# Patient Record
Sex: MALE | Race: WHITE | NOT HISPANIC OR LATINO | Employment: UNEMPLOYED | URBAN - METROPOLITAN AREA
[De-identification: names, ages, dates, MRNs, and addresses within clinical notes are randomized per-mention and may not be internally consistent; named-entity substitution may affect disease eponyms.]

---

## 2017-10-26 ENCOUNTER — APPOINTMENT (OUTPATIENT)
Dept: NON INVASIVE DIAGNOSTICS | Facility: HOSPITAL | Age: 57
DRG: 247 | End: 2017-10-26

## 2017-10-26 ENCOUNTER — APPOINTMENT (EMERGENCY)
Dept: RADIOLOGY | Facility: HOSPITAL | Age: 57
DRG: 247 | End: 2017-10-26

## 2017-10-26 ENCOUNTER — HOSPITAL ENCOUNTER (INPATIENT)
Facility: HOSPITAL | Age: 57
LOS: 1 days | Discharge: HOME | DRG: 247 | End: 2017-10-28
Attending: EMERGENCY MEDICINE | Admitting: INTERNAL MEDICINE

## 2017-10-26 ENCOUNTER — GENERIC CONVERSION - ENCOUNTER (OUTPATIENT)
Dept: OTHER | Facility: OTHER | Age: 57
End: 2017-10-26

## 2017-10-26 ENCOUNTER — APPOINTMENT (OUTPATIENT)
Dept: INTERVENTIONAL RADIOLOGY/VASCULAR | Facility: HOSPITAL | Age: 57
DRG: 247 | End: 2017-10-26
Attending: INTERNAL MEDICINE

## 2017-10-26 DIAGNOSIS — I25.10 CORONARY ARTERY DISEASE INVOLVING NATIVE CORONARY ARTERY: ICD-10-CM

## 2017-10-26 DIAGNOSIS — E78.00 HYPERCHOLESTEROLEMIA: ICD-10-CM

## 2017-10-26 DIAGNOSIS — I20.0 UNSTABLE ANGINA (HCC): Primary | ICD-10-CM

## 2017-10-26 DIAGNOSIS — E78.5 HYPERLIPIDEMIA: ICD-10-CM

## 2017-10-26 PROBLEM — R07.9 CHEST PAIN: Status: ACTIVE | Noted: 2017-10-26

## 2017-10-26 PROBLEM — Z72.0 TOBACCO ABUSE: Status: ACTIVE | Noted: 2017-10-26

## 2017-10-26 LAB
ALBUMIN SERPL BCP-MCNC: 3.9 G/DL (ref 3.5–5)
ALP SERPL-CCNC: 94 U/L (ref 46–116)
ALT SERPL W P-5'-P-CCNC: 47 U/L (ref 12–78)
ANION GAP SERPL CALCULATED.3IONS-SCNC: 7 MMOL/L (ref 4–13)
ANION GAP SERPL CALCULATED.3IONS-SCNC: 8 MMOL/L (ref 4–13)
APTT PPP: 34 SECONDS (ref 23–35)
APTT PPP: 47 SECONDS (ref 23–35)
AST SERPL W P-5'-P-CCNC: 29 U/L (ref 5–45)
ATRIAL RATE: 50 BPM
ATRIAL RATE: 54 BPM
BASOPHILS # BLD AUTO: 0.07 THOUSANDS/ΜL (ref 0–0.1)
BASOPHILS NFR BLD AUTO: 1 % (ref 0–1)
BILIRUB SERPL-MCNC: 0.4 MG/DL (ref 0.2–1)
BUN SERPL-MCNC: 14 MG/DL (ref 5–25)
BUN SERPL-MCNC: 17 MG/DL (ref 5–25)
CALCIUM SERPL-MCNC: 9 MG/DL (ref 8.3–10.1)
CALCIUM SERPL-MCNC: 9.1 MG/DL (ref 8.3–10.1)
CHLORIDE SERPL-SCNC: 103 MMOL/L (ref 100–108)
CHLORIDE SERPL-SCNC: 104 MMOL/L (ref 100–108)
CHOLEST SERPL-MCNC: 250 MG/DL (ref 50–200)
CO2 SERPL-SCNC: 28 MMOL/L (ref 21–32)
CO2 SERPL-SCNC: 29 MMOL/L (ref 21–32)
CREAT SERPL-MCNC: 0.94 MG/DL (ref 0.6–1.3)
CREAT SERPL-MCNC: 1 MG/DL (ref 0.6–1.3)
EOSINOPHIL # BLD AUTO: 0.16 THOUSAND/ΜL (ref 0–0.61)
EOSINOPHIL NFR BLD AUTO: 2 % (ref 0–6)
ERYTHROCYTE [DISTWIDTH] IN BLOOD BY AUTOMATED COUNT: 12.8 % (ref 11.6–15.1)
ERYTHROCYTE [DISTWIDTH] IN BLOOD BY AUTOMATED COUNT: 12.8 % (ref 11.6–15.1)
ERYTHROCYTE [DISTWIDTH] IN BLOOD BY AUTOMATED COUNT: 12.9 % (ref 11.6–15.1)
GFR SERPL CREATININE-BSD FRML MDRD: 84 ML/MIN/1.73SQ M
GFR SERPL CREATININE-BSD FRML MDRD: 91 ML/MIN/1.73SQ M
GLUCOSE P FAST SERPL-MCNC: 86 MG/DL (ref 65–99)
GLUCOSE SERPL-MCNC: 86 MG/DL (ref 65–140)
GLUCOSE SERPL-MCNC: 95 MG/DL (ref 65–140)
HCT VFR BLD AUTO: 44.5 % (ref 36.5–49.3)
HCT VFR BLD AUTO: 44.6 % (ref 36.5–49.3)
HCT VFR BLD AUTO: 45.1 % (ref 36.5–49.3)
HDLC SERPL-MCNC: 44 MG/DL (ref 40–60)
HGB BLD-MCNC: 14.9 G/DL (ref 12–17)
HGB BLD-MCNC: 14.9 G/DL (ref 12–17)
HGB BLD-MCNC: 15.2 G/DL (ref 12–17)
INR PPP: 0.94 (ref 0.86–1.16)
INR PPP: 1.09 (ref 0.86–1.16)
LDLC SERPL CALC-MCNC: 196 MG/DL (ref 0–100)
LYMPHOCYTES # BLD AUTO: 2.3 THOUSANDS/ΜL (ref 0.6–4.47)
LYMPHOCYTES NFR BLD AUTO: 29 % (ref 14–44)
MAGNESIUM SERPL-MCNC: 1.8 MG/DL (ref 1.6–2.6)
MCH RBC QN AUTO: 30.3 PG (ref 26.8–34.3)
MCH RBC QN AUTO: 30.4 PG (ref 26.8–34.3)
MCH RBC QN AUTO: 30.5 PG (ref 26.8–34.3)
MCHC RBC AUTO-ENTMCNC: 33.4 G/DL (ref 31.4–37.4)
MCHC RBC AUTO-ENTMCNC: 33.5 G/DL (ref 31.4–37.4)
MCHC RBC AUTO-ENTMCNC: 33.7 G/DL (ref 31.4–37.4)
MCV RBC AUTO: 91 FL (ref 82–98)
MONOCYTES # BLD AUTO: 0.71 THOUSAND/ΜL (ref 0.17–1.22)
MONOCYTES NFR BLD AUTO: 9 % (ref 4–12)
NEUTROPHILS # BLD AUTO: 4.7 THOUSANDS/ΜL (ref 1.85–7.62)
NEUTS SEG NFR BLD AUTO: 59 % (ref 43–75)
NRBC BLD AUTO-RTO: 0 /100 WBCS
P AXIS: 48 DEGREES
P AXIS: 61 DEGREES
PLATELET # BLD AUTO: 268 THOUSANDS/UL (ref 149–390)
PLATELET # BLD AUTO: 275 THOUSANDS/UL (ref 149–390)
PLATELET # BLD AUTO: 281 THOUSANDS/UL (ref 149–390)
PMV BLD AUTO: 10.1 FL (ref 8.9–12.7)
PMV BLD AUTO: 9.8 FL (ref 8.9–12.7)
PMV BLD AUTO: 9.9 FL (ref 8.9–12.7)
POTASSIUM SERPL-SCNC: 3.7 MMOL/L (ref 3.5–5.3)
POTASSIUM SERPL-SCNC: 4.3 MMOL/L (ref 3.5–5.3)
PR INTERVAL: 164 MS
PR INTERVAL: 172 MS
PROT SERPL-MCNC: 8.1 G/DL (ref 6.4–8.2)
PROTHROMBIN TIME: 12.8 SECONDS (ref 12.1–14.4)
PROTHROMBIN TIME: 14.3 SECONDS (ref 12.1–14.4)
QRS AXIS: -16 DEGREES
QRS AXIS: 28 DEGREES
QRSD INTERVAL: 104 MS
QRSD INTERVAL: 112 MS
QT INTERVAL: 428 MS
QT INTERVAL: 448 MS
QTC INTERVAL: 405 MS
QTC INTERVAL: 408 MS
RBC # BLD AUTO: 4.9 MILLION/UL (ref 3.88–5.62)
RBC # BLD AUTO: 4.92 MILLION/UL (ref 3.88–5.62)
RBC # BLD AUTO: 4.98 MILLION/UL (ref 3.88–5.62)
SODIUM SERPL-SCNC: 139 MMOL/L (ref 136–145)
SODIUM SERPL-SCNC: 140 MMOL/L (ref 136–145)
T WAVE AXIS: -8 DEGREES
T WAVE AXIS: 7 DEGREES
TRIGL SERPL-MCNC: 48 MG/DL
TROPONIN I SERPL-MCNC: <0.02 NG/ML
VENTRICULAR RATE: 50 BPM
VENTRICULAR RATE: 54 BPM
WBC # BLD AUTO: 8 THOUSAND/UL (ref 4.31–10.16)
WBC # BLD AUTO: 8.39 THOUSAND/UL (ref 4.31–10.16)
WBC # BLD AUTO: 8.5 THOUSAND/UL (ref 4.31–10.16)

## 2017-10-26 PROCEDURE — 80048 BASIC METABOLIC PNL TOTAL CA: CPT | Performed by: PHYSICIAN ASSISTANT

## 2017-10-26 PROCEDURE — 84484 ASSAY OF TROPONIN QUANT: CPT | Performed by: INTERNAL MEDICINE

## 2017-10-26 PROCEDURE — 36415 COLL VENOUS BLD VENIPUNCTURE: CPT | Performed by: EMERGENCY MEDICINE

## 2017-10-26 PROCEDURE — 93005 ELECTROCARDIOGRAM TRACING: CPT | Performed by: PHYSICIAN ASSISTANT

## 2017-10-26 PROCEDURE — 85027 COMPLETE CBC AUTOMATED: CPT | Performed by: PHYSICIAN ASSISTANT

## 2017-10-26 PROCEDURE — 80053 COMPREHEN METABOLIC PANEL: CPT | Performed by: EMERGENCY MEDICINE

## 2017-10-26 PROCEDURE — 85730 THROMBOPLASTIN TIME PARTIAL: CPT | Performed by: INTERNAL MEDICINE

## 2017-10-26 PROCEDURE — 85610 PROTHROMBIN TIME: CPT | Performed by: PHYSICIAN ASSISTANT

## 2017-10-26 PROCEDURE — 99285 EMERGENCY DEPT VISIT HI MDM: CPT

## 2017-10-26 PROCEDURE — 84484 ASSAY OF TROPONIN QUANT: CPT | Performed by: EMERGENCY MEDICINE

## 2017-10-26 PROCEDURE — 80061 LIPID PANEL: CPT | Performed by: PHYSICIAN ASSISTANT

## 2017-10-26 PROCEDURE — 71020 HB CHEST X-RAY 2VW FRONTAL&LATL: CPT

## 2017-10-26 PROCEDURE — 99152 MOD SED SAME PHYS/QHP 5/>YRS: CPT | Performed by: PHYSICIAN ASSISTANT

## 2017-10-26 PROCEDURE — 93005 ELECTROCARDIOGRAM TRACING: CPT | Performed by: EMERGENCY MEDICINE

## 2017-10-26 PROCEDURE — 93306 TTE W/DOPPLER COMPLETE: CPT

## 2017-10-26 PROCEDURE — 85730 THROMBOPLASTIN TIME PARTIAL: CPT | Performed by: PHYSICIAN ASSISTANT

## 2017-10-26 PROCEDURE — 83735 ASSAY OF MAGNESIUM: CPT | Performed by: PHYSICIAN ASSISTANT

## 2017-10-26 PROCEDURE — 85025 COMPLETE CBC W/AUTO DIFF WBC: CPT | Performed by: EMERGENCY MEDICINE

## 2017-10-26 RX ORDER — ASPIRIN 325 MG
325 TABLET, DELAYED RELEASE (ENTERIC COATED) ORAL DAILY
Status: DISCONTINUED | OUTPATIENT
Start: 2017-10-26 | End: 2017-10-27

## 2017-10-26 RX ORDER — ATORVASTATIN CALCIUM 20 MG/1
20 TABLET, FILM COATED ORAL
Status: DISCONTINUED | OUTPATIENT
Start: 2017-10-26 | End: 2017-10-27

## 2017-10-26 RX ORDER — NICOTINE 21 MG/24HR
1 PATCH, TRANSDERMAL 24 HOURS TRANSDERMAL DAILY
Status: DISCONTINUED | OUTPATIENT
Start: 2017-10-26 | End: 2017-10-28 | Stop reason: HOSPADM

## 2017-10-26 RX ORDER — FENTANYL CITRATE 50 UG/ML
INJECTION, SOLUTION INTRAMUSCULAR; INTRAVENOUS CODE/TRAUMA/SEDATION MEDICATION
Status: COMPLETED | OUTPATIENT
Start: 2017-10-26 | End: 2017-10-26

## 2017-10-26 RX ORDER — MIDAZOLAM HYDROCHLORIDE 1 MG/ML
INJECTION INTRAMUSCULAR; INTRAVENOUS CODE/TRAUMA/SEDATION MEDICATION
Status: COMPLETED | OUTPATIENT
Start: 2017-10-26 | End: 2017-10-26

## 2017-10-26 RX ORDER — HEPARIN SODIUM 1000 [USP'U]/ML
4000 INJECTION, SOLUTION INTRAVENOUS; SUBCUTANEOUS AS NEEDED
Status: DISCONTINUED | OUTPATIENT
Start: 2017-10-26 | End: 2017-10-27

## 2017-10-26 RX ORDER — HEPARIN SODIUM 1000 [USP'U]/ML
4000 INJECTION, SOLUTION INTRAVENOUS; SUBCUTANEOUS ONCE
Status: COMPLETED | OUTPATIENT
Start: 2017-10-26 | End: 2017-10-26

## 2017-10-26 RX ORDER — HEPARIN SODIUM 10000 [USP'U]/100ML
3-20 INJECTION, SOLUTION INTRAVENOUS
Status: DISCONTINUED | OUTPATIENT
Start: 2017-10-26 | End: 2017-10-27

## 2017-10-26 RX ORDER — SODIUM CHLORIDE 9 MG/ML
100 INJECTION, SOLUTION INTRAVENOUS CONTINUOUS
Status: DISCONTINUED | OUTPATIENT
Start: 2017-10-26 | End: 2017-10-27

## 2017-10-26 RX ORDER — HEPARIN SODIUM 1000 [USP'U]/ML
2000 INJECTION, SOLUTION INTRAVENOUS; SUBCUTANEOUS AS NEEDED
Status: DISCONTINUED | OUTPATIENT
Start: 2017-10-26 | End: 2017-10-27

## 2017-10-26 RX ADMIN — ATORVASTATIN CALCIUM 20 MG: 20 TABLET, FILM COATED ORAL at 17:38

## 2017-10-26 RX ADMIN — HEPARIN SODIUM 2000 UNITS: 1000 INJECTION, SOLUTION INTRAVENOUS; SUBCUTANEOUS at 21:17

## 2017-10-26 RX ADMIN — FENTANYL CITRATE 50 MCG: 50 INJECTION, SOLUTION INTRAMUSCULAR; INTRAVENOUS at 12:01

## 2017-10-26 RX ADMIN — ASPIRIN 325 MG: 325 TABLET, DELAYED RELEASE ORAL at 11:22

## 2017-10-26 RX ADMIN — MIDAZOLAM HYDROCHLORIDE 1 MG: 1 INJECTION, SOLUTION INTRAMUSCULAR; INTRAVENOUS at 12:01

## 2017-10-26 RX ADMIN — SODIUM CHLORIDE 100 ML/HR: 0.9 INJECTION, SOLUTION INTRAVENOUS at 14:57

## 2017-10-26 RX ADMIN — HEPARIN SODIUM 4000 UNITS: 1000 INJECTION, SOLUTION INTRAVENOUS; SUBCUTANEOUS at 13:42

## 2017-10-26 RX ADMIN — NICOTINE 1 PATCH: 21 PATCH, EXTENDED RELEASE TRANSDERMAL at 15:55

## 2017-10-26 RX ADMIN — HEPARIN SODIUM AND DEXTROSE 12 UNITS/KG/HR: 10000; 5 INJECTION INTRAVENOUS at 13:45

## 2017-10-26 NOTE — ED PROVIDER NOTES
History  Chief Complaint   Patient presents with    Chest Pain     Pt states that he started with chest pain yesterday that is across the top of his chest  Pt denies any cardiac hx      This is a 52-year-old male presenting here to the department with a chief complaint of random chest pain  He states that chest pain when he gets it radiates across the entire chest also to the left neck  The chest pain can be exertional in can also be at rest   He has at least 1 episode today at random times usually it is relieved by just resting  This morning he had really sharp pain where he was doubled over  He reports that in the past he had cardiac catheterization was told he had 60% occlusion on   He continues to smoke  He has history is concerning for unstable angina  None       Past Medical History:   Diagnosis Date    Irregular heart beat        History reviewed  No pertinent surgical history  History reviewed  No pertinent family history  I have reviewed and agree with the history as documented  Social History   Substance Use Topics    Smoking status: Current Every Day Smoker     Types: Cigarettes    Smokeless tobacco: Never Used    Alcohol use No        Review of Systems   Constitutional: Negative for diaphoresis, fatigue and fever  HENT: Negative for congestion, ear pain, nosebleeds and sore throat  Eyes: Negative for photophobia, pain, discharge and visual disturbance  Respiratory: Negative for cough, choking, chest tightness, shortness of breath and wheezing  Cardiovascular: Positive for chest pain  Negative for palpitations  Gastrointestinal: Negative for abdominal distention, abdominal pain, diarrhea and vomiting  Genitourinary: Negative for dysuria, flank pain and frequency  Musculoskeletal: Negative for back pain, gait problem and joint swelling  Skin: Negative for color change and rash  Neurological: Negative for dizziness, syncope and headaches  Psychiatric/Behavioral: Negative for behavioral problems and confusion  The patient is not nervous/anxious  All other systems reviewed and are negative  Physical Exam  ED Triage Vitals [10/26/17 0831]   Temperature Pulse Respirations Blood Pressure SpO2   98 2 °F (36 8 °C) 58 16 137/68 98 %      Temp Source Heart Rate Source Patient Position - Orthostatic VS BP Location FiO2 (%)   Oral Monitor Sitting Right arm --      Pain Score       Worst Possible Pain           Orthostatic Vital Signs  Vitals:    10/26/17 1225 10/26/17 1240 10/26/17 1310 10/26/17 1500   BP: 138/79 122/68 126/83 117/58   Pulse: (!) 51 (!) 51 (!) 47 (!) 50   Patient Position - Orthostatic VS:    Lying       Physical Exam   Constitutional: He is oriented to person, place, and time  He appears well-developed and well-nourished  HENT:   Head: Normocephalic and atraumatic  Eyes: Pupils are equal, round, and reactive to light  Neck: Normal range of motion  Neck supple  Cardiovascular: Normal rate, regular rhythm, normal heart sounds and normal pulses  PMI is not displaced  Pulmonary/Chest: Effort normal and breath sounds normal  No respiratory distress  Abdominal: Soft  He exhibits no distension  There is no guarding  Musculoskeletal: Normal range of motion  Lymphadenopathy:     He has no cervical adenopathy  Neurological: He is alert and oriented to person, place, and time  Skin: Skin is warm and dry  No rash noted  He is not diaphoretic  No pallor  Psychiatric: He has a normal mood and affect  Vitals reviewed        ED Medications  Medications   aspirin (ECOTRIN) EC tablet 325 mg (325 mg Oral Given 10/26/17 1122)   atorvastatin (LIPITOR) tablet 20 mg (not administered)   heparin (porcine) 25,000 units in 250 mL infusion (premix) (12 Units/kg/hr × 80 kg (Order-Specific) Intravenous New Bag 10/26/17 1345)   heparin (porcine) injection 4,000 Units (not administered)   heparin (porcine) injection 2,000 Units (not administered)   sodium chloride 0 9 % infusion (100 mL/hr Intravenous New Bag 10/26/17 7497)   nicotine (NICODERM CQ) 21 mg/24 hr TD 24 hr patch 1 patch (1 patch Transdermal Medication Applied 10/26/17 4815)   heparin (porcine) injection 4,000 Units (4,000 Units Intravenous Given 10/26/17 1342)   midazolam (VERSED) injection (1 mg Intravenous Given 10/26/17 1201)   fentanyl citrate (PF) 100 MCG/2ML (50 mcg Intravenous Given 10/26/17 1201)       Diagnostic Studies  Results Reviewed     Procedure Component Value Units Date/Time    APTT six (6) hours after Heparin bolus/drip initiation or dosing change [[id number]]  (Normal) Collected:  10/26/17 1051    Lab Status:  Final result Specimen:  Blood from Arm, Right Updated:  10/26/17 1113     PTT 34 seconds     Narrative:          Therapeutic Heparin Range = 60-90 seconds    Protime-INR [[id number]]  (Normal) Collected:  10/26/17 1051    Lab Status:  Final result Specimen:  Blood from Arm, Right Updated:  10/26/17 1113     Protime 12 8 seconds      INR 0 94    CBC [[id number]]  (Normal) Collected:  10/26/17 1051    Lab Status:  Final result Specimen:  Blood from Arm, Right Updated:  10/26/17 1103     WBC 8 39 Thousand/uL      RBC 4 90 Million/uL      Hemoglobin 14 9 g/dL      Hematocrit 44 5 %      MCV 91 fL      MCH 30 4 pg      MCHC 33 5 g/dL      RDW 12 8 %      Platelets 353 Thousands/uL      MPV 9 9 fL     TSH, 3rd generation [[id number]]     Lab Status:  No result Specimen:  Blood     Troponin I [[id number]]  (Normal) Collected:  10/26/17 0842    Lab Status:  Final result Specimen:  Blood from Arm, Right Updated:  10/26/17 0937     Troponin I <0 02 ng/mL     Narrative:         [employer] Chemistry analyzer 99% cutoff is > 0 04 ng/mL in network labs    o cTnI 99% cutoff is useful only when applied to patients in the clinical setting of myocardial ischemia  o cTnI 99% cutoff should be interpreted in the context of clinical history, ECG findings and possibly cardiac imaging to establish correct diagnosis  o cTnI 99% cutoff may be suggestive but clearly not indicative of a coronary event without the clinical setting of myocardial ischemia  Comprehensive metabolic panel [[id number]] Collected:  10/26/17 0842    Lab Status:  Final result Specimen:  Blood from Arm, Right Updated:  10/26/17 0934     Sodium 139 mmol/L      Potassium 4 3 mmol/L      Chloride 103 mmol/L      CO2 28 mmol/L      Anion Gap 8 mmol/L      BUN 17 mg/dL      Creatinine 0 94 mg/dL      Glucose 95 mg/dL      Calcium 9 1 mg/dL      AST 29 U/L      ALT 47 U/L      Alkaline Phosphatase 94 U/L      Total Protein 8 1 g/dL      Albumin 3 9 g/dL      Total Bilirubin 0 40 mg/dL      eGFR 91 ml/min/1 73sq m     Narrative:         National Kidney Disease Education Program recommendations are as follows:  GFR calculation is accurate only with a steady state creatinine  Chronic Kidney disease less than 60 ml/min/1 73 sq  meters  Kidney failure less than 15 ml/min/1 73 sq  meters  CBC and differential [[id number]]  (Normal) Collected:  10/26/17 0842    Lab Status:  Final result Specimen:  Blood from Arm, Right Updated:  10/26/17 0856     WBC 8 00 Thousand/uL      RBC 4 98 Million/uL      Hemoglobin 15 2 g/dL      Hematocrit 45 1 %      MCV 91 fL      MCH 30 5 pg      MCHC 33 7 g/dL      RDW 12 9 %      MPV 10 1 fL      Platelets 448 Thousands/uL      nRBC 0 /100 WBCs      Neutrophils Relative 59 %      Lymphocytes Relative 29 %      Monocytes Relative 9 %      Eosinophils Relative 2 %      Basophils Relative 1 %      Neutrophils Absolute 4 70 Thousands/µL      Lymphocytes Absolute 2 30 Thousands/µL      Monocytes Absolute 0 71 Thousand/µL      Eosinophils Absolute 0 16 Thousand/µL      Basophils Absolute 0 07 Thousands/µL                  X-ray chest 2 views   Final Result by Kym Vegas MD (10/26 0920)      Mild central airway peribronchial thickening suggesting bronchitis           Workstation performed: [id number] Procedures  Procedures       Phone Contacts  ED Phone Contact    ED Course  ED Course          HEART Risk Score    Flowsheet Row Most Recent Value   History  2 Filed at: 10/26/2017 1623   ECG  1 Filed at: 10/26/2017 1623   Age  1 Filed at: 10/26/2017 1623   Risk Factors  1 Filed at: 10/26/2017 1623   Troponin  0 Filed at: 10/26/2017 1623   Heart Score Risk Calculator   History  2 Filed at: 10/26/2017 1623   ECG  1 Filed at: 10/26/2017 1623   Age  1 Filed at: 10/26/2017 1623   Risk Factors  1 Filed at: 10/26/2017 1623   Troponin  0 Filed at: 10/26/2017 1623   HEART Score  5 Filed at: 10/26/2017 1623   HEART Score  5 Filed at: 10/26/2017 1623                            [facility]  Number of Diagnoses or Management Options  Unstable angina [facility]): new and requires workup  Diagnosis management comments: Although there is no ST elevation on EKG the patient story is very concerning for unstable angina the case was discussed with Cardiology who evaluated the patient here in the department and determined that he may need catheterization given his risk factors and history  Amount and/or Complexity of Data Reviewed  Clinical lab tests: reviewed and ordered  Tests in the radiology section of CPT®: reviewed and ordered  Tests in the medicine section of CPT®: reviewed and ordered  Review and summarize past medical records: yes  Independent visualization of images, tracings, or specimens: yes    Patient Progress  Patient progress: stable    CritCare Time    Disposition  Final diagnoses:   Unstable angina ([address] )     Time reflects when diagnosis was documented in both MDM as applicable and the Disposition within this note     Time User Action Codes Description Comment    10/26/2017  9:58 AM Barbara Barraza Add [I20 0] Unstable angina [facility])       ED Disposition     None      Follow-up Information    None       There are no discharge medications for this patient  No discharge procedures on file      ED Provider  Electronically Signed by           Ilana Goldman, RALPH  10/26/17 Narda Hughes, RALPH  10/26/17 6760

## 2017-10-26 NOTE — PLAN OF CARE
DISCHARGE PLANNING     Discharge to home or other facility with appropriate resources Progressing        DISCHARGE PLANNING - CARE MANAGEMENT     Discharge to post-acute care or home with appropriate resources Progressing        INFECTION - ADULT     Absence or prevention of progression during hospitalization Progressing        Knowledge Deficit     Patient/family/caregiver demonstrates understanding of disease process, treatment plan, medications, and discharge instructions Progressing        Nutrition/Hydration-ADULT     Nutrient/Hydration intake appropriate for improving, restoring or maintaining nutritional needs Progressing        PAIN - ADULT     Verbalizes/displays adequate comfort level or baseline comfort level Progressing        SAFETY ADULT     Patient will remain free of falls Progressing     Maintain or return to baseline ADL function Progressing     Maintain or return mobility status to optimal level Progressing

## 2017-10-26 NOTE — PROGRESS NOTES
Patient transported to [facility] 1 emergently for emergency procedure coming into lab  Report given to [facility], care assumed  VSS

## 2017-10-26 NOTE — CONSULTS
Consultation - Cardiology Team One  Dede Gonsales 55 y o  male MRN: [id number]  Unit/Bed#: ED 14 Encounter: [id number]    Inpatient consult to Cardiology  Consult performed by: Tatum Devlin ordered by: Lida Wolfe          Physician Requesting Consult: No att  providers found  Reason for Consult / Principal Problem: Unstable angina    HPI: Cardiologist Dr Elijah Tejeda is a 55y o  year old male who has a history of CAD (cath in 2009 showed left main 40% and RCA 40% stenosis) and tobacco abuse  Pt came to ED with complaints of  Chest pain for the last couple of weeks  He states that the chest pain is a pressure like pain in the left side of his chest  He states it occurs with rest and exertion  He states this am it was 10/10 on pain scale  He states it lasted a few minutes then resolved, but he got scare and came to ED for evaluation  He denies shortness of breath  He has family history of CAD, his father had Mi age 47  He is a smoker  REVIEW OF SYSTEMS:  Constitutional:  Denies fever or chills   Eyes:  Denies change in visual acuity   HENT:  Denies nasal congestion or sore throat   Respiratory:  Denies cough or shortness of breath   Cardiovascular: +chest pain Denies edema   GI:  Denies abdominal pain, nausea, vomiting, bloody stools or diarrhea   :  Denies dysuria, frequency, difficulty in micturition and nocturia  Musculoskeletal:  Denies back pain or joint pain   Neurologic:  Denies headache, focal weakness or sensory changes   Endocrine:  Denies polyuria or polydipsia   Lymphatic:  Denies swollen glands   Psychiatric:  Denies depression or anxiety     Historical Information   History reviewed  No pertinent past medical history  History reviewed  No pertinent surgical history    History   Alcohol Use No     History   Drug Use No     History   Smoking Status    Current Every Day Smoker    Types: Cigarettes   Smokeless Tobacco    Never Used       Family History: History reviewed  No pertinent family history  MEDS & ALLERGIES:  all current active meds have been reviewed and current meds: Current Facility-Administered Medications   Medication Dose Route Frequency    aspirin (ECOTRIN) EC tablet 325 mg  325 mg Oral Daily    atorvastatin (LIPITOR) tablet 20 mg  20 mg Oral Daily With Dinner    heparin (porcine) 25,000 units in 250 mL infusion (premix)  3-20 Units/kg/hr (Order-Specific) Intravenous Titrated    heparin (porcine) injection 2,000 Units  2,000 Units Intravenous PRN    heparin (porcine) injection 4,000 Units  4,000 Units Intravenous Once    heparin (porcine) injection 4,000 Units  4,000 Units Intravenous PRN       heparin (porcine) 3-20 Units/kg/hr (Order-Specific)     No Known Allergies    OBJECTIVE:  Vitals:   Vitals:    10/26/17 1045   BP: 145/86   Pulse: (!) 51   Resp: 16   Temp:    SpO2: 100%     Body mass index is 29 05 kg/m²  Systolic (63ZSQ), JAYNE:910 , Min:137 , OGX:921     Diastolic (45PMD), VGS:01, Min:68, Max:86    No intake or output data in the 24 hours ending 10/26/17 1057  Weight (last 2 days)     Date/Time   Weight    10/26/17 0831  81 6 (180)            Invasive Devices     Peripheral Intravenous Line            Peripheral IV 10/26/17 Right Antecubital less than 1 day                PHYSICAL EXAMS:  General:  Patient is not in acute distress, laying in the bed comfortably, awake, alert responding to commands  Head: Normocephalic, Atraumatic  HEENT: White sclera, pink conjunctiva,  PERRLA,pharynx benign  Neck:  Supple, no neck vein distention, carotids+2/+2 no bruits, thyromegaly, adenopathy  Respiratory: clear to P/A  Cardiovascular:  PMI normal, S1-S2 normal, No  Murmurs, thrills, gallops, rubs   Regular rhythm  GI:  Abdomen soft nontender   No hepatosplenomegaly, adenopathy, ascites,or rebound tenderness  Extremities: No edema, normal pulses, no calf tenderness, no joint deformities, no venous disease   Integument:  No skin rashes or ulceration  Lymphatic:  No cervical or inguinal lymphadenopathy  Neurologic:  Patient is awake alert, responding to command, well-oriented to time and place and person moving all extremities    LABORATORY RESULTS:    Results from last 7 days  Lab Units 10/26/17  0842   TROPONIN I ng/mL <0 02     CBC with diff:   Results from last 7 days  Lab Units 10/26/17  0842   WBC Thousand/uL 8 00   HEMOGLOBIN g/dL 15 2   HEMATOCRIT % 45 1   MCV fL 91   PLATELETS Thousands/uL 281   MCH pg 30 5   MCHC g/dL 33 7   RDW % 12 9   MPV fL 10 1   NRBC AUTO /100 WBCs 0       CMP:  Results from last 7 days  Lab Units 10/26/17  0842   SODIUM mmol/L 139   POTASSIUM mmol/L 4 3   CHLORIDE mmol/L 103   CO2 mmol/L 28   ANION GAP mmol/L 8   BUN mg/dL 17   CREATININE mg/dL 0 94   GLUCOSE RANDOM mg/dL 95   CALCIUM mg/dL 9 1   AST U/L 29   ALT U/L 47   ALK PHOS U/L 94   TOTAL PROTEIN g/dL 8 1   ALBUMIN g/dL 3 9   BILIRUBIN TOTAL mg/dL 0 40   EGFR ml/min/1 73sq m 91       BMP:  Results from last 7 days  Lab Units 10/26/17  0842   SODIUM mmol/L 139   POTASSIUM mmol/L 4 3   CHLORIDE mmol/L 103   CO2 mmol/L 28   BUN mg/dL 17   CREATININE mg/dL 0 94   GLUCOSE RANDOM mg/dL 95   CALCIUM mg/dL 9 1                                    Imaging:   I have personally reviewed pertinent reports  EKG reviewed personally:  Sinus richie, st depressions inferiorly    Telemetry reviewed personally:   Sinus richie HR 50's    Assessment/Plan:  1  Chest pain; 1st trop negative; in patient with known CAD (cath in 2009 left main 40% and rca 40% stenosis) add heparin gtt  Add asa and lipitor  No bb given bradycardia  Check echo to assess lvef and rwm  Discussed the need for cardiac cath to assess for progression of CAD  Risk and benefits reviewed, all questions answered  Pt was quoted risk of 1% for bleeding, infection,stroke and MI  Consent will be obtained  Npo after midnight  Pt to have cardiac cath tomorrow,  10/27/2017   Further recommendations will be based upon cardiac cath findings  Code Status: No Order    Counseling / Coordination of Care  Total floor / unit time spent today 35 minutes  Greater than 50% of total time was spent with the patient and / or family counseling and / or coordination of care  A description of the counseling / coordination of care: Review of history, current assessment, development of a plan      Connor Palomo PA-C  10/26/2017,10:57 AM

## 2017-10-26 NOTE — H&P
History and Physical - [facility]    Patient Information: Michelle Gillette 55 y o  male MRN: [id number]  Unit/Bed#: ED 14 Encounter: [id number]  Admitting Physician: Babar Fountain MD  PCP: No primary care provider on file  Date of Admission:  10/26/17    Assessment/Plan:    Hospital Problem List:     Principal Problem:    Chest pain  Active Problems:    Tobacco abuse    Coronary artery disease involving native coronary artery    Present on Admission:   Chest pain   Tobacco abuse   Coronary artery disease involving native coronary artery      Plan for the Primary Problem(s):  · Chest pain/unstable angina  Patient with history of having coronary artery disease-60% of 1 of his artery was clogged as per patient about 5 years ago  Seen by Cardiology  Is being started on IV heparin in addition to aspirin and statins  Patient takes baby aspirin at home  Not a candidate of a beta-blocker as he is bradycardic with heart rate going into high 40s and low 50s  · Bradycardia  Patient was pretty of lack before  Has been doing less work out  Would check his TSH  · Tobacco use  Encouraged to quit    Plan for Additional Problems:   · As above     VTE Prophylaxis: Heparin Drip  / sequential compression device   Code Status:  Full code  POLST: There is no POLST form on file for this patient (pre-hospital)    Anticipated Length of Stay:  Patient will be admitted on an Observation basis with an anticipated length of stay of  less than 2 midnights  Justification for Hospital Stay:  Chest pain        Chief Complaint:   Chest pain    History of Present Illness:    Michelle Gillette is a 55 y o  male who presents with off and on chest pain going on for the last couple of weeks  This morning his chest pain was 10/10 in intensity  At the moment chest pain is resolved and it is 0/10 in intensity  Chest pain was like pressure in upper part of his chest   He also has some discomfort of his neck    No shortness of breath  Chest pain would come at any time while at times he would be working and at times he would be just at rest   Chest pain would also be relieved on its own  No real exacerbating or relieving factors  No headache  No dizziness  Denies any fever or chills  Denies any abdominal pain  Denies any nausea or vomiting    Patient had a cardiac catheterization done about 5 years ago and as per patient 1 of his artery was 60% blocked  Review of Systems    All systems are reviewed  Positive as per history of presenting illness  Patient answered no to all other questions  Past Medical and Surgical History:  Coronary artery disease as above            Meds/Allergies:  No known allergies to medications, food or dye    Prior to Admission medications    Baby aspirin daily     I have reviewed home medications with patient personally  Allergies: No Known Allergies    Social History:     Marital Status: /Civil Union   Occupation:  He works in construction business  Patient Pre-hospital Living Situation:  With family  Patient Pre-hospital Level of Mobility:  Independent  Patient Pre-hospital Diet Restrictions:  None  Substance Use History:   History   Alcohol Use No     History   Smoking Status    Current Every Day Smoker    Types: Cigarettes   Smokeless Tobacco    Never Used     History   Drug Use No       Family History:    His father  at age [de-identified] and he had an MI  Physical Exam      Vitals:   Blood Pressure: 145/86 (10/26/17 1045)  Pulse: (!) 51 (10/26/17 104)  Temperature: 98 2 °F (36 8 °C) (10/26/17 0831)  Temp Source: Oral (10/26/17 0831)  Respirations: 16 (10/26/17 1045)  Height: 5' 6" (167 6 cm) (10/26/17 0831)  Weight - Scale: 81 6 kg (180 lb) (10/26/17 0831)  SpO2: 100 % (10/26/17 1045)    Vital signs are reviewed as above  Constitutional:  Patient laying in stretcher in the ER  Eyes: EOM grossly intact  Conjunctivae slightly pale   Patient has anicteric sclera  HENT: Oropharynx are moist  Did not notice any significant lesions on the tongue  Head normocephalic  Neck: Neck is supple  I was not able to visualize any JVD at 90°  There is no significant lymphadenopathy  I also did not notice any significant thyromegaly  Cardiac: I did not hear any rubs or gallop  Patient appears to be in sinus rhythm although bradycardic  Respiratory: Patient not in significant respiratory distress  Air entry in general is good  GI: Abdomen is soft  It is grossly nontender  Bowel sounds are adequate  I was not able to appreciate any hepatosplenomegaly  Neurologic:  Patient is awake and alert  Neurological examination is grossly intact  No obvious focal neurological deficit noticed  Skin: Skin is warm and dry  Psychiatric: Mood and affect are pleasant  Musculoskeletal  Patient moving all extremities   Has chronic arthritic joints   Extremities: Patient has no significant cyanosis, clubbing, or lower extremity edema           Additional Data:     Lab Results: I have personally reviewed pertinent reports  Results from last 7 days  Lab Units 10/26/17  1051 10/26/17  0842   WBC Thousand/uL 8 39 8 00   HEMOGLOBIN g/dL 14 9 15 2   HEMATOCRIT % 44 5 45 1   PLATELETS Thousands/uL 275 281   NEUTROS PCT %  --  59   LYMPHS PCT %  --  29   MONOS PCT %  --  9   EOS PCT %  --  2       Results from last 7 days  Lab Units 10/26/17  0842   SODIUM mmol/L 139   POTASSIUM mmol/L 4 3   CHLORIDE mmol/L 103   CO2 mmol/L 28   BUN mg/dL 17   CREATININE mg/dL 0 94   CALCIUM mg/dL 9 1   TOTAL PROTEIN g/dL 8 1   BILIRUBIN TOTAL mg/dL 0 40   ALK PHOS U/L 94   ALT U/L 47   AST U/L 29   GLUCOSE RANDOM mg/dL 95       Results from last 7 days  Lab Units 10/26/17  1051   INR  0 94       Imaging: I have personally reviewed pertinent reports  X-ray Chest 2 Views    Result Date: 10/26/2017  Narrative: CHEST DUAL ENERGY INDICATION:  Chest pain for a week  Smoker   COMPARISON:  None VIEWS:  PA and lateral projections; 2 images FINDINGS:     Cardiomediastinal silhouette appears unremarkable  Mild central away peribronchial thickening   No pneumothorax or pleural effusion  Visualized osseous structures appear within normal limits for the patient's age  Impression: Mild central airway peribronchial thickening suggesting bronchitis  Workstation performed: [id number]       EKG, Pathology, and Other Studies Reviewed on Admission:   · EKG:  Sinus Faythe [place]  I do not see any acute ischemic changes  Heart rate in 50s    Allscripts Records Reviewed: No     ** Please Note: Dragon 360 Dictation voice to text software may have been used in the creation of this document   **

## 2017-10-26 NOTE — PROGRESS NOTES
Patient transported to [facility] on stretcher on monitor, report given to [facility], care assumed  Expalined PTT needed to be drawn in 6 hours and that cardiac cath is scheduled for 0800 tomorrow morning  Patient agreeable to plan, Dr Munoz Query aware

## 2017-10-27 ENCOUNTER — APPOINTMENT (OUTPATIENT)
Dept: INTERVENTIONAL RADIOLOGY/VASCULAR | Facility: HOSPITAL | Age: 57
DRG: 247 | End: 2017-10-27
Attending: INTERNAL MEDICINE

## 2017-10-27 PROBLEM — I20.0 UNSTABLE ANGINA (HCC): Status: ACTIVE | Noted: 2017-10-26

## 2017-10-27 LAB
APTT PPP: 43 SECONDS (ref 23–35)
APTT PPP: 52 SECONDS (ref 23–35)

## 2017-10-27 PROCEDURE — B211YZZ FLUOROSCOPY OF MULTIPLE CORONARY ARTERIES USING OTHER CONTRAST: ICD-10-PCS | Performed by: INTERNAL MEDICINE

## 2017-10-27 PROCEDURE — C1887 CATHETER, GUIDING: HCPCS | Performed by: PHYSICIAN ASSISTANT

## 2017-10-27 PROCEDURE — C1874 STENT, COATED/COV W/DEL SYS: HCPCS

## 2017-10-27 PROCEDURE — 93458 L HRT ARTERY/VENTRICLE ANGIO: CPT | Performed by: PHYSICIAN ASSISTANT

## 2017-10-27 PROCEDURE — 85730 THROMBOPLASTIN TIME PARTIAL: CPT | Performed by: INTERNAL MEDICINE

## 2017-10-27 PROCEDURE — 4A023N7 MEASUREMENT OF CARDIAC SAMPLING AND PRESSURE, LEFT HEART, PERCUTANEOUS APPROACH: ICD-10-PCS | Performed by: INTERNAL MEDICINE

## 2017-10-27 PROCEDURE — 99152 MOD SED SAME PHYS/QHP 5/>YRS: CPT | Performed by: PHYSICIAN ASSISTANT

## 2017-10-27 PROCEDURE — C1769 GUIDE WIRE: HCPCS | Performed by: PHYSICIAN ASSISTANT

## 2017-10-27 PROCEDURE — 99153 MOD SED SAME PHYS/QHP EA: CPT | Performed by: PHYSICIAN ASSISTANT

## 2017-10-27 PROCEDURE — 027135Z DILATION OF CORONARY ARTERY, TWO ARTERIES WITH TWO DRUG-ELUTING INTRALUMINAL DEVICES, PERCUTANEOUS APPROACH: ICD-10-PCS | Performed by: INTERNAL MEDICINE

## 2017-10-27 PROCEDURE — C9601 PERC DRUG-EL COR STENT BRAN: HCPCS | Performed by: PHYSICIAN ASSISTANT

## 2017-10-27 PROCEDURE — C1725 CATH, TRANSLUMIN NON-LASER: HCPCS | Performed by: PHYSICIAN ASSISTANT

## 2017-10-27 PROCEDURE — C1894 INTRO/SHEATH, NON-LASER: HCPCS | Performed by: PHYSICIAN ASSISTANT

## 2017-10-27 PROCEDURE — C9600 PERC DRUG-EL COR STENT SING: HCPCS | Performed by: PHYSICIAN ASSISTANT

## 2017-10-27 RX ORDER — MIDAZOLAM HYDROCHLORIDE 1 MG/ML
INJECTION INTRAMUSCULAR; INTRAVENOUS CODE/TRAUMA/SEDATION MEDICATION
Status: COMPLETED | OUTPATIENT
Start: 2017-10-27 | End: 2017-10-27

## 2017-10-27 RX ORDER — LIDOCAINE HYDROCHLORIDE 10 MG/ML
INJECTION, SOLUTION INFILTRATION; PERINEURAL CODE/TRAUMA/SEDATION MEDICATION
Status: COMPLETED | OUTPATIENT
Start: 2017-10-27 | End: 2017-10-27

## 2017-10-27 RX ORDER — ACETAMINOPHEN 325 MG/1
650 TABLET ORAL EVERY 6 HOURS PRN
Status: DISCONTINUED | OUTPATIENT
Start: 2017-10-27 | End: 2017-10-28 | Stop reason: HOSPADM

## 2017-10-27 RX ORDER — ATORVASTATIN CALCIUM 40 MG/1
40 TABLET, FILM COATED ORAL
Status: DISCONTINUED | OUTPATIENT
Start: 2017-10-27 | End: 2017-10-28 | Stop reason: HOSPADM

## 2017-10-27 RX ORDER — NITROGLYCERIN 0.4 MG/1
0.4 TABLET SUBLINGUAL
Status: DISCONTINUED | OUTPATIENT
Start: 2017-10-27 | End: 2017-10-28 | Stop reason: HOSPADM

## 2017-10-27 RX ORDER — SODIUM CHLORIDE 9 MG/ML
75 INJECTION, SOLUTION INTRAVENOUS CONTINUOUS
Status: DISPENSED | OUTPATIENT
Start: 2017-10-27 | End: 2017-10-27

## 2017-10-27 RX ORDER — FENTANYL CITRATE 50 UG/ML
INJECTION, SOLUTION INTRAMUSCULAR; INTRAVENOUS CODE/TRAUMA/SEDATION MEDICATION
Status: COMPLETED | OUTPATIENT
Start: 2017-10-27 | End: 2017-10-27

## 2017-10-27 RX ORDER — ASPIRIN 81 MG/1
81 TABLET ORAL DAILY
Status: DISCONTINUED | OUTPATIENT
Start: 2017-10-28 | End: 2017-10-28 | Stop reason: HOSPADM

## 2017-10-27 RX ORDER — ONDANSETRON 2 MG/ML
4 INJECTION INTRAMUSCULAR; INTRAVENOUS EVERY 6 HOURS PRN
Status: DISCONTINUED | OUTPATIENT
Start: 2017-10-27 | End: 2017-10-28 | Stop reason: HOSPADM

## 2017-10-27 RX ORDER — NITROGLYCERIN 20 MG/100ML
INJECTION INTRAVENOUS CODE/TRAUMA/SEDATION MEDICATION
Status: COMPLETED | OUTPATIENT
Start: 2017-10-27 | End: 2017-10-27

## 2017-10-27 RX ORDER — MAGNESIUM HYDROXIDE/ALUMINUM HYDROXICE/SIMETHICONE 120; 1200; 1200 MG/30ML; MG/30ML; MG/30ML
30 SUSPENSION ORAL EVERY 6 HOURS PRN
Status: DISCONTINUED | OUTPATIENT
Start: 2017-10-27 | End: 2017-10-28 | Stop reason: HOSPADM

## 2017-10-27 RX ORDER — VERAPAMIL HCL 2.5 MG/ML
AMPUL (ML) INTRAVENOUS CODE/TRAUMA/SEDATION MEDICATION
Status: COMPLETED | OUTPATIENT
Start: 2017-10-27 | End: 2017-10-27

## 2017-10-27 RX ADMIN — VERAPAMIL HYDROCHLORIDE 2.5 MG: 2.5 INJECTION, SOLUTION INTRAVENOUS at 08:59

## 2017-10-27 RX ADMIN — MIDAZOLAM HYDROCHLORIDE 2 MG: 1 INJECTION, SOLUTION INTRAMUSCULAR; INTRAVENOUS at 08:26

## 2017-10-27 RX ADMIN — FENTANYL CITRATE 50 MCG: 50 INJECTION, SOLUTION INTRAMUSCULAR; INTRAVENOUS at 08:55

## 2017-10-27 RX ADMIN — FENTANYL CITRATE 50 MCG: 50 INJECTION, SOLUTION INTRAMUSCULAR; INTRAVENOUS at 08:26

## 2017-10-27 RX ADMIN — BIVALIRUDIN 1.75 MG/KG/HR: 250 INJECTION, POWDER, LYOPHILIZED, FOR SOLUTION INTRAVENOUS at 08:48

## 2017-10-27 RX ADMIN — SODIUM CHLORIDE 75 ML/HR: 0.9 INJECTION, SOLUTION INTRAVENOUS at 11:30

## 2017-10-27 RX ADMIN — TICAGRELOR 180 MG: 90 TABLET ORAL at 09:10

## 2017-10-27 RX ADMIN — ACETAMINOPHEN 650 MG: 325 TABLET, FILM COATED ORAL at 11:26

## 2017-10-27 RX ADMIN — MIDAZOLAM HYDROCHLORIDE 1 MG: 1 INJECTION, SOLUTION INTRAMUSCULAR; INTRAVENOUS at 08:57

## 2017-10-27 RX ADMIN — ATORVASTATIN CALCIUM 40 MG: 40 TABLET, FILM COATED ORAL at 16:09

## 2017-10-27 RX ADMIN — NITROGLYCERIN 200 MCG: 20 INJECTION INTRAVENOUS at 08:30

## 2017-10-27 RX ADMIN — TICAGRELOR 90 MG: 90 TABLET ORAL at 23:00

## 2017-10-27 RX ADMIN — HEPARIN SODIUM 2000 UNITS: 1000 INJECTION, SOLUTION INTRAVENOUS; SUBCUTANEOUS at 04:30

## 2017-10-27 RX ADMIN — NITROGLYCERIN 200 MCG: 20 INJECTION INTRAVENOUS at 08:59

## 2017-10-27 RX ADMIN — ASPIRIN 325 MG: 325 TABLET, DELAYED RELEASE ORAL at 07:33

## 2017-10-27 RX ADMIN — LIDOCAINE HYDROCHLORIDE 1 ML: 10 INJECTION, SOLUTION INFILTRATION; PERINEURAL at 08:29

## 2017-10-27 RX ADMIN — VERAPAMIL HYDROCHLORIDE 2.5 MG: 2.5 INJECTION, SOLUTION INTRAVENOUS at 08:30

## 2017-10-27 RX ADMIN — IOHEXOL 115 ML: 350 INJECTION, SOLUTION INTRAVENOUS at 09:13
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
## 2017-10-27 NOTE — PROGRESS NOTES
[facility] Progress Note  Patient: Hector Mendoza 55 y o  male   MRN: [id number]  PCP: No primary care provider on file  Unit/Bed#: [facility] Encounter: [id number]  Date Of Visit: 10/27/17    Assessment/Plan:    Principal Problem:    Unstable angina (HCC)  Active Problems:    Tobacco abuse    Coronary artery disease involving native coronary artery    Present on Admission:   Unstable angina ([address] )   Tobacco abuse   Coronary artery disease involving native coronary artery      · Unstable angina /coronary artery disease  Patient status post stent placement x2  Not a candidate for beta-blocker because of resting bradycardia  · Dyslipidemia  On statin  Would increase the dose  Further titration on outpatient basis  · Tobacco use  Encouraged to quit      VTE Pharmacologic Prophylaxis:   Pharmacologic: Heparin Drip  Mechanical VTE Prophylaxis in Place: Yes    Patient Centered Rounds: I have performed bedside rounds with nursing staff today  Discussions with Specialists or Other Care Team Provider:  Yes    Education and Discussions with Family / Patient:  Yes    Time Spent for Care: 35+ minutes  More than 50% of total time spent on counseling and coordination of care as described above  Current Length of Stay: 0 day(s)    Current Patient Status: Observation   Certification Statement: The patient will continue to require additional inpatient hospital stay due to Post cardiac catheterization/stent placement/unstable angina    Discharge Plan:  Home when stable    Code Status: Level 1 - Full Code      Subjective:   Patient status post cardiac catheterization and placement of 2 stents  Denies any chest pain, however, he is complaining of headache  Denies any nausea or vomiting    Denies any fever or chills    Objective:     Vitals:   Temp (24hrs), Av °F (36 7 °C), Min:97 7 °F (36 5 °C), Max:98 1 °F (36 7 °C)    HR:  [47-57] 48  Resp:  [14-22] 18  BP: (114-160)/(58-87) 137/71  SpO2: [96 %-100 %] 100 %  Body mass index is 29 28 kg/m²  Input and Output Summary (last 24 hours): Intake/Output Summary (Last 24 hours) at 10/27/17 1111  Last data filed at 10/27/17 0617   Gross per 24 hour   Intake             1340 ml   Output              450 ml   Net              890 ml           Physical Exam:     Vital signs are reviewed as above  Constitutional:  Patient in bed  Patient laying comfortably although somewhat sleepy in complaining of some headache  Eyes: EOM grossly intact  HENT: Oropharynx are slightly   Neck: Neck is supple  Cardiac: I did not hear any rubs or gallop  Patient appears to be in sinus rhythm although bradycardic  Respiratory: Patient not in significant respiratory distress  Air entry in general is fair anterolaterally  GI: Abdomen is soft  It is grossly nontender  Bowel sounds are adequate  I was not able to appreciate any hepatosplenomegaly  Neurologic:  Patient is awake and alert  Neurological examination is grossly intact  No obvious focal neurological deficit noticed  He is somewhat sleepy post Cath  Skin: Skin is warm and dry  Psychiatric: Mood and affect are pleasant  Musculoskeletal  Patient moving all extremities   Extremities: Patient has no significant cyanosis, clubbing, or lower extremity edema       Additional Data:     Labs:      Results from last 7 days  Lab Units 10/26/17  1454  10/26/17  0842   WBC Thousand/uL 8 50  < > 8 00   HEMOGLOBIN g/dL 14 9  < > 15 2   HEMATOCRIT % 44 6  < > 45 1   PLATELETS Thousands/uL 268  < > 281   NEUTROS PCT %  --   --  59   LYMPHS PCT %  --   --  29   MONOS PCT %  --   --  9   EOS PCT %  --   --  2   < > = values in this interval not displayed      Results from last 7 days  Lab Units 10/26/17  1454 10/26/17  0842   SODIUM mmol/L 140 139   POTASSIUM mmol/L 3 7 4 3   CHLORIDE mmol/L 104 103   CO2 mmol/L 29 28   BUN mg/dL 14 17   CREATININE mg/dL 1 00 0 94   CALCIUM mg/dL 9 0 9 1   TOTAL PROTEIN g/dL  --  8 1 BILIRUBIN TOTAL mg/dL  --  0 40   ALK PHOS U/L  --  94   ALT U/L  --  47   AST U/L  --  29   GLUCOSE RANDOM mg/dL 86 95       Results from last 7 days  Lab Units 10/26/17  1454   INR  1 09       * I Have Reviewed All Lab Data Listed Above  * Additional Pertinent Lab Tests Reviewed: All Labs Within Last 24 Hours Reviewed      Recent Cultures (last 7 days):           Last 24 Hours Medication List:     aspirin 325 mg Oral Daily   atorvastatin 20 mg Oral Daily With Dinner   nicotine 1 patch Transdermal Daily        Today, Patient Was Seen By: Sheridan Hall MD    ** Please Note: Dragon 360 Dictation voice to text software may have been used in the creation of this document   **
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
## 2017-10-27 NOTE — BRIEF OP NOTE (RAD/CATH)
Left heart catheterization, circumflex and RCA PCI Procedure Note    PATIENT NAME: Nicol Rapp  : 1960  MRN: [id number]     Pre-op Diagnosis:   1  Unstable angina (HCC)      Post-op Diagnosis:   1   Unstable angina ([address] )        Surgeon:   Oneida Serrato MD    Estimated Blood Loss: Minimal  Findings:   90% proximal circumflex lesion status post drug-eluting stent basement  80% mid RCA lesion status post drug-eluting stent placement    Complications:  None    Anesthesia: Conscious sedation and Local     Plan: aspirin and brilanta of at least one year    Oneida Serrato MD     Date: 10/27/2017  Time: 3:05 PM

## 2017-10-27 NOTE — CASE MANAGEMENT
[address] in the [facility] by Andrew Clemons for 2017  Network Utilization Review Department  Phone: [phone]; Fax [phone]  ATTENTION: The Network Utilization Review Department is now centralized for our 7 Facilities  Make a note that we have a new phone and fax numbers for our Department  Please call with any questions or concerns to [phone] and carefully follow the prompts so that you are directed to the right person  All voicemails are confidential  Fax any determinations, approvals, denials, and requests for initial or continue stay review clinical to [phone]  Due to HIGH CALL volume, it would be easier if you could please send faxed requests to expedite your requests and in part, help us provide discharge notifications faster  Initial Clinical Review    Admission: Date/Time/Statement: 10/26  1106    Orders Placed This Encounter   Procedures    Place in Observation     Standing Status:   Standing     Number of Occurrences:   1     Order Specific Question:   Admitting Physician     Answer:   Juan Jose Frost [[id number]]     Order Specific Question:   Level of Care     Answer:   Med Surg [16]     ED: Date/Time/Mode of Arrival:   ED Arrival Information     Expected Arrival Acuity Means of Arrival Escorted By Service Admission Type    - 10/26/2017 08:26 Urgent Walk-In Self General Medicine Urgent    Arrival Complaint    Chest pain          Chief Complaint:   Chief Complaint   Patient presents with    Chest Pain     Pt states that he started with chest pain yesterday that is across the top of his chest  Pt denies any cardiac hx        History of Illness:      Alanis Contreras is a 55 y o  male who presents with off and on chest pain going on for the last couple of weeks  This morning his chest pain was 10/10 in intensity  At the moment chest pain is resolved and it is 0/10 in intensity    Chest pain was like pressure in upper part of his chest  He also has some discomfort of his neck  No shortness of breath  Chest pain would come at any time while at times he would be working and at times he would be just at rest   Chest pain would also be relieved on its own  No real exacerbating or relieving factors  No headache  No dizziness  Denies any fever or chills  Denies any abdominal pain  Denies any nausea or vomiting  ED Vital Signs:   ED Triage Vitals [10/26/17 0831]   Temperature Pulse Respirations Blood Pressure SpO2   98 2 °F (36 8 °C) 58 16 137/68 98 %      Temp Source Heart Rate Source Patient Position - Orthostatic VS BP Location FiO2 (%)   Oral Monitor Sitting Right arm --      Pain Score       Worst Possible Pain        Wt Readings from Last 1 Encounters:   10/26/17 82 3 kg (181 lb 7 oz)       Vital Signs (abnormal):   10/26/17 1310 --  47 22 126/83 100 % -- CL   10/26/17 1240 --  51 14 122/68 100 % -- CL   10/26/17 1225 --  51 17 138/79 100 % -- CL   10/26/17 1210 --  51 18 136/75 100 % -- CL   10/26/17 1128 --  52 16 160/83 100 % -- SJO   10/26/17 1045 --  51 16 145/86 100 %           Abnormal Labs/Diagnostic Test Results: CXR -     Mild central airway peribronchial thickening suggesting bronchitis  EKG- SB   54    ED Treatment:   Medication Administration from 10/26/2017 0826 to 10/26/2017 1403       Date/Time Order Dose Route Action Action by Comments     10/26/2017 1122 aspirin (ECOTRIN) EC tablet 325 mg 325 mg Oral Given Nereyda Frias RN      10/26/2017 1342 heparin (porcine) injection 4,000 Units 4,000 Units Intravenous Given Keisha Moreau RN      10/26/2017 1345 heparin (porcine) 25,000 units in 250 mL infusion (premix) 12 Units/kg/hr Intravenous Hu Camargo RN      10/26/2017 1201 midazolam (VERSED) injection 1 mg Intravenous Given Keisha Moreau RN      10/26/2017 1201 fentanyl citrate (PF) 100 MCG/2ML 50 mcg Intravenous Given Keisha Moreau RN           Past Medical/Surgical History:    Active Ambulatory Problems     Diagnosis Date Noted    No Active Ambulatory Problems     Resolved Ambulatory Problems     Diagnosis Date Noted    No Resolved Ambulatory Problems     Past Medical History:   Diagnosis Date    Irregular heart beat        Admitting Diagnosis: Unstable angina (HCC) [I20 0]  Chest pain [R07 9]    Age/Sex: 55 y o  male    Assessment/Plan: Hospital Problem List:   Principal Problem:    Chest pain  Active Problems:    Tobacco abuse    Coronary artery disease involving native coronary artery   Present on Admission:   Chest pain   Tobacco abuse   Coronary artery disease involving native coronary artery   Plan for the Primary Problem(s):  · Chest pain/unstable angina  Patient with history of having coronary artery disease-60% of 1 of his artery was clogged as per patient about 5 years ago  Seen by Cardiology  Is being started on IV heparin in addition to aspirin and statins  Patient takes baby aspirin at home  Not a candidate of a beta-blocker as he is bradycardic with heart rate going into high 40s and low 50s  · Bradycardia  Patient was pretty of lack before  Has been doing less work out  Would check his TSH  ? Tobacco use  Encouraged to quit   Plan for Additional Problems:   · As above    VTE Prophylaxis: Heparin Drip  / sequential compression device   Code Status:  Full code  POLST: There is no POLST form on file for this patient (pre-hospital)   Anticipated Length of Stay:  Patient will be admitted on an Observation basis with an anticipated length of stay of  less than 2 midnights     Justification for Hospital Stay:  Chest pain       Admission Orders:  Scheduled Meds:   aspirin 325 mg Oral Daily   atorvastatin 20 mg Oral Daily With Dinner   nicotine 1 patch Transdermal Daily     Continuous Infusions:   heparin (porcine) 3-20 Units/kg/hr (Order-Specific) Last Rate: Stopped (10/27/17 7061)   sodium chloride 100 mL/hr Last Rate: 100 mL/hr (10/26/17 1457)     PRN Meds: heparin (porcine)   heparin (porcine)    NPO   Up and OOB as haley   Tele   10/27 ptt  10/26 tsh   Cardiac cath  10/27  SCD  Serial trop       Cardiology consult  10/26  Assessment/Plan:  1  Chest pain; 1st trop negative; in patient with known CAD (cath in 2009 left main 40% and rca 40% stenosis) add heparin gtt  Add asa and lipitor  No bb given bradycardia  Check echo to assess lvef and rwm  Discussed the need for cardiac cath to assess for progression of CAD  Risk and benefits reviewed, all questions answered  Pt was quoted risk of 1% for bleeding, infection,stroke and MI  Consent will be obtained  Npo after midnight  Pt to have cardiac cath tomorrow,  10/27/2017  Further recommendations will be based upon cardiac cath findings       Internal med progress note  10/27  Assessment/Plan:   Principal Problem:    Unstable angina [facility])  Active Problems:    Tobacco abuse    Coronary artery disease involving native coronary artery   Present on Admission:   Unstable angina ([facility] [address] )   Tobacco abuse   Coronary artery disease involving native coronary artery   · Unstable angina /coronary artery disease  Patient status post stent placement x2  Not a candidate for beta-blocker because of resting bradycardia  · Dyslipidemia  On statin  Would increase the dose  Further titration on outpatient basis  · Tobacco use  Encouraged to quit   VTE Pharmacologic Prophylaxis:   Pharmacologic: Heparin Drip  Mechanical VTE Prophylaxis in Place: Yes   Patient Centered Rounds: I have performed bedside rounds with nursing staff today    Discussions with Specialists or Other Care Team Provider:  Yes   Education and Discussions with Family / Patient:  Collin Brooks  Time Spent for Care: 35+ minutes    More than 50% of total time spent on counseling and coordination of care as described above    Current Length of Stay: 0 day(s)   Current Patient Status: Observation   Certification Statement: The patient will continue to require additional inpatient hospital stay due to Post cardiac catheterization/stent placement/unstable angina   Discharge Plan:  Home when stable

## 2017-10-27 NOTE — PROGRESS NOTES
Patient transported to [facility] [address] on stretcher on monitor, report given to [facility], care assumed  Assessed right radial puncture site with RN, site is clean, dry and intact  No signs or symptoms of bleeding or hematoma  Cap refill is brisk, fingers are warm to touch and pink

## 2017-10-28 VITALS
TEMPERATURE: 98.2 F | BODY MASS INDEX: 29.16 KG/M2 | HEART RATE: 58 BPM | SYSTOLIC BLOOD PRESSURE: 115 MMHG | WEIGHT: 181.44 LBS | OXYGEN SATURATION: 98 % | HEIGHT: 66 IN | RESPIRATION RATE: 19 BRPM | DIASTOLIC BLOOD PRESSURE: 74 MMHG

## 2017-10-28 PROBLEM — E78.00 HYPERCHOLESTEROLEMIA: Status: ACTIVE | Noted: 2017-10-28

## 2017-10-28 LAB
ANION GAP SERPL CALCULATED.3IONS-SCNC: 9 MMOL/L (ref 4–13)
BUN SERPL-MCNC: 13 MG/DL (ref 5–25)
CALCIUM SERPL-MCNC: 8.5 MG/DL (ref 8.3–10.1)
CHLORIDE SERPL-SCNC: 108 MMOL/L (ref 100–108)
CO2 SERPL-SCNC: 24 MMOL/L (ref 21–32)
CREAT SERPL-MCNC: 0.91 MG/DL (ref 0.6–1.3)
ERYTHROCYTE [DISTWIDTH] IN BLOOD BY AUTOMATED COUNT: 13 % (ref 11.6–15.1)
GFR SERPL CREATININE-BSD FRML MDRD: 95 ML/MIN/1.73SQ M
GLUCOSE SERPL-MCNC: 108 MG/DL (ref 65–140)
HCT VFR BLD AUTO: 42.1 % (ref 36.5–49.3)
HGB BLD-MCNC: 14 G/DL (ref 12–17)
MCH RBC QN AUTO: 30.4 PG (ref 26.8–34.3)
MCHC RBC AUTO-ENTMCNC: 33.3 G/DL (ref 31.4–37.4)
MCV RBC AUTO: 91 FL (ref 82–98)
PLATELET # BLD AUTO: 250 THOUSANDS/UL (ref 149–390)
PMV BLD AUTO: 10.3 FL (ref 8.9–12.7)
POTASSIUM SERPL-SCNC: 4.1 MMOL/L (ref 3.5–5.3)
RBC # BLD AUTO: 4.61 MILLION/UL (ref 3.88–5.62)
SODIUM SERPL-SCNC: 141 MMOL/L (ref 136–145)
WBC # BLD AUTO: 8.52 THOUSAND/UL (ref 4.31–10.16)

## 2017-10-28 PROCEDURE — 85027 COMPLETE CBC AUTOMATED: CPT | Performed by: PHYSICIAN ASSISTANT

## 2017-10-28 PROCEDURE — 80048 BASIC METABOLIC PNL TOTAL CA: CPT | Performed by: PHYSICIAN ASSISTANT

## 2017-10-28 RX ORDER — ASPIRIN 81 MG/1
81 TABLET ORAL DAILY
Refills: 0
Start: 2017-10-29 | End: 2019-12-26 | Stop reason: HOSPADM

## 2017-10-28 RX ORDER — CLOPIDOGREL BISULFATE 75 MG/1
75 TABLET ORAL DAILY
Qty: 30 TABLET | Refills: 11 | Status: SHIPPED | OUTPATIENT
Start: 2017-10-28 | End: 2019-06-21 | Stop reason: SDUPTHER

## 2017-10-28 RX ORDER — ATORVASTATIN CALCIUM 40 MG/1
40 TABLET, FILM COATED ORAL
Qty: 30 TABLET | Refills: 2 | Status: SHIPPED | OUTPATIENT
Start: 2017-10-28 | End: 2019-06-21 | Stop reason: SDUPTHER

## 2017-10-28 RX ORDER — CLOPIDOGREL BISULFATE 75 MG/1
75 TABLET ORAL DAILY
Status: DISCONTINUED | OUTPATIENT
Start: 2017-10-28 | End: 2017-10-28 | Stop reason: HOSPADM

## 2017-10-28 RX ADMIN — TICAGRELOR 90 MG: 90 TABLET ORAL at 08:35

## 2017-10-28 RX ADMIN — ASPIRIN 81 MG: 81 TABLET, COATED ORAL at 08:35

## 2017-10-28 RX ADMIN — CLOPIDOGREL BISULFATE 75 MG: 75 TABLET ORAL at 12:17

## 2017-10-28 NOTE — DISCHARGE SUMMARY
Discharge Summary - Gaina Cantrell [facility] Internal Medicine    Patient Information: Steve Gu 55 y o  male MRN: [id number]  Unit/Bed#: -01 Encounter: [id number]    Discharging Physician / Practitioner: Yann Willis MD  PCP: No primary care provider on file  Admission Date: 10/26/2017  Discharge Date: 10/28/17    Reason for Admission:  Chest pain    Discharge Diagnoses:     Principal Problem:    Unstable angina [facility])  Active Problems:    Tobacco abuse    Coronary artery disease involving native coronary artery    Hypercholesterolemia  Resolved Problems:    * No resolved hospital problems  *    Present on Admission:   Unstable angina ([address] )   Tobacco abuse   Coronary artery disease involving native coronary artery   Hypercholesterolemia    Consultations During Hospital Stay:  · Cardiology    Procedures Performed:     · Cardiac catheterization    Significant Findings:     Findings:   · 90% proximal circumflex lesion status post drug-eluting stent basement  · 80% mid RCA lesion status post drug-eluting stent placement    Incidental Findings:   · As above     Test Results Pending at Discharge (will require follow up): · None     Outpatient Tests Requested:  · None    Complications:  None    Hospital Course:     Steve Gu is a 55 y o  male patient who originally presented to the hospital on 10/26/2017 due to chest pain  He was ruled out for an acute ischemic event with serial cardiac enzymes and EKG  He has history of coronary artery disease which did not require any stent placement in the past   He was seen by Cardiology  Had a cardiac catheterization and results as above  He had 2 stents put in  He is not a candidate for beta-blocker because of bradycardia  He has been started on aspirin in addition to Plavix and statin  Patient claims that he had some trouble with his liver functions when he was on statin    I have ordered LFTs to be drawn in a month and patient to follow up with his primary care physician [facility] cardiologist about continuing statin  He was made aware of the importance of taking his medications including antiplatelets  He was also strongly encouraged smoking  Encouraged him on healthy diet and exercise    Condition at Discharge: good     Discharge Day Visit / Exam:     Subjective:  No further chest pain or any other symptoms  Vitals: Blood Pressure: 115/74 (10/28/17 1000)  Pulse: 58 (10/28/17 1000)  Temperature: 98 2 °F (36 8 °C) (10/28/17 1000)  Temp Source: Oral (10/28/17 1000)  Respirations: 19 (10/28/17 1000)  Height: 5' 6" (167 6 cm) (10/26/17 1500)  Weight - Scale: 82 3 kg (181 lb 7 oz) (10/26/17 1500)  SpO2: 98 % (10/28/17 1000)  Exam:        Vital signs are reviewed as above  Constitutional:  Patient in bed  Patient is sitting  comfortably  Eyes: EOM grossly intact  Conjunctivae are normal    HENT: Oropharynx are moist  Did not notice any significant lesions on the tongue  Head normocephalic  Neck: Neck is supple  I was not able to visualize any JVD at 90°  There is no significant lymphadenopathy  I also did not notice any significant thyromegaly  Cardiac: I did not hear any rubs or gallop  Patient in sinus rhythm although [place]   Respiratory: Patient not in significant respiratory distress  Air entry in general is fair  GI: Abdomen is soft  It is grossly nontender  Bowel sounds are adequate  I was not able to appreciate any hepatosplenomegaly  Neurologic:  Patient is awake and alert  Neurological examination is grossly intact  No obvious focal neurological deficit noticed  Skin: Skin is warm and dry  Psychiatric:  Patient was quite upset this morning when I saw him for really no recent  Also he refused to take Brilinta  Explained to him and then he was calmer and cooperative  Musculoskeletal  Patient moving all extremities       Extremities: Patient has no significant cyanosis, clubbing, or lower extremity edema               Discharge instructions/Information to patient and family:   See after visit summary for information provided to patient and family  Provisions for Follow-Up Care:  See after visit summary for information related to follow-up care and any pertinent home health orders  Disposition:     Home    For Discharges to [address] SNF:   · Not Applicable to this Patient - Not Applicable to this Patient    Planned Readmission:  None     Discharge Statement:  I spent more than 35 minutes discharging the patient  This time was spent on the day of discharge  I had direct contact with the patient on the day of discharge  Greater than 50% of the total time was spent examining patient, answering all patient questions, arranging and discussing plan of care with patient as well as directly providing post-discharge instructions  Additional time then spent on discharge activities  Discharge Medications:  See after visit summary for reconciled discharge medications provided to patient and family  ** Please Note: Dragon 360 Dictation voice to text software may have been used in the creation of this document   **

## 2017-10-28 NOTE — PROGRESS NOTES
General Cardiology   Progress Note   Tasneem Right 55 y o  male MRN: [id number]  Unit/Bed#: -01 Encounter: [id number]      SUBJECTIVE:   No significant events overnight  I am feeling good  I am ready to go home  Denies any chest pain  REVIEW OF SYSTEMS:  Constitutional:  Denies fever or chills   Eyes:  Denies change in visual acuity   HENT:  Denies nasal congestion or sore throat   Respiratory:  Denies cough or shortness of breath   Cardiovascular:  Denies chest pain or edema   GI:  Denies abdominal pain, nausea, vomiting, bloody stools or diarrhea   :  Denies dysuria, frequency, difficulty in micturition and nocturia  Musculoskeletal:  Denies back pain or joint pain   Neurologic:  Denies headache, focal weakness or sensory changes   Endocrine:  Denies polyuria or polydipsia   Lymphatic:  Denies swollen glands   Psychiatric:  Denies depression or anxiety     OBJECTIVE:   Vitals:  Vitals:    10/28/17 1000   BP: 115/74   Pulse: 58   Resp: 19   Temp: 98 2 °F (36 8 °C)   SpO2: 98%     Body mass index is 29 28 kg/m²  Systolic (77CFE), FIB:204 , Min:115 , YGX:840     Diastolic (92RMK), YRS:87, Min:64, Max:77      Intake/Output Summary (Last 24 hours) at 10/28/17 1203  Last data filed at 10/28/17 0430   Gross per 24 hour   Intake             1560 ml   Output             1450 ml   Net              110 ml     Weight (last 2 days)     Date/Time   Weight    10/26/17 1500  82 3 (181 44)    10/26/17 0831  81 6 (180)              PHYSICAL EXAMS:  General:  Patient is not in acute distress, laying in the bed comfortably, awake, alert responding to commands  Head: Normocephalic, Atraumatic  HEENT:  Both pupils normal-size atraumatic, normocephalic, nonicteric  Neck:  JVP not raised  Trachea central  Respiratory:  Bronchovascular breathing all over the chest without any accompaniment  Cardiovascular:  S1-S2 normal  Regular rate and rhythm  GI:  Abdomen soft nontender   Liver and spleen normal size  Lymphatic: No cervical or inguinal lymphadenopathy  Neurologic:  Patient is awake alert, responding to command, well-oriented to time and place and person moving     LABORATORY RESULTS:    Results from last 7 days  Lab Units 10/26/17  2010 10/26/17  1748 10/26/17  1454   TROPONIN I ng/mL <0 02 <0 02 <0 02       CBC with diff:   Results from last 7 days  Lab Units 10/28/17  0521 10/26/17  1454 10/26/17  1051 10/26/17  0842   WBC Thousand/uL 8 52 8 50 8 39 8 00   HEMOGLOBIN g/dL 14 0 14 9 14 9 15 2   HEMATOCRIT % 42 1 44 6 44 5 45 1   MCV fL 91 91 91 91   PLATELETS Thousands/uL 250 268 275 281   MCH pg 30 4 30 3 30 4 30 5   MCHC g/dL 33 3 33 4 33 5 33 7   RDW % 13 0 12 8 12 8 12 9   MPV fL 10 3 9 8 9 9 10 1   NRBC AUTO /100 WBCs  --   --   --  0       CMP:  Results from last 7 days  Lab Units 10/28/17  0521 10/26/17  1454 10/26/17  0842   SODIUM mmol/L 141 140 139   POTASSIUM mmol/L 4 1 3 7 4 3   CHLORIDE mmol/L 108 104 103   CO2 mmol/L 24 29 28   ANION GAP mmol/L 9 7 8   BUN mg/dL 13 14 17   CREATININE mg/dL 0 91 1 00 0 94   GLUCOSE RANDOM mg/dL 108 86 95   CALCIUM mg/dL 8 5 9 0 9 1   AST U/L  --   --  29   ALT U/L  --   --  47   ALK PHOS U/L  --   --  94   TOTAL PROTEIN g/dL  --   --  8 1   ALBUMIN g/dL  --   --  3 9   BILIRUBIN TOTAL mg/dL  --   --  0 40   EGFR ml/min/1 73sq m 95 84 91       BMP:  Results from last 7 days  Lab Units 10/28/17  0521 10/26/17  1454 10/26/17  0842   SODIUM mmol/L 141 140 139   POTASSIUM mmol/L 4 1 3 7 4 3   CHLORIDE mmol/L 108 104 103   CO2 mmol/L 24 29 28   BUN mg/dL 13 14 17   CREATININE mg/dL 0 91 1 00 0 94   GLUCOSE RANDOM mg/dL 108 86 95   CALCIUM mg/dL 8 5 9 0 9 1              Results from last 7 days  Lab Units 10/26/17  1454   MAGNESIUM mg/dL 1 8               Results from last 7 days  Lab Units 10/26/17  1454 10/26/17  1051   INR  1 09 0 94       Lipid Profile:   Lab Results   Component Value Date    CHOL 250 (H) 10/26/2017     Lab Results   Component Value Date    HDL 44 10/26/2017 Lab Results   Component Value Date    LDLCALC 196 (H) 10/26/2017     Lab Results   Component Value Date    TRIG 48 10/26/2017       Cardiac testing:  Results for orders placed during the hospital encounter of 10/26/17   Echo complete with contrast if indicated    Narrative [address] [place] [address]  [phone]    Transthoracic Echocardiogram  2D, M-mode, Doppler, and Color Doppler    Study date:  26-Oct-2017    Patient: Angel Mccollum  MR number: [id number]  Account number: [de-identified]  : 1960  Age: 55 years  Gender: Male  Status: Outpatient  Location: Bedside  Height: 66 in  Weight: 180 lb  BP: 160/ 83 mmHg    Indications: Chets pain, pressure and tightness, Assess left ventricular function  Diagnoses: R07 9 - Chest pain, unspecified    Sonographer:   [facility] , RDCS  Interpreting Physician:  Carmelo Vera MD  Referring Physician:  Weston Ponce PA-C  Group:  [facility]    SUMMARY    LEFT VENTRICLE:  Ejection fraction was estimated to be 60 %  There were no regional wall motion abnormalities  MITRAL VALVE:  There was trace regurgitation  TRICUSPID VALVE:  There was trace regurgitation  HISTORY: Symptoms: chest pain  PRIOR HISTORY: CAD, Risk factors: a history of current cigarette use (within the last month)  PROCEDURE: The procedure was performed at the bedside  This was a routine study  The transthoracic approach was used  The study included complete 2D imaging, M-mode, complete spectral Doppler, and color Doppler  The heart rate was 48 bpm,  at the start of the study  Images were obtained from the parasternal, apical, subcostal, and suprasternal notch acoustic windows  Image quality was adequate  LEFT VENTRICLE: Size was normal  Ejection fraction was estimated to be 60 %  There were no regional wall motion abnormalities   DOPPLER: Left ventricular diastolic function parameters were normal     RIGHT VENTRICLE: The size was normal  Systolic function was normal  Wall thickness was normal     LEFT ATRIUM: Size was normal     RIGHT ATRIUM: Size was normal     MITRAL VALVE: Valve structure was normal  There was normal leaflet separation  DOPPLER: The transmitral velocity was within the normal range  There was no evidence for stenosis  There was trace regurgitation  AORTIC VALVE: The valve was trileaflet  Leaflets exhibited normal thickness and normal cuspal separation  DOPPLER: Transaortic velocity was within the normal range  There was no evidence for stenosis  There was no regurgitation  TRICUSPID VALVE: The valve structure was normal  There was normal leaflet separation  DOPPLER: The transtricuspid velocity was within the normal range  There was no evidence for stenosis  There was trace regurgitation  PULMONIC VALVE: Leaflets exhibited normal thickness, no calcification, and normal cuspal separation  DOPPLER: The transpulmonic velocity was within the normal range  There was no regurgitation  PERICARDIUM: There was no pericardial effusion  The pericardium was normal in appearance  AORTA: The root exhibited normal size      SYSTEM MEASUREMENT TABLES    2D  %FS: 41 1 %  Ao Diam: 3 2 cm  EDV(Teich): 105 4 ml  EF(Teich): 71 9 %  ESV(Teich): 29 7 ml  IVSd: 1 1 cm  LA Area: 19 4 cm2  LA Diam: 3 1 cm  LVEDV MOD A4C: 113 9 ml  LVEF MOD A4C: 53 %  LVESV MOD A4C: 53 5 ml  LVIDd: 4 8 cm  LVIDs: 2 8 cm  LVLd A4C: 9 2 cm  LVLs A4C: 7 1 cm  LVPWd: 1 cm  RA Area: 17 5 cm2  RVIDd: 3 5 cm  SV MOD A4C: 60 4 ml  SV(Teich): 75 8 ml    CW  TR Vmax: 2 6 m/s  TR maxP 3 mmHg    MM  TAPSE: 2 1 cm    PW  E': 0 1 m/s  E/E': 8 8  MV A Luis Alberto: 0 7 m/s  MV Dec Thurston: 4 m/s2  MV DecT: 231 8 ms  MV E Luis Alberto: 0 9 m/s  MV E/A Ratio: 1 3  MV PHT: 67 2 ms  MVA By PHT: 3 3 cm2    [employer] Accredited Echocardiography Laboratory    Prepared and electronically signed by    Adelita Arango MD  Signed 26-Oct-2017 16:09:55       No results found for this or any previous visit  No results found for this or any previous visit  No procedure found  No results found for this or any previous visit  Meds/Allergies   all current active meds have been reviewed and current meds:   Current Facility-Administered Medications   Medication Dose Route Frequency    acetaminophen (TYLENOL) tablet 650 mg  650 mg Oral Q6H PRN    aluminum-magnesium hydroxide-simethicone (MYLANTA) 200-200-20 mg/5 mL oral suspension 30 mL  30 mL Oral Q6H PRN    aspirin (ECOTRIN LOW STRENGTH) EC tablet 81 mg  81 mg Oral Daily    atorvastatin (LIPITOR) tablet 40 mg  40 mg Oral Daily With Dinner    clopidogrel (PLAVIX) tablet 75 mg  75 mg Oral Daily    nicotine (NICODERM CQ) 21 mg/24 hr TD 24 hr patch 1 patch  1 patch Transdermal Daily    nitroglycerin (NITROSTAT) SL tablet 0 4 mg  0 4 mg Sublingual Q5 Min PRN    ondansetron (ZOFRAN) injection 4 mg  4 mg Intravenous Q6H PRN     No prescriptions prior to admission  ASSESSMENT & PLAN   1   CAD status post drug-eluting stent to 90% proximal circumflex lesion as well as 80% mid RCA lesion  Continue all medications aspirin 81 mg daily, Lipitor 40 mg daily, Plavix 75 mg daily  No beta blockers as patient has a tendency for bradycardia  Discussed the importance of staying on Plavix for the next 1 year without interruption  Advise report any bleeding  All questions answered  Patient is stable from a cardiac standpoint to be discharged  Follow up in the office in 1-2 weeks  Rolando Jasmine PA-C  10/28/2017,12:03 PM    Portions of the record may have been created with voice recognition software   Occasional wrong word or "sound a like" substitutions may have occurred due to the inherent limitations of voice recognition software   Read the chart carefully and recognize, using context, where substitutions have occurred

## 2017-10-28 NOTE — PLAN OF CARE
Problem: DISCHARGE PLANNING - CARE MANAGEMENT  Goal: Discharge to post-acute care or home with appropriate resources  INTERVENTIONS:  - Conduct assessment to determine patient/family and health care team treatment goals, and need for post-acute services based on payer coverage, community resources, and patient preferences, and barriers to discharge  - Address psychosocial, clinical, and financial barriers to discharge as identified in assessment in conjunction with the patient/family and health care team  - Arrange appropriate level of post-acute services according to patient's   needs and preference and payer coverage in collaboration with the physician and health care team  - Communicate with and update the patient/family, physician, and health care team regarding progress on the discharge plan  - Arrange appropriate transportation to post-acute venues    Outcome: Completed Date Met: 10/28/17  Nurse requested CM meet with pt at bedside  Pt reported he may have difficulties paying for prescriptions pending cost  CM spoke with [facility] who provided Plavix and Lipitor prescriptions  CM spoke with [facility] who reported prescriptions would be $3 95 each  CM met with pt at bedside and he was agreeable  CM faxed prescriptions to [facility] for   CM notified WILEY and nurse  Pt has no other needs at this time

## 2017-10-28 NOTE — SOCIAL WORK
Nurse requested CM meet with pt at bedside  Pt reported he may have difficulties paying for prescriptions pending cost  CM spoke with [facility] who provided Plavix and Lipitor prescriptions  CM spoke with [facility] who reported prescriptions would be $3 95 each  CM met with pt at bedside and he was agreeable  CM faxed prescriptions to [facility] for   CM notified WILEY and nurse  Pt has no other needs at this time

## 2017-10-30 NOTE — CASE MANAGEMENT
UPDATE:   Was OBS 10/26/17 @1106  Changed to INPATIENT 10/27/17 @2237  10/27/17 2238  Inpatient Admission Once     Transfer Service: General Medicine       Question Answer Comment   Admitting Physician Greer Todd    Level of Care Med Surg    Estimated length of stay More than 2 Midnights    Certification I certify that inpatient services are medically necessary for this patient for a duration of greater than two midnights  See H&P and MD Progress Notes for additional information about the patient's course of treatment          10/27/17 2237

## 2017-11-08 ENCOUNTER — ALLSCRIPTS OFFICE VISIT (OUTPATIENT)
Dept: OTHER | Facility: OTHER | Age: 57
End: 2017-11-08

## 2017-11-10 NOTE — PROGRESS NOTES
Assessment  Assessed    1  History of hyperlipidemia (V12 29) (Z86 39)   2  History Of Prior Surgery   3  Family history of myocardial infarction (V17 3) (Z82 49) : Father   4  Former smoker (V15 82) (C10 669)   5  CAD (coronary artery disease) (414 00) (I25 10)   6  History of heart artery stent (V45 82) (Z95 5)    Plan  CAD (coronary artery disease)    · From  Plavix 75 MG Oral Tablet TAKE 1 TABLET DAILY To Clopidogrel [id number] MG Oral Tablet (Plavix) TAKE 1 TABLET DAILY   Rx By: Garima Dumont; Dispense: 30 Days ; #:30 Tablet; Refill: 4;CAD (coronary artery disease); DANNY = N; [facility]   · Aspirin 81 MG Oral Tablet Delayed Release; TAKE 1 TABLET DAILY ASDIRECTED   Rx By: Garima Dumont; Dispense: 30 Days ; #:30 Tablet Delayed Release; Refill: 11;CAD (coronary artery disease); DANNY = N; Sent To: [facility]  History of heart artery stent    · Follow-up visit in 6 months Evaluation and Treatment  Follow-up  Status: Hold For -Scheduling  Requested for: [id number]   Ordered; For: History of heart artery stent; Ordered By: Garima Dumont Performed:  Due: [id number]  Hyperlipidemia    · Atorvastatin Calcium 40 MG Oral Tablet; Take 1 tablet daily   Rx By: Garima Dumont; Dispense: 90 Days ; #:90 Tablet; Refill: 3;For: Hyperlipidemia; DANNY = N; Sent To: [facility]   · (1) COMPREHENSIVE METABOLIC PANEL; Status:Active; Requested KXZ:[id number];    Perform:[facility]; IGK:[id number]; Ordered;Ordered By:Dalton Enriquez;   · (1) LIPID PANEL FASTING W DIRECT LDL REFLEX; Status:Active; Requestedfor:[id number];    Perform:[facility]; NAL:[id number]; Ordered;Ordered By:Dalton Enriquez; Discussion/Summary  Cardiology Discussion Summary Free Text Note Form [facility]:   #1   Coronary disease status post RCA and circumflex PCI with drug-eluting stentpain is resolvedand Plavix for at least a yearatorvastatinbeta blocker due to bradycardia and no ACE inhibitor due to hypotensionHyperlipidemialipid profile in 6 weeks for improvement in LDL, last LDL was 196  Chief Complaint  Chief Complaint Free Text Note Form: Hospital follow-up for unstable angina      History of Present Illness  Cardiology HPI Free Text Note Form [facility]: 55-year-old male with past medical history of uncontrolled hyperlipidemia presented to the hospital last week with chest pain concerning for unstable angina  He had cardiac catheterization which showed significant lesions in proximal circumflex and RCA status post drug-eluting stent placement  Patient's chest pain is resolved after intervention  He is tolerating his medications well  Patient was not started on beta blocker due to significant bradycardia  His LV systolic function was normal And his blood pressure was low, hence was not initiated on ACE inhibitor  heart catheterization:circumflex: There was a tubular 90 % stenosis  There was KAMLESH grade 3 flow through the vessel (brisk flow)  This lesion is a likely culprit for the patient's anginal symptoms  An intervention was performed  circumflex: There was a 50 % stenosis  RCA: There was a tubular 50 % stenosis  RCA: There was a discrete 80 % stenosis  There was KAMLESH grade 3 flow through the vessel (brisk flow)  This lesion is a likely culprit for the patient's anginal symptoms  An intervention was performed  RCA: There was a 50 % stenosis  showed normal systolic function      Review of Systems  Cardiology Male ROS:    Cardiac: No complaints of chest pain, no palpitations, no fainiting  Skin: No complaints of nonhealing sores or skin rash  Genitourinary: No complaints of recurrent urinary tract infections, frequent urination at night, difficult urination, blood in urine, kidney stones, loss of bladder control, no kidney or prostate problems, no erectile dysfunction  Psychological: No complaints of feeling depressed, anxiety, panic attacks, or difficulty concentrating    General: No complaints of trouble sleeping, lack of energy, fatigue, appetite changes, weight changes, fever, frequent infections, or night sweats  Respiratory: No complaints of shortness of breath, cough with sputum, or wheezing  HEENT: No complaints of serious problems, hearing problems, nose problems, throat problems, or snoring  Gastrointestinal: No complaints of liver problems, nausea, vomiting, heartburn, constipation, bloody stools, diarrhea, problems swallowing, adbominal pain, or rectal bleeding  Hematologic: No complaints of bleeding disorders, anemia, blood clots, or excessive brusing  Neurological: No complaints of numbness, tingling, dizziness, weakness, seizures, headaches, syncope or fainting, AM fatigue, daytime sleepiness, no witnessed apnea episodes  Musculoskeletal: No complaints of arthritis, back pain, or painfull swelling  ROS Reviewed:   ROS reviewed  Active Problems  Problems    1  CAD (coronary artery disease) (414 00) (I25 10)   2  Hyperlipidemia (272 4) (E78 5)    Past Medical History  Problems    1  History of hyperlipidemia (V12 29) (Z86 39)  Active Problems And Past Medical History Reviewed: The active problems and past medical history were reviewed and updated today  Surgical History  Problems    1  History Of Prior Surgery  Surgical History Reviewed: The surgical history was reviewed and updated today  Family History  Father    1  Family history of myocardial infarction (V17 3) (Z82 49)  Family History Reviewed: The family history was reviewed and updated today  Social History  Problems    · Former smoker (W26 75) (B48 233)   ·    · Occasional alcohol use  Social History Reviewed: The social history was reviewed and updated today  The social history was reviewed and is unchanged  Current Meds   1  Aspirin 81 MG Oral Tablet Delayed Release; TAKE 1 TABLET DAILY AS DIRECTED; Therapy: [id number] to Recorded   2  Atorvastatin Calcium 40 MG Oral Tablet; Take 1 tablet daily;  Therapy: [id number] to (Nettie Contreras) Recorded   3  Plavix 75 MG Oral Tablet; TAKE 1 TABLET DAILY; Therapy: [id number] to (Nettie Diane) Recorded  Medication List Reviewed: The medication list was reviewed and updated today  Allergies  Medication    1  No Known Drug Allergies    Vitals  Vital Signs    Recorded: 25RTQ0963 03:25PM   Heart Rate 68   Systolic 016   Diastolic 60   Height 5 ft 7 in   Weight 176 lb 6 0 oz   BMI Calculated 27 62   BSA Calculated 1 92   O2 Saturation 97       Physical Exam   Constitutional  General appearance: No acute distress, well appearing and well nourished  Eyes  Conjunctiva and Sclera examination: Conjunctiva pink, sclera anicteric  Ears, Nose, Mouth, and Throat - Oropharynx: Clear, nares are clear, mucous membranes are moist   Neck  Neck and thyroid: Normal, supple, trachea midline, no thyromegaly  Pulmonary  Respiratory effort: No increased work of breathing or signs of respiratory distress  Auscultation of lungs: Clear to auscultation, no rales, no rhonchi, no wheezing, good air movement  Cardiovascular  Auscultation of heart: Normal rate and rhythm, normal S1 and S2, no murmurs  Carotid pulses: Normal, 2+ bilaterally  Peripheral vascular exam: Normal pulses throughout, no tenderness, erythema or swelling  Pedal pulses: Normal, 2+ bilaterally  Examination of extremities for edema and/or varicosities: Normal    Abdomen  Abdomen: Non-tender and no distention  Liver and spleen: No hepatomegaly or splenomegaly  Musculoskeletal Gait and station: Normal gait  -- Digits and nails: Normal without clubbing or cyanosis  -- Inspection/palpation of joints, bones, and muscles: Normal, ROM normal    Skin - Skin and subcutaneous tissue: Normal without rashes or lesions  Skin is warm and well perfused, normal turgor  Neurologic - Cranial nerves: II - XII intact  -- Speech: Normal    Psychiatric - Orientation to person, place, and time: Normal -- Mood and affect: Normal  Results/Data  Diagnostic Studies Reviewed Cardio: I personally reviewed the recording/images in the office today  My interpretation follows  Echocardiogram/PHILIP: Normal LV systolic function  Catheterization: Proximal circumflex: There was a tubular 90 % stenosis  There was KAMLESH grade 3 flow through the vessel (brisk flow)  This lesion is a likely culprit for the patient's anginal symptoms  An intervention was performed  circumflex: There was a 50 % stenosis  RCA: There was a tubular 50 % stenosis  RCA: There was a discrete 80 % stenosis  There was KAMLESH grade 3 flow through the vessel (brisk flow)  This lesion is a likely culprit for the patient's anginal symptoms  An intervention was performed  RCA: There was a 50 % stenosis        Signatures   Electronically signed by : GERMAN Sky ; Nov 8 2017  3:51PM EST                       (Author)

## 2018-01-13 VITALS
WEIGHT: 176.38 LBS | DIASTOLIC BLOOD PRESSURE: 60 MMHG | HEIGHT: 67 IN | OXYGEN SATURATION: 97 % | HEART RATE: 68 BPM | SYSTOLIC BLOOD PRESSURE: 108 MMHG | BODY MASS INDEX: 27.68 KG/M2

## 2018-06-11 ENCOUNTER — OFFICE VISIT (OUTPATIENT)
Dept: CARDIOLOGY CLINIC | Facility: CLINIC | Age: 58
End: 2018-06-11

## 2018-06-11 VITALS
DIASTOLIC BLOOD PRESSURE: 82 MMHG | OXYGEN SATURATION: 98 % | HEIGHT: 66 IN | HEART RATE: 61 BPM | WEIGHT: 186.8 LBS | BODY MASS INDEX: 30.02 KG/M2 | SYSTOLIC BLOOD PRESSURE: 114 MMHG

## 2018-06-11 DIAGNOSIS — Z72.0 TOBACCO ABUSE: ICD-10-CM

## 2018-06-11 DIAGNOSIS — I25.118 CORONARY ARTERY DISEASE OF NATIVE ARTERY OF NATIVE HEART WITH STABLE ANGINA PECTORIS (HCC): ICD-10-CM

## 2018-06-11 DIAGNOSIS — E78.00 HYPERCHOLESTEROLEMIA: ICD-10-CM

## 2018-06-11 DIAGNOSIS — I20.0 UNSTABLE ANGINA (HCC): Primary | ICD-10-CM

## 2018-06-11 PROCEDURE — 99214 OFFICE O/P EST MOD 30 MIN: CPT | Performed by: INTERNAL MEDICINE

## 2018-06-11 NOTE — PROGRESS NOTES
Cardiology Consultation     Evan Oneill  [id number]  1960  [address] [address]    C/C: FOLLOW-UP OF CORONARY ARTERY DISEASE    HPI:  41-year-old male with past medical history of coronary disease status post circumflex and RCA PCI with drug-eluting stent  In October 2017 is here for follow-up  Patient complains of occasional chest pressure with exertion  Patient had 2 episodes since last visit on exertion  Chest pressure was very brief and resolved spontaneously with no treatment  Unfortunately continues to smoke about a pack a day  He denies other cardiac symptoms including shortness of breath, palpitations, dizziness or syncope  Patient Active Problem List   Diagnosis    Unstable angina (HCC)    Tobacco abuse    Coronary artery disease involving native coronary artery    Hypercholesterolemia     Past Medical History:   Diagnosis Date    CAD (coronary artery disease)     Family history of MI (myocardial infarction)     Former smoker     Hyperlipidemia     Irregular heart beat      Social History     Social History    Marital status: /Civil Union     Spouse name: N/A    Number of children: N/A    Years of education: N/A     Occupational History    Not on file  Social History Main Topics    Smoking status: Current Every Day Smoker     Types: Cigarettes    Smokeless tobacco: Never Used    Alcohol use No    Drug use: No    Sexual activity: Not on file     Other Topics Concern    Not on file     Social History Narrative    No narrative on file      History reviewed  No pertinent family history  Past Surgical History:   Procedure Laterality Date    CORONARY ANGIOPLASTY WITH STENT PLACEMENT  10/27/2017    TAMARA to prox   Cfx       Current Outpatient Prescriptions:     aspirin (ECOTRIN LOW STRENGTH) 81 mg EC tablet, Take 1 tablet by mouth daily, Disp: , Rfl: 0    atorvastatin (LIPITOR) 40 mg tablet, Take 1 tablet by mouth daily with dinner, Disp: 30 tablet, Rfl: 2    clopidogrel (PLAVIX) 75 mg tablet, Take 1 tablet by mouth daily, Disp: 30 tablet, Rfl: 11  No Known Allergies  Vitals:    06/11/18 1441   BP: 114/82   Pulse: 61   SpO2: 98%   Weight: 84 7 kg (186 lb 12 8 oz)   Height: 5' 6" (1 676 m)       Labs:  No visits with results within 2 Month(s) from this visit     Latest known visit with results is:   Admission on 10/26/2017, Discharged on 10/28/2017   Component Date Value    Sodium 10/26/2017 139     Potassium 10/26/2017 4 3     Chloride 10/26/2017 103     CO2 10/26/2017 28     Anion Gap 10/26/2017 8     BUN 10/26/2017 17     Creatinine 10/26/2017 0 94     Glucose 10/26/2017 95     Calcium 10/26/2017 9 1     AST 10/26/2017 29     ALT 10/26/2017 47     Alkaline Phosphatase 10/26/2017 94     Total Protein 10/26/2017 8 1     Albumin 10/26/2017 3 9     Total Bilirubin 10/26/2017 0 40     eGFR 10/26/2017 91     WBC 10/26/2017 8 00     RBC 10/26/2017 4 98     Hemoglobin 10/26/2017 15 2     Hematocrit 10/26/2017 45 1     MCV 10/26/2017 91     MCH 10/26/2017 30 5     MCHC 10/26/2017 33 7     RDW 10/26/2017 12 9     MPV 10/26/2017 10 1     Platelets 06/28/5119 281     nRBC 10/26/2017 0     Neutrophils Relative 10/26/2017 59     Lymphocytes Relative 10/26/2017 29     Monocytes Relative 10/26/2017 9     Eosinophils Relative 10/26/2017 2     Basophils Relative 10/26/2017 1     Neutrophils Absolute 10/26/2017 4 70     Lymphocytes Absolute 10/26/2017 2 30     Monocytes Absolute 10/26/2017 0 71     Eosinophils Absolute 10/26/2017 0 16     Basophils Absolute 10/26/2017 0 07     Ventricular Rate 10/26/2017 54     Atrial Rate 10/26/2017 54     [place] Interval 10/26/2017 172     QRSD Interval 10/26/2017 104     QT Interval 10/26/2017 428     QTC Interval 10/26/2017 405     P Axis 10/26/2017 61     QRS Axis 10/26/2017 28  T Wave Axis 10/26/2017 7     Troponin I 10/26/2017 <0 02     PTT 10/26/2017 34     WBC 10/26/2017 8 39     RBC 10/26/2017 4 90     Hemoglobin 10/26/2017 14 9     Hematocrit 10/26/2017 44 5     MCV 10/26/2017 91     MCH 10/26/2017 30 4     MCHC 10/26/2017 33 5     RDW 10/26/2017 12 8     Platelets 70/36/8483 275     MPV 10/26/2017 9 9     Protime 10/26/2017 12 8     INR 10/26/2017 0 94     Sodium 10/26/2017 140     Potassium 10/26/2017 3 7     Chloride 10/26/2017 104     CO2 10/26/2017 29     Anion Gap 10/26/2017 7     BUN 10/26/2017 14     Creatinine 10/26/2017 1 00     Glucose 10/26/2017 86     Glucose, Fasting 10/26/2017 86     Calcium 10/26/2017 9 0     eGFR 10/26/2017 84     Magnesium 10/26/2017 1 8     Cholesterol 10/26/2017 250*    Triglycerides 10/26/2017 48     HDL, Direct 10/26/2017 44     LDL Calculated 10/26/2017 196*    WBC 10/26/2017 8 50     RBC 10/26/2017 4 92     Hemoglobin 10/26/2017 14 9     Hematocrit 10/26/2017 44 6     MCV 10/26/2017 91     MCH 10/26/2017 30 3     MCHC 10/26/2017 33 4     RDW 10/26/2017 12 8     Platelets 04/53/0428 268     MPV 10/26/2017 9 8     Protime 10/26/2017 14 3     INR 10/26/2017 1 09     Ventricular Rate 10/26/2017 50     Atrial Rate 10/26/2017 50     [place] Interval 10/26/2017 164     QRSD Interval 10/26/2017 112     QT Interval 10/26/2017 448     QTC Interval 10/26/2017 408     P Axis 10/26/2017 48     QRS Axis 10/26/2017 -16     T Wave Axis 10/26/2017 -8     Troponin I 10/26/2017 <0 02     Troponin I 10/26/2017 <0 02     PTT 10/26/2017 47*    Troponin I 10/26/2017 <0 02     PTT 10/27/2017 52*    PTT 10/27/2017 43*    WBC 10/28/2017 8 52     RBC 10/28/2017 4 61     Hemoglobin 10/28/2017 14 0     Hematocrit 10/28/2017 42 1     MCV 10/28/2017 91     MCH 10/28/2017 30 4     MCHC 10/28/2017 33 3     RDW 10/28/2017 13 0     Platelets 37/35/0650 250     MPV 10/28/2017 10 3     Sodium 10/28/2017 141     Potassium 10/28/2017 4 1     Chloride 10/28/2017 108     CO2 10/28/2017 24     Anion Gap 10/28/2017 9     BUN 10/28/2017 13     Creatinine 10/28/2017 0 91     Glucose 10/28/2017 108     Calcium 10/28/2017 8 5     eGFR 10/28/2017 95      Imaging: No results found  Review of Systems:  Review of Systems   Constitutional: Negative for diaphoresis and fatigue  HENT: Negative for congestion and facial swelling  Eyes: Negative for photophobia and visual disturbance  Respiratory: Negative for chest tightness and shortness of breath  Cardiovascular: Positive for chest pain  Negative for palpitations and leg swelling  Gastrointestinal: Negative for abdominal pain and blood in stool  Endocrine: Negative for cold intolerance and heat intolerance  Musculoskeletal: Negative for arthralgias and myalgias  Skin: Negative for pallor and rash  Neurological: Negative for dizziness and syncope  Physical Exam:  Physical Exam   Constitutional: He is oriented to person, place, and time  He appears well-developed and well-nourished  HENT:   Head: Normocephalic and atraumatic  Eyes: Conjunctivae and EOM are normal  Pupils are equal, round, and reactive to light  Neck: Normal range of motion  Neck supple  No JVD present  No thyromegaly present  Cardiovascular: Normal rate, regular rhythm, normal heart sounds and intact distal pulses  Exam reveals no gallop and no friction rub  No murmur heard  Pulmonary/Chest: Effort normal and breath sounds normal  No respiratory distress  He has no wheezes  He has no rales  Abdominal: Soft  Bowel sounds are normal  He exhibits no distension  There is no tenderness  Musculoskeletal: Normal range of motion  He exhibits no edema  Neurological: He is alert and oriented to person, place, and time  He has normal reflexes  Skin: Skin is warm and dry  Psychiatric: He has a normal mood and affect  Discussion/Summary:    1   Coronary disease status post circumflex and RCA PCI   continue aspirin, statins and Plavix   cannot add beta-blocker as patient's blood pressure is on lower side   patient was counseled about smoking cessation   I will get nuclear stress test to evaluate for ischemia for recurrent chest tightness        Follow-up in 4 months

## 2018-09-17 ENCOUNTER — TELEPHONE (OUTPATIENT)
Dept: CARDIOLOGY CLINIC | Facility: CLINIC | Age: 58
End: 2018-09-17

## 2018-09-17 NOTE — TELEPHONE ENCOUNTER
S/w pts wife Pati and she informed me for about a month Odalys Quinn has been having chest pain, numbness in both hands and tingling in left arm with occasional headaches  He absolutely refuses to go to the ED-she says she has ran out of options and has no idea what or how to convince him to go to the ED  She is requesting a CB from Dr Chinedu Ledesma to see what options there are to help her   Please advise

## 2018-09-17 NOTE — TELEPHONE ENCOUNTER
Patients wife called & sated she is very concerned about her husbands health  Patients wife stated back in May patient was ordered to stop smoking cigarettes, pt has been smoking 2 packs a day  Patient has been complaing of chest discomfort & tingling but does not want to come for an appointment  Patients wife would like a call back to discuss patients health & concerns

## 2018-09-18 ENCOUNTER — OFFICE VISIT (OUTPATIENT)
Dept: CARDIOLOGY CLINIC | Facility: CLINIC | Age: 58
End: 2018-09-18
Payer: COMMERCIAL

## 2018-09-18 VITALS
OXYGEN SATURATION: 97 % | HEART RATE: 62 BPM | HEIGHT: 66 IN | SYSTOLIC BLOOD PRESSURE: 126 MMHG | DIASTOLIC BLOOD PRESSURE: 74 MMHG | BODY MASS INDEX: 29.51 KG/M2 | WEIGHT: 183.6 LBS

## 2018-09-18 DIAGNOSIS — I25.118 CORONARY ARTERY DISEASE OF NATIVE ARTERY OF NATIVE HEART WITH STABLE ANGINA PECTORIS (HCC): ICD-10-CM

## 2018-09-18 DIAGNOSIS — Z72.0 TOBACCO ABUSE: ICD-10-CM

## 2018-09-18 DIAGNOSIS — E78.00 PURE HYPERCHOLESTEROLEMIA: ICD-10-CM

## 2018-09-18 DIAGNOSIS — R07.9 CHEST PAIN, UNSPECIFIED TYPE: Primary | ICD-10-CM

## 2018-09-18 PROCEDURE — 99214 OFFICE O/P EST MOD 30 MIN: CPT | Performed by: INTERNAL MEDICINE

## 2018-09-18 PROCEDURE — 93000 ELECTROCARDIOGRAM COMPLETE: CPT | Performed by: INTERNAL MEDICINE

## 2018-09-18 RX ORDER — ISOSORBIDE MONONITRATE 30 MG/1
30 TABLET, EXTENDED RELEASE ORAL DAILY
Qty: 30 TABLET | Refills: 5 | Status: SHIPPED | OUTPATIENT
Start: 2018-09-18 | End: 2019-05-13 | Stop reason: SDUPTHER

## 2018-09-18 RX ORDER — ISOSORBIDE MONONITRATE 30 MG/1
30 TABLET, EXTENDED RELEASE ORAL DAILY
Qty: 30 TABLET | Refills: 5 | Status: SHIPPED | OUTPATIENT
Start: 2018-09-18 | End: 2018-09-18 | Stop reason: SDUPTHER

## 2018-09-18 NOTE — PROGRESS NOTES
[place] [address] [id number]  Cardiology Follow Up    Austin Xiong  1960  [id number]    1  Chest pain, unspecified type  POCT ECG    NM myocardial perfusion spect (rx stress and/or rest)    Echo complete with contrast if indicated    isosorbide mononitrate (IMDUR) 30 mg 24 hr tablet    DISCONTINUED: isosorbide mononitrate (IMDUR) 30 mg 24 hr tablet   2  Coronary artery disease of native artery of native heart with stable angina pectoris ([address] )     3  Pure hypercholesterolemia     4  Tobacco abuse         Chief Complaint   Patient presents with    Follow-up    Chest Pain    Numbness     in both hands       Interval History:  Patient presents to the office for follow-up visit with history of CAD, drug-eluting stent to proximal circumflex and mid RCA October 2017  Patient states he has been having chest pressure for the last couple of weeks  He states that occurs at rest and exertion  He denies any shortness of breath, dizziness, sweatiness  He states he has more sluggish than usual   He also notes numbness and tingling in his left hand  Patient states he is taking all medications as prescribed  Patient states he continues to smoke approximately half a pack a day which is down from 2 to 3 packs previously  Patient Active Problem List   Diagnosis    Unstable angina (HCC)    Tobacco abuse    Coronary artery disease involving native coronary artery    Hypercholesterolemia     Past Medical History:   Diagnosis Date    CAD (coronary artery disease)     Family history of MI (myocardial infarction)     Former smoker     Hyperlipidemia     Irregular heart beat      Social History     Social History    Marital status: /Civil Union     Spouse name: N/A    Number of children: N/A    Years of education: N/A     Occupational History    Not on file       Social History Main Topics    Smoking status: Current Every Day Smoker     Types: Cigarettes    Smokeless tobacco: Never Used    Alcohol use No    Drug use: No    Sexual activity: Not on file     Other Topics Concern    Not on file     Social History Narrative    No narrative on file      History reviewed  No pertinent family history  Past Surgical History:   Procedure Laterality Date    CORONARY ANGIOPLASTY WITH STENT PLACEMENT  10/27/2017    TAMARA to prox  Cfx       Current Outpatient Prescriptions:     aspirin (ECOTRIN LOW STRENGTH) 81 mg EC tablet, Take 1 tablet by mouth daily, Disp: , Rfl: 0    atorvastatin (LIPITOR) 40 mg tablet, Take 1 tablet by mouth daily with dinner, Disp: 30 tablet, Rfl: 2    clopidogrel (PLAVIX) 75 mg tablet, Take 1 tablet by mouth daily, Disp: 30 tablet, Rfl: 11    isosorbide mononitrate (IMDUR) 30 mg 24 hr tablet, Take 1 tablet (30 mg total) by mouth daily, Disp: 30 tablet, Rfl: 5  No Known Allergies      Ekg: sb, filipped t waves inferior leads  Imaging: No results found  Review of Systems:  Review of Systems   Constitutional: Negative  HENT: Negative  Respiratory: Negative  Cardiovascular: Positive for chest pain  Genitourinary: Negative  Neurological: Positive for numbness  Hematological: Negative  Psychiatric/Behavioral: Negative  All other systems reviewed and are negative  /74   Pulse 62   Ht 5' 6" (1 676 m)   Wt 83 3 kg (183 lb 9 6 oz)   SpO2 97%   BMI 29 63 kg/m²     Physical Exam:  Physical Exam   Constitutional: He is oriented to person, place, and time  He appears well-developed and well-nourished  HENT:   Head: Normocephalic and atraumatic  Neck: Normal range of motion  Neck supple  Cardiovascular: Normal rate, regular rhythm and normal heart sounds  Pulmonary/Chest: Effort normal and breath sounds normal    Musculoskeletal: Normal range of motion  Neurological: He is alert and oriented to person, place, and time  Psychiatric: He has a normal mood and affect   His behavior is normal  Judgment and thought content normal    Nursing note and vitals reviewed  Discussion/Summary:   1-CAD with history of stents now with chest pain  Will check nuclear stress test to assess for ischemia  Patient cannot walk on the treadmill given he is currently having symptoms with stents less than 1 year ago  Will add Imdur 30 mg daily  Check echocardiogram to assess LV function and regional wall motion for abnormalities    2-hyperlipidemia, continue with statin    3-tobacco abuse smoking cessation is highly advised  Continue all medications  Previous studies reviewed with patient, medications reviewed and possible side effects discussed  Continue risk factor modification  Optimize weight, regular exercise and follow up with appropriate specialists and primary care physician as discussed  All questions answered  Patient advised to report any problems prompting to medical attention   Return for follow up visit in 1 months or earlier if needed

## 2018-09-24 ENCOUNTER — TELEPHONE (OUTPATIENT)
Dept: CARDIOLOGY CLINIC | Facility: CLINIC | Age: 58
End: 2018-09-24

## 2018-09-24 NOTE — TELEPHONE ENCOUNTER
Patients stress test needs peer to peer review through [facility]   [phone]  Option 3   Tracking number [id number]

## 2018-09-27 ENCOUNTER — HOSPITAL ENCOUNTER (OUTPATIENT)
Dept: NON INVASIVE DIAGNOSTICS | Facility: CLINIC | Age: 58
Discharge: HOME | End: 2018-09-27
Payer: COMMERCIAL

## 2018-09-27 ENCOUNTER — TELEPHONE (OUTPATIENT)
Dept: CARDIOLOGY CLINIC | Facility: CLINIC | Age: 58
End: 2018-09-27

## 2018-09-27 DIAGNOSIS — R07.9 CHEST PAIN, UNSPECIFIED TYPE: ICD-10-CM

## 2018-09-27 PROCEDURE — 93018 CV STRESS TEST I&R ONLY: CPT | Performed by: INTERNAL MEDICINE

## 2018-09-27 PROCEDURE — 78452 HT MUSCLE IMAGE SPECT MULT: CPT | Performed by: INTERNAL MEDICINE

## 2018-09-27 PROCEDURE — 78452 HT MUSCLE IMAGE SPECT MULT: CPT

## 2018-09-27 PROCEDURE — 93016 CV STRESS TEST SUPVJ ONLY: CPT | Performed by: INTERNAL MEDICINE

## 2018-09-27 PROCEDURE — 93306 TTE W/DOPPLER COMPLETE: CPT | Performed by: INTERNAL MEDICINE

## 2018-09-27 PROCEDURE — 93017 CV STRESS TEST TRACING ONLY: CPT

## 2018-09-27 PROCEDURE — A9502 TC99M TETROFOSMIN: HCPCS

## 2018-09-27 PROCEDURE — 93306 TTE W/DOPPLER COMPLETE: CPT

## 2018-09-27 RX ADMIN — REGADENOSON 0.4 MG: 0.08 INJECTION, SOLUTION INTRAVENOUS at 08:57

## 2018-09-27 NOTE — TELEPHONE ENCOUNTER
----- Message from Celina Sellers PA-C sent at 9/27/2018  2:03 PM EDT -----  pls call stress test and echo was good, how is he feeling?

## 2018-09-27 NOTE — TELEPHONE ENCOUNTER
Spoke with pts wife but he's not home, called and lm on cell with results and for pt to cb with an update on how he's feeling

## 2018-09-28 LAB
MAX DIASTOLIC BP: 74 MMHG
MAX HEART RATE: 75 BPM
MAX PREDICTED HEART RATE: 164 BPM
MAX. SYSTOLIC BP: 126 MMHG
PROTOCOL NAME: NORMAL
REASON FOR TERMINATION: NORMAL
TARGET HR FORMULA: NORMAL
TEST INDICATION: NORMAL
TIME IN EXERCISE PHASE: NORMAL

## 2018-09-28 NOTE — TELEPHONE ENCOUNTER
Spoke with pt he states he feels fine with no symptoms, he'd like to  a copy of both tests in the [place] office   Can you print and have ready, i'm at [place]

## 2018-10-18 ENCOUNTER — OFFICE VISIT (OUTPATIENT)
Dept: CARDIOLOGY CLINIC | Facility: CLINIC | Age: 58
End: 2018-10-18
Payer: COMMERCIAL

## 2018-10-18 VITALS
SYSTOLIC BLOOD PRESSURE: 130 MMHG | HEIGHT: 66 IN | BODY MASS INDEX: 29.73 KG/M2 | OXYGEN SATURATION: 99 % | WEIGHT: 185 LBS | DIASTOLIC BLOOD PRESSURE: 80 MMHG | HEART RATE: 70 BPM

## 2018-10-18 DIAGNOSIS — Z95.5 PRESENCE OF DRUG COATED STENT IN LEFT CIRCUMFLEX CORONARY ARTERY: ICD-10-CM

## 2018-10-18 DIAGNOSIS — Z95.5 S/P RIGHT CORONARY ARTERY (RCA) STENT PLACEMENT: ICD-10-CM

## 2018-10-18 DIAGNOSIS — I25.118 CORONARY ARTERY DISEASE OF NATIVE ARTERY OF NATIVE HEART WITH STABLE ANGINA PECTORIS (HCC): Primary | ICD-10-CM

## 2018-10-18 DIAGNOSIS — Z72.0 TOBACCO ABUSE: ICD-10-CM

## 2018-10-18 DIAGNOSIS — E78.00 PURE HYPERCHOLESTEROLEMIA: ICD-10-CM

## 2018-10-18 PROCEDURE — 99214 OFFICE O/P EST MOD 30 MIN: CPT | Performed by: INTERNAL MEDICINE

## 2018-10-18 RX ORDER — METOPROLOL SUCCINATE 25 MG/1
25 TABLET, EXTENDED RELEASE ORAL DAILY
Qty: 30 TABLET | Refills: 3 | Status: SHIPPED | OUTPATIENT
Start: 2018-10-18 | End: 2018-10-18 | Stop reason: SDUPTHER

## 2018-10-18 RX ORDER — METOPROLOL SUCCINATE 25 MG/1
25 TABLET, EXTENDED RELEASE ORAL DAILY
Qty: 30 TABLET | Refills: 11 | Status: SHIPPED | OUTPATIENT
Start: 2018-10-18 | End: 2019-06-21 | Stop reason: SDUPTHER

## 2018-10-18 NOTE — PROGRESS NOTES
Cardiology Consultation     Deborah Hairston  [id number]  1960  [address] [place] [address]    Chief complaints: CHEST PRESSURE     HISTORY OF PRESENT ILLNESS:  Patient presents to the office for follow-up visit with history of CAD, drug-eluting stent to proximal circumflex and mid RCA October 2017  PATIENT CONTINUES TO HAVE CHEST PRESSURE BUT IT IS NOT EXERTIONAL  Patient did go back to work and did some strenuous work with no chest pain  He denies having other cardiac symptoms including shortness of breath, palpitations, dizziness or syncope  Patient states he continues to smoke approximately half a pack a day which is down from 2 to 3 packs previously  Patient Active Problem List   Diagnosis    Unstable angina (HCC)    Tobacco abuse    Coronary artery disease involving native coronary artery    Pure hypercholesterolemia     Past Medical History:   Diagnosis Date    CAD (coronary artery disease)     Family history of MI (myocardial infarction)     Former smoker     Hyperlipidemia     Irregular heart beat      Social History     Social History    Marital status: /Civil Union     Spouse name: N/A    Number of children: N/A    Years of education: N/A     Occupational History    Not on file  Social History Main Topics    Smoking status: Current Every Day Smoker     Types: Cigarettes    Smokeless tobacco: Never Used    Alcohol use No    Drug use: No    Sexual activity: Not on file     Other Topics Concern    Not on file     Social History Narrative    No narrative on file      History reviewed  No pertinent family history  Past Surgical History:   Procedure Laterality Date    CORONARY ANGIOPLASTY WITH STENT PLACEMENT  10/27/2017    TAMARA to prox   Cfx       Current Outpatient Prescriptions:     aspirin (ECOTRIN LOW STRENGTH) 81 mg EC tablet, Take 1 tablet by mouth daily, Disp: , Rfl: 0    atorvastatin (LIPITOR) 40 mg tablet, Take 1 tablet by mouth daily with dinner, Disp: 30 tablet, Rfl: 2    clopidogrel (PLAVIX) 75 mg tablet, Take 1 tablet by mouth daily, Disp: 30 tablet, Rfl: 11    isosorbide mononitrate (IMDUR) 30 mg 24 hr tablet, Take 1 tablet (30 mg total) by mouth daily, Disp: 30 tablet, Rfl: 5  No Known Allergies  Vitals:    10/18/18 1527   BP: 130/80   BP Location: Left arm   Patient Position: Sitting   Cuff Size: Adult   Pulse: 70   SpO2: 99%   Weight: 83 9 kg (185 lb)   Height: 5' 6" (1 676 m)       Labs:  Hospital Outpatient Visit on 09/27/2018   Component Date Value    Protocol Name 09/27/2018 LEXISCAN-SIT     Time In Exercise Phase 09/27/2018 00:03:00     MAX  SYSTOLIC BP 71/02/8125 280     Max Diastolic Bp 53/72/3301 74     Max Heart Rate 09/27/2018 75     Max Predicted Heart Rate 09/27/2018 164     Reason for Termination 09/27/2018 Protocol Complete     Test Indication 09/27/2018 CHEST PAIN     Target Hr Formular 09/27/2018 (220 - Age)*85%      Imaging: No results found  Review of Systems:  Review of Systems   Constitutional: Negative for diaphoresis and fatigue  HENT: Negative for congestion and facial swelling  Eyes: Negative for photophobia and visual disturbance  Respiratory: Positive for chest tightness  Negative for shortness of breath  Cardiovascular: Negative for chest pain, palpitations and leg swelling  Gastrointestinal: Negative for abdominal pain and blood in stool  Endocrine: Negative for cold intolerance and heat intolerance  Musculoskeletal: Negative for arthralgias and myalgias  Skin: Negative for pallor and rash  Neurological: Negative for dizziness and syncope  Physical Exam:  Physical Exam   Constitutional: He is oriented to person, place, and time  He appears well-developed and well-nourished  HENT:   Head: Normocephalic and atraumatic     Eyes: Pupils are equal, round, and reactive to light  Conjunctivae and EOM are normal    Neck: Normal range of motion  Neck supple  No JVD present  No thyromegaly present  Cardiovascular: Normal rate, regular rhythm, normal heart sounds and intact distal pulses  Exam reveals no gallop and no friction rub  No murmur heard  Pulmonary/Chest: Effort normal and breath sounds normal  No respiratory distress  He has no wheezes  He has no rales  Abdominal: Soft  Bowel sounds are normal  He exhibits no distension  There is no tenderness  Musculoskeletal: Normal range of motion  He exhibits no edema  Neurological: He is alert and oriented to person, place, and time  He has normal reflexes  Skin: Skin is warm and dry  Psychiatric: He has a normal mood and affect  Discussion/Summary:   1  Coronary disease status post PCI/chest pain   chest pain could be due to microvascular disease, stress test was negative  Chest pressure improved with Imdur   I will add Toprol-XL  2  Hyperlipidemia,  Check lipid profile   LDL was high last time     3  Tobacco use, smoking cessation counseling was given     Previous studies reviewed with patient, medications reviewed and possible side effects discussed  Continue risk factor modification  Optimize weight, regular exercise and follow up with appropriate specialists and primary care physician as discussed  All questions answered  Patient advised to report any problems prompting to medical attention   Return for follow up visit in 1 months or earlier if needed

## 2019-02-25 ENCOUNTER — OFFICE VISIT (OUTPATIENT)
Dept: CARDIOLOGY CLINIC | Facility: CLINIC | Age: 59
End: 2019-02-25
Payer: COMMERCIAL

## 2019-02-25 VITALS
WEIGHT: 189 LBS | BODY MASS INDEX: 30.37 KG/M2 | OXYGEN SATURATION: 99 % | HEIGHT: 66 IN | SYSTOLIC BLOOD PRESSURE: 132 MMHG | HEART RATE: 61 BPM | DIASTOLIC BLOOD PRESSURE: 82 MMHG

## 2019-02-25 DIAGNOSIS — Z95.5 PRESENCE OF DRUG COATED STENT IN LEFT CIRCUMFLEX CORONARY ARTERY: ICD-10-CM

## 2019-02-25 DIAGNOSIS — Z72.0 TOBACCO ABUSE: ICD-10-CM

## 2019-02-25 DIAGNOSIS — Z95.5 S/P RIGHT CORONARY ARTERY (RCA) STENT PLACEMENT: ICD-10-CM

## 2019-02-25 DIAGNOSIS — I25.10 CORONARY ARTERY DISEASE INVOLVING NATIVE CORONARY ARTERY OF NATIVE HEART WITHOUT ANGINA PECTORIS: Primary | ICD-10-CM

## 2019-02-25 PROCEDURE — 99214 OFFICE O/P EST MOD 30 MIN: CPT | Performed by: INTERNAL MEDICINE

## 2019-02-25 NOTE — PROGRESS NOTES
Cardiology Consultation     Lazaro Mak  [id number]  1960  [facility] [address] [facility] [address]    Chief complaints: Follow-up of chest pain and CAD      HISTORY OF PRESENT ILLNESS:  Patient presents to the office for follow-up visit with history of CAD, drug-eluting stent to proximal circumflex and mid RCA October 2017  patient was seen last visit for chest tightness  He was started on Imdur and was counseled about smoking cessation  Patient has stopped smoking since last visit  Patient says the chest pressure is improved since last visit     Patient is working on his diet and tries to exercise routinely  Patient denies other cardiac symptoms including shortness of breath, palpitations, dizziness or syncope      Patient Active Problem List   Diagnosis    Unstable angina (HCC)    Tobacco abuse    Coronary artery disease involving native coronary artery    Pure hypercholesterolemia    Presence of drug coated stent in left circumflex coronary artery    S/P right coronary artery (RCA) stent placement     Past Medical History:   Diagnosis Date    CAD (coronary artery disease)     Family history of MI (myocardial infarction)     Former smoker     Hyperlipidemia     Irregular heart beat      Social History     Socioeconomic History    Marital status: /Civil Union     Spouse name: Not on file    Number of children: Not on file    Years of education: Not on file    Highest education level: Not on file   Occupational History    Not on file   Social Needs    Financial resource strain: Not on file    Food insecurity:     Worry: Not on file     Inability: Not on file    Transportation needs:     Medical: Not on file     Non-medical: Not on file   Tobacco Use    Smoking status: Current Every Day Smoker     Types: Cigarettes    Smokeless tobacco: Never Used   Substance and Sexual Activity    Alcohol use: No    Drug use: No    Sexual activity: Not on file   Lifestyle    Physical activity:     Days per week: Not on file     Minutes per session: Not on file    Stress: Not on file   Relationships    Social connections:     Talks on phone: Not on file     Gets together: Not on file     Attends Religion service: Not on file     Active member of club or organization: Not on file     Attends meetings of clubs or organizations: Not on file     Relationship status: Not on file    Intimate partner violence:     Fear of current or ex partner: Not on file     Emotionally abused: Not on file     Physically abused: Not on file     Forced sexual activity: Not on file   Other Topics Concern    Not on file   Social History Narrative    Not on file      History reviewed  No pertinent family history  Past Surgical History:   Procedure Laterality Date    CORONARY ANGIOPLASTY WITH STENT PLACEMENT  10/27/2017    TAMARA to prox  Cfx       Current Outpatient Medications:     aspirin (ECOTRIN LOW STRENGTH) 81 mg EC tablet, Take 1 tablet by mouth daily, Disp: , Rfl: 0    atorvastatin (LIPITOR) 40 mg tablet, Take 1 tablet by mouth daily with dinner, Disp: 30 tablet, Rfl: 2    clopidogrel (PLAVIX) 75 mg tablet, Take 1 tablet by mouth daily, Disp: 30 tablet, Rfl: 11    isosorbide mononitrate (IMDUR) 30 mg 24 hr tablet, Take 1 tablet (30 mg total) by mouth daily, Disp: 30 tablet, Rfl: 5    metoprolol succinate (TOPROL-XL) 25 mg 24 hr tablet, Take 1 tablet (25 mg total) by mouth daily, Disp: 30 tablet, Rfl: 11  No Known Allergies  Vitals:    02/25/19 0841   BP: 132/82   BP Location: Left arm   Patient Position: Sitting   Cuff Size: Adult   Pulse: 61   SpO2: 99%   Weight: 85 7 kg (189 lb)   Height: 5' 6" (1 676 m)       Labs:  No visits with results within 2 Month(s) from this visit     Latest known visit with results is:   Hospital Outpatient Visit on 09/27/2018   Component Date Value    Protocol Name 09/27/2018 LEXISCAN-SIT     Time In Exercise Phase 09/27/2018 00:03:00     MAX  SYSTOLIC BP 99/35/0424 574     Max Diastolic Bp 18/45/8960 74     Max Heart Rate 09/27/2018 75     Max Predicted Heart Rate 09/27/2018 164     Reason for Termination 09/27/2018 Protocol Complete     Test Indication 09/27/2018 CHEST PAIN     Target Hr Formular 09/27/2018 (220 - Age)*85%      Imaging: No results found  Review of Systems:  Review of Systems   Constitutional: Negative for diaphoresis and fatigue  HENT: Negative for congestion and facial swelling  Eyes: Negative for photophobia and visual disturbance  Respiratory: Negative for chest tightness and shortness of breath  Cardiovascular: Negative for chest pain, palpitations and leg swelling  Gastrointestinal: Negative for abdominal pain and blood in stool  Endocrine: Negative for cold intolerance and heat intolerance  Musculoskeletal: Negative for arthralgias and myalgias  Skin: Negative for pallor and rash  Neurological: Negative for dizziness and syncope  Physical Exam:  Physical Exam   Constitutional: He is oriented to person, place, and time  He appears well-developed and well-nourished  HENT:   Head: Normocephalic and atraumatic  Eyes: Pupils are equal, round, and reactive to light  Conjunctivae and EOM are normal    Neck: Normal range of motion  Neck supple  No JVD present  No thyromegaly present  Cardiovascular: Normal rate, regular rhythm, normal heart sounds and intact distal pulses  Exam reveals no gallop and no friction rub  No murmur heard  Pulmonary/Chest: Effort normal and breath sounds normal  No respiratory distress  He has no wheezes  He has no rales  Abdominal: Soft  Bowel sounds are normal  He exhibits no distension  There is no tenderness  Musculoskeletal: Normal range of motion  He exhibits no edema  Neurological: He is alert and oriented to person, place, and time  He has normal reflexes     Skin: Skin is warm and dry  Psychiatric: He has a normal mood and affect  Discussion/Summary:   1  Coronary disease status post PCI/chest pain  Chest pain has resolved  Patient is on maximal medical management for CAD including his aspirin, Plavix, statins, beta-blocker and Imdur  Patient has stop smoking which has improved his symptoms        2  Hyperlipidemia,  Check lipid profile   LDL was high last time   Patient was asked to check his lipid profile as he did not check it since last visit       3  Tobacco use, smoking cessation counseling was given     Previous studies reviewed with patient, medications reviewed and possible side effects discussed  Continue risk factor modification  Optimize weight, regular exercise and follow up with appropriate specialists and primary care physician as discussed   All questions answered  Patient advised to report any problems prompting to medical attention       Follow-up in 4 months

## 2019-03-07 ENCOUNTER — TELEPHONE (OUTPATIENT)
Dept: CARDIOLOGY CLINIC | Facility: CLINIC | Age: 59
End: 2019-03-07

## 2019-05-13 DIAGNOSIS — R07.9 CHEST PAIN, UNSPECIFIED TYPE: ICD-10-CM

## 2019-05-15 RX ORDER — ISOSORBIDE MONONITRATE 30 MG/1
30 TABLET, EXTENDED RELEASE ORAL DAILY
Qty: 30 TABLET | Refills: 4 | Status: SHIPPED | OUTPATIENT
Start: 2019-05-15 | End: 2019-06-21 | Stop reason: SDUPTHER

## 2019-06-21 ENCOUNTER — HOSPITAL ENCOUNTER (EMERGENCY)
Facility: HOSPITAL | Age: 59
Discharge: HOME | End: 2019-06-21
Attending: EMERGENCY MEDICINE | Admitting: EMERGENCY MEDICINE

## 2019-06-21 VITALS
HEIGHT: 67 IN | HEART RATE: 58 BPM | WEIGHT: 180 LBS | TEMPERATURE: 98.1 F | SYSTOLIC BLOOD PRESSURE: 149 MMHG | DIASTOLIC BLOOD PRESSURE: 64 MMHG | BODY MASS INDEX: 28.25 KG/M2 | RESPIRATION RATE: 18 BRPM | OXYGEN SATURATION: 99 %

## 2019-06-21 DIAGNOSIS — K04.7 DENTAL ABSCESS: Primary | ICD-10-CM

## 2019-06-21 DIAGNOSIS — I25.118 CORONARY ARTERY DISEASE OF NATIVE ARTERY OF NATIVE HEART WITH STABLE ANGINA PECTORIS (HCC): ICD-10-CM

## 2019-06-21 DIAGNOSIS — R07.9 CHEST PAIN, UNSPECIFIED TYPE: ICD-10-CM

## 2019-06-21 PROCEDURE — 99283 EMERGENCY DEPT VISIT LOW MDM: CPT | Performed by: EMERGENCY MEDICINE

## 2019-06-21 PROCEDURE — 99283 EMERGENCY DEPT VISIT LOW MDM: CPT

## 2019-06-21 PROCEDURE — 96372 THER/PROPH/DIAG INJ SC/IM: CPT

## 2019-06-21 RX ORDER — ATORVASTATIN CALCIUM 40 MG/1
40 TABLET, FILM COATED ORAL
Qty: 90 TABLET | Refills: 3 | Status: SHIPPED | OUTPATIENT
Start: 2019-06-21 | End: 2019-12-26 | Stop reason: HOSPADM

## 2019-06-21 RX ORDER — LIDOCAINE HYDROCHLORIDE 10 MG/ML
5 INJECTION, SOLUTION EPIDURAL; INFILTRATION; INTRACAUDAL; PERINEURAL ONCE
Status: COMPLETED | OUTPATIENT
Start: 2019-06-21 | End: 2019-06-21

## 2019-06-21 RX ORDER — CLOPIDOGREL BISULFATE 75 MG/1
75 TABLET ORAL DAILY
Qty: 90 TABLET | Refills: 3 | Status: SHIPPED | OUTPATIENT
Start: 2019-06-21 | End: 2019-06-21 | Stop reason: SDUPTHER

## 2019-06-21 RX ORDER — KETOROLAC TROMETHAMINE 30 MG/ML
15 INJECTION, SOLUTION INTRAMUSCULAR; INTRAVENOUS ONCE
Status: COMPLETED | OUTPATIENT
Start: 2019-06-21 | End: 2019-06-21

## 2019-06-21 RX ORDER — METOPROLOL SUCCINATE 25 MG/1
25 TABLET, EXTENDED RELEASE ORAL DAILY
Qty: 90 TABLET | Refills: 3 | Status: SHIPPED | OUTPATIENT
Start: 2019-06-21 | End: 2019-12-26 | Stop reason: HOSPADM

## 2019-06-21 RX ORDER — ISOSORBIDE MONONITRATE 30 MG/1
30 TABLET, EXTENDED RELEASE ORAL DAILY
Qty: 90 TABLET | Refills: 3 | Status: SHIPPED | OUTPATIENT
Start: 2019-06-21 | End: 2019-12-26 | Stop reason: HOSPADM

## 2019-06-21 RX ORDER — PENICILLIN V POTASSIUM 500 MG/1
500 TABLET ORAL 4 TIMES DAILY
Qty: 40 TABLET | Refills: 0 | Status: SHIPPED | OUTPATIENT
Start: 2019-06-21 | End: 2019-06-28

## 2019-06-21 RX ORDER — CLOPIDOGREL BISULFATE 75 MG/1
75 TABLET ORAL DAILY
Qty: 90 TABLET | Refills: 3 | Status: SHIPPED | OUTPATIENT
Start: 2019-06-21 | End: 2019-12-26 | Stop reason: HOSPADM

## 2019-06-21 RX ORDER — ATORVASTATIN CALCIUM 40 MG/1
40 TABLET, FILM COATED ORAL
Qty: 90 TABLET | Refills: 3 | Status: SHIPPED | OUTPATIENT
Start: 2019-06-21 | End: 2019-06-21 | Stop reason: SDUPTHER

## 2019-06-21 RX ORDER — NAPROXEN 500 MG/1
500 TABLET ORAL EVERY 12 HOURS
Qty: 30 TABLET | Refills: 0 | Status: SHIPPED | OUTPATIENT
Start: 2019-06-21 | End: 2019-12-26 | Stop reason: HOSPADM

## 2019-06-21 RX ORDER — NAPROXEN 500 MG/1
500 TABLET ORAL 2 TIMES DAILY PRN
Qty: 14 TABLET | Refills: 0 | Status: SHIPPED | OUTPATIENT
Start: 2019-06-21 | End: 2019-06-21

## 2019-06-21 RX ADMIN — KETOROLAC TROMETHAMINE 15 MG: 30 INJECTION, SOLUTION INTRAMUSCULAR at 11:37

## 2019-06-21 RX ADMIN — LIDOCAINE HYDROCHLORIDE 5 ML: 10 INJECTION, SOLUTION EPIDURAL; INFILTRATION; INTRACAUDAL; PERINEURAL at 11:37

## 2019-12-03 ENCOUNTER — APPOINTMENT (EMERGENCY)
Dept: CT IMAGING | Facility: HOSPITAL | Age: 59
End: 2019-12-03

## 2019-12-03 ENCOUNTER — HOSPITAL ENCOUNTER (EMERGENCY)
Facility: HOSPITAL | Age: 59
Discharge: HOME | End: 2019-12-03
Admitting: EMERGENCY MEDICINE

## 2019-12-03 ENCOUNTER — APPOINTMENT (EMERGENCY)
Dept: RADIOLOGY | Facility: HOSPITAL | Age: 59
End: 2019-12-03

## 2019-12-03 VITALS
RESPIRATION RATE: 18 BRPM | TEMPERATURE: 97.8 F | SYSTOLIC BLOOD PRESSURE: 125 MMHG | DIASTOLIC BLOOD PRESSURE: 64 MMHG | OXYGEN SATURATION: 98 % | HEART RATE: 62 BPM

## 2019-12-03 DIAGNOSIS — S16.1XXA CERVICAL STRAIN, ACUTE: ICD-10-CM

## 2019-12-03 DIAGNOSIS — S63.501A SPRAIN OF RIGHT WRIST: Primary | ICD-10-CM

## 2019-12-03 PROCEDURE — 73130 X-RAY EXAM OF HAND: CPT

## 2019-12-03 PROCEDURE — 99284 EMERGENCY DEPT VISIT MOD MDM: CPT

## 2019-12-03 PROCEDURE — 99285 EMERGENCY DEPT VISIT HI MDM: CPT | Performed by: PHYSICIAN ASSISTANT

## 2019-12-03 PROCEDURE — 72125 CT NECK SPINE W/O DYE: CPT

## 2019-12-03 PROCEDURE — 73110 X-RAY EXAM OF WRIST: CPT

## 2019-12-03 RX ORDER — CYCLOBENZAPRINE HCL 10 MG
10 TABLET ORAL 3 TIMES DAILY PRN
Qty: 15 TABLET | Refills: 0 | Status: SHIPPED | OUTPATIENT
Start: 2019-12-03 | End: 2019-12-26 | Stop reason: HOSPADM

## 2019-12-03 NOTE — ED PROVIDER NOTES
History  Chief Complaint   Patient presents with   Cristy Splinter Fall     pt fell on ice on sunday and landed on concrete  pt states to have caught self with right hand  pt c/o right hand and wrist pain  pt c/o upper back pain and tailbone pain  pt denies hitting head, denies LOC  pt currently on plavix and baby aspirin daily     21-year-old male here for evaluation of injuries sustained from a fall on Sunday  He was walking on the ice when he slipped falling toward his right and bracing his fall with his right hand  He has had pain in the hand and wrist since then  Also having pain along his tailbone where he landed  Complains of pain at the base of his neck that radiates to both shoulders  She states he was not going to come in however his pain seems to be getting worsening feels like muscle spasm retention and his upper back  Denies any chest pain or shortness of breath  There is no head injury, no loss of consciousness  Did not strike his head  He states the pain from his neck is radiating up to his head otherwise no headache  Denies any visual disturbances, nausea or vomiting  No saddle anesthesia or urinary incontinence no bowel incontinence  No extremity weakness  He does take Plavix, no anticoagulants  History provided by:  Patient   used: No    Fall   Mechanism of injury: fall    Injury location: neck, tailbone, right wrist and right hand    Incident location:  Outdoors  Time since incident:  2 days  Arrived directly from scene: no    Fall:     Fall occurred:  Tripped    Height of fall:  Standing    Impact surface:  Gravel and ice    Point of impact:  Outstretched arms and neck  Protective equipment: none    Prior to arrival data:     Bystander interventions:  None  Associated symptoms: back pain and neck pain    Associated symptoms: no abdominal pain, no blindness, no chest pain, no difficulty breathing, no headaches, no hearing loss, no loss of consciousness, no nausea, no seizures and no vomiting    Risk factors comment:  On plavix      Prior to Admission Medications   Prescriptions Last Dose Informant Patient Reported? Taking?   aspirin (ECOTRIN LOW STRENGTH) 81 mg EC tablet   No No   Sig: Take 1 tablet by mouth daily   atorvastatin (LIPITOR) 40 mg tablet   No No   Sig: Take 1 tablet (40 mg total) by mouth daily with dinner   clopidogrel (PLAVIX) 75 mg tablet   No No   Sig: Take 1 tablet (75 mg total) by mouth daily   isosorbide mononitrate (IMDUR) 30 mg 24 hr tablet   No No   Sig: Take 1 tablet (30 mg total) by mouth daily   metoprolol succinate (TOPROL-XL) 25 mg 24 hr tablet   No No   Sig: Take 1 tablet (25 mg total) by mouth daily   naproxen (NAPROSYN) 500 mg tablet   No No   Sig: Take 1 tablet (500 mg total) by mouth every 12 (twelve) hours      Facility-Administered Medications: None       Past Medical History:   Diagnosis Date    CAD (coronary artery disease)     Family history of MI (myocardial infarction)     Former smoker     Hyperlipidemia     Irregular heart beat        Past Surgical History:   Procedure Laterality Date    CORONARY ANGIOPLASTY WITH STENT PLACEMENT  10/27/2017    TAMARA to prox  Cfx       History reviewed  No pertinent family history  I have reviewed and agree with the history as documented  Social History     Tobacco Use    Smoking status: Current Every Day Smoker     Types: Cigarettes    Smokeless tobacco: Never Used   Substance Use Topics    Alcohol use: No    Drug use: No        Review of Systems   Constitutional: Negative for activity change, appetite change, chills, diaphoresis, fatigue, fever and unexpected weight change  HENT: Negative for congestion, hearing loss, rhinorrhea, sinus pressure, sore throat and trouble swallowing  Eyes: Negative for blindness, photophobia and visual disturbance  Respiratory: Negative for apnea, cough, choking, chest tightness, shortness of breath, wheezing and stridor      Cardiovascular: Negative for chest pain, palpitations and leg swelling  Gastrointestinal: Negative for abdominal distention, abdominal pain, blood in stool, constipation, diarrhea, nausea and vomiting  Genitourinary: Negative for decreased urine volume, difficulty urinating, dysuria, enuresis, flank pain, frequency, hematuria and urgency  Musculoskeletal: Positive for back pain and neck pain  Negative for arthralgias, myalgias and neck stiffness  Skin: Negative for color change, pallor, rash and wound  Allergic/Immunologic: Negative  Neurological: Negative for dizziness, tremors, seizures, loss of consciousness, syncope, weakness, light-headedness, numbness and headaches  Hematological: Negative  Psychiatric/Behavioral: Negative  All other systems reviewed and are negative  Physical Exam  Physical Exam   Constitutional: He is oriented to person, place, and time  He appears well-developed and well-nourished  Non-toxic appearance  He does not have a sickly appearance  He does not appear ill  No distress  HENT:   Head: Normocephalic and atraumatic  Eyes: Pupils are equal, round, and reactive to light  EOM and lids are normal    Neck: Normal range of motion  Neck supple  Cardiovascular: Normal rate, regular rhythm, S1 normal, S2 normal, normal heart sounds, intact distal pulses and normal pulses  Exam reveals no gallop, no distant heart sounds, no friction rub and no decreased pulses  No murmur heard  Pulses:       Radial pulses are 2+ on the right side, and 2+ on the left side  Pulmonary/Chest: Effort normal and breath sounds normal  No accessory muscle usage  No apnea, no tachypnea and no bradypnea  No respiratory distress  He has no decreased breath sounds  He has no wheezes  He has no rhonchi  He has no rales  Abdominal: Soft  Normal appearance  He exhibits no distension  There is no tenderness  There is no rigidity, no rebound and no guarding  Musculoskeletal: Normal range of motion   He exhibits no edema or deformity  Right elbow: Normal        Right wrist: He exhibits tenderness and bony tenderness  He exhibits normal range of motion, no swelling, no effusion, no crepitus and no deformity  Cervical back: He exhibits tenderness and bony tenderness  Thoracic back: Normal         Lumbar back: He exhibits tenderness and bony tenderness  He exhibits normal range of motion, no swelling and no edema  Back:         Right forearm: Normal         Right hand: He exhibits tenderness and bony tenderness  He exhibits normal range of motion, normal capillary refill, no deformity and no swelling  Normal sensation noted  Normal strength noted  Hands:  Neurological: He is alert and oriented to person, place, and time  No cranial nerve deficit  GCS eye subscore is 4  GCS verbal subscore is 5  GCS motor subscore is 6  GCS 15  AAOx3  Ambulating in department without difficulty  CN II-XII grossly intact  No focal neuro deficits  Skin: Skin is warm, dry and intact  No rash noted  He is not diaphoretic  No erythema  No pallor  Psychiatric: His speech is normal    Nursing note and vitals reviewed        Vital Signs  ED Triage Vitals   Temperature Pulse Respirations Blood Pressure SpO2   12/03/19 1331 12/03/19 1331 12/03/19 1331 12/03/19 1331 12/03/19 1331   97 8 °F (36 6 °C) 55 17 125/57 99 %      Temp Source Heart Rate Source Patient Position - Orthostatic VS BP Location FiO2 (%)   12/03/19 1331 12/03/19 1331 12/03/19 1331 12/03/19 1331 --   Oral Monitor Sitting Left arm       Pain Score       12/03/19 1407       8           Vitals:    12/03/19 1331 12/03/19 1602   BP: 125/57 125/64   Pulse: 55 62   Patient Position - Orthostatic VS: Sitting          Visual Acuity  Visual Acuity      Most Recent Value   L Pupil Size (mm)  3   R Pupil Size (mm)  3          ED Medications  Medications - No data to display    Diagnostic Studies  Results Reviewed     None                 CT spine cervical without contrast   Final Result by Mathew Sierra MD (12/03 8600)      No cervical spine fracture or traumatic malalignment  Workstation performed: [id number]         XR hand 3+ vw right   ED Interpretation by Claudean Sine, PA-C (12/03 1432)   No acute osseous abnormalities      Final Result by Cornelius Rothman DO (12/03 1557)      No acute osseous abnormality  Small ossified density projects anterior to the carpal bones on the lateral view, possibly representing sequela of an old injury  This does not have the appearance of an acute fracture  Correlate for focal pain  Workstation performed: [id number]         XR wrist 3+ views RIGHT   ED Interpretation by Claudean Sine, PA-C (12/03 1433)   No acute osseous abnormalities  Final Result by Cornelius Rothman DO (12/03 1557)      No acute osseous abnormality  Small ossified density projects anterior to the carpal bones on the lateral view, possibly representing sequela of an old injury  This does not have the appearance of an acute fracture  Correlate for focal pain  Workstation performed: [id number]                    Procedures  Procedures         ED Course                               MDM  Number of Diagnoses or Management Options  Cervical strain, acute: new and requires workup  Sprain of right wrist: new and requires workup  Diagnosis management comments: Differential diagnosis including but not limited to: sprain, strain, fracture, dislocation, contusion  Plan:  CT neck, x-ray right wrist and right hand  Discussed imaging of the tail bone and explained that if there is a fractured tailbone the management would be conservative  He states he does not feel acute fractures table anyhow does not want x-ray         Amount and/or Complexity of Data Reviewed  Tests in the radiology section of CPT®: reviewed and ordered  Independent visualization of images, tracings, or specimens: yes    Risk of Complications, Morbidity, and/or Mortality  Presenting problems: moderate  Management options: low  General comments: 60-year-old male patient status post fall on outstretched hand  X-ray and CT scans obtained  Both without acute abnormalities  Likely sprain strain contusion injuries  Recommended rest ice compression elevation  Can trial muscle relaxer given his upper back/neck pain seems more like muscle spasm  Discussed follow up with family physician, return parameters were provided  Patient understands and agrees with the plan, ambulated of department  Patient Progress  Patient progress: stable        Disposition  Final diagnoses:   Sprain of right wrist   Cervical strain, acute     Time reflects when diagnosis was documented in both MDM as applicable and the Disposition within this note     Time User Action Codes Description Comment    12/3/2019  3:47 PM Ninetta Doom Add [S63 501A] Sprain of right wrist     12/3/2019  3:47 PM Ninetta Doom Add [S16  1XXA] Cervical strain, acute       ED Disposition     ED Disposition Condition Date/Time Comment    Discharge Stable Tue Dec 3, 2019  3:46 PM Mariettalarie Cost discharge to home/self care              Follow-up Information     Follow up With Specialties Details Why [address],  Internal Medicine Call  for local family doctor and routine follow up [address]  [phone]            Discharge Medication List as of 12/3/2019  3:47 PM      START taking these medications    Details   cyclobenzaprine (FLEXERIL) 10 mg tablet Take 1 tablet (10 mg total) by mouth 3 (three) times a day as needed for muscle spasms, Starting Tue 12/3/2019, Print         CONTINUE these medications which have NOT CHANGED    Details   aspirin (ECOTRIN LOW STRENGTH) 81 mg EC tablet Take 1 tablet by mouth daily, Starting Sun 10/29/2017, No Print      atorvastatin (LIPITOR) 40 mg tablet Take 1 tablet (40 mg total) by mouth daily with dinner, Starting Fri 6/21/2019, Normal      clopidogrel (PLAVIX) 75 mg tablet Take 1 tablet (75 mg total) by mouth daily, Starting Fri 6/21/2019, Normal      isosorbide mononitrate (IMDUR) 30 mg 24 hr tablet Take 1 tablet (30 mg total) by mouth daily, Starting Fri 6/21/2019, Normal      metoprolol succinate (TOPROL-XL) 25 mg 24 hr tablet Take 1 tablet (25 mg total) by mouth daily, Starting Fri 6/21/2019, Normal      naproxen (NAPROSYN) 500 mg tablet Take 1 tablet (500 mg total) by mouth every 12 (twelve) hours, Starting Fri 6/21/2019, Print           No discharge procedures on file      ED Provider  Electronically Signed by           Ibeth Motta PA-C  12/03/19 9680

## 2019-12-12 ENCOUNTER — HOSPITAL ENCOUNTER (OUTPATIENT)
Facility: HOSPITAL | Age: 59
Setting detail: OBSERVATION
Discharge: ANOTHER HOSPITAL | End: 2019-12-13
Attending: EMERGENCY MEDICINE | Admitting: FAMILY MEDICINE

## 2019-12-12 ENCOUNTER — APPOINTMENT (EMERGENCY)
Dept: RADIOLOGY | Facility: HOSPITAL | Age: 59
End: 2019-12-12

## 2019-12-12 DIAGNOSIS — I20.0 UNSTABLE ANGINA (HCC): Primary | ICD-10-CM

## 2019-12-12 PROBLEM — Z95.5 S/P PRIMARY ANGIOPLASTY WITH CORONARY STENT: Status: ACTIVE | Noted: 2019-12-12

## 2019-12-12 PROBLEM — R07.9 CHEST PAIN: Status: ACTIVE | Noted: 2019-12-12

## 2019-12-12 LAB
ALBUMIN SERPL BCP-MCNC: 3.7 G/DL (ref 3.5–5)
ALP SERPL-CCNC: 93 U/L (ref 46–116)
ALT SERPL W P-5'-P-CCNC: 46 U/L (ref 12–78)
ANION GAP SERPL CALCULATED.3IONS-SCNC: 10 MMOL/L (ref 4–13)
AST SERPL W P-5'-P-CCNC: 26 U/L (ref 5–45)
ATRIAL RATE: 60 BPM
ATRIAL RATE: 76 BPM
BASOPHILS # BLD AUTO: 0.07 THOUSANDS/ΜL (ref 0–0.1)
BASOPHILS NFR BLD AUTO: 1 % (ref 0–1)
BILIRUB SERPL-MCNC: 0.5 MG/DL (ref 0.2–1)
BUN SERPL-MCNC: 18 MG/DL (ref 5–25)
CALCIUM SERPL-MCNC: 8.7 MG/DL (ref 8.3–10.1)
CHLORIDE SERPL-SCNC: 106 MMOL/L (ref 100–108)
CHOLEST SERPL-MCNC: 149 MG/DL (ref 50–200)
CO2 SERPL-SCNC: 26 MMOL/L (ref 21–32)
CREAT SERPL-MCNC: 1.23 MG/DL (ref 0.6–1.3)
EOSINOPHIL # BLD AUTO: 0.24 THOUSAND/ΜL (ref 0–0.61)
EOSINOPHIL NFR BLD AUTO: 2 % (ref 0–6)
ERYTHROCYTE [DISTWIDTH] IN BLOOD BY AUTOMATED COUNT: 12.9 % (ref 11.6–15.1)
GFR SERPL CREATININE-BSD FRML MDRD: 64 ML/MIN/1.73SQ M
GLUCOSE SERPL-MCNC: 96 MG/DL (ref 65–140)
HCT VFR BLD AUTO: 46.8 % (ref 36.5–49.3)
HDLC SERPL-MCNC: 39 MG/DL
HGB BLD-MCNC: 15.5 G/DL (ref 12–17)
IMM GRANULOCYTES # BLD AUTO: 0.06 THOUSAND/UL (ref 0–0.2)
IMM GRANULOCYTES NFR BLD AUTO: 1 % (ref 0–2)
LDLC SERPL CALC-MCNC: 101 MG/DL (ref 0–100)
LYMPHOCYTES # BLD AUTO: 2.3 THOUSANDS/ΜL (ref 0.6–4.47)
LYMPHOCYTES NFR BLD AUTO: 22 % (ref 14–44)
MCH RBC QN AUTO: 30.9 PG (ref 26.8–34.3)
MCHC RBC AUTO-ENTMCNC: 33.1 G/DL (ref 31.4–37.4)
MCV RBC AUTO: 93 FL (ref 82–98)
MONOCYTES # BLD AUTO: 1.13 THOUSAND/ΜL (ref 0.17–1.22)
MONOCYTES NFR BLD AUTO: 11 % (ref 4–12)
NEUTROPHILS # BLD AUTO: 6.68 THOUSANDS/ΜL (ref 1.85–7.62)
NEUTS SEG NFR BLD AUTO: 63 % (ref 43–75)
NONHDLC SERPL-MCNC: 110 MG/DL
NRBC BLD AUTO-RTO: 0 /100 WBCS
NT-PROBNP SERPL-MCNC: 82 PG/ML
P AXIS: 52 DEGREES
P AXIS: 60 DEGREES
PLATELET # BLD AUTO: 266 THOUSANDS/UL (ref 149–390)
PMV BLD AUTO: 10.2 FL (ref 8.9–12.7)
POTASSIUM SERPL-SCNC: 3.8 MMOL/L (ref 3.5–5.3)
PR INTERVAL: 150 MS
PR INTERVAL: 168 MS
PROT SERPL-MCNC: 7.7 G/DL (ref 6.4–8.2)
QRS AXIS: -18 DEGREES
QRS AXIS: -32 DEGREES
QRSD INTERVAL: 102 MS
QRSD INTERVAL: 106 MS
QT INTERVAL: 404 MS
QT INTERVAL: 426 MS
QTC INTERVAL: 404 MS
QTC INTERVAL: 479 MS
RBC # BLD AUTO: 5.01 MILLION/UL (ref 3.88–5.62)
SODIUM SERPL-SCNC: 142 MMOL/L (ref 136–145)
T WAVE AXIS: 33 DEGREES
T WAVE AXIS: 4 DEGREES
TRIGL SERPL-MCNC: 44 MG/DL
TROPONIN I SERPL-MCNC: <0.02 NG/ML
VENTRICULAR RATE: 60 BPM
VENTRICULAR RATE: 76 BPM
WBC # BLD AUTO: 10.48 THOUSAND/UL (ref 4.31–10.16)

## 2019-12-12 PROCEDURE — 99284 EMERGENCY DEPT VISIT MOD MDM: CPT | Performed by: EMERGENCY MEDICINE

## 2019-12-12 PROCEDURE — 85025 COMPLETE CBC W/AUTO DIFF WBC: CPT | Performed by: EMERGENCY MEDICINE

## 2019-12-12 PROCEDURE — 93005 ELECTROCARDIOGRAM TRACING: CPT

## 2019-12-12 PROCEDURE — 99220 PR INITIAL OBSERVATION CARE/DAY 70 MINUTES: CPT | Performed by: FAMILY MEDICINE

## 2019-12-12 PROCEDURE — 36415 COLL VENOUS BLD VENIPUNCTURE: CPT | Performed by: EMERGENCY MEDICINE

## 2019-12-12 PROCEDURE — 80053 COMPREHEN METABOLIC PANEL: CPT | Performed by: EMERGENCY MEDICINE

## 2019-12-12 PROCEDURE — 99245 OFF/OP CONSLTJ NEW/EST HI 55: CPT | Performed by: INTERNAL MEDICINE

## 2019-12-12 PROCEDURE — 84484 ASSAY OF TROPONIN QUANT: CPT | Performed by: EMERGENCY MEDICINE

## 2019-12-12 PROCEDURE — 83880 ASSAY OF NATRIURETIC PEPTIDE: CPT | Performed by: EMERGENCY MEDICINE

## 2019-12-12 PROCEDURE — 99285 EMERGENCY DEPT VISIT HI MDM: CPT

## 2019-12-12 PROCEDURE — 71046 X-RAY EXAM CHEST 2 VIEWS: CPT

## 2019-12-12 PROCEDURE — 36415 COLL VENOUS BLD VENIPUNCTURE: CPT | Performed by: PHYSICIAN ASSISTANT

## 2019-12-12 PROCEDURE — 84484 ASSAY OF TROPONIN QUANT: CPT | Performed by: PHYSICIAN ASSISTANT

## 2019-12-12 PROCEDURE — 93010 ELECTROCARDIOGRAM REPORT: CPT | Performed by: INTERNAL MEDICINE

## 2019-12-12 PROCEDURE — 80061 LIPID PANEL: CPT | Performed by: PHYSICIAN ASSISTANT

## 2019-12-12 RX ORDER — ACETAMINOPHEN 325 MG/1
650 TABLET ORAL EVERY 4 HOURS PRN
Status: DISCONTINUED | OUTPATIENT
Start: 2019-12-12 | End: 2019-12-13 | Stop reason: HOSPADM

## 2019-12-12 RX ORDER — CLOPIDOGREL BISULFATE 75 MG/1
75 TABLET ORAL DAILY
Status: DISCONTINUED | OUTPATIENT
Start: 2019-12-12 | End: 2019-12-13 | Stop reason: HOSPADM

## 2019-12-12 RX ORDER — ATORVASTATIN CALCIUM 40 MG/1
40 TABLET, FILM COATED ORAL
Status: DISCONTINUED | OUTPATIENT
Start: 2019-12-12 | End: 2019-12-13 | Stop reason: HOSPADM

## 2019-12-12 RX ORDER — NICOTINE 21 MG/24HR
1 PATCH, TRANSDERMAL 24 HOURS TRANSDERMAL DAILY
Status: DISCONTINUED | OUTPATIENT
Start: 2019-12-12 | End: 2019-12-13 | Stop reason: HOSPADM

## 2019-12-12 RX ORDER — METOPROLOL SUCCINATE 25 MG/1
12.5 TABLET, EXTENDED RELEASE ORAL DAILY
Status: DISCONTINUED | OUTPATIENT
Start: 2019-12-13 | End: 2019-12-13 | Stop reason: HOSPADM

## 2019-12-12 RX ORDER — ASPIRIN 81 MG/1
81 TABLET ORAL DAILY
Status: DISCONTINUED | OUTPATIENT
Start: 2019-12-12 | End: 2019-12-13 | Stop reason: HOSPADM

## 2019-12-12 RX ORDER — METOPROLOL SUCCINATE 25 MG/1
25 TABLET, EXTENDED RELEASE ORAL DAILY
Status: DISCONTINUED | OUTPATIENT
Start: 2019-12-12 | End: 2019-12-12

## 2019-12-12 RX ORDER — ONDANSETRON 2 MG/ML
4 INJECTION INTRAMUSCULAR; INTRAVENOUS EVERY 6 HOURS PRN
Status: DISCONTINUED | OUTPATIENT
Start: 2019-12-12 | End: 2019-12-13 | Stop reason: HOSPADM

## 2019-12-12 RX ORDER — ISOSORBIDE MONONITRATE 30 MG/1
30 TABLET, EXTENDED RELEASE ORAL DAILY
Status: DISCONTINUED | OUTPATIENT
Start: 2019-12-12 | End: 2019-12-13 | Stop reason: HOSPADM

## 2019-12-12 RX ORDER — CALCIUM CARBONATE 200(500)MG
1000 TABLET,CHEWABLE ORAL DAILY PRN
Status: DISCONTINUED | OUTPATIENT
Start: 2019-12-12 | End: 2019-12-13 | Stop reason: HOSPADM

## 2019-12-12 RX ORDER — SODIUM CHLORIDE 9 MG/ML
50 INJECTION, SOLUTION INTRAVENOUS CONTINUOUS
Status: DISCONTINUED | OUTPATIENT
Start: 2019-12-12 | End: 2019-12-12

## 2019-12-12 RX ADMIN — SODIUM CHLORIDE 50 ML/HR: 0.9 INJECTION, SOLUTION INTRAVENOUS at 05:38

## 2019-12-12 RX ADMIN — ASPIRIN 81 MG: 81 TABLET, COATED ORAL at 08:20

## 2019-12-12 RX ADMIN — METOPROLOL SUCCINATE 25 MG: 25 TABLET, EXTENDED RELEASE ORAL at 08:19

## 2019-12-12 RX ADMIN — CLOPIDOGREL BISULFATE 75 MG: 75 TABLET ORAL at 08:19

## 2019-12-12 RX ADMIN — ISOSORBIDE MONONITRATE 30 MG: 30 TABLET, EXTENDED RELEASE ORAL at 08:19

## 2019-12-12 RX ADMIN — ATORVASTATIN CALCIUM 40 MG: 40 TABLET, FILM COATED ORAL at 18:09

## 2019-12-12 NOTE — ED PROVIDER NOTES
History  Chief Complaint   Patient presents with    Chest Pain     was woken up by CP  hx of cardiac stents  Patient is a 63year old male  He has a history of coronary artery disease  He has stents  His last catheterization was in 2017  He is a former smoker  There is a family history of coronary artery disease  He has hyperlipidemia  He denies hypertension or diabetes  Patient awoke from sleep about an hour ago with chest pain  It is a left-sided pressure that involves the left arm  No associated shortness of breath, nausea or diaphoresis  Patient took aspirin  Pain lasted about 10 minutes and resolved  There is no cough or hemoptysis  No unilateral leg swelling  No peripheral edema  Pain was nonexertional   Pain resolved spontaneously  Currently patient is pain-free  Patient has no associated back pain pleuritic pain  No fever or chills  Prior to Admission Medications   Prescriptions Last Dose Informant Patient Reported?  Taking?   aspirin (ECOTRIN LOW STRENGTH) 81 mg EC tablet   No No   Sig: Take 1 tablet by mouth daily   atorvastatin (LIPITOR) 40 mg tablet   No No   Sig: Take 1 tablet (40 mg total) by mouth daily with dinner   clopidogrel (PLAVIX) 75 mg tablet   No No   Sig: Take 1 tablet (75 mg total) by mouth daily   cyclobenzaprine (FLEXERIL) 10 mg tablet   No No   Sig: Take 1 tablet (10 mg total) by mouth 3 (three) times a day as needed for muscle spasms   isosorbide mononitrate (IMDUR) 30 mg 24 hr tablet   No No   Sig: Take 1 tablet (30 mg total) by mouth daily   metoprolol succinate (TOPROL-XL) 25 mg 24 hr tablet   No No   Sig: Take 1 tablet (25 mg total) by mouth daily   naproxen (NAPROSYN) 500 mg tablet   No No   Sig: Take 1 tablet (500 mg total) by mouth every 12 (twelve) hours      Facility-Administered Medications: None       Past Medical History:   Diagnosis Date    CAD (coronary artery disease)     Family history of MI (myocardial infarction)     Former smoker  Hyperlipidemia     Irregular heart beat        Past Surgical History:   Procedure Laterality Date    CORONARY ANGIOPLASTY WITH STENT PLACEMENT  10/27/2017    TAMARA to prox  Cfx       History reviewed  No pertinent family history  I have reviewed and agree with the history as documented  Social History     Tobacco Use    Smoking status: Current Every Day Smoker     Types: Cigarettes    Smokeless tobacco: Never Used   Substance Use Topics    Alcohol use: No    Drug use: No        Review of Systems   Constitutional: Negative for chills and fever  HENT: Negative for rhinorrhea and sore throat  Eyes: Negative for pain, redness and visual disturbance  Respiratory: Negative for cough and shortness of breath  Cardiovascular: Positive for chest pain  Negative for leg swelling  Gastrointestinal: Negative for abdominal pain, diarrhea and vomiting  Endocrine: Negative for polydipsia and polyuria  Genitourinary: Negative for dysuria, frequency and hematuria  Musculoskeletal: Negative for back pain and neck pain  Skin: Negative for rash and wound  Allergic/Immunologic: Negative for immunocompromised state  Neurological: Negative for weakness, numbness and headaches  Psychiatric/Behavioral: Negative for hallucinations and suicidal ideas  All other systems reviewed and are negative  Physical Exam  Physical Exam   Constitutional: He is oriented to person, place, and time  He appears well-developed and well-nourished  Non-toxic appearance  He does not appear ill  No distress  HENT:   Head: Normocephalic and atraumatic  Eyes: Right eye exhibits no discharge  Left eye exhibits no discharge  No scleral icterus  Neck: Normal range of motion  Neck supple  Cardiovascular: Normal rate, regular rhythm, normal heart sounds and intact distal pulses  Exam reveals no gallop and no friction rub  No murmur heard    Pulmonary/Chest: Effort normal and breath sounds normal  No respiratory distress  He has no wheezes  He has no rales  No chest wall tenderness  Abdominal: Soft  Bowel sounds are normal  He exhibits no distension  There is no tenderness  There is no rebound and no guarding  Musculoskeletal: Normal range of motion  He exhibits no edema, tenderness or deformity  Right lower leg: He exhibits no tenderness and no edema  Left lower leg: He exhibits no tenderness and no edema  Neurological: He is alert and oriented to person, place, and time  He has normal strength  No sensory deficit  GCS eye subscore is 4  GCS verbal subscore is 5  GCS motor subscore is 6  Skin: Skin is warm and dry  No rash noted  He is not diaphoretic  Psychiatric: He has a normal mood and affect  His behavior is normal    Vitals reviewed        Vital Signs  ED Triage Vitals   Temperature Pulse Respirations Blood Pressure SpO2   12/12/19 0357 12/12/19 0357 12/12/19 0357 12/12/19 0357 12/12/19 0357   97 6 °F (36 4 °C) 59 17 (!) 172/90 99 %      Temp src Heart Rate Source Patient Position - Orthostatic VS BP Location FiO2 (%)   -- 12/12/19 0357 12/12/19 0357 12/12/19 0357 --    Monitor Lying Right arm       Pain Score       12/12/19 0355       No Pain           Vitals:    12/12/19 0357 12/12/19 0430   BP: (!) 172/90 148/84   Pulse: 59 (!) 54   Patient Position - Orthostatic VS: Lying Lying         Visual Acuity      ED Medications  Medications - No data to display    Diagnostic Studies  Results Reviewed     Procedure Component Value Units Date/Time    NT-BNP PRO [[id number]]     Lab Status:  No result Specimen:  Blood     Troponin I [[id number]]  (Normal) Collected:  12/12/19 0415    Lab Status:  Final result Specimen:  Blood from Arm, Right Updated:  12/12/19 0442     Troponin I <0 02 ng/mL     Comprehensive metabolic panel [[id number]] Collected:  12/12/19 0415    Lab Status:  Final result Specimen:  Blood from Arm, Right Updated:  12/12/19 0439     Sodium 142 mmol/L      Potassium 3 8 mmol/L Chloride 106 mmol/L      CO2 26 mmol/L      ANION GAP 10 mmol/L      BUN 18 mg/dL      Creatinine 1 23 mg/dL      Glucose 96 mg/dL      Calcium 8 7 mg/dL      AST 26 U/L      ALT 46 U/L      Alkaline Phosphatase 93 U/L      Total Protein 7 7 g/dL      Albumin 3 7 g/dL      Total Bilirubin 0 50 mg/dL      eGFR 64 ml/min/1 73sq m     Narrative:       [place] guidelines for Chronic Kidney Disease (CKD):     Stage 1 with normal or high GFR (GFR > 90 mL/min/1 73 square meters)    Stage 2 Mild CKD (GFR = 60-89 mL/min/1 73 square meters)    Stage 3A Moderate CKD (GFR = 45-59 mL/min/1 73 square meters)    Stage 3B Moderate CKD (GFR = 30-44 mL/min/1 73 square meters)    Stage 4 Severe CKD (GFR = 15-29 mL/min/1 73 square meters)    Stage 5 End Stage CKD (GFR <15 mL/min/1 73 square meters)  Note: GFR calculation is accurate only with a steady state creatinine    CBC and differential [[id number]]  (Abnormal) Collected:  12/12/19 0415    Lab Status:  Final result Specimen:  Blood from Arm, Right Updated:  12/12/19 0424     WBC 10 48 Thousand/uL      RBC 5 01 Million/uL      Hemoglobin 15 5 g/dL      Hematocrit 46 8 %      MCV 93 fL      MCH 30 9 pg      MCHC 33 1 g/dL      RDW 12 9 %      MPV 10 2 fL      Platelets 992 Thousands/uL      nRBC 0 /100 WBCs      Neutrophils Relative 63 %      Immat GRANS % 1 %      Lymphocytes Relative 22 %      Monocytes Relative 11 %      Eosinophils Relative 2 %      Basophils Relative 1 %      Neutrophils Absolute 6 68 Thousands/µL      Immature Grans Absolute 0 06 Thousand/uL      Lymphocytes Absolute 2 30 Thousands/µL      Monocytes Absolute 1 13 Thousand/µL      Eosinophils Absolute 0 24 Thousand/µL      Basophils Absolute 0 07 Thousands/µL                  XR chest 2 views   ED Interpretation by Serafin Brink MD (12/12 4982)   No pneumothorax  No pneumonia  Increased vascular congestion  No widened mediastinum                   Procedures  ECG 12 Lead Documentation Only  Date/Time: 12/12/2019 4:18 AM  Performed by: Varsha Christiansen MD  Authorized by: Varsha Christiansen MD     ECG reviewed by me, the ED Provider: yes    Patient location:  ED  Comments:      Normal sinus rhythm  Left axis deviation  No acute ischemic ST or T-wave abnormality  No elevations  No ectopy  ED Course         HEART Risk Score      Most Recent Value   History  1 Filed at: 12/12/2019 0455   ECG  0 Filed at: 12/12/2019 0455   Age  1 Filed at: 12/12/2019 0455   Risk Factors  2 Filed at: 12/12/2019 0455   Troponin  0 Filed at: 12/12/2019 0455   Heart Score Risk Calculator   History  1 Filed at: 12/12/2019 0455   ECG  0 Filed at: 12/12/2019 0455   Age  1 Filed at: 12/12/2019 0455   Risk Factors  2 Filed at: 12/12/2019 0455   Troponin  0 Filed at: 12/12/2019 0455   HEART Score  4 Filed at: 12/12/2019 0455   HEART Score  4 Filed at: 12/12/2019 0455                            MDM  Number of Diagnoses or Management Options  Diagnosis management comments: Atypical for dissection or pulmonary embolism  No pneumonia or pneumothorax on chest x-ray  Heart score is 4  Patient has a history of coronary artery disease and stents  He will require admission to rule out acute coronary syndrome  Pain-free of present    Received aspirin prior to arrival          Amount and/or Complexity of Data Reviewed  Clinical lab tests: ordered and reviewed  Tests in the radiology section of CPT®: ordered and reviewed  Discuss the patient with other providers: yes  Independent visualization of images, tracings, or specimens: yes          Disposition  Final diagnoses:   Unstable angina ([address] )     Time reflects when diagnosis was documented in both MDM as applicable and the Disposition within this note     Time User Action Codes Description Comment    12/12/2019  4:57 AM Lavonne Rosario [I20 0] Unstable angina [facility])       ED Disposition     ED Disposition Condition Date/Time Comment    Admit Stable Thu Dec 12, 2019  4:57 AM         Follow-up Information    None         Patient's Medications   Discharge Prescriptions    No medications on file     No discharge procedures on file      ED Provider  Electronically Signed by           Serafin Brink MD  12/12/19 7166

## 2019-12-12 NOTE — QUICK NOTE
The patient is now going to require further hospitalization and will be going for a cardiac catheterization tomorrow  Discharge will depend on the cardiac catheterization  Patient with symptoms of unstable angina  Will give 1 time dose of therapeutic Lovenox per Cardiology recommendation

## 2019-12-12 NOTE — ASSESSMENT & PLAN NOTE
· States awoke at 0200 with Lt sided anterior chest pressure/pain that radiated down LUE with (+) nausea lasting 30min  Denies SOB or diaphoresis  · (+) hx of CAD and with stent x 2 10/27/2017 (RCA and circumflex)  · NM stress test 9/27/2018 (-)  · Initial troponin in ED (-)   Will trend x 3  · KAMLESH = 3  · Admit obs - ACS r/o

## 2019-12-12 NOTE — H&P
H&P- Renata Scruggs 1960, 63 y o  male MRN: [id number]    Unit/Bed#: ED 14 Encounter: [id number]    Primary Care Provider: No primary care provider on file  Date and time admitted to hospital: 12/12/2019  3:52 AM        * Chest pain  Assessment & Plan  · States awoke at 0200 with Lt sided anterior chest pressure/pain that radiated down LUE with (+) nausea lasting 30min  Denies SOB or diaphoresis  · (+) hx of CAD and with stent x 2 10/27/2017 (RCA and circumflex)  · NM stress test 9/27/2018 (-)  · Initial troponin in ED (-)  Will trend x 3  · KAMLESH = 3  · Admit obs - ACS r/o    S/P primary angioplasty with coronary stent  Assessment & Plan  · TAMARA to Circumflex and RCA 10/2017  · Continue home dose ASA, Lipitor, Plavix, Imdur and Toprol XL    Tobacco abuse  Assessment & Plan  · Currently smoking 0 5ppd  · Smoking cessation education  · Nicotine patch    Pure hypercholesterolemia  Assessment & Plan  · Continue home dose Lipitor  · FLP in am      VTE Prophylaxis: Low risk  Ambulatory  / reason for no mechanical VTE prophylaxis Ambulatory   Code Status: Level 1 Full Code  POLST: POLST form is not discussed and not completed at this time  Discussion with family: NA    Anticipated Length of Stay:  Patient will be admitted on an Observation basis with an anticipated length of stay of  Less than 2 midnights  Justification for Hospital Stay: See AP above    Total Time for Visit, including Counseling / Coordination of Care: 30 minutes  Greater than 50% of this total time spent on direct patient counseling and coordination of care  Chief Complaint:   Chest pain    History of Present Illness:    Renata Scruggs is a 63 y o  male who presents with Lt anterior chest wall pressure/pain that radiated down left arm  States awoke with the pain at 0200 and it lasted approx 30min  (+) associated nausea  Denies SOB or diaphoresis  (+) hx of CAD with TAMARA x 2 placed 10/2017  NM stress test completed 9/2018 (-)  Denies recent rever/chills  Denies URI symptoms  Review of Systems:    Review of Systems   Constitutional: Negative for activity change, appetite change, chills and fever  HENT: Negative for congestion, ear pain, sinus pressure and sore throat  Eyes: Negative for visual disturbance  Respiratory: Negative for cough, shortness of breath and wheezing  Cardiovascular: Positive for chest pain  Negative for palpitations and leg swelling  Gastrointestinal: Positive for nausea  Negative for abdominal pain, constipation, diarrhea and vomiting  Genitourinary: Negative for difficulty urinating, dysuria and frequency  Musculoskeletal: Negative for neck pain and neck stiffness  Neurological: Negative for dizziness, syncope, light-headedness and headaches  All other systems reviewed and are negative  Past Medical and Surgical History:     Past Medical History:   Diagnosis Date    CAD (coronary artery disease)     Family history of MI (myocardial infarction)     Former smoker     Hyperlipidemia     Irregular heart beat        Past Surgical History:   Procedure Laterality Date    CORONARY ANGIOPLASTY WITH STENT PLACEMENT  10/27/2017    TAMARA to prox  Cfx       Meds/Allergies:    Prior to Admission medications    Medication Sig Start Date End Date Taking?  Authorizing Provider   aspirin (ECOTRIN LOW STRENGTH) 81 mg EC tablet Take 1 tablet by mouth daily 10/29/17   Shellie Preston MD   atorvastatin (LIPITOR) 40 mg tablet Take 1 tablet (40 mg total) by mouth daily with dinner 6/21/19   Lauren Henderson MD   clopidogrel (PLAVIX) 75 mg tablet Take 1 tablet (75 mg total) by mouth daily 6/21/19   Lauren Henderson MD   cyclobenzaprine (FLEXERIL) 10 mg tablet Take 1 tablet (10 mg total) by mouth 3 (three) times a day as needed for muscle spasms 12/3/19   Charlie Anna PA-C   isosorbide mononitrate (IMDUR) 30 mg 24 hr tablet Take 1 tablet (30 mg total) by mouth daily 6/21/19   Lauren Henderson MD   metoprolol succinate (TOPROL-XL) 25 mg 24 hr tablet Take 1 tablet (25 mg total) by mouth daily 6/21/19   Lila Godfrey MD   naproxen (NAPROSYN) 500 mg tablet Take 1 tablet (500 mg total) by mouth every 12 (twelve) hours 6/21/19   Osiel Deluna PA-C     I have reviewed home medications with patient personally  Allergies: No Known Allergies    Social History:     Marital Status: /Civil Union   Patient Pre-hospital Living Situation: Lives with wife  Patient Pre-hospital Level of Mobility: Ambulatory w/o assistive device  Patient Pre-hospital Diet Restrictions: Low fat  Low Na  Substance Use History:   Social History     Substance and Sexual Activity   Alcohol Use No     Social History     Tobacco Use   Smoking Status Current Every Day Smoker    Packs/day: 0 50    Types: Cigarettes   Smokeless Tobacco Never Used     Social History     Substance and Sexual Activity   Drug Use No       Family History:    Family History   Family history unknown: Yes       Physical Exam:     Vitals:   Blood Pressure: 144/77 (12/12/19 0500)  Pulse: 58 (12/12/19 0500)  Temperature: 97 6 °F (36 4 °C) (12/12/19 0357)  Respirations: 21 (12/12/19 0500)  Height: 5' 7" (170 2 cm) (12/12/19 0355)  Weight - Scale: 85 kg (187 lb 6 3 oz) (12/12/19 0357)  SpO2: 100 % (12/12/19 0500)    Physical Exam   Constitutional: He is oriented to person, place, and time  He appears well-developed and well-nourished  No distress  HENT:   Head: Normocephalic and atraumatic  Eyes: Pupils are equal, round, and reactive to light  EOM are normal    Neck: Normal range of motion  Neck supple  Cardiovascular: Regular rhythm, normal heart sounds and intact distal pulses  Bradycardia present  Exam reveals no gallop and no friction rub  No murmur heard  Pulmonary/Chest: Effort normal and breath sounds normal  No respiratory distress  He has no wheezes  He has no rales  Abdominal: Soft  Bowel sounds are normal  There is no tenderness   There is no rebound and no guarding  Musculoskeletal: Normal range of motion  He exhibits no edema or tenderness  Neurological: He is alert and oriented to person, place, and time  Skin: Skin is warm and dry  Psychiatric: He has a normal mood and affect  His behavior is normal  Thought content normal          Additional Data:     Lab Results: I have personally reviewed pertinent reports  Results from last 7 days   Lab Units 12/12/19  0415   WBC Thousand/uL 10 48*   HEMOGLOBIN g/dL 15 5   HEMATOCRIT % 46 8   PLATELETS Thousands/uL 266   NEUTROS PCT % 63   LYMPHS PCT % 22   MONOS PCT % 11   EOS PCT % 2     Results from last 7 days   Lab Units 12/12/19  0415   SODIUM mmol/L 142   POTASSIUM mmol/L 3 8   CHLORIDE mmol/L 106   CO2 mmol/L 26   BUN mg/dL 18   CREATININE mg/dL 1 23   ANION GAP mmol/L 10   CALCIUM mg/dL 8 7   ALBUMIN g/dL 3 7   TOTAL BILIRUBIN mg/dL 0 50   ALK PHOS U/L 93   ALT U/L 46   AST U/L 26   GLUCOSE RANDOM mg/dL 96                       Imaging: I have personally reviewed pertinent films in PACS    XR chest 2 views   ED Interpretation by Babita English MD (12/12 8788)   No pneumothorax  No pneumonia  Increased vascular congestion  No widened mediastinum  EKG, Pathology, and Other Studies Reviewed on Admission:   · EKG: NS w/ no acute ischemic changes    Allscripts / Epic Records Reviewed: Yes     ** Please Note: This note has been constructed using a voice recognition system   **

## 2019-12-12 NOTE — ED NOTES
Pt reports taking 3 or 4 Aspirin at home right before arrival in ED     Yoli Singletary RN  12/12/19 [id number]

## 2019-12-12 NOTE — ASSESSMENT & PLAN NOTE
· TAMARA to Circumflex and RCA 10/2017  · Continue home dose ASA, Lipitor, Plavix, Imdur and Toprol XL

## 2019-12-12 NOTE — ED NOTES
CC- CP (cardiac cath tomorrow)    Admission related to injury? - N/A    Orientation status- AOx4    Abnormal labs/abnormal focused assessment/vitals- none    Medication/drips- NSS 50/hr    Last time narcotics given- N/A     IV lines/drains/etc- 18G RAC    Isolation status- N/A    Skin- WDL    BMAT screening tool-? Level 4     ED nurse's name and phone number- LITA Roberson  12/12/19 3439

## 2019-12-12 NOTE — CONSULTS
Consultation - Cardiology   [facility] 63 y o  male MRN: [id number]  Unit/Bed#: ED 21 Encounter: [id number]  12/12/19  3:22 PM    Assessment/ Plan:  1- Unstable angina, symptoms concerning for ACS, discussed options patient to include invasive versus noninvasive ischemic evaluation, after long discussion patient decided to opt for invasive evaluation to define coronary anatomy  Risks versus benefits of procedure were discussed to include 1% chance of stroke, mi, bleeding, infection and/or arrhythmias  Patient verbalized understanding is agreeable to proceed  NPO now  Further recommendation will based upon catheterization results  2- CAD s/p pCFX, mRCA PCI, 10/27/2017  Plan as above  Continue aspirin, statin, Imdur and Toprol XL  3- Hyperlipidemia, continue with atorvastatin 40 mg daily  4- Tobacco abuse, current 1-1/2 pack per day smoker  He has no desire to quit at this time  Counseling given  History of Present Illness   Physician Requesting Consult: Juan Reagan MD  Reason for Consult / Principal Problem: Chest pain  HPI: [facility] is a 63y o  year old male previously known by Dr Ban Doyle who presents with chest pain  Patient states that last night he awoke with centralized, burning chest pressure which lasted approximately 10-15 minutes  Chest pain radiated to his neck and has left arm  States it felt like when he received stenting 2 years ago but a little more severe this time  No known exertional symptoms  No further episodes  Reports he has been compliant with his at home medication regimen otherwise  Denies any associated palpitations, nausea, diaphoresis, shortness of breath, orthopnea, PND or lower extremity edema  He received cardiac catheterization on 10/27/2017 with TAMARA to proximal circumflex and mid RCA, catheter poor also showed 50% mid circumflex, 50% proximal RCA and 50% distal RCA stenosis    He received outpatient pharmacologic stress test on 09/27/2018 which showed normal study  Previous echo from 09/27/2018 shows normal EF 55-65% without regional wall motion abnormalities, mild MR, mild TR  Inpatient consult to Cardiology  Consult performed by: Vish Hardy PA-C  Consult ordered by: Ernie Ohara MD        EKG: NSR, HR 60    Review of Systems   Constitutional: Negative for appetite change, chills, diaphoresis, fatigue and fever  Respiratory: Positive for chest tightness  Negative for cough and shortness of breath  Cardiovascular: Positive for chest pain  Negative for palpitations and leg swelling  Gastrointestinal: Negative for diarrhea, nausea and vomiting  Endocrine: Negative for cold intolerance and heat intolerance  Genitourinary: Negative for difficulty urinating, dysuria and enuresis  Musculoskeletal: Negative for arthralgias, back pain and gait problem  Allergic/Immunologic: Negative for environmental allergies and food allergies  Neurological: Negative for dizziness, facial asymmetry and headaches  Hematological: Negative for adenopathy  Does not bruise/bleed easily  Psychiatric/Behavioral: Negative for agitation, behavioral problems and confusion  Historical Information   Past Medical History:   Diagnosis Date    CAD (coronary artery disease)     Family history of MI (myocardial infarction)     Former smoker     Hyperlipidemia     Irregular heart beat      Past Surgical History:   Procedure Laterality Date    CORONARY ANGIOPLASTY WITH STENT PLACEMENT  10/27/2017    TAMARA to prox   Cfx     Social History     Substance and Sexual Activity   Alcohol Use No     Social History     Substance and Sexual Activity   Drug Use No     Social History     Tobacco Use   Smoking Status Current Every Day Smoker    Packs/day: 0 50    Types: Cigarettes   Smokeless Tobacco Never Used       Family History:   Family History   Family history unknown: Yes       Meds/Allergies   current meds:   Current Facility-Administered Medications   Medication Dose Route Frequency    acetaminophen (TYLENOL) tablet 650 mg  650 mg Oral Q4H PRN    aspirin (ECOTRIN LOW STRENGTH) EC tablet 81 mg  81 mg Oral Daily    atorvastatin (LIPITOR) tablet 40 mg  40 mg Oral Daily With Dinner    calcium carbonate (TUMS) chewable tablet 1,000 mg  1,000 mg Oral Daily PRN    clopidogrel (PLAVIX) tablet 75 mg  75 mg Oral Daily    enoxaparin (LOVENOX) subcutaneous injection 90 mg  1 mg/kg Subcutaneous Once    isosorbide mononitrate (IMDUR) 24 hr tablet 30 mg  30 mg Oral Daily    magnesium hydroxide (MILK OF MAGNESIA) 400 mg/5 mL oral suspension 30 mL  30 mL Oral Daily PRN    [START ON 12/13/2019] metoprolol succinate (TOPROL-XL) 24 hr tablet 12 5 mg  12 5 mg Oral Daily    nicotine (NICODERM CQ) 14 mg/24hr TD 24 hr patch 1 patch  1 patch Transdermal Daily    ondansetron (ZOFRAN) injection 4 mg  4 mg Intravenous Q6H PRN    sodium chloride 0 9 % infusion  50 mL/hr Intravenous Continuous     No Known Allergies    Objective   Vitals: Blood pressure 119/57, pulse (!) 49, temperature 97 6 °F (36 4 °C), resp  rate 16, height 5' 7" (1 702 m), weight 85 kg (187 lb 6 3 oz), SpO2 98 %  , Body mass index is 29 35 kg/m² ,   Orthostatic Blood Pressures      Most Recent Value   Blood Pressure  119/57 filed at 12/12/2019 1411   Patient Position - Orthostatic VS  Sitting filed at 12/12/2019 6081          Systolic (60FFW), WLM:966 , Min:119 , ASHLEIGH:278     Diastolic (55ZLD), JJN:33, Min:57, Max:90      No intake or output data in the 24 hours ending 12/12/19 1522    Invasive Devices     Peripheral Intravenous Line            Peripheral IV 12/12/19 Right Antecubital less than 1 day                    Physical Exam:  GEN: Alert and oriented x 3, in no acute distress  Well appearing and well nourished  HEENT: Sclera anicteric, conjunctivae pink, mucous membranes moist  Oropharynx clear  NECK: Supple, no carotid bruits, no significant JVD  Trachea midline, no thyromegaly     HEART: Regular rhythm, normal S1 and S2, no murmurs, clicks, gallops or rubs  PMI nondisplaced, no thrills  LUNGS: Clear to auscultation bilaterally; no wheezes, rales, or rhonchi  No increased work of breathing or signs of respiratory distress  ABDOMEN: Soft, nontender, nondistended, normoactive bowel sounds  EXTREMITIES: Skin warm and well perfused, no clubbing, cyanosis, or edema  NEURO: No focal findings  Normal speech  Mood and affect normal    SKIN: Normal without suspicious lesions on exposed skin        Lab Results:     Troponins:   Results from last 7 days   Lab Units 12/12/19  1015 12/12/19  0656 12/12/19  0415   TROPONIN I ng/mL <0 02 <0 02 <0 02       CBC with diff:   Results from last 7 days   Lab Units 12/12/19  0415   WBC Thousand/uL 10 48*   HEMOGLOBIN g/dL 15 5   HEMATOCRIT % 46 8   MCV fL 93   PLATELETS Thousands/uL 266   MCH pg 30 9   MCHC g/dL 33 1   RDW % 12 9   MPV fL 10 2   NRBC AUTO /100 WBCs 0         CMP:   Results from last 7 days   Lab Units 12/12/19  0415   POTASSIUM mmol/L 3 8   CHLORIDE mmol/L 106   CO2 mmol/L 26   BUN mg/dL 18   CREATININE mg/dL 1 23   CALCIUM mg/dL 8 7   AST U/L 26   ALT U/L 46   ALK PHOS U/L 93   EGFR ml/min/1 73sq m 64

## 2019-12-12 NOTE — ED NOTES
Family at bedside     Gabbi Rodríguez, [facility]  12/12/19 3521

## 2019-12-13 ENCOUNTER — APPOINTMENT (OUTPATIENT)
Dept: INTERVENTIONAL RADIOLOGY/VASCULAR | Facility: HOSPITAL | Age: 59
End: 2019-12-13

## 2019-12-13 ENCOUNTER — HOSPITAL ENCOUNTER (INPATIENT)
Facility: HOSPITAL | Age: 59
LOS: 13 days | Discharge: HOME | DRG: 235 | End: 2019-12-26
Attending: INTERNAL MEDICINE | Admitting: INTERNAL MEDICINE

## 2019-12-13 VITALS
HEART RATE: 50 BPM | BODY MASS INDEX: 29.41 KG/M2 | TEMPERATURE: 97.9 F | SYSTOLIC BLOOD PRESSURE: 140 MMHG | HEIGHT: 67 IN | WEIGHT: 187.39 LBS | RESPIRATION RATE: 19 BRPM | DIASTOLIC BLOOD PRESSURE: 84 MMHG | OXYGEN SATURATION: 98 %

## 2019-12-13 DIAGNOSIS — I20.0 UNSTABLE ANGINA (HCC): ICD-10-CM

## 2019-12-13 DIAGNOSIS — R09.02 HYPOXIA: ICD-10-CM

## 2019-12-13 DIAGNOSIS — Z95.1 S/P CABG (CORONARY ARTERY BYPASS GRAFT): ICD-10-CM

## 2019-12-13 DIAGNOSIS — J96.01 ACUTE RESPIRATORY FAILURE WITH HYPOXIA (HCC): ICD-10-CM

## 2019-12-13 DIAGNOSIS — I25.10 CORONARY ARTERY DISEASE INVOLVING NATIVE CORONARY ARTERY OF NATIVE HEART WITHOUT ANGINA PECTORIS: ICD-10-CM

## 2019-12-13 DIAGNOSIS — Z95.1 S/P CABG X 3: Primary | ICD-10-CM

## 2019-12-13 PROBLEM — R00.1 BRADYCARDIA: Status: ACTIVE | Noted: 2019-12-13

## 2019-12-13 LAB
ATRIAL RATE: 67 BPM
P AXIS: 63 DEGREES
PR INTERVAL: 166 MS
QRS AXIS: -24 DEGREES
QRSD INTERVAL: 90 MS
QT INTERVAL: 402 MS
QTC INTERVAL: 424 MS
T WAVE AXIS: -7 DEGREES
VENTRICULAR RATE: 67 BPM

## 2019-12-13 PROCEDURE — 99153 MOD SED SAME PHYS/QHP EA: CPT | Performed by: PHYSICIAN ASSISTANT

## 2019-12-13 PROCEDURE — 99214 OFFICE O/P EST MOD 30 MIN: CPT | Performed by: INTERNAL MEDICINE

## 2019-12-13 PROCEDURE — C1894 INTRO/SHEATH, NON-LASER: HCPCS | Performed by: PHYSICIAN ASSISTANT

## 2019-12-13 PROCEDURE — 99152 MOD SED SAME PHYS/QHP 5/>YRS: CPT | Performed by: INTERNAL MEDICINE

## 2019-12-13 PROCEDURE — 99217 PR OBSERVATION CARE DISCHARGE MANAGEMENT: CPT | Performed by: FAMILY MEDICINE

## 2019-12-13 PROCEDURE — 93458 L HRT ARTERY/VENTRICLE ANGIO: CPT | Performed by: PHYSICIAN ASSISTANT

## 2019-12-13 PROCEDURE — C1769 GUIDE WIRE: HCPCS | Performed by: PHYSICIAN ASSISTANT

## 2019-12-13 PROCEDURE — 93005 ELECTROCARDIOGRAM TRACING: CPT

## 2019-12-13 PROCEDURE — 93010 ELECTROCARDIOGRAM REPORT: CPT | Performed by: INTERNAL MEDICINE

## 2019-12-13 PROCEDURE — 99152 MOD SED SAME PHYS/QHP 5/>YRS: CPT | Performed by: PHYSICIAN ASSISTANT

## 2019-12-13 PROCEDURE — 93458 L HRT ARTERY/VENTRICLE ANGIO: CPT | Performed by: INTERNAL MEDICINE

## 2019-12-13 RX ORDER — FENTANYL CITRATE 50 UG/ML
INJECTION, SOLUTION INTRAMUSCULAR; INTRAVENOUS CODE/TRAUMA/SEDATION MEDICATION
Status: COMPLETED | OUTPATIENT
Start: 2019-12-13 | End: 2019-12-13

## 2019-12-13 RX ORDER — HEPARIN SODIUM 1000 [USP'U]/ML
INJECTION, SOLUTION INTRAVENOUS; SUBCUTANEOUS CODE/TRAUMA/SEDATION MEDICATION
Status: COMPLETED | OUTPATIENT
Start: 2019-12-13 | End: 2019-12-13

## 2019-12-13 RX ORDER — MIDAZOLAM HYDROCHLORIDE 2 MG/2ML
INJECTION, SOLUTION INTRAMUSCULAR; INTRAVENOUS CODE/TRAUMA/SEDATION MEDICATION
Status: COMPLETED | OUTPATIENT
Start: 2019-12-13 | End: 2019-12-13

## 2019-12-13 RX ORDER — VERAPAMIL HCL 2.5 MG/ML
AMPUL (ML) INTRAVENOUS CODE/TRAUMA/SEDATION MEDICATION
Status: COMPLETED | OUTPATIENT
Start: 2019-12-13 | End: 2019-12-13

## 2019-12-13 RX ORDER — ACETAMINOPHEN 325 MG/1
650 TABLET ORAL EVERY 4 HOURS PRN
Status: DISCONTINUED | OUTPATIENT
Start: 2019-12-13 | End: 2019-12-19 | Stop reason: HOSPADM

## 2019-12-13 RX ORDER — ATORVASTATIN CALCIUM 40 MG/1
40 TABLET, FILM COATED ORAL
Status: CANCELLED | OUTPATIENT
Start: 2019-12-13

## 2019-12-13 RX ORDER — ONDANSETRON 2 MG/ML
4 INJECTION INTRAMUSCULAR; INTRAVENOUS EVERY 6 HOURS PRN
Status: CANCELLED | OUTPATIENT
Start: 2019-12-13

## 2019-12-13 RX ORDER — ASPIRIN 81 MG/1
81 TABLET ORAL DAILY
Status: CANCELLED | OUTPATIENT
Start: 2019-12-14

## 2019-12-13 RX ORDER — CLOPIDOGREL BISULFATE 75 MG/1
75 TABLET ORAL DAILY
Status: CANCELLED | OUTPATIENT
Start: 2019-12-14

## 2019-12-13 RX ORDER — ATORVASTATIN CALCIUM 40 MG/1
40 TABLET, FILM COATED ORAL
Status: DISCONTINUED | OUTPATIENT
Start: 2019-12-14 | End: 2019-12-16

## 2019-12-13 RX ORDER — CALCIUM CARBONATE 200(500)MG
1000 TABLET,CHEWABLE ORAL DAILY PRN
Status: CANCELLED | OUTPATIENT
Start: 2019-12-13

## 2019-12-13 RX ORDER — ACETAMINOPHEN 325 MG/1
650 TABLET ORAL EVERY 4 HOURS PRN
Status: CANCELLED | OUTPATIENT
Start: 2019-12-13

## 2019-12-13 RX ORDER — SODIUM CHLORIDE 9 MG/ML
125 INJECTION, SOLUTION INTRAVENOUS CONTINUOUS
Status: DISPENSED | OUTPATIENT
Start: 2019-12-13 | End: 2019-12-13

## 2019-12-13 RX ORDER — ISOSORBIDE MONONITRATE 30 MG/1
30 TABLET, EXTENDED RELEASE ORAL DAILY
Status: CANCELLED | OUTPATIENT
Start: 2019-12-14

## 2019-12-13 RX ORDER — CALCIUM CARBONATE 200(500)MG
1000 TABLET,CHEWABLE ORAL DAILY PRN
Status: DISCONTINUED | OUTPATIENT
Start: 2019-12-13 | End: 2019-12-19 | Stop reason: HOSPADM

## 2019-12-13 RX ORDER — LIDOCAINE WITH 8.4% SOD BICARB 0.9%(10ML)
SYRINGE (ML) INJECTION CODE/TRAUMA/SEDATION MEDICATION
Status: COMPLETED | OUTPATIENT
Start: 2019-12-13 | End: 2019-12-13

## 2019-12-13 RX ORDER — NICOTINE 21 MG/24HR
1 PATCH, TRANSDERMAL 24 HOURS TRANSDERMAL DAILY
Status: DISCONTINUED | OUTPATIENT
Start: 2019-12-14 | End: 2019-12-19 | Stop reason: HOSPADM

## 2019-12-13 RX ORDER — ONDANSETRON 2 MG/ML
4 INJECTION INTRAMUSCULAR; INTRAVENOUS EVERY 6 HOURS PRN
Status: DISCONTINUED | OUTPATIENT
Start: 2019-12-13 | End: 2019-12-19 | Stop reason: HOSPADM

## 2019-12-13 RX ORDER — NITROGLYCERIN 20 MG/100ML
INJECTION INTRAVENOUS CODE/TRAUMA/SEDATION MEDICATION
Status: COMPLETED | OUTPATIENT
Start: 2019-12-13 | End: 2019-12-13

## 2019-12-13 RX ORDER — ISOSORBIDE MONONITRATE 30 MG/1
30 TABLET, EXTENDED RELEASE ORAL DAILY
Status: DISCONTINUED | OUTPATIENT
Start: 2019-12-14 | End: 2019-12-16

## 2019-12-13 RX ORDER — METOPROLOL SUCCINATE 25 MG/1
12.5 TABLET, EXTENDED RELEASE ORAL DAILY
Status: DISCONTINUED | OUTPATIENT
Start: 2019-12-14 | End: 2019-12-13

## 2019-12-13 RX ORDER — CLOPIDOGREL BISULFATE 75 MG/1
75 TABLET ORAL DAILY
Status: DISCONTINUED | OUTPATIENT
Start: 2019-12-14 | End: 2019-12-14

## 2019-12-13 RX ORDER — NICOTINE 21 MG/24HR
1 PATCH, TRANSDERMAL 24 HOURS TRANSDERMAL DAILY
Status: CANCELLED | OUTPATIENT
Start: 2019-12-14

## 2019-12-13 RX ORDER — LIDOCAINE HYDROCHLORIDE 10 MG/ML
INJECTION, SOLUTION EPIDURAL; INFILTRATION; INTRACAUDAL; PERINEURAL CODE/TRAUMA/SEDATION MEDICATION
Status: COMPLETED | OUTPATIENT
Start: 2019-12-13 | End: 2019-12-13

## 2019-12-13 RX ORDER — ASPIRIN 81 MG/1
81 TABLET ORAL DAILY
Status: DISCONTINUED | OUTPATIENT
Start: 2019-12-14 | End: 2019-12-19 | Stop reason: HOSPADM

## 2019-12-13 RX ORDER — METOPROLOL SUCCINATE 25 MG/1
12.5 TABLET, EXTENDED RELEASE ORAL DAILY
Status: CANCELLED | OUTPATIENT
Start: 2019-12-14

## 2019-12-13 RX ADMIN — ISOSORBIDE MONONITRATE 30 MG: 30 TABLET, EXTENDED RELEASE ORAL at 09:13

## 2019-12-13 RX ADMIN — SODIUM CHLORIDE 125 ML/HR: 0.9 INJECTION, SOLUTION INTRAVENOUS at 15:14

## 2019-12-13 RX ADMIN — CLOPIDOGREL BISULFATE 75 MG: 75 TABLET ORAL at 09:10

## 2019-12-13 RX ADMIN — VERAPAMIL HYDROCHLORIDE 2.5 MG: 2.5 INJECTION, SOLUTION INTRAVENOUS at 12:15

## 2019-12-13 RX ADMIN — MIDAZOLAM HYDROCHLORIDE 1 MG: 1 INJECTION, SOLUTION INTRAMUSCULAR; INTRAVENOUS at 12:08

## 2019-12-13 RX ADMIN — MIDAZOLAM HYDROCHLORIDE 1 MG: 1 INJECTION, SOLUTION INTRAMUSCULAR; INTRAVENOUS at 12:13

## 2019-12-13 RX ADMIN — IODIXANOL 35 ML: 320 INJECTION, SOLUTION INTRAVASCULAR at 12:33

## 2019-12-13 RX ADMIN — NITROGLYCERIN 200 MCG: 20 INJECTION INTRAVENOUS at 12:15

## 2019-12-13 RX ADMIN — FENTANYL CITRATE 25 MCG: 50 INJECTION, SOLUTION INTRAMUSCULAR; INTRAVENOUS at 12:08

## 2019-12-13 RX ADMIN — ASPIRIN 81 MG: 81 TABLET, COATED ORAL at 09:10

## 2019-12-13 RX ADMIN — METOPROLOL SUCCINATE 12.5 MG: 25 TABLET, EXTENDED RELEASE ORAL at 09:10

## 2019-12-13 RX ADMIN — LIDOCAINE HYDROCHLORIDE 3 ML: 10 INJECTION, SOLUTION INFILTRATION; PERINEURAL at 12:11

## 2019-12-13 RX ADMIN — ATORVASTATIN CALCIUM 40 MG: 40 TABLET, FILM COATED ORAL at 16:16

## 2019-12-13 RX ADMIN — LIDOCAINE HYDROCHLORIDE 2 ML: 10 INJECTION, SOLUTION EPIDURAL; INFILTRATION; INTRACAUDAL; PERINEURAL at 12:11

## 2019-12-13 RX ADMIN — HEPARIN SODIUM 5000 UNITS: 1000 INJECTION INTRAVENOUS; SUBCUTANEOUS at 12:18

## 2019-12-13 NOTE — ASSESSMENT & PLAN NOTE
· TAMARA to Circumflex and RCA 10/2017  · Continue home dose ASA, Lipitor, Plavix, Imdur and Toprol XL  · Patient is status post cardiac catheterization  He has multi-vessel disease and will need a CABG  Patient will be going to [facility] per conversation with Cardiology

## 2019-12-13 NOTE — PROGRESS NOTES
Cardiology Progress Note    Assessment/Plan   Unstable angina (70% ostial LAD, 80% ostial RPDA, 70% at the bifurcation of the LCx/OM)  CAD, s/p previous PCI to mid-RCA, proximal LCx, both patent  Tobacco abuse  Family history of CAD  HLD      Expectant management post cardiac cath via right radial approach  Given the patient's age (60) and his strong family history, combined with CAD progression on therapy, I think it would be reasonable to ask for a surgical opinion  The question will be inpatient versus outpatient  Monitor renal function  Keep K > 4, Mg > 2, Ca > 8 and PO4 > 2 5  Further instructions on disposition will follow our conversation with CTS  Addendum:  Case reviewed with CTS surgery  Given his presentation with resting pain, the decision was made to transfer to [facility] for inpatient CABG evaluation  This was explained to the patient and his wife  WILEY was informed  Transfer as soon as a bed is available  LOS: 0 days     Subjective   Coronary angiography shows 3VD, patent previous stents  Objective     Vital signs in last 24 hours:  Temp:  [98 3 °F (36 8 °C)-98 6 °F (37 °C)] 98 5 °F (36 9 °C)  HR:  [49-57] 52  Resp:  [15-16] 16  BP: (119-145)/(57-68) 128/68      No intake/output data recorded  No intake/output data recorded  Weight (last 2 days)     Date/Time   Weight    12/12/19 0357   85 (187 39)    12/12/19 0355   86 2 (190)                Physical Exam:  General: AAOx3, NAD  HEENT: Normocephalic/Atraumatic, No thyromegaly, lymphadenopathy, JVD or carotid bruits  Cardiovasc: Regular rhythm with a normal rate  No murmurs, rubs or gallops  Radial, carotid, femoral, dorsalis pedis and posterior tibial pulses are 2+/4  Chest/Pulm: CTAB, no wheezes, rales or rhonchi  Abdomen: +BSx4, Soft, non-tender  No organomegaly, rebound, rigidity or guarding  Extremities: No edema              Scheduled Meds:  Current Facility-Administered Medications:  acetaminophen 650 mg Oral Q4H PRN Zamzam Antonio PA-C   aspirin 81 mg Oral Daily Sonya Cota   [facility]vastatin 40 mg [facility] [address] [facility]   calcium carbonate 1,000 mg Oral Daily PRN Zamzam Antonio PA-C   clopidogrel 75 mg Oral Daily Carmen Olivas PA-C   enoxaparin 1 mg/kg Subcutaneous Once Paulette Anderson MD   isosorbide mononitrate 30 mg Oral Daily Carmen Olivas PA-C   magnesium hydroxide 30 mL Oral Daily PRN Zamzam Antonio PA-C   metoprolol succinate 12 5 mg Oral Daily Paulette Anderson MD   nicotine 1 patch Transdermal Daily Zamzam Antonio PA-C   ondansetron 4 mg Intravenous Q6H PRN Zamzam Antonio PA-C     Continuous Infusions:   PRN Meds:   acetaminophen    calcium carbonate    magnesium hydroxide    ondansetron          Lab Review   Results for Lan Cummings (MRN [id number]) as of 12/13/2019 12:50   12/12/2019 04:15   Sodium 142   Potassium 3 8   Chloride 106   CO2 26   Anion Gap 10   BUN 18   Creatinine 1 23   Glucose, Random 96   Calcium 8 7   AST 26   ALT 46   Alkaline Phosphatase 93   Total Protein 7 7   Albumin 3 7   TOTAL BILIRUBIN 0 50   eGFR 64     Results for Lan Cummings (MRN [id number]) as of 12/13/2019 12:50   12/12/2019 04:15   WBC 10 48 (H)   Red Blood Cell Count 5 01   Hemoglobin 15 5   HCT 46 8   MCV 93   MCH 30 9   MCHC 33 1   RDW 12 9   Platelet Count 558     Results for Lan Cummings (MRN [id number]) as of 12/13/2019 12:50   12/12/2019 06:56   Cholesterol 149   Triglycerides 44   HDL 39 (L)   Non-HDL Cholesterol 110   LDL Direct 101 (H)

## 2019-12-13 NOTE — PLAN OF CARE
Problem: Potential for Falls  Goal: Patient will remain free of falls  Description  INTERVENTIONS:  - Assess patient frequently for physical needs  -  Identify cognitive and physical deficits and behaviors that affect risk of falls    -  [place] fall precautions as indicated by assessment   - Educate patient/family on patient safety including physical limitations  - Instruct patient to call for assistance with activity based on assessment  - Modify environment to reduce risk of injury  - Consider OT/PT consult to assist with strengthening/mobility  Outcome: Progressing     Problem: PAIN - ADULT  Goal: Verbalizes/displays adequate comfort level or baseline comfort level  Description  Interventions:  - Encourage patient to monitor pain and request assistance  - Assess pain using appropriate pain scale  - Administer analgesics based on type and severity of pain and evaluate response  - Implement non-pharmacological measures as appropriate and evaluate response  - Consider cultural and social influences on pain and pain management  - Notify physician/advanced practitioner if interventions unsuccessful or patient reports new pain  Outcome: Progressing     Problem: INFECTION - ADULT  Goal: Absence or prevention of progression during hospitalization  Description  INTERVENTIONS:  - Assess and monitor for signs and symptoms of infection  - Monitor lab/diagnostic results  - Monitor all insertion sites, i e  indwelling lines, tubes, and drains  - Monitor endotracheal if appropriate and nasal secretions for changes in amount and color  - [place] appropriate cooling/warming therapies per order  - Administer medications as ordered  - Instruct and encourage patient and family to use good hand hygiene technique  - Identify and instruct in appropriate isolation precautions for identified infection/condition  Outcome: Progressing  Goal: Absence of fever/infection during neutropenic period  Description  INTERVENTIONS:  - Monitor WBC    Outcome: Progressing     Problem: SAFETY ADULT  Goal: Maintain or return to baseline ADL function  Description  INTERVENTIONS:  -  Assess patient's ability to carry out ADLs; assess patient's baseline for ADL function and identify physical deficits which impact ability to perform ADLs (bathing, care of mouth/teeth, toileting, grooming, dressing, etc )  - Assess/evaluate cause of self-care deficits   - Assess range of motion  - Assess patient's mobility; develop plan if impaired  - Assess patient's need for assistive devices and provide as appropriate  - Encourage maximum independence but intervene and supervise when necessary  - Involve family in performance of ADLs  - Assess for home care needs following discharge   - Consider OT consult to assist with ADL evaluation and planning for discharge  - Provide patient education as appropriate  Outcome: Progressing  Goal: Maintain or return mobility status to optimal level  Description  INTERVENTIONS:  - Assess patient's baseline mobility status (ambulation, transfers, stairs, etc )    - Identify cognitive and physical deficits and behaviors that affect mobility  - Identify mobility aids required to assist with transfers and/or ambulation (gait belt, sit-to-stand, lift, walker, cane, etc )  - [place] fall precautions as indicated by assessment  - Record patient progress and toleration of activity level on Mobility SBAR; progress patient to next Phase/Stage  - Instruct patient to call for assistance with activity based on assessment  - Consider rehabilitation consult to assist with strengthening/weightbearing, etc   Outcome: Progressing     Problem: DISCHARGE PLANNING  Goal: Discharge to home or other facility with appropriate resources  Description  INTERVENTIONS:  - Identify barriers to discharge w/patient and caregiver  - Arrange for needed discharge resources and transportation as appropriate  - Identify discharge learning needs (meds, wound care, etc )  - Arrange for interpretive services to assist at discharge as needed  - Refer to Case Management Department for coordinating discharge planning if the patient needs post-hospital services based on physician/advanced practitioner order or complex needs related to functional status, cognitive ability, or social support system  Outcome: Progressing     Problem: Knowledge Deficit  Goal: Patient/family/caregiver demonstrates understanding of disease process, treatment plan, medications, and discharge instructions  Description  Complete learning assessment and assess knowledge base    Interventions:  - Provide teaching at level of understanding  - Provide teaching via preferred learning methods  Outcome: Progressing

## 2019-12-13 NOTE — DISCHARGE SUMMARY
Discharge- Karen Rae 1960, 63 y o  male MRN: [id number]    Unit/Bed#: -01 Encounter: [id number]    Primary Care Provider: No primary care provider on file  Date and time admitted to hospital: 12/12/2019  3:52 AM        S/P primary angioplasty with coronary stent  Assessment & Plan  · TAMARA to Circumflex and RCA 10/2017  · Continue home dose ASA, Lipitor, Plavix, Imdur and Toprol XL  · Patient is status post cardiac catheterization  He has multi-vessel disease and will need a CABG  Patient will be going to [facility] per conversation with Cardiology  Pure hypercholesterolemia  Assessment & Plan  · Continue home dose Lipitor  ·     Tobacco abuse  Assessment & Plan  · Currently smoking 0 5ppd  · Smoking cessation education  · Nicotine patch    * Chest pain  Assessment & Plan  · States awoke at 0200 with Lt sided anterior chest pressure/pain that radiated down LUE with (+) nausea lasting 30min  Denies SOB or diaphoresis  · (+) hx of CAD and with stent x 2 10/27/2017 (RCA and circumflex)  · NM stress test 9/27/2018 (-)  · Secondary to to the symptoms the patient underwent a cardiac catheterization and will need to go [place] for a open heart procedure  Patient with unstable angina  Discharging Physician / Practitioner: Madison Anthony MD  PCP: No primary care provider on file    Admission Date:   Admission Orders (From admission, onward)     Ordered        12/12/19 0459  Place in Observation (expected length of stay for this patient is less than two midnights)  Once                   Discharge Date: 12/13/19    Resolved Problems  Date Reviewed: 12/13/2019    None          Consultations During Hospital Stay:  · [facility] Cardiology    Procedures Performed:   Cardiac catheterization:Unstable angina (70% ostial LAD, 80% ostial RPDA, 70% at the bifurcation of the LCx/OM)  · CAD, s/p previous PCI to mid-RCA, proximal LCx, both patent    Significant Findings / Test Results: · See above    Incidental Findings:   · See above    Test Results Pending at Discharge (will require follow up): · None     Outpatient Tests Requested:  · Non    Complications:  None    Reason for Admission:  Chest pain    Hospital Course:     Gin Harrison is a 63 y o  male patient who originally presented to the hospital on 12/12/2019 due to chest pain  History of presenting Kiran Camarillo is a 63 y o  male who presents with Lt anterior chest wall pressure/pain that radiated down left arm  States awoke with the pain at 0200 and it lasted approx 30min  (+) associated nausea  Denies SOB or diaphoresis  (+) hx of CAD with TAMARA x 2 placed 10/2017  NM stress test completed 9/2018 (-)  Denies recent rever/chills  Denies URI symptoms    Hospital course:  Patient was admitted for the above reasons  He was seen in consultation by Cardiology  Given his symptoms and underlying history with 2 previous stents the patient was taken for cardiac catheterization today  The cardiac catheterization was for unstable angina  Unstable angina (70% ostial LAD, 80% ostial RPDA, 70% at the bifurcation of the LCx/OM)  I did speak to Dr Ángela Jurado who spoke to CT surgery over there  The patient needs to go there for an inpatient CABG  Continue current medications  I am waiting to hear back from [place] for transfer  Please see above list of diagnoses and related plan for additional information  Condition at Discharge: fair     Discharge Day Visit / Exam:     Subjective:  Patient seen examined  Status post cardiac catheterization    Vitals: Blood Pressure: 119/63 (12/13/19 1430)  Pulse: (!) 46 (12/13/19 1430)  Temperature: 98 5 °F (36 9 °C) (12/13/19 1100)  Temp Source: Oral (12/13/19 1100)  Respirations: 16 (12/13/19 1300)  Height: 5' 7" (170 2 cm) (12/12/19 0355)  Weight - Scale: 85 kg (187 lb 6 3 oz) (12/12/19 0357)  SpO2: 100 % (12/13/19 1300)  Exam:   Physical Exam  (   General Appearance:    Alert, cooperative, no distress, appears stated age                               Lungs:     Clear to auscultation bilaterally, respirations unlabored       Heart:    Regular rate and rhythm, S1 and S2 normal, no murmur, rub    or gallop   Abdomen:     Soft, non-tender, bowel sounds active all four quadrants,     no masses, no organomegaly           Extremities:   Extremities normal, atraumatic, no cyanosis or edema     Discussion with Family:  Wife    Discharge instructions/Information to patient and family:   See after visit summary for information provided to patient and family  Provisions for Follow-Up Care:  See after visit summary for information related to follow-up care and any pertinent home health orders  Disposition:     [address] [facility]  [address] Transfer to [facility] to [facility] SNF:   · Not Applicable to this Patient - Not Applicable to this Patient    Planned Readmission:  He will be readmitted to [place]     Discharge Statement:  I spent 28 minutes discharging the patient  This time was spent on the day of discharge  I had direct contact with the patient on the day of discharge  Greater than 50% of the total time was spent examining patient, answering all patient questions, arranging and discussing plan of care with patient as well as directly providing post-discharge instructions  Additional time then spent on discharge activities  Discharge Medications:  See after visit summary for reconciled discharge medications provided to patient and family        ** Please Note: This note has been constructed using a voice recognition system **

## 2019-12-13 NOTE — PLAN OF CARE
Problem: Potential for Falls  Goal: Patient will remain free of falls  Description  INTERVENTIONS:  - Assess patient frequently for physical needs  -  Identify cognitive and physical deficits and behaviors that affect risk of falls    -  [place] fall precautions as indicated by assessment   - Educate patient/family on patient safety including physical limitations  - Instruct patient to call for assistance with activity based on assessment  - Modify environment to reduce risk of injury  - Consider OT/PT consult to assist with strengthening/mobility  Outcome: Progressing     Problem: PAIN - ADULT  Goal: Verbalizes/displays adequate comfort level or baseline comfort level  Description  Interventions:  - Encourage patient to monitor pain and request assistance  - Assess pain using appropriate pain scale  - Administer analgesics based on type and severity of pain and evaluate response  - Implement non-pharmacological measures as appropriate and evaluate response  - Consider cultural and social influences on pain and pain management  - Notify physician/advanced practitioner if interventions unsuccessful or patient reports new pain  Outcome: Progressing     Problem: INFECTION - ADULT  Goal: Absence or prevention of progression during hospitalization  Description  INTERVENTIONS:  - Assess and monitor for signs and symptoms of infection  - Monitor lab/diagnostic results  - Monitor all insertion sites, i e  indwelling lines, tubes, and drains  - Monitor endotracheal if appropriate and nasal secretions for changes in amount and color  - [place] appropriate cooling/warming therapies per order  - Administer medications as ordered  - Instruct and encourage patient and family to use good hand hygiene technique  - Identify and instruct in appropriate isolation precautions for identified infection/condition  Outcome: Progressing  Goal: Absence of fever/infection during neutropenic period  Description  INTERVENTIONS:  - Monitor WBC    Outcome: Progressing     Problem: SAFETY ADULT  Goal: Maintain or return to baseline ADL function  Description  INTERVENTIONS:  -  Assess patient's ability to carry out ADLs; assess patient's baseline for ADL function and identify physical deficits which impact ability to perform ADLs (bathing, care of mouth/teeth, toileting, grooming, dressing, etc )  - Assess/evaluate cause of self-care deficits   - Assess range of motion  - Assess patient's mobility; develop plan if impaired  - Assess patient's need for assistive devices and provide as appropriate  - Encourage maximum independence but intervene and supervise when necessary  - Involve family in performance of ADLs  - Assess for home care needs following discharge   - Consider OT consult to assist with ADL evaluation and planning for discharge  - Provide patient education as appropriate  Outcome: Progressing  Goal: Maintain or return mobility status to optimal level  Description  INTERVENTIONS:  - Assess patient's baseline mobility status (ambulation, transfers, stairs, etc )    - Identify cognitive and physical deficits and behaviors that affect mobility  - Identify mobility aids required to assist with transfers and/or ambulation (gait belt, sit-to-stand, lift, walker, cane, etc )  - [place] fall precautions as indicated by assessment  - Record patient progress and toleration of activity level on Mobility SBAR; progress patient to next Phase/Stage  - Instruct patient to call for assistance with activity based on assessment  - Consider rehabilitation consult to assist with strengthening/weightbearing, etc   Outcome: Progressing     Problem: DISCHARGE PLANNING  Goal: Discharge to home or other facility with appropriate resources  Description  INTERVENTIONS:  - Identify barriers to discharge w/patient and caregiver  - Arrange for needed discharge resources and transportation as appropriate  - Identify discharge learning needs (meds, wound care, etc )  - Arrange for interpretive services to assist at discharge as needed  - Refer to Case Management Department for coordinating discharge planning if the patient needs post-hospital services based on physician/advanced practitioner order or complex needs related to functional status, cognitive ability, or social support system  Outcome: Progressing     Problem: Knowledge Deficit  Goal: Patient/family/caregiver demonstrates understanding of disease process, treatment plan, medications, and discharge instructions  Description  Complete learning assessment and assess knowledge base    Interventions:  - Provide teaching at level of understanding  - Provide teaching via preferred learning methods  Outcome: Progressing     Problem: CARDIOVASCULAR - ADULT  Goal: Maintains optimal cardiac output and hemodynamic stability  Description  INTERVENTIONS:  - Monitor I/O, vital signs and rhythm  - Monitor for S/S and trends of decreased cardiac output  - Administer and titrate ordered vasoactive medications to optimize hemodynamic stability  - Assess quality of pulses, skin color and temperature  - Assess for signs of decreased coronary artery perfusion  - Instruct patient to report change in severity of symptoms  Outcome: Progressing  Goal: Absence of cardiac dysrhythmias or at baseline rhythm  Description  INTERVENTIONS:  - Continuous cardiac monitoring, vital signs, obtain 12 lead EKG if ordered  - Administer antiarrhythmic and heart rate control medications as ordered  - Monitor electrolytes and administer replacement therapy as ordered  Outcome: Progressing

## 2019-12-13 NOTE — EMTALA/ACUTE CARE TRANSFER
[facility] [address] FLOOR MED SURG UNIT  [address]  [place] [address]  Dept: [phone]      ACUTE CARE TRANSFER CONSENT    NAME James Aguilar                                         1960                              MRN [id number]    I have been informed of my rights regarding examination, treatment, and transfer   by Dr Mariely Ackerman MD    Benefits:      Risks:        Consent for Transfer:  I acknowledge that my medical condition has been evaluated and explained to me by the treating physician or other qualified medical person and/or my attending physician, who has recommended that I be transferred to the service of    at    The above potential benefits of such transfer, the potential risks associated with such transfer, and the probable risks of not being transferred have been explained to me, and I fully understand them  The doctor has explained that, in my case, the benefits of transfer outweigh the risks  I agree to be transferred  I authorize the performance of emergency medical procedures and treatments upon me in both transit and upon arrival at the receiving facility  Additionally, I authorize the release of any and all medical records to the receiving facility and request they be transported with me, if possible  I understand that the safest mode of transportation during a medical emergency is an ambulance and that the Hospital advocates the use of this mode of transport  Risks of traveling to the receiving facility by car, including absence of medical control, life sustaining equipment, such as oxygen, and medical personnel has been explained to me and I fully understand them  (TEMO CORRECT BOX BELOW)  [  ]  I consent to the stated transfer and to be transported by ambulance/helicopter  [  ]  I consent to the stated transfer, but refuse transportation by ambulance and accept full responsibility for my transportation by car    I understand the risks of non-ambulance transfers and I exonerate the Hospital and its staff from any deterioration in my condition that results from this refusal     X___________________________________________    DATE  19  TIME________  Signature of patient or legally responsible individual signing on patient behalf           RELATIONSHIP TO PATIENT_________________________          Provider Certification    NAME Jaydon Fairbanks                                         1960                              MRN [id number]    A medical screening exam was performed on the above named patient  Based on the examination:    Condition Necessitating Transfer CT surgery evaluation for open-heart    Patient Condition:      Reason for Transfer:      Transfer Requirements: Facility     · Space available and qualified personnel available for treatment as acknowledged by    · Agreed to accept transfer and to provide appropriate medical treatment as acknowledged by          · Appropriate medical records of the examination and treatment of the patient are provided at the time of transfer   [address] [place] [address] [address] _______  · Transfer will be performed by qualified personnel from    and appropriate transfer equipment as required, including the use of necessary and appropriate life support measures      Provider Certification: I have examined the patient and explained the following risks and benefits of being transferred/refusing transfer to the patient/family:         Based on these reasonable risks and benefits to the patient and/or the unborn child(jerome), and based upon the information available at the time of the patients examination, I certify that the medical benefits reasonably to be expected from the provision of appropriate medical treatments at another medical facility outweigh the increasing risks, if any, to the individuals medical condition, and in the case of labor to the unborn child, from effecting the transfer      X____________________________________________ DATE 12/13/19        TIME_______      ORIGINAL - SEND TO MEDICAL RECORDS   COPY - SEND WITH PATIENT DURING TRANSFER

## 2019-12-13 NOTE — ASSESSMENT & PLAN NOTE
· States awoke at 0200 with Lt sided anterior chest pressure/pain that radiated down LUE with (+) nausea lasting 30min  Denies SOB or diaphoresis  · (+) hx of CAD and with stent x 2 10/27/2017 (RCA and circumflex)  · NM stress test 9/27/2018 (-)  · Secondary to to the symptoms the patient underwent a cardiac catheterization and will need to go [place] for a open heart procedure  Patient with unstable angina

## 2019-12-13 NOTE — UTILIZATION REVIEW
Initial Clinical Review    Admission: Date/Time/Statement: OBS  12/12 0459  Orders Placed This Encounter   Procedures    Place in Observation (expected length of stay for this patient is less than two midnights)     Standing Status:   Standing     Number of Occurrences:   1     Order Specific Question:   Admitting Physician     Answer:   Zach Sin     Order Specific Question:   Level of Care     Answer:   Med Surg [16]     ED Arrival Information     Expected Arrival Acuity Means of Arrival Escorted By Service Admission Type    - 12/12/2019 03:47 Emergent Walk-In Spouse General Medicine Emergency    Arrival Complaint    chest pains/nausea        Chief Complaint   Patient presents with    Chest Pain     was woken up by CP  hx of cardiac stents  Assessment/Plan: 62 yo male to ED from home w/ Lt anterior chest wall pressure/pain that radiated down left arm  + nausea   (+) hx of CAD with TAMARA x 2 placed 10/2017  Admitted OBS status w/ CP plan to do serial trop   Hx angioplasty cont asa, lipitor , plavix, imdur and toprol XL      ED Triage Vitals   Temperature Pulse Respirations Blood Pressure SpO2   12/12/19 0357 12/12/19 0357 12/12/19 0357 12/12/19 0357 12/12/19 0357   97 6 °F (36 4 °C) 59 17 (!) 172/90 99 %      Temp Source Heart Rate Source Patient Position - Orthostatic VS BP Location FiO2 (%)   12/13/19 0300 12/12/19 0357 12/12/19 0357 12/12/19 0357 --   Oral Monitor Lying Right arm       Pain Score       12/12/19 0355       No Pain        Wt Readings from Last 1 Encounters:   12/12/19 85 kg (187 lb 6 3 oz)     Additional Vital Signs:   12/13/19 0755  98 3 °F (36 8 °C)  57  16  145/65  98 %  None (Room air)  Lying   12/13/19 0300  98 6 °F (37 °C)  52Abnormal   16    97 %  None (Room air)  Lying   12/12/19 1554    54Abnormal   15  120/64  100 %  None (Room air)  Sitting   12/12/19 1411    49Abnormal   16  119/57  98 %  None (Room air)  Sitting   12/12/19 1105    47Abnormal   18  123/61  98 %   Lying   12/12/19 0752    59  18  144/76  99 %    Sitting   12/12/19 0600    54Abnormal   21  141/70  99 %  None (Room air)  Lying   12/12/19 0500    58  21  144/77  100 %  Nasal cannula  Lying   12/12/19 0430    54Abnormal   17  148/84  99 %  Nasal cannula  Lying       Pertinent Labs/Diagnostic Test Results:   12/12 CXR -   No pneumothorax  No pneumonia  Increased vascular congestion  No widened mediastinum        12/12 EKG- NSR w   No ischemic changes  12/12 cardiac cath - pending   Results from last 7 days   Lab Units 12/12/19  0415   WBC Thousand/uL 10 48*   HEMOGLOBIN g/dL 15 5   HEMATOCRIT % 46 8   PLATELETS Thousands/uL 266   NEUTROS ABS Thousands/µL 6 68     Results from last 7 days   Lab Units 12/12/19  0415   SODIUM mmol/L 142   POTASSIUM mmol/L 3 8   CHLORIDE mmol/L 106   CO2 mmol/L 26   ANION GAP mmol/L 10   BUN mg/dL 18   CREATININE mg/dL 1 23   EGFR ml/min/1 73sq m 64   CALCIUM mg/dL 8 7     Results from last 7 days   Lab Units 12/12/19  0415   AST U/L 26   ALT U/L 46   ALK PHOS U/L 93   TOTAL PROTEIN g/dL 7 7   ALBUMIN g/dL 3 7   TOTAL BILIRUBIN mg/dL 0 50     Results from last 7 days   Lab Units 12/12/19  0415   GLUCOSE RANDOM mg/dL 96     Results from last 7 days   Lab Units 12/12/19  1015 12/12/19  0656 12/12/19  0415   TROPONIN I ng/mL <0 02 <0 02 <0 02     Results from last 7 days   Lab Units 12/12/19  0415   NT-PRO BNP pg/mL 82     ED Treatment:   Medication Administration from 12/12/2019 0347 to 12/12/2019 1942       Date/Time Order Dose Route Action     12/12/2019 0820 aspirin (ECOTRIN LOW STRENGTH) EC tablet 81 mg 81 mg Oral Given     12/12/2019 1809 atorvastatin (LIPITOR) tablet 40 mg 40 mg Oral Given     12/12/2019 0819 clopidogrel (PLAVIX) tablet 75 mg 75 mg Oral Given     12/12/2019 0819 isosorbide mononitrate (IMDUR) 24 hr tablet 30 mg 30 mg Oral Given     12/12/2019 0819 metoprolol succinate (TOPROL-XL) 24 hr tablet 25 mg 25 mg Oral Given     12/12/2019 0577 sodium chloride 0 9 % infusion 50 mL/hr Intravenous New Bag        Past Medical History:   Diagnosis Date    CAD (coronary artery disease)     Family history of MI (myocardial infarction)     Former smoker     Hyperlipidemia     Irregular heart beat      Present on Admission:   Tobacco abuse   Pure hypercholesterolemia      Admitting Diagnosis: Unstable angina ([facility] [address] ) [I20 0]  Chest pain [R07 9]  Age/Sex: 63 y o  male  Admission Orders:  Scheduled Medications:    Medications:  aspirin 81 mg Oral Daily   atorvastatin 40 mg Oral Daily With Dinner   clopidogrel 75 mg Oral Daily   enoxaparin 1 mg/kg Subcutaneous Once   isosorbide mononitrate 30 mg Oral Daily   metoprolol succinate 12 5 mg Oral Daily   nicotine 1 patch Transdermal Daily     Continuous IV Infusions:     PRN Meds:    acetaminophen 650 mg Oral Q4H PRN   calcium carbonate 1,000 mg Oral Daily PRN   magnesium hydroxide 30 mL Oral Daily PRN   ondansetron 4 mg Intravenous Q6H PRN     NPO   Cardiac diet   Act as haley   Up and OOb as haley   Tele   EKG prn cp  EKG 2nd and 3rd trop  IP CONSULT TO CARDIOLOGY    Network Utilization Review Department  [email or web address]  ATTENTION: Please call with any questions or concerns to [phone] and carefully listen to the prompts so that you are directed to the right person  All voicemails are confidential   Jesusita Jasso all requests for admission clinical reviews, approved or denied determinations and any other requests to dedicated fax number below belonging to the campus where the patient is receiving treatment   List of dedicated fax numbers for the Facilities:  FACILITY NAME UR FAX NUMBER   ADMISSION DENIALS (Administrative/Medical Necessity) [phone]   PARENT CHILD HEALTH (Maternity/NICU/Pediatrics) [phone]   Humphrey Vasquez [phone]   Francisco Brown [phone]   [address] [facility] [phone]   [facility] [phone]   [facility] [address] [phone] [address] [phone]

## 2019-12-14 ENCOUNTER — APPOINTMENT (INPATIENT)
Dept: NON INVASIVE DIAGNOSTICS | Facility: HOSPITAL | Age: 59
DRG: 235 | End: 2019-12-14

## 2019-12-14 ENCOUNTER — APPOINTMENT (INPATIENT)
Dept: RADIOLOGY | Facility: HOSPITAL | Age: 59
DRG: 235 | End: 2019-12-14

## 2019-12-14 PROBLEM — R91.1 PULMONARY NODULE: Status: ACTIVE | Noted: 2019-12-14

## 2019-12-14 LAB
BILIRUB UR QL STRIP: NEGATIVE
CHOLEST SERPL-MCNC: 154 MG/DL (ref 50–200)
CLARITY UR: CLEAR
COLOR UR: YELLOW
EST. AVERAGE GLUCOSE BLD GHB EST-MCNC: 114 MG/DL
GLUCOSE UR STRIP-MCNC: NEGATIVE MG/DL
HBA1C MFR BLD: 5.6 % (ref 4.2–6.3)
HDLC SERPL-MCNC: 33 MG/DL
HGB UR QL STRIP.AUTO: NEGATIVE
KETONES UR STRIP-MCNC: NEGATIVE MG/DL
LDLC SERPL CALC-MCNC: 107 MG/DL (ref 0–100)
LEUKOCYTE ESTERASE UR QL STRIP: NEGATIVE
NITRITE UR QL STRIP: NEGATIVE
NONHDLC SERPL-MCNC: 121 MG/DL
PH UR STRIP.AUTO: 7 [PH]
PROT UR STRIP-MCNC: NEGATIVE MG/DL
SP GR UR STRIP.AUTO: 1.01 (ref 1–1.03)
TRIGL SERPL-MCNC: 68 MG/DL
UROBILINOGEN UR QL STRIP.AUTO: 1 E.U./DL

## 2019-12-14 PROCEDURE — 99255 IP/OBS CONSLTJ NEW/EST HI 80: CPT | Performed by: PHYSICIAN ASSISTANT

## 2019-12-14 PROCEDURE — 93971 EXTREMITY STUDY: CPT | Performed by: SURGERY

## 2019-12-14 PROCEDURE — 80061 LIPID PANEL: CPT | Performed by: PHYSICIAN ASSISTANT

## 2019-12-14 PROCEDURE — 93971 EXTREMITY STUDY: CPT

## 2019-12-14 PROCEDURE — 83036 HEMOGLOBIN GLYCOSYLATED A1C: CPT | Performed by: PHYSICIAN ASSISTANT

## 2019-12-14 PROCEDURE — 81003 URINALYSIS AUTO W/O SCOPE: CPT | Performed by: PHYSICIAN ASSISTANT

## 2019-12-14 PROCEDURE — 99223 1ST HOSP IP/OBS HIGH 75: CPT | Performed by: INTERNAL MEDICINE

## 2019-12-14 PROCEDURE — 87081 CULTURE SCREEN ONLY: CPT | Performed by: PHYSICIAN ASSISTANT

## 2019-12-14 PROCEDURE — 71250 CT THORAX DX C-: CPT

## 2019-12-14 PROCEDURE — 93880 EXTRACRANIAL BILAT STUDY: CPT

## 2019-12-14 PROCEDURE — NC001 PR NO CHARGE: Performed by: INTERNAL MEDICINE

## 2019-12-14 RX ORDER — CHLORHEXIDINE GLUCONATE 0.12 MG/ML
15 RINSE ORAL EVERY 12 HOURS SCHEDULED
Status: DISCONTINUED | OUTPATIENT
Start: 2019-12-14 | End: 2019-12-19 | Stop reason: HOSPADM

## 2019-12-14 RX ADMIN — ASPIRIN 81 MG: 81 TABLET ORAL at 09:13

## 2019-12-14 RX ADMIN — Medication 1 APPLICATION: at 21:16

## 2019-12-14 RX ADMIN — CHLORHEXIDINE GLUCONATE 0.12% ORAL RINSE 15 ML: 1.2 LIQUID ORAL at 09:49

## 2019-12-14 RX ADMIN — Medication 1 APPLICATION: at 09:47

## 2019-12-14 RX ADMIN — CHLORHEXIDINE GLUCONATE 0.12% ORAL RINSE 15 ML: 1.2 LIQUID ORAL at 21:16

## 2019-12-14 RX ADMIN — ISOSORBIDE MONONITRATE 30 MG: 30 TABLET, EXTENDED RELEASE ORAL at 09:13

## 2019-12-14 RX ADMIN — ATORVASTATIN CALCIUM 40 MG: 40 TABLET, FILM COATED ORAL at 16:51

## 2019-12-14 NOTE — SOCIAL WORK
Cm met with pt to review the role of Cm  Pt presented as alert during the conversation  Pt lives with wife Pati [phone] in 2 story split house, 4 steps leading to the house, 4 steps to bedroom and bathroom  Pt reported being independent with ADLS PTA  Pt denies any inpatient PT/OT, MH, substance abuse, or [facility]  Pt reported that he doesn't have PCP and preferred pharmacy is [facility]  Pt unemployed and drives independently  Pt has no DME  Pt has no POA or LW  Pt reported wife Pait will transport upon d/c   CM provided pt with info link card  CM reviewed d/c planning process including the following: identifying help at home, patient preference for d/c planning needs, Discharge Lounge, [facility] Meds to Bed program, availability of treatment team to discuss questions or concerns patient and/or family may have regarding understanding medications and recognizing signs and symptoms once discharged  CM also encouraged patient to follow up with all recommended appointments after discharge  Patient advised of importance for patient and family to participate in managing patients medical well being

## 2019-12-14 NOTE — NURSING NOTE
Patient is s/p cardiac cath via right radial, air removed from radial band per order/protocol  Band removed at 2040, site clean dry and intact, bandaid applied  Patient denies pain, denies  tingling,denies numbness  Skin warm good cap refill, palpable pulses,no swelling  Telephone report given to [address]  at [facility]  P 4  Pt for  by SLETS

## 2019-12-14 NOTE — ASSESSMENT & PLAN NOTE
- Presented to [facility] with chest pain  EKG negative and troponin negative at the time  - S/p PCI this admission which significant for 3 vessel disease 70% ostial LAD, 99% RCA, 90% circumflex    - Nuclear medicine stress test done 09/2018 was negative at that time  - Echocardiogram ejection fraction 65%, no regional wall motion abnormalities  - Evaluated by CT surgery, planned for CABG on Thursday 12/19    Plan:  - Continue ASA, hold Plavix for the anticipated CABG  - Imdur increased to 60 mg Q 24 hours    - Atorvastatin increased to 80 mg daily  - Beta-blockers on hold secondary to bradycardia  - CABG tomorrow Thursday 12/19

## 2019-12-14 NOTE — ASSESSMENT & PLAN NOTE
- Patient states that his baseline is HR 60s     - Asymptomatic  - Continue telemetry   - Beta-blocker on hold

## 2019-12-14 NOTE — H&P
INTERNAL MEDICINE RESIDENCY ADMISSION H&P     Name: Irene Sher   Age & Sex: 63 y o  male   MRN: [id number]  Unit/Bed#: [facility] 408-01   Encounter: [id number]  Primary Care Provider: No primary care provider on file  Code Status: Level 1 - Full Code  Admission Status: INPATIENT   Disposition: Patient requires Med/Surg with Telemetry    ASSESSMENT/PLAN     Principal Problem:    Unstable angina (HCC)  Active Problems:    Tobacco abuse    Pure hypercholesterolemia    S/P primary angioplasty with coronary stent    Bradycardia      * Unstable angina [facility])  Assessment & Plan  Presented to [facility] with chest pain  EKG negative and troponin negative at the time  S/p PCI this admission which indicated 70% ostial LAD, 80% ostial RPDA, 70% at the bifurcation of the left circumflex/OM  Nuclear medicine stress test done 09/2018 was negative at that time     -continue ASA Plavix, Imdur, metoprolol  -Evaluation by CTS for possible CABG   -Cardiology recommendations appreciated     Pure hypercholesterolemia  Assessment & Plan  Stable  , TG 44 most recently    -Continue atorvastatin 40     Tobacco abuse  Assessment & Plan  Smokes 0 5 ppd   -tobacco cessation education provided  -continue nicotine patch    S/P primary angioplasty with coronary stent  Assessment & Plan  CAD s/p TAMARA of pCFX, mRCA on 10/2017    -plan as above     Bradycardia  Assessment & Plan  Patient states that his baseline is HR 60s  Today, found to be with asymptomatic bradycardia to the 40s  EKG demonstrates sinus richie    -continue telemetry   -Held metoprolol for now       VTE Pharmacologic Prophylaxis: Enoxaparin (Lovenox)  VTE Mechanical Prophylaxis: sequential compression device    CHIEF COMPLAINT   No chief complaint on file  HISTORY OF PRESENT ILLNESS     64 yo M with CAD s/p TAMARA x 2 in 10/2017 presented to [facility] on 12/12/19 with left sided chest pain/pressure that radiated down left arm   Pain woke him up at 0200 and lasted about 30 min  There was associated nausea  Patient denies SOB, diaphoresis, abdominal pain, weakness, weight loss, fevers, chills  At the time of presentation, troponins have been negative and without EKG changes  Cardiology was consulted and he was taken for cardiac catheterization  This was significant for unstable angina (30% ostial LAD, 80% ostial RPDA, 7% at bifurcation of L CX/OM)  Patient transferred to [address] [place] [address] for evaluation for CABG  Today, patient is sitting comfortably in bed  He has no current complaints  Patient denies any shortness of breath, chest pain, abdominal pain, fevers, chills  Patient has many questions regarding potential CABG  He is confused as to why this is happening as he states that he was previously in good health and "20 lbs heavier and pure muscle  "He is a current smoker; patient is eager to quit however  No other complaints  Of note, patient was found to be in bradycardic to the 40s on telemetry once patient arrived to floors  He says his baseline in usually in the 62s  REVIEW OF SYSTEMS     Review of Systems   Constitutional: Negative for diaphoresis, fatigue and fever  HENT: Negative  Eyes: Negative  Respiratory: Negative for cough and chest tightness  Cardiovascular: Negative for chest pain, palpitations and leg swelling  Gastrointestinal: Negative for abdominal distention and abdominal pain  Endocrine: Negative  Genitourinary: Negative  Musculoskeletal: Negative  Skin: Negative  Allergic/Immunologic: Negative  Neurological: Negative  Hematological: Negative  Psychiatric/Behavioral: Negative        OBJECTIVE     Vitals:    19 2225   BP: 142/77   BP Location: Left arm   Pulse: (!) 43   Resp: 18   Temp: 97 6 °F (36 4 °C)   TempSrc: Oral   SpO2: 99%   Weight: 83 5 kg (184 lb 1 4 oz)   Height: 5' 7" (1 702 m)      Temperature:   Temp (24hrs), Av 1 °F (36 7 °C), Min:97 6 °F (36 4 °C), Max:98 6 °F (37 °C)    Temperature: 97 6 °F (36 4 °C)  Intake & Output:  I/O     None        Weights:   IBW: 66 1 kg    Body mass index is 28 83 kg/m²  Weight (last 2 days)     Date/Time   Weight    12/13/19 2225   83 5 (184 08) Standing     Weight: Standing  at 12/13/19 2225            Physical Exam   Constitutional: He is oriented to person, place, and time  He appears well-developed and well-nourished  HENT:   Head: Normocephalic and atraumatic  Mouth/Throat: Oropharynx is clear and moist    Eyes: Pupils are equal, round, and reactive to light  Conjunctivae and EOM are normal  No scleral icterus  Neck: Normal range of motion  Neck supple  No JVD present  Cardiovascular: Normal rate, regular rhythm, normal heart sounds and intact distal pulses  No murmur heard  Pulmonary/Chest: Effort normal and breath sounds normal  He has no wheezes  He has no rales  Abdominal: Soft  Bowel sounds are normal  He exhibits no mass  There is no tenderness  There is no guarding  Musculoskeletal: Normal range of motion  He exhibits no edema or tenderness  Neurological: He is alert and oriented to person, place, and time  No cranial nerve deficit or sensory deficit  He exhibits normal muscle tone  Skin: Skin is warm and dry  No rash noted  Nursing note and vitals reviewed  PAST MEDICAL HISTORY     Past Medical History:   Diagnosis Date    CAD (coronary artery disease)     Family history of MI (myocardial infarction)     Former smoker     Hyperlipidemia     Irregular heart beat      PAST SURGICAL HISTORY     Past Surgical History:   Procedure Laterality Date    CORONARY ANGIOPLASTY WITH STENT PLACEMENT  10/27/2017    TAMARA to prox   Cfx     SOCIAL & FAMILY HISTORY     Social History     Substance and Sexual Activity   Alcohol Use Never    Frequency: Never    Drinks per session: Patient refused    Binge frequency: Never     Substance and Sexual Activity   Alcohol Use Never    Frequency: Never    Drinks per session: Patient refused    Binge frequency: Never        Substance and Sexual Activity   Drug Use No     Social History     Tobacco Use   Smoking Status Current Every Day Smoker    Packs/day: 0 50    Types: Cigarettes   Smokeless Tobacco Never Used     Family History   Family history unknown: Yes     LABORATORY DATA     Labs: I have personally reviewed pertinent reports  Results from last 7 days   Lab Units 12/12/19  0415   WBC Thousand/uL 10 48*   HEMOGLOBIN g/dL 15 5   HEMATOCRIT % 46 8   PLATELETS Thousands/uL 266   NEUTROS PCT % 63   MONOS PCT % 11      Results from last 7 days   Lab Units 12/12/19  0415   POTASSIUM mmol/L 3 8   CHLORIDE mmol/L 106   CO2 mmol/L 26   BUN mg/dL 18   CREATININE mg/dL 1 23   CALCIUM mg/dL 8 7   ALK PHOS U/L 93   ALT U/L 46   AST U/L 26                      Results from last 7 days   Lab Units 12/12/19  1015 12/12/19  0656 12/12/19  0415   TROPONIN I ng/mL <0 02 <0 02 <0 02     Micro:  No results found for: Radha Maxim, WOUNDCULT, SPUTUMCULTUR  IMAGING & DIAGNOSTIC TESTS     Imaging: I have personally reviewed pertinent reports  Xr Chest 2 Views    Result Date: 12/12/2019  Impression: No acute cardiopulmonary disease  Workstation performed: [id number]     EKG, Pathology, and Other Studies: I have personally reviewed pertinent reports  ALLERGIES   No Known Allergies  MEDICATIONS PRIOR TO ARRIVAL     Prior to Admission medications    Medication Sig Start Date End Date Taking?  Authorizing Provider   aspirin (ECOTRIN LOW STRENGTH) 81 mg EC tablet Take 1 tablet by mouth daily 10/29/17   Amber Joseph MD   atorvastatin (LIPITOR) 40 mg tablet Take 1 tablet (40 mg total) by mouth daily with dinner 6/21/19   Jumana Ramos MD   clopidogrel (PLAVIX) 75 mg tablet Take 1 tablet (75 mg total) by mouth daily 6/21/19   Jumana Ramos MD   cyclobenzaprine (FLEXERIL) 10 mg tablet Take 1 tablet (10 mg total) by mouth 3 (three) times a day as needed for muscle spasms 12/3/19   Amador Palma MEGA Leyva   isosorbide mononitrate (IMDUR) 30 mg 24 hr tablet Take 1 tablet (30 mg total) by mouth daily 6/21/19   Christie Gant MD   metoprolol succinate (TOPROL-XL) 25 mg 24 hr tablet Take 1 tablet (25 mg total) by mouth daily 6/21/19   Christie Gant MD   naproxen (NAPROSYN) 500 mg tablet Take 1 tablet (500 mg total) by mouth every 12 (twelve) hours 6/21/19   Verena Watts PA-C     MEDICATIONS ADMINISTERED IN LAST 24 HOURS     CURRENT MEDICATIONS       Current Facility-Administered Medications:  acetaminophen 650 mg Oral Q4H PRN Yusra Mckeon MD   [START ON 12/14/2019] aspirin 81 mg Oral Daily Yusra Mckeon MD   [START ON 12/14/2019] atorvastatin 40 mg Oral Daily With Gust Rinne, MD   calcium carbonate 1,000 mg Oral Daily PRN Yusra Mckeon MD   Abdullahi Dent ON 12/14/2019] clopidogrel 75 mg Oral Daily Yusra Mckeon MD   Abdullahi Daviess ON 12/14/2019] isosorbide mononitrate 30 mg Oral Daily Yusra Mckeon MD   magnesium hydroxide 30 mL Oral Daily PRN MD David Blacka Dent ON 12/14/2019] nicotine 1 patch Transdermal Daily Yusra Mckeon MD   ondansetron 4 mg Intravenous Q6H PRN Yusra Mckeon MD            Admission Time  I spent 30 minutes admitting the patient  This involved direct patient contact where I performed a full history and physical, reviewing previous records, and reviewing laboratory and other diagnostic studies  Portions of the record may have been created with voice recognition software  Occasional wrong word or "sound a like" substitutions may have occurred due to the inherent limitations of voice recognition software    Read the chart carefully and recognize, using context, where substitutions have occurred     ==  Barber Newell MD  [address] [facility]  Internal Medicine Residency PGY-1

## 2019-12-14 NOTE — NURSING NOTE
2107: Patient transported off unit via stretcher accompanied  By 2 SLETS staff  All belongings taken by pt and spouse

## 2019-12-14 NOTE — UTILIZATION REVIEW
Initial Clinical Review    Admission: Date/Time/Statement: Inpatient Admission Orders (From admission, onward)     Ordered        12/13/19 2250  Inpatient Admission  Once                   Orders Placed This Encounter   Procedures    Inpatient Admission     Standing Status:   Standing     Number of Occurrences:   1     Order Specific Question:   Admitting Physician     Answer:   Uzair Casey [[id number]]     Order Specific Question:   Level of Care     Answer:   Med Surg [16]     Order Specific Question:   Estimated length of stay     Answer:   More than 2 Midnights     Order Specific Question:   Certification     Answer:   I certify that inpatient services are medically necessary for this patient for a duration of greater than two midnights  See H&P and MD Progress Notes for additional information about the patient's course of treatment  ED Arrival Information     Patient not seen in ED -- transfer from [address] [place] [address]                      Chief complaint:  Unstable angine    Assessment/Plan: 62 y/o male with PMHx of CAD s/p TAMARA x 2 in 10/2017 presented to [facility] on 12/12/19 with left sided chest pain/pressure that radiated down his left arm  Pain woke him up at 0200 and lasted about 30 min  There was associated nausea  At the time of presentation, troponins had been negative and without EKG changes  Cardiology was consulted and he was taken for cardiac catheterization  This was significant for unstable angina (30% ostial LAD, 80% ostial RPDA, 7% at bifurcation of L CX/OM)  Patient transferred to [facility] for evaluation for CABG  Admit inpatient to M/S/Tele unit with Unstable Angina -- tele monitoring, consult CTSx, continue po meds, cardiac diet, nicotine patch, hold metoprolol for now d/t bradycardia, hold plavix    12/14 -- CTSx consult -- A: unstable angina  Plan to proceed with ABG    Routine pre-op labs and studies have been ordered    Vital Signs:   Date/Time  Temp  Pulse  Resp  BP MAP (mmHg)  SpO2  O2 Device   12/14/19 1529  97 9 °F (36 6 °C)  45Abnormal   18  160/78     100 %  None (Room air)   12/14/19 1128  97 2 °F (36 2 °C)Abnormal   59  18  167/87  119  100 %  None (Room air)   12/14/19 0720  97 8 °F (36 6 °C)  54Abnormal   18  148/78  107  97 %  None (Room air)   12/14/19 0248  97 7 °F (36 5 °C)  50Abnormal   18  111/58     98 %  None (Room air)   12/14/19 0000              None (Room air)   12/13/19 2225  97 6 °F (36 4 °C)  43Abnormal   18  142/77     99 %  None (Room air)       Pertinent Labs/Diagnostic Test Results:   CT chest 12/14 --   Noncalcified 10 x 7 mm left upper lobe pulmonary nodule  Based on current Fleischner Society 2017 Guidelines on incidental pulmonary nodule, either PET/CT scan evaluation or tissue sampling may be considered appropriate in this patient with a smoking history    Subtle groundglass parenchymal densities which could represent atelectasis or perhaps mild interstitial edema related to pulmonary vascular congestive change   No pleural effusions    EKG 12/13 -- NSR    Results from last 7 days   Lab Units 12/12/19  0415   WBC Thousand/uL 10 48*   HEMOGLOBIN g/dL 15 5   HEMATOCRIT % 46 8   PLATELETS Thousands/uL 266   NEUTROS ABS Thousands/µL 6 68     Results from last 7 days   Lab Units 12/12/19  0415   SODIUM mmol/L 142   POTASSIUM mmol/L 3 8   CHLORIDE mmol/L 106   CO2 mmol/L 26   ANION GAP mmol/L 10   BUN mg/dL 18   CREATININE mg/dL 1 23   EGFR ml/min/1 73sq m 64   CALCIUM mg/dL 8 7     Results from last 7 days   Lab Units 12/12/19  0415   AST U/L 26   ALT U/L 46   ALK PHOS U/L 93   TOTAL PROTEIN g/dL 7 7   ALBUMIN g/dL 3 7   TOTAL BILIRUBIN mg/dL 0 50       Results from last 7 days   Lab Units 12/12/19  0415   GLUCOSE RANDOM mg/dL 96     Results from last 7 days   Lab Units 12/14/19  0953   HEMOGLOBIN A1C % 5 6   EAG mg/dl 114     Results from last 7 days   Lab Units 12/12/19  1015 12/12/19  0656 12/12/19  0415   TROPONIN I ng/mL <0 02 <0 02 <0 02     Results from last 7 days   Lab Units 12/12/19  0415   NT-PRO BNP pg/mL 82     Results from last 7 days   Lab Units 12/14/19  1609   CLARITY UA  Clear   COLOR UA  Yellow   SPEC GRAV UA  1 009   PH UA  7 0   GLUCOSE UA mg/dl Negative   KETONES UA mg/dl Negative   BLOOD UA  Negative   PROTEIN UA mg/dl Negative   NITRITE UA  Negative   BILIRUBIN UA  Negative   UROBILINOGEN UA E U /dl 1 0   LEUKOCYTES UA  Negative       Past Medical History:   Diagnosis Date    CAD (coronary artery disease)     Family history of MI (myocardial infarction)     Former smoker     Hyperlipidemia     Irregular heart beat      Present on Admission:   Tobacco abuse   Pure hypercholesterolemia   Unstable angina (HCC)      Admitting Diagnosis: Chest pain [R07 9]  Age/Sex: 63 y o  male  Admission Orders:  Scheduled Medications:  Medications:  aspirin 81 mg Oral Daily   atorvastatin 40 mg Oral Daily With Dinner   chlorhexidine 15 mL Mouth/Throat Q12H [address]   isosorbide mononitrate 30 mg Oral Daily   mupirocin 1 application Nasal C48T [address]   nicotine 1 patch Transdermal Daily        PRN Meds:  acetaminophen 650 mg Oral Q4H PRN   calcium carbonate 1,000 mg Oral Daily PRN   magnesium hydroxide 30 mL Oral Daily PRN   ondansetron 4 mg Intravenous Q6H PRN       IP CONSULT TO [address] [place] [address] Utilization Review Department  [email or web address]  ATTENTION: Please call with any questions or concerns to [phone] and carefully listen to the prompts so that you are directed to the right person  All voicemails are confidential   Jose Hallmark all requests for admission clinical reviews, approved or denied determinations and any other requests to dedicated fax number below belonging to the campus where the patient is receiving treatment   List of dedicated fax numbers for the Facilities:  [address] [place] [address] DENIALS (Administrative/Medical Necessity) [phone]   [address] [place] [address] (Maternity/NICU/Pediatrics) [phone]     Maya Ladinand [phone]   Brandi Harrington [phone]   Lui Grace [phone]   [address] [place] [address] [address] [facility] Guard [phone]   [place] [address] [facility] [address] [place] [address] [place] [phone]

## 2019-12-14 NOTE — PROGRESS NOTES
INTERNAL MEDICINE RESIDENCY SENIOR ADMISSION NOTE     Name: Apolonia Mendoza   Age & Sex: 63 y o  male   MRN: [id number]  Unit/Bed#: [facility] 408-01   Encounter: [id number]  Primary Care Provider: No primary care provider on file  Patient seen and examined  Reviewed H&P per Dr Shai Ferrell   Agree with the assessment and plan with any exception/addition as noted below:    Principal Problem:    Unstable angina (HCC)  Active Problems:    Tobacco abuse    Pure hypercholesterolemia    S/P primary angioplasty with coronary stent    Bradycardia    Triple Vessel disease  Plan:  CABG eval     Code Status: Level 1 - Full Code  Admission Status: INPATIENT   Disposition: Patient requires Med/Surg with Telemetry  Expected Length of Stay:

## 2019-12-14 NOTE — PLAN OF CARE
Problem: Potential for Falls  Goal: Patient will remain free of falls  Description  INTERVENTIONS:  - Assess patient frequently for physical needs  -  Identify cognitive and physical deficits and behaviors that affect risk of falls    -  [facility] fall precautions as indicated by assessment   - Educate patient/family on patient safety including physical limitations  - Instruct patient to call for assistance with activity based on assessment  - Modify environment to reduce risk of injury  - Consider OT/PT consult to assist with strengthening/mobility  Outcome: Progressing     Problem: PAIN - ADULT  Goal: Verbalizes/displays adequate comfort level or baseline comfort level  Description  Interventions:  - Encourage patient to monitor pain and request assistance  - Assess pain using appropriate pain scale  - Administer analgesics based on type and severity of pain and evaluate response  - Implement non-pharmacological measures as appropriate and evaluate response  - Consider cultural and social influences on pain and pain management  - Notify physician/advanced practitioner if interventions unsuccessful or patient reports new pain  Outcome: Progressing     Problem: INFECTION - ADULT  Goal: Absence or prevention of progression during hospitalization  Description  INTERVENTIONS:  - Assess and monitor for signs and symptoms of infection  - Monitor lab/diagnostic results  - Monitor all insertion sites, i e  indwelling lines, tubes, and drains  - Monitor endotracheal if appropriate and nasal secretions for changes in amount and color  - [facility] appropriate cooling/warming therapies per order  - Administer medications as ordered  - Instruct and encourage patient and family to use good hand hygiene technique  - Identify and instruct in appropriate isolation precautions for identified infection/condition  Outcome: Progressing  Goal: Absence of fever/infection during neutropenic period  Description  INTERVENTIONS:  - Monitor WBC    Outcome: Progressing     Problem: SAFETY ADULT  Goal: Maintain or return to baseline ADL function  Description  INTERVENTIONS:  -  Assess patient's ability to carry out ADLs; assess patient's baseline for ADL function and identify physical deficits which impact ability to perform ADLs (bathing, care of mouth/teeth, toileting, grooming, dressing, etc )  - Assess/evaluate cause of self-care deficits   - Assess range of motion  - Assess patient's mobility; develop plan if impaired  - Assess patient's need for assistive devices and provide as appropriate  - Encourage maximum independence but intervene and supervise when necessary  - Involve family in performance of ADLs  - Assess for home care needs following discharge   - Consider OT consult to assist with ADL evaluation and planning for discharge  - Provide patient education as appropriate  Outcome: Progressing  Goal: Maintain or return mobility status to optimal level  Description  INTERVENTIONS:  - Assess patient's baseline mobility status (ambulation, transfers, stairs, etc )    - Identify cognitive and physical deficits and behaviors that affect mobility  - Identify mobility aids required to assist with transfers and/or ambulation (gait belt, sit-to-stand, lift, walker, cane, etc )  - [place] fall precautions as indicated by assessment  - Record patient progress and toleration of activity level on Mobility SBAR; progress patient to next Phase/Stage  - Instruct patient to call for assistance with activity based on assessment  - Consider rehabilitation consult to assist with strengthening/weightbearing, etc   Outcome: Progressing     Problem: DISCHARGE PLANNING  Goal: Discharge to home or other facility with appropriate resources  Description  INTERVENTIONS:  - Identify barriers to discharge w/patient and caregiver  - Arrange for needed discharge resources and transportation as appropriate  - Identify discharge learning needs (meds, wound care, etc )  - Arrange for interpretive services to assist at discharge as needed  - Refer to Case Management Department for coordinating discharge planning if the patient needs post-hospital services based on physician/advanced practitioner order or complex needs related to functional status, cognitive ability, or social support system  Outcome: Progressing     Problem: Knowledge Deficit  Goal: Patient/family/caregiver demonstrates understanding of disease process, treatment plan, medications, and discharge instructions  Description  Complete learning assessment and assess knowledge base    Interventions:  - Provide teaching at level of understanding  - Provide teaching via preferred learning methods  Outcome: Progressing

## 2019-12-14 NOTE — H&P (VIEW-ONLY)
Consultation - Cardiothoracic Surgery   [facility] 63 y o  male MRN: [id number]  Unit/Bed#: [facility] 408-01 Encounter: [id number]    Physician Requesting Consult: Mendez Hussein DO    Reason for Consult / Principal Problem: CAD    Inpatient consult to Cardiothoracic Surgery  Consult performed by: Katty Barbour PA-C  Consult ordered by: Anita Lackey DO        History of Present Illness: [facility] is a 63y o  year old male with a cardiovascular past medical history significant for coronary artery disease  He previously underwent cardiac catheterization in 2017 with drug-eluting stent placement to the circumflex and RCA  Since that time he has been compliant with his outpatient Plavix  He has however been noncompliant with medical follow-up with his primary cardiologist     Most recently he has been in his usual state of health until he was awoken from sleep with significant chest pressure  He presented for evaluation treatment [facility] [place]  Cardiac enzymes were drawn and found to be negative  However in light of his past medical history, cardiac catheterization was completed  Severe 3 vessel coronary artery disease was identified  At this point was transferred to [place] [id number]  for cardiovascular surgery consultation  During interview today he denies recurrence of angina since admission to the hospital   He denies significant exertional dyspnea  Retrospectively does note progressive fatigability  He denies any recent fevers, sweats, or chills  He also denies any recent lightheadedness, dizziness, or syncope      Past Medical History:  Past Medical History:   Diagnosis Date    CAD (coronary artery disease)     Family history of MI (myocardial infarction)     Former smoker     Hyperlipidemia     Irregular heart beat        Past Surgical History:   Past Surgical History:   Procedure Laterality Date    CORONARY ANGIOPLASTY WITH STENT PLACEMENT  10/27/2017 TAMARA to prox  Cfx       Family History:  Family History   Family history unknown: Yes   MOther: CABG  Father:  2/2 MI at age 47    Social History:  Social History     Substance and Sexual Activity   Alcohol Use Never    Frequency: Never    Drinks per session: Patient refused    Binge frequency: Never     Social History     Substance and Sexual Activity   Drug Use No     Social History     Tobacco Use   Smoking Status Current Every Day Smoker    Packs/day: 0 50    Types: Cigarettes   Smokeless Tobacco Never Used     Marital Status: /Civil Union      Home Medications:   Prior to Admission medications    Medication Sig Start Date End Date Taking?  Authorizing Provider   aspirin (ECOTRIN LOW STRENGTH) 81 mg EC tablet Take 1 tablet by mouth daily 10/29/17   Rogelio Koch MD   atorvastatin (LIPITOR) 40 mg tablet Take 1 tablet (40 mg total) by mouth daily with dinner 19   Sergio Kelly MD   clopidogrel (PLAVIX) 75 mg tablet Take 1 tablet (75 mg total) by mouth daily 19   Sergio Kelly MD   cyclobenzaprine (FLEXERIL) 10 mg tablet Take 1 tablet (10 mg total) by mouth 3 (three) times a day as needed for muscle spasms 12/3/19   Aleja Parsons PA-C   isosorbide mononitrate (IMDUR) 30 mg 24 hr tablet Take 1 tablet (30 mg total) by mouth daily 19   Sergio Kelly MD   metoprolol succinate (TOPROL-XL) 25 mg 24 hr tablet Take 1 tablet (25 mg total) by mouth daily 19   Sergio Kelly MD   naproxen (NAPROSYN) 500 mg tablet Take 1 tablet (500 mg total) by mouth every 12 (twelve) hours 19   Yana Cruz PA-C       Inpatient Medications:  Scheduled Meds:   Current Facility-Administered Medications:  acetaminophen 650 mg Oral Q4H PRN Jim Vences MD   aspirin 81 mg Oral Daily Jim Vences MD   atorvastatin 40 mg Oral Daily With Rani Sauceda MD   calcium carbonate 1,000 mg Oral Daily PRN Jim Vences MD   chlorhexidine 15 mL Mouth/Throat Q12H [facility] Jocelyn Ricardo PA-C isosorbide mononitrate 30 mg Oral Daily Ernie Ohara MD   magnesium hydroxide 30 mL Oral Daily PRN Ernie Ohara MD   mupirocin 1 application Nasal M94P [facility] Mercedes Hogan PA-C   nicotine 1 patch Transdermal Daily Ernie Ohara MD   ondansetron 4 mg Intravenous Q6H PRN Ernie Ohara MD     Continuous Infusions:    PRN Meds:    acetaminophen 650 mg Q4H PRN   calcium carbonate 1,000 mg Daily PRN   magnesium hydroxide 30 mL Daily PRN   ondansetron 4 mg Q6H PRN       Allergies:  No Known Allergies    Review of Systems:  Review of Systems   Constitutional: Positive for fatigue  HENT: Negative  Eyes: Negative  Respiratory: Positive for chest tightness  Negative for shortness of breath  Cardiovascular: Positive for chest pain and leg swelling  Endocrine: Negative  Genitourinary: Negative  Musculoskeletal: Negative  Skin: Negative  Neurological: Negative  Psychiatric/Behavioral: Negative  Vital Signs:     Vitals:    12/14/19 0248 12/14/19 0538 12/14/19 0720 12/14/19 1128   BP: 111/58  148/78 167/87   BP Location: Left arm  Left arm Left arm   Pulse: (!) 50  (!) 54 59   Resp: 18  18 18   Temp: 97 7 °F (36 5 °C)  97 8 °F (36 6 °C) (!) 97 2 °F (36 2 °C)   TempSrc: Oral  Oral Oral   SpO2: 98%  97% 100%   Weight:  83 5 kg (184 lb)     Height:         Invasive Devices     Peripheral Intravenous Line            Peripheral IV 12/12/19 Right Antecubital 2 days                Physical Exam:  Physical Exam   Constitutional: He is oriented to person, place, and time  Vital signs are normal  He appears well-developed  HENT:   Head: Normocephalic and atraumatic  Eyes: Pupils are equal, round, and reactive to light  Conjunctivae are normal    Neck: Normal range of motion and full passive range of motion without pain  No JVD present  Carotid bruit is not present  No tracheal deviation present  No thyromegaly present     Cardiovascular: Normal rate, regular rhythm, S1 normal and S2 normal    Pulses: Carotid pulses are 2+ on the right side, and 2+ on the left side  Dorsalis pedis pulses are 2+ on the right side, and 2+ on the left side  Posterior tibial pulses are 2+ on the right side, and 2+ on the left side  Pulmonary/Chest: No accessory muscle usage  No respiratory distress  He has no wheezes  He has no rales  He exhibits no tenderness  Abdominal: Soft  Normal appearance and bowel sounds are normal  There is no tenderness  There is no CVA tenderness  Musculoskeletal: Normal range of motion  Neurological: He is alert and oriented to person, place, and time  He has normal strength  No cranial nerve deficit or sensory deficit  Skin: Skin is warm and dry  Psychiatric: He has a normal mood and affect  His speech is normal and behavior is normal        Lab Results:     Results from last 7 days   Lab Units 12/12/19  0415   WBC Thousand/uL 10 48*   HEMOGLOBIN g/dL 15 5   HEMATOCRIT % 46 8   PLATELETS Thousands/uL 266     Results from last 7 days   Lab Units 12/12/19  0415   POTASSIUM mmol/L 3 8   CHLORIDE mmol/L 106   CO2 mmol/L 26   BUN mg/dL 18   CREATININE mg/dL 1 23   CALCIUM mg/dL 8 7         Lab Results   Component Value Date    HGBA1C 5 6 12/14/2019     Lab Results   Component Value Date    TROPONINI <0 02 12/12/2019       Imaging Studies:     Cardiac Catheterization: 70% ostial LAD, 80% ostial RPDA, 70% at the bifurcation of the LCx/OM    Echocardiogram: Pending    I have personally reviewed pertinent reports  and I have personally reviewed pertinent films in PACS    Assessment:  Principal Problem:    Unstable angina ([address] )  Active Problems:    Tobacco abuse    Pure hypercholesterolemia    S/P primary angioplasty with coronary stent    Bradycardia    Severe coronary artery disease; Ongoing CABG workup    Plan:  Risks and benefits of coronary artery bypass grafting were discussed in detail today with the patient    They understand and wish to proceed with further workup and ultimately surgical intervention  We have ordered routine preoperative laboratory and vascular studies  Pending the results of these tests, they will be scheduled for surgery Thursday with LISA Miller     [facility] was comfortable with our recommendations, and their questions were answered to their satisfaction  We will continue to evaluate the patient daily with further recommendations as work up is completed  Thank you for allowing us to participate in the care of this patient       SIGNATURE: Katty Barbour PA-C  DATE: December 14, 2019  TIME: 11:55 AM

## 2019-12-14 NOTE — CONSULTS
Consultation - Cardiothoracic Surgery   [facility] 63 y o  male MRN: [id number]  Unit/Bed#: [facility] 408-01 Encounter: [id number]    Physician Requesting Consult: Aden Velez DO    Reason for Consult / Principal Problem: CAD    Inpatient consult to Cardiothoracic Surgery  Consult performed by: Yadiel Dias PA-C  Consult ordered by: Maldonado Mclaughlin DO        History of Present Illness: [facility] is a 63y o  year old male with a cardiovascular past medical history significant for coronary artery disease  He previously underwent cardiac catheterization in 2017 with drug-eluting stent placement to the circumflex and RCA  Since that time he has been compliant with his outpatient Plavix  He has however been noncompliant with medical follow-up with his primary cardiologist     Most recently he has been in his usual state of health until he was awoken from sleep with significant chest pressure  He presented for evaluation treatment [facility] [place]  Cardiac enzymes were drawn and found to be negative  However in light of his past medical history, cardiac catheterization was completed  Severe 3 vessel coronary artery disease was identified  At this point was transferred to [place] [address]  for cardiovascular surgery consultation  During interview today he denies recurrence of angina since admission to the hospital   He denies significant exertional dyspnea  Retrospectively does note progressive fatigability  He denies any recent fevers, sweats, or chills  He also denies any recent lightheadedness, dizziness, or syncope      Past Medical History:  Past Medical History:   Diagnosis Date    CAD (coronary artery disease)     Family history of MI (myocardial infarction)     Former smoker     Hyperlipidemia     Irregular heart beat        Past Surgical History:   Past Surgical History:   Procedure Laterality Date    CORONARY ANGIOPLASTY WITH STENT PLACEMENT  10/27/2017 TAMARA to prox  Cfx       Family History:  Family History   Family history unknown: Yes   MOther: CABG  Father:  2/2 MI at age 47    Social History:  Social History     Substance and Sexual Activity   Alcohol Use Never    Frequency: Never    Drinks per session: Patient refused    Binge frequency: Never     Social History     Substance and Sexual Activity   Drug Use No     Social History     Tobacco Use   Smoking Status Current Every Day Smoker    Packs/day: 0 50    Types: Cigarettes   Smokeless Tobacco Never Used     Marital Status: /Civil Union      Home Medications:   Prior to Admission medications    Medication Sig Start Date End Date Taking?  Authorizing Provider   aspirin (ECOTRIN LOW STRENGTH) 81 mg EC tablet Take 1 tablet by mouth daily 10/29/17   Tiarra Luevano MD   atorvastatin (LIPITOR) 40 mg tablet Take 1 tablet (40 mg total) by mouth daily with dinner 19   Rosa M Zelaya MD   clopidogrel (PLAVIX) 75 mg tablet Take 1 tablet (75 mg total) by mouth daily 19   Rosa M Zelaya MD   cyclobenzaprine (FLEXERIL) 10 mg tablet Take 1 tablet (10 mg total) by mouth 3 (three) times a day as needed for muscle spasms 12/3/19   Claudean Sine, PA-C   isosorbide mononitrate (IMDUR) 30 mg 24 hr tablet Take 1 tablet (30 mg total) by mouth daily 19   Rosa M Zelaya MD   metoprolol succinate (TOPROL-XL) 25 mg 24 hr tablet Take 1 tablet (25 mg total) by mouth daily 19   Rosa M Zelaya MD   naproxen (NAPROSYN) 500 mg tablet Take 1 tablet (500 mg total) by mouth every 12 (twelve) hours 19   Kathi Giron PA-C       Inpatient Medications:  Scheduled Meds:   Current Facility-Administered Medications:  acetaminophen 650 mg Oral Q4H PRN Archie Reardon MD   aspirin 81 mg Oral Daily Archie Reardon MD   atorvastatin 40 mg Oral Daily With Ana M Gonzales MD   calcium carbonate 1,000 mg Oral Daily PRN Archie Reardon MD   chlorhexidine 15 mL Mouth/Throat Q12H [address] Brianne Ayala PA-C isosorbide mononitrate 30 mg Oral Daily Heriberto Hurley MD   magnesium hydroxide 30 mL Oral Daily PRN Heriberto Hurley MD   mupirocin 1 application Nasal U01Y [address] Osiel MEGA Sifuentes   nicotine 1 patch Transdermal Daily Heriberto Hurley MD   ondansetron 4 mg Intravenous Q6H PRN Heriberto Hurley MD     Continuous Infusions:    PRN Meds:    acetaminophen 650 mg Q4H PRN   calcium carbonate 1,000 mg Daily PRN   magnesium hydroxide 30 mL Daily PRN   ondansetron 4 mg Q6H PRN       Allergies:  No Known Allergies    Review of Systems:  Review of Systems   Constitutional: Positive for fatigue  HENT: Negative  Eyes: Negative  Respiratory: Positive for chest tightness  Negative for shortness of breath  Cardiovascular: Positive for chest pain and leg swelling  Endocrine: Negative  Genitourinary: Negative  Musculoskeletal: Negative  Skin: Negative  Neurological: Negative  Psychiatric/Behavioral: Negative  Vital Signs:     Vitals:    12/14/19 0248 12/14/19 0538 12/14/19 0720 12/14/19 1128   BP: 111/58  148/78 167/87   BP Location: Left arm  Left arm Left arm   Pulse: (!) 50  (!) 54 59   Resp: 18  18 18   Temp: 97 7 °F (36 5 °C)  97 8 °F (36 6 °C) (!) 97 2 °F (36 2 °C)   TempSrc: Oral  Oral Oral   SpO2: 98%  97% 100%   Weight:  83 5 kg (184 lb)     Height:         Invasive Devices     Peripheral Intravenous Line            Peripheral IV 12/12/19 Right Antecubital 2 days                Physical Exam:  Physical Exam   Constitutional: He is oriented to person, place, and time  Vital signs are normal  He appears well-developed  HENT:   Head: Normocephalic and atraumatic  Eyes: Pupils are equal, round, and reactive to light  Conjunctivae are normal    Neck: Normal range of motion and full passive range of motion without pain  No JVD present  Carotid bruit is not present  No tracheal deviation present  No thyromegaly present     Cardiovascular: Normal rate, regular rhythm, S1 normal and S2 normal    Pulses: Carotid pulses are 2+ on the right side, and 2+ on the left side  Dorsalis pedis pulses are 2+ on the right side, and 2+ on the left side  Posterior tibial pulses are 2+ on the right side, and 2+ on the left side  Pulmonary/Chest: No accessory muscle usage  No respiratory distress  He has no wheezes  He has no rales  He exhibits no tenderness  Abdominal: Soft  Normal appearance and bowel sounds are normal  There is no tenderness  There is no CVA tenderness  Musculoskeletal: Normal range of motion  Neurological: He is alert and oriented to person, place, and time  He has normal strength  No cranial nerve deficit or sensory deficit  Skin: Skin is warm and dry  Psychiatric: He has a normal mood and affect  His speech is normal and behavior is normal        Lab Results:     Results from last 7 days   Lab Units 12/12/19  0415   WBC Thousand/uL 10 48*   HEMOGLOBIN g/dL 15 5   HEMATOCRIT % 46 8   PLATELETS Thousands/uL 266     Results from last 7 days   Lab Units 12/12/19  0415   POTASSIUM mmol/L 3 8   CHLORIDE mmol/L 106   CO2 mmol/L 26   BUN mg/dL 18   CREATININE mg/dL 1 23   CALCIUM mg/dL 8 7         Lab Results   Component Value Date    HGBA1C 5 6 12/14/2019     Lab Results   Component Value Date    TROPONINI <0 02 12/12/2019       Imaging Studies:     Cardiac Catheterization: 70% ostial LAD, 80% ostial RPDA, 70% at the bifurcation of the LCx/OM    Echocardiogram: Pending    I have personally reviewed pertinent reports  and I have personally reviewed pertinent films in PACS    Assessment:  Principal Problem:    Unstable angina ([address] )  Active Problems:    Tobacco abuse    Pure hypercholesterolemia    S/P primary angioplasty with coronary stent    Bradycardia    Severe coronary artery disease; Ongoing CABG workup    Plan:  Risks and benefits of coronary artery bypass grafting were discussed in detail today with the patient    They understand and wish to proceed with further workup and ultimately surgical intervention  We have ordered routine preoperative laboratory and vascular studies  Pending the results of these tests, they will be scheduled for surgery Thursday with LISA Paulino was comfortable with our recommendations, and their questions were answered to their satisfaction  We will continue to evaluate the patient daily with further recommendations as work up is completed  Thank you for allowing us to participate in the care of this patient       SIGNATURE: Mian Feng PA-C  DATE: December 14, 2019  TIME: 11:55 AM

## 2019-12-14 NOTE — PROGRESS NOTES
INTERNAL MEDICINE RESIDENCY PROGRESS NOTE     Name: Manny Mahan   Age & Sex: 63 y o  male   MRN: [id number]  Unit/Bed#: [facility] 408-01   Encounter: [id number]  Team: SOD Team A    PATIENT INFORMATION     Name: Manny Mahan   Age & Sex: 63 y o  male   MRN: [id number]  Hospital Stay Days: 1    ASSESSMENT/PLAN     Principal Problem:    Unstable angina ([address] )  Active Problems:    Tobacco abuse    Pure hypercholesterolemia    S/P primary angioplasty with coronary stent    Bradycardia      Bradycardia  Assessment & Plan  Patient states that his baseline is HR 60s  Today, found to be with asymptomatic bradycardia to the 40s  EKG demonstrates sinus richie    -continue telemetry   -Held metoprolol for now   -holding Plavix      S/P primary angioplasty with coronary stent  Assessment & Plan  CAD s/p TAMARA of pCFX, mRCA on 10/2017    -plan as above     Pure hypercholesterolemia  Assessment & Plan  Stable  , TG 44 most recently    -Continue atorvastatin 40     Tobacco abuse  Assessment & Plan  Smokes 0 5 ppd   -tobacco cessation education provided  -continue nicotine patch    * Unstable angina [facility])  Assessment & Plan  Presented to [facility] with chest pain  EKG negative and troponin negative at the time  S/p PCI this admission which indicated 70% ostial LAD, 80% ostial RPDA, 70% at the bifurcation of the left circumflex/OM  Nuclear medicine stress test done 09/2018 was negative at that time     -continue ASA Plavix, Imdur, metoprolol  -Evaluation by CTS for possible CABG   -Cardiology recommendations appreciated       Disposition:  Remain inpatient     SUBJECTIVE     Patient seen and examined  No acute events overnight  Patient resting comfortably in bed in no apparent distress  Patient says he has not any further episodes of chest pain since admitted to [facility] 2 days ago  Patient is to have multiple questions about CABG procedure and whether not he was going to have it done  Informed patient that CT surgery will be around today to evaluate him and they will be able to give him an answer  Patient continues to be bradycardic however he has been still asymptomatic since arrival to the hospital     OBJECTIVE     Vitals:    19 0248 19 0538 19 0720 19 1128   BP: 111/58  148/78 167/87   BP Location: Left arm  Left arm Left arm   Pulse: (!) 50  (!) 54 59   Resp: 18  18 18   Temp: 97 7 °F (36 5 °C)  97 8 °F (36 6 °C) (!) 97 2 °F (36 2 °C)   TempSrc: Oral  Oral Oral   SpO2: 98%  97% 100%   Weight:  83 5 kg (184 lb)     Height:          Temperature:   Temp (24hrs), Av 7 °F (36 5 °C), Min:97 2 °F (36 2 °C), Max:97 9 °F (36 6 °C)    Temperature: (!) 97 2 °F (36 2 °C)  Intake & Output:  I/O        07 -  0700  07 -  0700  07 - 12/15 0700    P  O   480     Total Intake(mL/kg)  480 (5 7)     Urine (mL/kg/hr)  575 700 (1 7)    Total Output  575 700    Net  -95 -700               Weights:   IBW: 66 1 kg    Body mass index is 28 82 kg/m²  Weight (last 2 days)     Date/Time   Weight    19 0538   83 5 (184)    19 2225   83 5 (184 08) Standing     Weight: Standing  at 19 2225            Physical Exam   Constitutional: He is oriented to person, place, and time  He appears well-developed and well-nourished  HENT:   Head: Normocephalic and atraumatic  Eyes: Pupils are equal, round, and reactive to light  Conjunctivae and EOM are normal    Neck: Normal range of motion  Neck supple  Cardiovascular: Regular rhythm, normal heart sounds and intact distal pulses  Bradycardic   Pulmonary/Chest: Effort normal and breath sounds normal    Abdominal: Soft  Bowel sounds are normal    Musculoskeletal: Normal range of motion  Neurological: He is alert and oriented to person, place, and time  Skin: Skin is dry  Psychiatric: He has a normal mood and affect   His behavior is normal  Judgment and thought content normal      LABORATORY DATA Labs: I have personally reviewed pertinent reports  Results from last 7 days   Lab Units 12/12/19  0415   WBC Thousand/uL 10 48*   HEMOGLOBIN g/dL 15 5   HEMATOCRIT % 46 8   PLATELETS Thousands/uL 266   NEUTROS PCT % 63   MONOS PCT % 11      Results from last 7 days   Lab Units 12/12/19  0415   POTASSIUM mmol/L 3 8   CHLORIDE mmol/L 106   CO2 mmol/L 26   BUN mg/dL 18   CREATININE mg/dL 1 23   CALCIUM mg/dL 8 7   ALK PHOS U/L 93   ALT U/L 46   AST U/L 26                      Results from last 7 days   Lab Units 12/12/19  1015 12/12/19  0656 12/12/19  0415   TROPONIN I ng/mL <0 02 <0 02 <0 02       IMAGING & DIAGNOSTIC TESTING     Radiology Results: I have personally reviewed pertinent reports  No results found  Other Diagnostic Testing: I have personally reviewed pertinent reports  ACTIVE MEDICATIONS     Current Facility-Administered Medications   Medication Dose Route Frequency    acetaminophen (TYLENOL) tablet 650 mg  650 mg Oral Q4H PRN    aspirin (ECOTRIN LOW STRENGTH) EC tablet 81 mg  81 mg Oral Daily    atorvastatin (LIPITOR) tablet 40 mg  40 mg Oral Daily With Dinner    calcium carbonate (TUMS) chewable tablet 1,000 mg  1,000 mg Oral Daily PRN    chlorhexidine (PERIDEX) 0 12 % oral rinse 15 mL  15 mL Mouth/Throat Q12H LATASHA    isosorbide mononitrate (IMDUR) 24 hr tablet 30 mg  30 mg Oral Daily    magnesium hydroxide (MILK OF MAGNESIA) 400 mg/5 mL oral suspension 30 mL  30 mL Oral Daily PRN    mupirocin (BACTROBAN) 2 % nasal ointment 1 application  1 application Nasal X73L [address]    nicotine (NICODERM CQ) 14 mg/24hr TD 24 hr patch 1 patch  1 patch Transdermal Daily    ondansetron (ZOFRAN) injection 4 mg  4 mg Intravenous Q6H PRN       VTE Pharmacologic Prophylaxis: Reason for no pharmacologic prophylaxis Possible procedure  VTE Mechanical Prophylaxis: reason for no mechanical VTE prophylaxis Patient refused    Portions of the record may have been created with voice recognition software  Occasional wrong word or "sound a like" substitutions may have occurred due to the inherent limitations of voice recognition software    Read the chart carefully and recognize, using context, where substitutions have occurred   ==  Maryjo Little, [facility]  Internal Medicine Residency PGY-2

## 2019-12-14 NOTE — PLAN OF CARE
Problem: Potential for Falls  Goal: Patient will remain free of falls  Description  INTERVENTIONS:  - Assess patient frequently for physical needs  -  Identify cognitive and physical deficits and behaviors that affect risk of falls    -  [place] fall precautions as indicated by assessment   - Educate patient/family on patient safety including physical limitations  - Instruct patient to call for assistance with activity based on assessment  - Modify environment to reduce risk of injury  - Consider OT/PT consult to assist with strengthening/mobility  Outcome: Progressing     Problem: PAIN - ADULT  Goal: Verbalizes/displays adequate comfort level or baseline comfort level  Description  Interventions:  - Encourage patient to monitor pain and request assistance  - Assess pain using appropriate pain scale  - Administer analgesics based on type and severity of pain and evaluate response  - Implement non-pharmacological measures as appropriate and evaluate response  - Consider cultural and social influences on pain and pain management  - Notify physician/advanced practitioner if interventions unsuccessful or patient reports new pain  Outcome: Progressing     Problem: INFECTION - ADULT  Goal: Absence or prevention of progression during hospitalization  Description  INTERVENTIONS:  - Assess and monitor for signs and symptoms of infection  - Monitor lab/diagnostic results  - Monitor all insertion sites, i e  indwelling lines, tubes, and drains  - Monitor endotracheal if appropriate and nasal secretions for changes in amount and color  - [place] appropriate cooling/warming therapies per order  - Administer medications as ordered  - Instruct and encourage patient and family to use good hand hygiene technique  - Identify and instruct in appropriate isolation precautions for identified infection/condition  Outcome: Progressing  Goal: Absence of fever/infection during neutropenic period  Description  INTERVENTIONS:  - Monitor WBC    Outcome: Progressing     Problem: SAFETY ADULT  Goal: Maintain or return to baseline ADL function  Description  INTERVENTIONS:  -  Assess patient's ability to carry out ADLs; assess patient's baseline for ADL function and identify physical deficits which impact ability to perform ADLs (bathing, care of mouth/teeth, toileting, grooming, dressing, etc )  - Assess/evaluate cause of self-care deficits   - Assess range of motion  - Assess patient's mobility; develop plan if impaired  - Assess patient's need for assistive devices and provide as appropriate  - Encourage maximum independence but intervene and supervise when necessary  - Involve family in performance of ADLs  - Assess for home care needs following discharge   - Consider OT consult to assist with ADL evaluation and planning for discharge  - Provide patient education as appropriate  Outcome: Progressing  Goal: Maintain or return mobility status to optimal level  Description  INTERVENTIONS:  - Assess patient's baseline mobility status (ambulation, transfers, stairs, etc )    - Identify cognitive and physical deficits and behaviors that affect mobility  - Identify mobility aids required to assist with transfers and/or ambulation (gait belt, sit-to-stand, lift, walker, cane, etc )  - [place] fall precautions as indicated by assessment  - Record patient progress and toleration of activity level on Mobility SBAR; progress patient to next Phase/Stage  - Instruct patient to call for assistance with activity based on assessment  - Consider rehabilitation consult to assist with strengthening/weightbearing, etc   Outcome: Progressing     Problem: DISCHARGE PLANNING  Goal: Discharge to home or other facility with appropriate resources  Description  INTERVENTIONS:  - Identify barriers to discharge w/patient and caregiver  - Arrange for needed discharge resources and transportation as appropriate  - Identify discharge learning needs (meds, wound care, etc )  - Arrange for interpretive services to assist at discharge as needed  - Refer to Case Management Department for coordinating discharge planning if the patient needs post-hospital services based on physician/advanced practitioner order or complex needs related to functional status, cognitive ability, or social support system  Outcome: Progressing     Problem: Knowledge Deficit  Goal: Patient/family/caregiver demonstrates understanding of disease process, treatment plan, medications, and discharge instructions  Description  Complete learning assessment and assess knowledge base    Interventions:  - Provide teaching at level of understanding  - Provide teaching via preferred learning methods  Outcome: Progressing     Problem: CARDIOVASCULAR - ADULT  Goal: Maintains optimal cardiac output and hemodynamic stability  Description  INTERVENTIONS:  - Monitor I/O, vital signs and rhythm  - Monitor for S/S and trends of decreased cardiac output  - Administer and titrate ordered vasoactive medications to optimize hemodynamic stability  - Assess quality of pulses, skin color and temperature  - Assess for signs of decreased coronary artery perfusion  - Instruct patient to report change in severity of symptoms  Outcome: Progressing  Goal: Absence of cardiac dysrhythmias or at baseline rhythm  Description  INTERVENTIONS:  - Continuous cardiac monitoring, vital signs, obtain 12 lead EKG if ordered  - Administer antiarrhythmic and heart rate control medications as ordered  - Monitor electrolytes and administer replacement therapy as ordered  Outcome: Progressing

## 2019-12-15 LAB
ABO GROUP BLD: NORMAL
ATRIAL RATE: 48 BPM
BLD GP AB SCN SERPL QL: NEGATIVE
P AXIS: 140 DEGREES
PLATELET # BLD AUTO: 260 THOUSANDS/UL (ref 149–390)
PMV BLD AUTO: 10.1 FL (ref 8.9–12.7)
PR INTERVAL: 176 MS
QRS AXIS: -25 DEGREES
QRSD INTERVAL: 104 MS
QT INTERVAL: 440 MS
QTC INTERVAL: 393 MS
RH BLD: POSITIVE
SPECIMEN EXPIRATION DATE: NORMAL
T WAVE AXIS: -22 DEGREES
TROPONIN I SERPL-MCNC: 0.03 NG/ML
TROPONIN I SERPL-MCNC: 0.05 NG/ML
TSH SERPL DL<=0.05 MIU/L-ACNC: 3.04 UIU/ML (ref 0.36–3.74)
VENTRICULAR RATE: 48 BPM

## 2019-12-15 PROCEDURE — NC001 PR NO CHARGE: Performed by: PHYSICIAN ASSISTANT

## 2019-12-15 PROCEDURE — 84443 ASSAY THYROID STIM HORMONE: CPT | Performed by: INTERNAL MEDICINE

## 2019-12-15 PROCEDURE — 86923 COMPATIBILITY TEST ELECTRIC: CPT

## 2019-12-15 PROCEDURE — 85049 AUTOMATED PLATELET COUNT: CPT | Performed by: INTERNAL MEDICINE

## 2019-12-15 PROCEDURE — 84484 ASSAY OF TROPONIN QUANT: CPT | Performed by: STUDENT IN AN ORGANIZED HEALTH CARE EDUCATION/TRAINING PROGRAM

## 2019-12-15 PROCEDURE — 99232 SBSQ HOSP IP/OBS MODERATE 35: CPT | Performed by: INTERNAL MEDICINE

## 2019-12-15 PROCEDURE — 86850 RBC ANTIBODY SCREEN: CPT | Performed by: THORACIC SURGERY (CARDIOTHORACIC VASCULAR SURGERY)

## 2019-12-15 PROCEDURE — 86901 BLOOD TYPING SEROLOGIC RH(D): CPT | Performed by: THORACIC SURGERY (CARDIOTHORACIC VASCULAR SURGERY)

## 2019-12-15 PROCEDURE — 84484 ASSAY OF TROPONIN QUANT: CPT | Performed by: INTERNAL MEDICINE

## 2019-12-15 PROCEDURE — 93005 ELECTROCARDIOGRAM TRACING: CPT

## 2019-12-15 PROCEDURE — 93880 EXTRACRANIAL BILAT STUDY: CPT | Performed by: SURGERY

## 2019-12-15 PROCEDURE — 86900 BLOOD TYPING SEROLOGIC ABO: CPT | Performed by: THORACIC SURGERY (CARDIOTHORACIC VASCULAR SURGERY)

## 2019-12-15 PROCEDURE — 93010 ELECTROCARDIOGRAM REPORT: CPT | Performed by: INTERNAL MEDICINE

## 2019-12-15 RX ORDER — LIDOCAINE 50 MG/G
1 PATCH TOPICAL DAILY
Status: DISCONTINUED | OUTPATIENT
Start: 2019-12-16 | End: 2019-12-19 | Stop reason: HOSPADM

## 2019-12-15 RX ADMIN — CHLORHEXIDINE GLUCONATE 0.12% ORAL RINSE 15 ML: 1.2 LIQUID ORAL at 08:12

## 2019-12-15 RX ADMIN — ASPIRIN 81 MG: 81 TABLET ORAL at 08:12

## 2019-12-15 RX ADMIN — ENOXAPARIN SODIUM 40 MG: 40 INJECTION SUBCUTANEOUS at 10:13

## 2019-12-15 RX ADMIN — ACETAMINOPHEN 650 MG: 325 TABLET ORAL at 20:41

## 2019-12-15 RX ADMIN — ATORVASTATIN CALCIUM 40 MG: 40 TABLET, FILM COATED ORAL at 17:43

## 2019-12-15 RX ADMIN — CHLORHEXIDINE GLUCONATE 0.12% ORAL RINSE 15 ML: 1.2 LIQUID ORAL at 20:41

## 2019-12-15 RX ADMIN — ISOSORBIDE MONONITRATE 30 MG: 30 TABLET, EXTENDED RELEASE ORAL at 10:00

## 2019-12-15 RX ADMIN — Medication 1 APPLICATION: at 20:42

## 2019-12-15 RX ADMIN — Medication 1 APPLICATION: at 08:12

## 2019-12-15 NOTE — INCIDENTAL FINDINGS
The following findings require follow up:  Radiographic finding      Finding: Noncalcified 10 x 7 mm left upper lobe pulmonary nodule   Follow up required: Either PET/CT scan evaluation or tissue sampling given the smoking history  Follow up should be done within: to be discussed with primary care physician  Please notify the following clinician to assist with the follow up: Your primary care physician

## 2019-12-15 NOTE — ASSESSMENT & PLAN NOTE
- Incidental noncalcified 10 x 7 mm left upper lobe pulmonary nodules detected on CT chest   - CT/PET or tissue sampling recommended in the setting of smoking history  - CT head: no intracranial abnormalities or masses    - CT abdomen/pelvis:  No evidence of Mets

## 2019-12-15 NOTE — PROGRESS NOTES
Progress Note - Cardiothoracic Surgery   [facility] 63 y o  male MRN: [id number]  Unit/Bed#: [facility] 408-01 Encounter: [id number]      24 Hour Events: No angina/dyspnea  No events overnight  Medications:   Scheduled Meds:  Current Facility-Administered Medications:  acetaminophen 650 mg Oral Q4H PRN Jeovanny Padgett MD   aspirin 81 mg Oral Daily Jeovanny Padgett MD   atorvastatin 40 mg Oral Daily With Mu Levine MD   calcium carbonate 1,000 mg Oral Daily PRN Jeovanny Padgett MD   chlorhexidine 15 mL Mouth/Throat Q12H [facility] Helen Barnes PA-C   enoxaparin 40 mg Subcutaneous Q24H [facility] J Luis Jc DO   isosorbide mononitrate 30 mg Oral Daily Jeovanny Padgett MD   magnesium hydroxide 30 mL Oral Daily PRN Jeovanny Padgett MD   mupirocin 1 application Nasal T63T [facility] Helen Barnes PA-C   nicotine 1 patch Transdermal Daily Jeovanny Padgett MD   ondansetron 4 mg Intravenous Q6H PRN Jeovanny Padgett MD     Continuous Infusions:     Results:   Results from last 7 days   Lab Units 12/15/19  1006 12/12/19  0415   WBC Thousand/uL  --  10 48*   HEMOGLOBIN g/dL  --  15 5   HEMATOCRIT %  --  46 8   PLATELETS Thousands/uL 260 266     Results from last 7 days   Lab Units 12/12/19  0415   POTASSIUM mmol/L 3 8   CHLORIDE mmol/L 106   CO2 mmol/L 26   BUN mg/dL 18   CREATININE mg/dL 1 23   CALCIUM mg/dL 8 7           Studies:     Cardiac Catheterization: 70% ostial LAD, 80% ostial RPDA, 70% at the bifurcation of the LCx/OM    Chest CT: Noncalcified 10 x 7 mm left upper lobe pulmonary nodule       Echocardiogram: Pending    Carotid artery duplex: Pending     Greater saphenous vein mapping: Pending    Vitals:   Vitals:    12/15/19 0600 12/15/19 0741 12/15/19 1129 12/15/19 1532   BP:  148/79 155/81 118/57   BP Location:  Left arm Left arm Left arm   Pulse:  (!) 50 (!) 50 (!) 54   Resp:  18 18 19   Temp:  97 8 °F (36 6 °C) 97 6 °F (36 4 °C) 97 7 °F (36 5 °C)   TempSrc:  Oral Oral Oral   SpO2:  100% 99% 96%   Weight: 82 1 kg (181 lb)      Height: Physical Exam:    HEENT/NECK:  PERRLA  No jugular venous distention  Cardiac: Regular rate and rhythm  Pulmonary:  Breath sounds clear bilaterally  Abdomen:  Non-tender and Non-distended  Extremities: Extremities warm/dry  Neuro: Alert and oriented X 3  Skin: Warm/Dry, without rashes or lesions  Assessment:    Severe coronary artery disease; Ongoing CABG workup    Plan:  Patient agreeable to proceed with surgery;    Lung Nodule identified   CT head, abdomen, and pelvis ordered to rule out metastatic disease  Vascular studies and echo pending  Blood type and cross match ordered;   Continue Mupirocin 2% nasal ointment q 12 hrs  Continue topical chlorhexidine bath and mouth rise  Preoperative oxygen, beta blocker, insulin, and antibiotics ordered coronary artery bypass grafting scheduled for Thursday with LISA Villegas Augusto: Gavin Irene PA-C  DATE: December 15, 2019  TIME: 4:14 PM

## 2019-12-15 NOTE — PROGRESS NOTES
INTERNAL MEDICINE RESIDENCY PROGRESS NOTE     Name: Bonnie Bo   Age & Sex: 63 y o  male   MRN: [id number]  Unit/Bed#: [facility] 408-01   Encounter: [id number]  Team: SOD Team A    PATIENT INFORMATION     Name: Bonnie Bo   Age & Sex: 63 y o  male   MRN: [id number]  Hospital Stay Days: 2    ASSESSMENT/PLAN     Principal Problem:    Unstable angina ([address] )  Active Problems:    Tobacco abuse    Pure hypercholesterolemia    S/P primary angioplasty with coronary stent    Bradycardia    Pulmonary nodule      * Unstable angina [facility])  Assessment & Plan  - Presented to [facility] with chest pain  EKG negative and troponin negative at the time  - - S/p PCI this admission which significant for 3 vessel disease 70% ostial LAD, 99% RCA, 90% circumflex    - Nuclear medicine stress test done 09/2018 was negative at that time  - Evaluated by CT surgery, planned for CABG on Thursday    Plan:  - Continue ASA, hold Plavix for the anticipated CABG  - Hold metoprolol in the setting of bradycardia  - Continue Imdur mg Q 24 hours  - Continue statin  - CABG next Thursday  - Cardiology recommendations appreciated     Pulmonary nodule  Assessment & Plan  - Incidental noncalcified 10 x 7 mm left upper lobe pulmonary nodules detected on CT chest   - CT/PET or tissue sampling recommended in the setting of smoking history    Bradycardia  Assessment & Plan  - Patient states that his baseline is HR 60s  - Asymptomatic, telemetry reviewed, sinus bradycardia  - Continue telemetry   - Metoprolol on hold      S/P primary angioplasty with coronary stent  Assessment & Plan  CAD s/p TAMARA of pCFX, mRCA on 10/2017    -plan as above     Pure hypercholesterolemia  Assessment & Plan  Stable  , TG 44 most recently    -Continue atorvastatin 40     Tobacco abuse  Assessment & Plan  - Smokes 0 5 ppd for the past 40 years    - Tobacco cessation education provided  - Declining nicotine patch      Disposition:  Continue inpatient, planned for CABG on Thursday  SUBJECTIVE     Patient seen and examined  No acute events overnight  He denies any further episodes of chest pain since admission  Also denies shortness of breath, palpitations, dizziness or lower limb swelling  Continues to be bradycardic and non symptomatic  OBJECTIVE     Vitals:    19 2305 12/15/19 0258 12/15/19 0600 12/15/19 0741   BP: 119/62 120/58  148/79   BP Location: Right arm Right arm  Left arm   Pulse: (!) 52 55  (!) 50   Resp: 18 18  18   Temp: 98 1 °F (36 7 °C) 97 9 °F (36 6 °C)  97 8 °F (36 6 °C)   TempSrc: Oral Oral  Oral   SpO2: 97% 97%  100%   Weight:   82 1 kg (181 lb)    Height:          Temperature:   Temp (24hrs), Av 8 °F (36 6 °C), Min:97 2 °F (36 2 °C), Max:98 1 °F (36 7 °C)    Temperature: 97 8 °F (36 6 °C)  Intake & Output:  I/O        07 -  0700  07 - 12/15 0700 12/15 07 -  0700    P  O  480 700     Total Intake(mL/kg) 480 (5 7) 700 (8 5)     Urine (mL/kg/hr) 575 1800 (0 9)     Total Output 575 1800     Net -95 -1100                Weights:   IBW: 66 1 kg    Body mass index is 28 35 kg/m²  Weight (last 2 days)     Date/Time   Weight    12/15/19 0600   82 1 (181)    19 0538   83 5 (184)    19 2225   83 5 (184 08) Standing     Weight: Standing  at 19 2225            Physical Exam   Constitutional: He is oriented to person, place, and time  He appears well-developed and well-nourished  HENT:   Head: Normocephalic and atraumatic  Eyes: Pupils are equal, round, and reactive to light  EOM are normal    Neck: Normal range of motion  Neck supple  Cardiovascular: Regular rhythm and normal heart sounds  Exam reveals no friction rub  No murmur heard  Bradycardia   Pulmonary/Chest: Effort normal  No respiratory distress  He has no wheezes  He has no rales  Abdominal: Soft  He exhibits no distension  There is no tenderness  Musculoskeletal: He exhibits no edema     Neurological: He is alert and oriented to person, place, and time  Skin: Skin is warm  LABORATORY DATA     Labs: I have personally reviewed pertinent reports  Results from last 7 days   Lab Units 12/12/19  0415   WBC Thousand/uL 10 48*   HEMOGLOBIN g/dL 15 5   HEMATOCRIT % 46 8   PLATELETS Thousands/uL 266   NEUTROS PCT % 63   MONOS PCT % 11      Results from last 7 days   Lab Units 12/12/19  0415   POTASSIUM mmol/L 3 8   CHLORIDE mmol/L 106   CO2 mmol/L 26   BUN mg/dL 18   CREATININE mg/dL 1 23   CALCIUM mg/dL 8 7   ALK PHOS U/L 93   ALT U/L 46   AST U/L 26                      Results from last 7 days   Lab Units 12/12/19  1015 12/12/19  0656 12/12/19  0415   TROPONIN I ng/mL <0 02 <0 02 <0 02       IMAGING & DIAGNOSTIC TESTING     Radiology Results: I have personally reviewed pertinent reports  Ct Chest Wo Contrast    Result Date: 12/14/2019  Impression: Noncalcified 10 x 7 mm left upper lobe pulmonary nodule  Based on current Fleischner Society 2017 Guidelines on incidental pulmonary nodule, either PET/CT scan evaluation or tissue sampling may be considered appropriate in this patient with a smoking history  Subtle groundglass parenchymal densities which could represent atelectasis or perhaps mild interstitial edema related to pulmonary vascular congestive change  No pleural effusions  The study was marked in EPIC for significant notification  Workstation performed: [id number]     Other Diagnostic Testing: I have personally reviewed pertinent reports      ACTIVE MEDICATIONS     Current Facility-Administered Medications   Medication Dose Route Frequency    acetaminophen (TYLENOL) tablet 650 mg  650 mg Oral Q4H PRN    aspirin (ECOTRIN LOW STRENGTH) EC tablet 81 mg  81 mg Oral Daily    atorvastatin (LIPITOR) tablet 40 mg  40 mg Oral Daily With Dinner    calcium carbonate (TUMS) chewable tablet 1,000 mg  1,000 mg Oral Daily PRN    chlorhexidine (PERIDEX) 0 12 % oral rinse 15 mL  15 mL Mouth/Throat Q12H [facility]    isosorbide mononitrate (IMDUR) 24 hr tablet 30 mg  30 mg Oral Daily    magnesium hydroxide (MILK OF MAGNESIA) 400 mg/5 mL oral suspension 30 mL  30 mL Oral Daily PRN    mupirocin (BACTROBAN) 2 % nasal ointment 1 application  1 application Nasal [address]    nicotine (NICODERM CQ) 14 mg/24hr TD 24 hr patch 1 patch  1 patch Transdermal Daily    ondansetron (ZOFRAN) injection 4 mg  4 mg Intravenous Q6H PRN         VTE Mechanical Prophylaxis: sequential compression device    Portions of the record may have been created with voice recognition software  Occasional wrong word or "sound a like" substitutions may have occurred due to the inherent limitations of voice recognition software    Read the chart carefully and recognize, using context, where substitutions have occurred   ==  Catarina Galvez MD  [facility]  Internal Medicine Residency PGY-1

## 2019-12-16 ENCOUNTER — APPOINTMENT (INPATIENT)
Dept: RADIOLOGY | Facility: HOSPITAL | Age: 59
DRG: 235 | End: 2019-12-16

## 2019-12-16 PROBLEM — I25.119 ATHEROSCLEROSIS OF NATIVE CORONARY ARTERY WITH ANGINA PECTORIS (HCC): Status: ACTIVE | Noted: 2019-12-13

## 2019-12-16 LAB
ANION GAP SERPL CALCULATED.3IONS-SCNC: 6 MMOL/L (ref 4–13)
ATRIAL RATE: 51 BPM
BUN SERPL-MCNC: 22 MG/DL (ref 5–25)
CALCIUM SERPL-MCNC: 9 MG/DL (ref 8.3–10.1)
CHLORIDE SERPL-SCNC: 109 MMOL/L (ref 100–108)
CO2 SERPL-SCNC: 25 MMOL/L (ref 21–32)
CREAT SERPL-MCNC: 1.09 MG/DL (ref 0.6–1.3)
GFR SERPL CREATININE-BSD FRML MDRD: 74 ML/MIN/1.73SQ M
GLUCOSE SERPL-MCNC: 170 MG/DL (ref 65–140)
MRSA NOSE QL CULT: NORMAL
P AXIS: 147 DEGREES
POTASSIUM SERPL-SCNC: 3.8 MMOL/L (ref 3.5–5.3)
PR INTERVAL: 172 MS
QRS AXIS: -28 DEGREES
QRSD INTERVAL: 96 MS
QT INTERVAL: 430 MS
QTC INTERVAL: 396 MS
SODIUM SERPL-SCNC: 140 MMOL/L (ref 136–145)
T WAVE AXIS: -2 DEGREES
TROPONIN I SERPL-MCNC: 0.04 NG/ML
TROPONIN I SERPL-MCNC: 0.04 NG/ML
VENTRICULAR RATE: 51 BPM

## 2019-12-16 PROCEDURE — 99232 SBSQ HOSP IP/OBS MODERATE 35: CPT | Performed by: INTERNAL MEDICINE

## 2019-12-16 PROCEDURE — 93010 ELECTROCARDIOGRAM REPORT: CPT | Performed by: INTERNAL MEDICINE

## 2019-12-16 PROCEDURE — 80048 BASIC METABOLIC PNL TOTAL CA: CPT | Performed by: INTERNAL MEDICINE

## 2019-12-16 PROCEDURE — 84484 ASSAY OF TROPONIN QUANT: CPT | Performed by: STUDENT IN AN ORGANIZED HEALTH CARE EDUCATION/TRAINING PROGRAM

## 2019-12-16 PROCEDURE — 74176 CT ABD & PELVIS W/O CONTRAST: CPT

## 2019-12-16 PROCEDURE — 70450 CT HEAD/BRAIN W/O DYE: CPT

## 2019-12-16 PROCEDURE — NC001 PR NO CHARGE: Performed by: THORACIC SURGERY (CARDIOTHORACIC VASCULAR SURGERY)

## 2019-12-16 RX ORDER — ATORVASTATIN CALCIUM 80 MG/1
80 TABLET, FILM COATED ORAL
Status: DISCONTINUED | OUTPATIENT
Start: 2019-12-16 | End: 2019-12-19 | Stop reason: HOSPADM

## 2019-12-16 RX ORDER — ISOSORBIDE MONONITRATE 60 MG/1
60 TABLET, EXTENDED RELEASE ORAL DAILY
Status: DISCONTINUED | OUTPATIENT
Start: 2019-12-17 | End: 2019-12-19 | Stop reason: HOSPADM

## 2019-12-16 RX ADMIN — METOPROLOL TARTRATE 12.5 MG: 25 TABLET ORAL at 12:14

## 2019-12-16 RX ADMIN — CHLORHEXIDINE GLUCONATE 0.12% ORAL RINSE 15 ML: 1.2 LIQUID ORAL at 08:48

## 2019-12-16 RX ADMIN — ISOSORBIDE MONONITRATE 30 MG: 30 TABLET, EXTENDED RELEASE ORAL at 08:48

## 2019-12-16 RX ADMIN — Medication 1 APPLICATION: at 08:53

## 2019-12-16 RX ADMIN — ASPIRIN 81 MG: 81 TABLET ORAL at 08:48

## 2019-12-16 RX ADMIN — Medication 1 APPLICATION: at 21:19

## 2019-12-16 RX ADMIN — ENOXAPARIN SODIUM 40 MG: 40 INJECTION SUBCUTANEOUS at 08:52

## 2019-12-16 RX ADMIN — ATORVASTATIN CALCIUM 80 MG: 80 TABLET, FILM COATED ORAL at 15:47

## 2019-12-16 RX ADMIN — CHLORHEXIDINE GLUCONATE 0.12% ORAL RINSE 15 ML: 1.2 LIQUID ORAL at 21:19

## 2019-12-16 RX ADMIN — LIDOCAINE 1 PATCH: 50 PATCH TOPICAL at 08:50

## 2019-12-16 NOTE — PROGRESS NOTES
INTERNAL MEDICINE RESIDENCY PROGRESS NOTE     Name: Antoinette Galeazzi   Age & Sex: 63 y o  male   MRN: [id number]  Unit/Bed#: [facility] 408-01   Encounter: [id number]  Team: SOD Team A    PATIENT INFORMATION     Name: Antoinette Galeazzi   Age & Sex: 63 y o  male   MRN: [id number]  Hospital Stay Days: 3    ASSESSMENT/PLAN     Principal Problem:    Unstable angina ([facility] [address] )  Active Problems:    Tobacco abuse    Pure hypercholesterolemia    S/P primary angioplasty with coronary stent    Bradycardia    Pulmonary nodule    Atherosclerosis of native coronary artery with angina pectoris ([facility] [address] )      * Unstable angina ([facility] [address] )  Assessment & Plan  - Presented to [facility] with chest pain  EKG negative and troponin negative at the time  - S/p PCI this admission which significant for 3 vessel disease 70% ostial LAD, 99% RCA, 90% circumflex    - Nuclear medicine stress test done 09/2018 was negative at that time  - Evaluated by CT surgery, planned for CABG on Thursday    Plan:  - Continue ASA, hold Plavix for the anticipated CABG  - Imdur increased to 60 mg Q 24 hours  - Continue statin  - CABG next Thursday  - Cardiology following, recommending starting Lopressor 12 5 mg b i d  Pulmonary nodule  Assessment & Plan  - Incidental noncalcified 10 x 7 mm left upper lobe pulmonary nodules detected on CT chest   - CT/PET or tissue sampling recommended in the setting of smoking history  - CT head, CT abdomen/pelvis to rule out metastatic disease    Bradycardia  Assessment & Plan  - Patient states that his baseline is HR 60s  - Asymptomatic, telemetry reviewed, sinus bradycardia  - Continue telemetry   - Beta-blocker started by Cardiology today      S/P primary angioplasty with coronary stent  Assessment & Plan  CAD s/p TAMARA of pCFX, mRCA on 10/2017    -plan as above     Pure hypercholesterolemia  Assessment & Plan  Stable   , TG 44 most recently    -Continue atorvastatin 40     Tobacco abuse  Assessment & Plan  - Smokes 0 5 ppd for the past 40 years  - Tobacco cessation education provided  - Declining nicotine patch      Disposition:  Inpatient, CABG next Thursday  SUBJECTIVE     Patient seen and examined  Reported chest pain overnight  EKG was negative for acute ischemic events  Troponin 0 05 -->0 03 -->0  04  This morning, complaining of central chest discomfort 3/10  Denies shortness of breath, diaphoresis, palpitations, lower limb swelling, nausea, vomiting or diarrhea  OBJECTIVE     Vitals:    19 0213 19 0600 19 0734 19 1113   BP: 126/81  126/60 103/63   BP Location: Left arm  Right arm Left arm   Pulse: (!) 50  55 55   Resp: 19  18 18   Temp: 98 °F (36 7 °C)  98 2 °F (36 8 °C) 97 8 °F (36 6 °C)   TempSrc: Oral  Oral Oral   SpO2: 98%  98% 98%   Weight:  82 5 kg (181 lb 14 1 oz)     Height:          Temperature:   Temp (24hrs), Av °F (36 7 °C), Min:97 7 °F (36 5 °C), Max:98 2 °F (36 8 °C)    Temperature: 97 8 °F (36 6 °C)  Intake & Output:  I/O        07 - 12/15 0700 12/15 07 -  0700  07 -  0700    P  O  700 960 150    Total Intake(mL/kg) 700 (8 5) 960 (11 6) 150 (1 8)    Urine (mL/kg/hr) 1800 (0 9) 600 (0 3)     Total Output 1800 600     Net -1100 +360 +150               Weights:   IBW: 66 1 kg    Body mass index is 28 49 kg/m²  Weight (last 2 days)     Date/Time   Weight    19 0600   82 5 (181 88)    12/15/19 0600   82 1 (181)    19 0538   83 5 (184)            Physical Exam   Constitutional: He is oriented to person, place, and time  He appears well-developed and well-nourished  HENT:   Head: Normocephalic and atraumatic  Eyes: Pupils are equal, round, and reactive to light  EOM are normal    Neck: Normal range of motion  Neck supple  No JVD present  Cardiovascular: Regular rhythm and normal heart sounds  Exam reveals no gallop and no friction rub  No murmur heard    Bradycardia   Pulmonary/Chest: Effort normal and breath sounds normal  No stridor  No respiratory distress  He has no wheezes  Abdominal: Soft  He exhibits no distension  There is no tenderness  Musculoskeletal: Normal range of motion  He exhibits no edema  Neurological: He is alert and oriented to person, place, and time  Skin: Skin is warm  Psychiatric: He has a normal mood and affect  His behavior is normal      LABORATORY DATA     Labs: I have personally reviewed pertinent reports  Results from last 7 days   Lab Units 12/15/19  1006 12/12/19  0415   WBC Thousand/uL  --  10 48*   HEMOGLOBIN g/dL  --  15 5   HEMATOCRIT %  --  46 8   PLATELETS Thousands/uL 260 266   NEUTROS PCT %  --  63   MONOS PCT %  --  11      Results from last 7 days   Lab Units 12/16/19  0510 12/12/19  0415   POTASSIUM mmol/L 3 8 3 8   CHLORIDE mmol/L 109* 106   CO2 mmol/L 25 26   BUN mg/dL 22 18   CREATININE mg/dL 1 09 1 23   CALCIUM mg/dL 9 0 8 7   ALK PHOS U/L  --  93   ALT U/L  --  46   AST U/L  --  26                      Results from last 7 days   Lab Units 12/16/19  0510 12/16/19  0212 12/15/19  2230   TROPONIN I ng/mL 0 04 0 04 0 03       IMAGING & DIAGNOSTIC TESTING     Radiology Results: I have personally reviewed pertinent reports  Ct Chest Wo Contrast    Result Date: 12/14/2019  Impression: Noncalcified 10 x 7 mm left upper lobe pulmonary nodule  Based on current Fleischner Society 2017 Guidelines on incidental pulmonary nodule, either PET/CT scan evaluation or tissue sampling may be considered appropriate in this patient with a smoking history  Subtle groundglass parenchymal densities which could represent atelectasis or perhaps mild interstitial edema related to pulmonary vascular congestive change  No pleural effusions  The study was marked in EPIC for significant notification  Workstation performed: [id number]     Other Diagnostic Testing: I have personally reviewed pertinent reports      ACTIVE MEDICATIONS     Current Facility-Administered Medications   Medication Dose Route Frequency    acetaminophen (TYLENOL) tablet 650 mg  650 mg Oral Q4H PRN    aspirin (ECOTRIN LOW STRENGTH) EC tablet 81 mg  81 mg Oral Daily    atorvastatin (LIPITOR) tablet 80 mg  80 mg Oral Daily With Dinner    calcium carbonate (TUMS) chewable tablet 1,000 mg  1,000 mg Oral Daily PRN    chlorhexidine (PERIDEX) 0 12 % oral rinse 15 mL  15 mL Mouth/Throat Q12H [facility]    enoxaparin (LOVENOX) subcutaneous injection 40 mg  40 mg Subcutaneous Q24H [facility]    [START ON 12/17/2019] isosorbide mononitrate (IMDUR) 24 hr tablet 60 mg  60 mg Oral Daily    lidocaine (LIDODERM) 5 % patch 1 patch  1 patch Topical Daily    magnesium hydroxide (MILK OF MAGNESIA) 400 mg/5 mL oral suspension 30 mL  30 mL Oral Daily PRN    mupirocin (BACTROBAN) 2 % nasal ointment 1 application  1 application Nasal [address]    nicotine (NICODERM CQ) 14 mg/24hr TD 24 hr patch 1 patch  1 patch Transdermal Daily    ondansetron (ZOFRAN) injection 4 mg  4 mg Intravenous Q6H PRN       VTE Pharmacologic Prophylaxis: Enoxaparin (Lovenox)  VTE Mechanical Prophylaxis: sequential compression device    Portions of the record may have been created with voice recognition software  Occasional wrong word or "sound a like" substitutions may have occurred due to the inherent limitations of voice recognition software    Read the chart carefully and recognize, using context, where substitutions have occurred   ==  Cookie Mccray MD  [facility]  Internal Medicine Residency PGY-1

## 2019-12-16 NOTE — PROGRESS NOTES
Progress Note - Cardiothoracic Surgery   Lor Leo 63 y o  male MRN: [id number]  Unit/Bed#: [facility] 408-01 Encounter: [id number]      24 Hour Events: Pt reports having episode of chest pain with radiation to neck and left arm last evening while lying in bed   No SOB, diaphoresis or nausea  Pt reports symptoms have persisted and present currently  Pain 3/10  Beta blocker discontinued yesterday due to bradycardia  Currently Sinus bradycardia, rate 52       Medications:   Scheduled Meds:  Current Facility-Administered Medications:  acetaminophen 650 mg Oral Q4H PRN Paulette Anderson MD   aspirin 81 mg Oral Daily Paulette Anderson MD   atorvastatin 40 mg Oral Daily With Marvin Mckeon MD   calcium carbonate 1,000 mg Oral Daily PRN Paulette Anderson MD   chlorhexidine 15 mL Mouth/Throat Q12H [address] Maral Huddleston PA-C   enoxaparin 40 mg Subcutaneous Q24H [address] Max Robbiemargarette Chamorro, DO   isosorbide mononitrate 30 mg Oral Daily Paulette Anderson MD   lidocaine 1 patch Topical Daily Romero Gomez MD   magnesium hydroxide 30 mL Oral Daily PRN Paulette Anderson MD   mupirocin 1 application Nasal A77H [address] Maral Huddleston PA-C   nicotine 1 patch Transdermal Daily Paulette Anderson MD   ondansetron 4 mg Intravenous Q6H PRN Paulette Anderson MD     Continuous Infusions:     Results:   Results from last 7 days   Lab Units 12/15/19  1006 12/12/19  0415   WBC Thousand/uL  --  10 48*   HEMOGLOBIN g/dL  --  15 5   HEMATOCRIT %  --  46 8   PLATELETS Thousands/uL 260 266     Results from last 7 days   Lab Units 12/16/19  0510 12/12/19  0415   POTASSIUM mmol/L 3 8 3 8   CHLORIDE mmol/L 109* 106   CO2 mmol/L 25 26   BUN mg/dL 22 18   CREATININE mg/dL 1 09 1 23   CALCIUM mg/dL 9 0 8 7   Troponin 0 04  T&S A positive  UA negative  A1C 5 6  Chol 149, Trig 44, HDL 39,   MRSA pending        Studies:     Cardiac Catheterization: 70% ostial LAD, 80% ostial RPDA, 70% at the bifurcation of the LCx/OM     Chest CT: Noncalcified 10 x 7 mm left upper lobe pulmonary nodule  CT abd/pelvis: pending    CT head: pending    Echocardiogram: pending    Carotid artery duplex:   < 50% right carotid stenosis  Vertebral artery flow is antegrade and There is no significant subclavian artery   < 50% left carotid stenosis  Vertebral artery flow is antegrade and There is no significant subclavian artery    Greater saphenous vein mapping: Adequate conduit for CABG, bilaterally    PFT: pending, scheduled for     Vitals:   Vitals:    12/15/19 2308 19 0213 19 0600 19 0734   BP: 107/55 126/81  126/60   BP Location: Left arm Left arm  Right arm   Pulse: (!) 54 (!) 50  55   Resp:    Temp: 98 1 °F (36 7 °C) 98 °F (36 7 °C)  98 2 °F (36 8 °C)   TempSrc: Oral Oral  Oral   SpO2: 98% 98%  98%   Weight:   82 5 kg (181 lb 14 1 oz)    Height:           Physical Exam:    HEENT/NECK:  PERRLA  No jugular venous distention  Cardiac: Regular rate and rhythm and No murmurs/rubs/gallops  Pulmonary:  Breath sounds clear bilaterally and No rales/rhonchi/wheezes  Abdomen:  Non-tender, Non-distended and Normal bowel sounds  Extremities: Extremities warm/dry, Radial pulses 2+ bilaterally and No edema B/L  Neuro: Alert and oriented X 3, Sensation is grossly intact and No focal deficits  Skin: Warm/Dry, without rashes or lesions  Assessment:    Severe coronary artery disease; Ongoing CABG workup    Plan:  Patient agreeable to proceed with surgery; all questions and concerns addressed  Preop workup underway   Awaiting PFTs, CT head, abd/pelvis and echocardiogram   Continue Mupirocin 2% nasal ointment q 12 hrs  Continue topical chlorhexidine bath and mouth rise  coronary artery bypass grafting scheduled tentatively for  with LISA Hassan: Rikki Morales PA-C  DATE: 2019  TIME: 9:26 AM

## 2019-12-16 NOTE — PROGRESS NOTES
General Cardiology   Progress Note -  [employer]   Nelly Pablo 63 y o  male MRN: [id number]    Unit/Bed#: [facility] 408-01 Encounter: [id number]    Assessment:  UA/Multi-vessel CAD:  History proximal circumflex and mid RCA stenting 10/2017  Presented with unstable angina [facility] 12[facility]12  Troponin negative  EKG with no acute ischemic change  Cardiac catheterization 12/13 revealed multi-vessel CAD involving 70% LAD, 99% RCA and 90% circumflex  Last dose of Plavix was 12/13  Transferred from [place] for CABG evaluation  · Evaluated by CT surgery with CABG tentatively scheduled for Thursday,  12/19  · On aspirin, statin, Imdur 30 mg   · Beta-blocker held since 12/13 due to bradycardia  Patient reports history of low resting heart rates most of his life due to being very athletic  · Recurrent chest discomfort yesterday that has been constant  Troponin remained negative  Preserved biventricular systolic function:  Echocardiogram 09/2018 EF 55-65% with no WMAs, mild MR, mild TR  Hyperlipidemia: , TG 68, HDL 33,  on atorvastatin 40 mg  Tobacco abuse:  Complete smoking cessation advised    Plan/Recommendations:  · Start Lopressor 12 5 mg b i d   · Continue aspirin and statin   · Recommend increasing Imdur ordered to 60 mg patient agreeable  · CABG scheduled for 12/19      Interval history:    Patient is a 60-year-old gentleman with past medical history of CAD status post proximal circumflex and mid RCA stenting 10/2017, hyperlipidemia, tobacco abuse presented to [facility] [place] on 12/12 with chest pain  Described the pain as burning lasting 10-15 minutes with radiation to his neck and left arm similar to what he had prior to stenting in 2017 but more severe this time  Troponins were negative  Patient underwent cardiac catheterization that showed multivessel CAD involving the LAD, RCA and circumflex    Patient was transferred to [facility] for CT surgical evaluation  Home medication regimen includes aspirin 81 mg, atorvastatin 40 mg, Plavix 75 mg, Imdur were 30 mg, Toprol-XL 25 mg  Subjective    Patient seen and examined  States he had return of chest pain around 1:00 p m  Yesterday that has been constant rated 2-3/10  Patient states he does not like how he feels with getting nitro  He denies any shortness of breath, palpitations, lightheadedness or dizziness  Patient states he has a history of a low resting heart rate most of his life due to being very athletic  Review of Systems   Constitution: Negative  Negative for chills  Cardiovascular: Positive for chest pain  Negative for dyspnea on exertion, leg swelling, near-syncope, orthopnea, palpitations, paroxysmal nocturnal dyspnea and syncope  Respiratory: Negative  Negative for shortness of breath  Gastrointestinal: Negative for diarrhea, nausea and vomiting  Neurological: Negative for dizziness, light-headedness and weakness  Psychiatric/Behavioral: Negative for altered mental status  All other systems reviewed and are negative  Objective:   Vitals: Blood pressure 126/60, pulse 55, temperature 98 2 °F (36 8 °C), temperature source Oral, resp  rate 18, height 5' 7" (1 702 m), weight 82 5 kg (181 lb 14 1 oz), SpO2 98 %  ,       Body mass index is 28 49 kg/m²  ,     Systolic (98PPR), SXI:976 , Min:104 , MRC:560     Diastolic (67NHW), WPQ:12, Min:55, Max:81      Intake/Output Summary (Last 24 hours) at 12/16/2019 1111  Last data filed at 12/16/2019 0804  Gross per 24 hour   Intake 870 ml   Output 600 ml   Net 270 ml     Weight (last 2 days)     Date/Time   Weight    12/16/19 0600   82 5 (181 88)    12/15/19 0600   82 1 (181)    12/14/19 0538   83 5 (184)            Telemetry Review:  Sinus rhythm with rates 45-60 beats per minute with rare PVCs  Physical Exam   Constitutional: He is oriented to person, place, and time  He appears well-developed and well-nourished     On RA in NAD HENT:   Head: Normocephalic and atraumatic  Neck: Normal range of motion  Neck supple  No JVD present  Cardiovascular: Normal rate, regular rhythm and normal heart sounds  No murmur heard  Pulmonary/Chest: Effort normal and breath sounds normal  No respiratory distress  He has no wheezes  He has no rales  Abdominal: Soft  Bowel sounds are normal    Musculoskeletal: Normal range of motion  He exhibits no edema  Neurological: He is alert and oriented to person, place, and time  Skin: Skin is warm and dry  Psychiatric: He has a normal mood and affect  His behavior is normal  Judgment and thought content normal    Nursing note and vitals reviewed        LABORATORY RESULTS  Results from last 7 days   Lab Units 12/16/19  0510 12/16/19  0212 12/15/19  2230   TROPONIN I ng/mL 0 04 0 04 0 03     CBC with diff:   Results from last 7 days   Lab Units 12/15/19  1006 12/12/19  0415   WBC Thousand/uL  --  10 48*   HEMOGLOBIN g/dL  --  15 5   HEMATOCRIT %  --  46 8   MCV fL  --  93   PLATELETS Thousands/uL 260 266   MCH pg  --  30 9   MCHC g/dL  --  33 1   RDW %  --  12 9   MPV fL 10 1 10 2   NRBC AUTO /100 WBCs  --  0       CMP:  Results from last 7 days   Lab Units 12/16/19  0510 12/12/19  0415   POTASSIUM mmol/L 3 8 3 8   CHLORIDE mmol/L 109* 106   CO2 mmol/L 25 26   BUN mg/dL 22 18   CREATININE mg/dL 1 09 1 23   CALCIUM mg/dL 9 0 8 7   AST U/L  --  26   ALT U/L  --  46   ALK PHOS U/L  --  93   EGFR ml/min/1 73sq m 74 64       BMP:  Results from last 7 days   Lab Units 12/16/19  0510 12/12/19  0415   POTASSIUM mmol/L 3 8 3 8   CHLORIDE mmol/L 109* 106   CO2 mmol/L 25 26   BUN mg/dL 22 18   CREATININE mg/dL 1 09 1 23   CALCIUM mg/dL 9 0 8 7       Lab Results   Component Value Date    NTBNP 82 12/12/2019                    Results from last 7 days   Lab Units 12/14/19  0953   HEMOGLOBIN A1C % 5 6          Results from last 7 days   Lab Units 12/15/19  1006   TSH 3RD GENERATON uIU/mL 3 040             Lipid Profile:   No results found for: CHOL  Lab Results   Component Value Date    HDL 33 (L) 2019    HDL 39 (L) 2019    HDL 44 10/26/2017     Lab Results   Component Value Date    LDLCALC 107 (H) 2019    LDLCALC 101 (H) 2019    LDLCALC 196 (H) 10/26/2017     Lab Results   Component Value Date    TRIG 68 2019    TRIG 44 2019    TRIG 48 10/26/2017       Cardiac testing:   Results for orders placed during the hospital encounter of 18   Echo complete with contrast if indicated    Narrative [facility] [facility]  [phone]    Transthoracic Echocardiogram  2D, M-mode, and Color Doppler    Study date:  27-Sep-2018    Patient: Vinay Bell  MR number: [id number]  Account number: [de-identified]  : [id number]  Age: 65 years  Gender: Male  Status: Outpatient  Location: [facility]  Height: 66 in  Weight: 183 lb  BP: 126/ 74 mmHg    Indications: Chest pain  Diagnoses: R07 9 - Chest pain, unspecified    Sonographer:  Kendrick RCS  Interpreting Physician:  Agustin Lucero MD  Referring Physician:  Agustin Lucero MD  Group:  [facility]    SUMMARY    LEFT VENTRICLE:  Systolic function was normal  Ejection fraction was estimated in the range of 55 % to 65 %  There were no regional wall motion abnormalities  MITRAL VALVE:  There was mild regurgitation  TRICUSPID VALVE:  There was mild regurgitation  SUMMARY MEASUREMENTS  Unspecified Scan Mode measurements:  Aortic Valve:   HR was 49 /min  Left Ventricle:   HR was 50 /min  HISTORY: PRIOR HISTORY: CAD  Medication-treated hyperlipidemia  Risk factors: a history of current cigarette use (within the last month) and a family history of coronary artery disease  PRIOR PROCEDURES: Stent  PROCEDURE: The study was performed in the [address] [place] [address]  This was a routine study  The transthoracic approach was used   The study included complete 2D imaging, M-mode, and color Doppler  The heart rate was 48 bpm, at the  start of the study  Images were obtained from the parasternal, apical, subcostal, and suprasternal notch acoustic windows  Image quality was adequate  LEFT VENTRICLE: Size was normal  Systolic function was normal  Ejection fraction was estimated in the range of 55 % to 65 %  There were no regional wall motion abnormalities  Wall thickness was normal  DOPPLER: Left ventricular diastolic  function parameters were normal     RIGHT VENTRICLE: The size was normal  Systolic function was normal  Wall thickness was normal     LEFT ATRIUM: Size was normal     RIGHT ATRIUM: Size was normal     MITRAL VALVE: Valve structure was normal  There was normal leaflet separation  DOPPLER: The transmitral velocity was within the normal range  There was no evidence for stenosis  There was mild regurgitation  AORTIC VALVE: The valve was trileaflet  Leaflets exhibited normal thickness and normal cuspal separation  DOPPLER: Transaortic velocity was within the normal range  There was no evidence for stenosis  There was no regurgitation  TRICUSPID VALVE: The valve structure was normal  There was normal leaflet separation  DOPPLER: The transtricuspid velocity was within the normal range  There was no evidence for stenosis  There was mild regurgitation  PULMONIC VALVE: Leaflets exhibited normal thickness, no calcification, and normal cuspal separation  DOPPLER: The transpulmonic velocity was within the normal range  There was no regurgitation  PERICARDIUM: There was no pericardial effusion  The pericardium was normal in appearance  AORTA: The root exhibited normal size  SYSTEMIC VEINS: IVC: The inferior vena cava was normal in size and course   Respirophasic changes were normal     SYSTEM MEASUREMENT TABLES    Apical four chamber  TAPSE: 22 6 mm    Unspecified Scan Mode  HR: 49 /min  HR: 50 /min    [facility] Accredited Echocardiography Laboratory    Prepared and electronically signed by    Tin Cardona MD  Signed 27-Sep-2018 13:12:03       No results found for this or any previous visit  No results found for this or any previous visit  No procedure found  No results found for this or any previous visit  Meds/Allergies   all current active meds have been reviewed, current meds:   Current Facility-Administered Medications   Medication Dose Route Frequency    acetaminophen (TYLENOL) tablet 650 mg  650 mg Oral Q4H PRN    aspirin (ECOTRIN LOW STRENGTH) EC tablet 81 mg  81 mg Oral Daily    atorvastatin (LIPITOR) tablet 40 mg  40 mg Oral Daily With Dinner    calcium carbonate (TUMS) chewable tablet 1,000 mg  1,000 mg Oral Daily PRN    chlorhexidine (PERIDEX) 0 12 % oral rinse 15 mL  15 mL Mouth/Throat Q12H LATASHA    enoxaparin (LOVENOX) subcutaneous injection 40 mg  40 mg Subcutaneous Q24H LATASHA    isosorbide mononitrate (IMDUR) 24 hr tablet 30 mg  30 mg Oral Daily    lidocaine (LIDODERM) 5 % patch 1 patch  1 patch Topical Daily    magnesium hydroxide (MILK OF MAGNESIA) 400 mg/5 mL oral suspension 30 mL  30 mL Oral Daily PRN    mupirocin (BACTROBAN) 2 % nasal ointment 1 application  1 application Nasal G78D [facility]    nicotine (NICODERM CQ) 14 mg/24hr TD 24 hr patch 1 patch  1 patch Transdermal Daily    ondansetron (ZOFRAN) injection 4 mg  4 mg Intravenous Q6H PRN    and PTA meds:   Prior to Admission Medications   Prescriptions Last Dose Informant Patient Reported?  Taking?   aspirin (ECOTRIN LOW STRENGTH) 81 mg EC tablet   No No   Sig: Take 1 tablet by mouth daily   atorvastatin (LIPITOR) 40 mg tablet   No No   Sig: Take 1 tablet (40 mg total) by mouth daily with dinner   clopidogrel (PLAVIX) 75 mg tablet   No No   Sig: Take 1 tablet (75 mg total) by mouth daily   cyclobenzaprine (FLEXERIL) 10 mg tablet   No No   Sig: Take 1 tablet (10 mg total) by mouth 3 (three) times a day as needed for muscle spasms isosorbide mononitrate (IMDUR) 30 mg 24 hr tablet   No No   Sig: Take 1 tablet (30 mg total) by mouth daily   metoprolol succinate (TOPROL-XL) 25 mg 24 hr tablet   No No   Sig: Take 1 tablet (25 mg total) by mouth daily   naproxen (NAPROSYN) 500 mg tablet   No No   Sig: Take 1 tablet (500 mg total) by mouth every 12 (twelve) hours      Facility-Administered Medications: None     Medications Prior to Admission   Medication    aspirin (ECOTRIN LOW STRENGTH) 81 mg EC tablet    atorvastatin (LIPITOR) 40 mg tablet    clopidogrel (PLAVIX) 75 mg tablet    cyclobenzaprine (FLEXERIL) 10 mg tablet    isosorbide mononitrate (IMDUR) 30 mg 24 hr tablet    metoprolol succinate (TOPROL-XL) 25 mg 24 hr tablet    naproxen (NAPROSYN) 500 mg tablet         Counseling / Coordination of Care  Total floor / unit time spent today 20 minutes  Greater than 50% of total time was spent with the patient and / or family counseling and / or coordination of care  ** Please Note: Dragon 360 Dictation voice to text software may have been used in the creation of this document   **

## 2019-12-17 ENCOUNTER — APPOINTMENT (OUTPATIENT)
Dept: PULMONOLOGY | Facility: HOSPITAL | Age: 59
DRG: 235 | End: 2019-12-17

## 2019-12-17 ENCOUNTER — APPOINTMENT (INPATIENT)
Dept: NON INVASIVE DIAGNOSTICS | Facility: HOSPITAL | Age: 59
DRG: 235 | End: 2019-12-17

## 2019-12-17 PROCEDURE — 94729 DIFFUSING CAPACITY: CPT

## 2019-12-17 PROCEDURE — 93306 TTE W/DOPPLER COMPLETE: CPT

## 2019-12-17 PROCEDURE — 94729 DIFFUSING CAPACITY: CPT | Performed by: INTERNAL MEDICINE

## 2019-12-17 PROCEDURE — 94760 N-INVAS EAR/PLS OXIMETRY 1: CPT

## 2019-12-17 PROCEDURE — 99232 SBSQ HOSP IP/OBS MODERATE 35: CPT | Performed by: INTERNAL MEDICINE

## 2019-12-17 PROCEDURE — NC001 PR NO CHARGE: Performed by: THORACIC SURGERY (CARDIOTHORACIC VASCULAR SURGERY)

## 2019-12-17 PROCEDURE — 93306 TTE W/DOPPLER COMPLETE: CPT | Performed by: INTERNAL MEDICINE

## 2019-12-17 PROCEDURE — 94726 PLETHYSMOGRAPHY LUNG VOLUMES: CPT | Performed by: INTERNAL MEDICINE

## 2019-12-17 PROCEDURE — 94726 PLETHYSMOGRAPHY LUNG VOLUMES: CPT

## 2019-12-17 PROCEDURE — 94060 EVALUATION OF WHEEZING: CPT | Performed by: INTERNAL MEDICINE

## 2019-12-17 PROCEDURE — 94060 EVALUATION OF WHEEZING: CPT

## 2019-12-17 RX ORDER — HEPARIN SODIUM 5000 [USP'U]/ML
5000 INJECTION, SOLUTION INTRAVENOUS; SUBCUTANEOUS EVERY 8 HOURS SCHEDULED
Status: DISCONTINUED | OUTPATIENT
Start: 2019-12-17 | End: 2019-12-19 | Stop reason: HOSPADM

## 2019-12-17 RX ORDER — BISACODYL 10 MG
10 SUPPOSITORY, RECTAL RECTAL DAILY PRN
Status: DISCONTINUED | OUTPATIENT
Start: 2019-12-17 | End: 2019-12-19 | Stop reason: HOSPADM

## 2019-12-17 RX ORDER — POLYETHYLENE GLYCOL 3350 17 G/17G
17 POWDER, FOR SOLUTION ORAL DAILY
Status: DISCONTINUED | OUTPATIENT
Start: 2019-12-17 | End: 2019-12-19 | Stop reason: HOSPADM

## 2019-12-17 RX ORDER — DOCUSATE SODIUM 100 MG/1
100 CAPSULE, LIQUID FILLED ORAL 2 TIMES DAILY
Status: DISCONTINUED | OUTPATIENT
Start: 2019-12-17 | End: 2019-12-19 | Stop reason: HOSPADM

## 2019-12-17 RX ORDER — BISACODYL 10 MG
10 SUPPOSITORY, RECTAL RECTAL DAILY PRN
Status: DISCONTINUED | OUTPATIENT
Start: 2019-12-17 | End: 2019-12-17

## 2019-12-17 RX ORDER — ALBUTEROL SULFATE 2.5 MG/3ML
2.5 SOLUTION RESPIRATORY (INHALATION) ONCE
Status: COMPLETED | OUTPATIENT
Start: 2019-12-17 | End: 2019-12-17

## 2019-12-17 RX ADMIN — Medication 1 APPLICATION: at 09:22

## 2019-12-17 RX ADMIN — DOCUSATE SODIUM 100 MG: 100 CAPSULE, LIQUID FILLED ORAL at 16:19

## 2019-12-17 RX ADMIN — ASPIRIN 81 MG: 81 TABLET ORAL at 09:21

## 2019-12-17 RX ADMIN — ATORVASTATIN CALCIUM 80 MG: 80 TABLET, FILM COATED ORAL at 16:20

## 2019-12-17 RX ADMIN — ISOSORBIDE MONONITRATE 60 MG: 60 TABLET, EXTENDED RELEASE ORAL at 09:21

## 2019-12-17 RX ADMIN — LIDOCAINE 1 PATCH: 50 PATCH TOPICAL at 09:22

## 2019-12-17 RX ADMIN — CHLORHEXIDINE GLUCONATE 0.12% ORAL RINSE 15 ML: 1.2 LIQUID ORAL at 09:21

## 2019-12-17 RX ADMIN — HEPARIN SODIUM 5000 UNITS: 5000 INJECTION INTRAVENOUS; SUBCUTANEOUS at 09:26

## 2019-12-17 RX ADMIN — NICOTINE 1 PATCH: 14 PATCH TRANSDERMAL at 13:25

## 2019-12-17 RX ADMIN — Medication 1 APPLICATION: at 22:02

## 2019-12-17 RX ADMIN — HEPARIN SODIUM 5000 UNITS: 5000 INJECTION INTRAVENOUS; SUBCUTANEOUS at 13:29

## 2019-12-17 RX ADMIN — HEPARIN SODIUM 5000 UNITS: 5000 INJECTION INTRAVENOUS; SUBCUTANEOUS at 22:02

## 2019-12-17 RX ADMIN — POLYETHYLENE GLYCOL 3350 17 G: 17 POWDER, FOR SOLUTION ORAL at 11:37

## 2019-12-17 RX ADMIN — ACETAMINOPHEN 650 MG: 325 TABLET ORAL at 16:19

## 2019-12-17 RX ADMIN — ALBUTEROL SULFATE 2.5 MG: 2.5 SOLUTION RESPIRATORY (INHALATION) at 08:57

## 2019-12-17 RX ADMIN — CHLORHEXIDINE GLUCONATE 0.12% ORAL RINSE 15 ML: 1.2 LIQUID ORAL at 22:01

## 2019-12-17 RX ADMIN — DOCUSATE SODIUM 100 MG: 100 CAPSULE, LIQUID FILLED ORAL at 11:34

## 2019-12-17 NOTE — SOCIAL WORK
CM spoke with pt and his wife  He is still MA pending but provided with [employer] card for PCP when valid  He would like to go to [facility]  Also discussed no HHC available with MA pending in [facility]  They have good understanding

## 2019-12-17 NOTE — PROGRESS NOTES
Progress Note - Cardiothoracic Surgery   [facility] 63 y o  male MRN: [id number]  Unit/Bed#: [facility] 408-01 Encounter: [id number]      24 Hour Events: CT abd/pelvis & head complete w/o acute findings  No other events  Episode of substernal chest pressure this AM w/ ambulation in hallway, self-resolved, CP free at this time  Denies other cardiac symptomatology  Still agreeable to sx, all questions addressed during encounter      Medications:   Scheduled Meds:  Current Facility-Administered Medications:  acetaminophen 650 mg Oral Q4H PRN Randy Waters MD   aspirin 81 mg Oral Daily Randy Waters MD   atorvastatin 80 mg Oral Daily With Yulisa Kaur MD   calcium carbonate 1,000 mg Oral Daily PRN Randy Waters MD   chlorhexidine 15 mL Mouth/Throat Q12H [address] Bladimir Ko PA-C   enoxaparin 40 mg Subcutaneous Q24H [address] J Luis Hutchins DO   isosorbide mononitrate 60 mg Oral Daily Jeanna Vazquez MD   lidocaine 1 patch Topical Daily Christian Gregory MD   magnesium hydroxide 30 mL Oral Daily PRN Randy Waters MD   mupirocin 1 application Nasal A76F [address] Bladimir Ko PA-C   nicotine 1 patch Transdermal Daily Randy Waters MD   ondansetron 4 mg Intravenous Q6H PRN Randy Waters MD     Continuous Infusions:     Results:   Results from last 7 days   Lab Units 12/15/19  1006 12/12/19  0415   WBC Thousand/uL  --  10 48*   HEMOGLOBIN g/dL  --  15 5   HEMATOCRIT %  --  46 8   PLATELETS Thousands/uL 260 266     Results from last 7 days   Lab Units 12/16/19  0510 12/12/19  0415   POTASSIUM mmol/L 3 8 3 8   CHLORIDE mmol/L 109* 106   CO2 mmol/L 25 26   BUN mg/dL 22 18   CREATININE mg/dL 1 09 1 23   CALCIUM mg/dL 9 0 8 7           Studies:     Cardiac Cat 12/13h: 70% ostial LAD, 80% ostial RPDA, 70% at the bifurcation of the LCx/OM    TTE: ordered 12/16    Vein Mapping 12/14: adequate bilaterally    Carotid US 12/14: <50% bilat, antegrade flow and no sig SCA dz bilat    CXR 12/12: NAD    CT head 12/16: no abnormality, discrete mass/edema/hemorrhage not evident    CT abdomen/pelvis w/o 12/16: no metastatic disease, moderate constipation    CT chest w/o 12/14: Noncalcified 10 x 7 mm left upper lobe pulmonary nodule  EKG 12/16: sinus richie    PFT: ordered 12/14, scheduled for 12/17    Vitals:   Vitals:    12/17/19 0255 12/17/19 0600 12/17/19 0649 12/17/19 0722   BP: 139/88  153/88 137/79   BP Location: Right arm  Right arm Left arm   Pulse: 57  (!) 54 (!) 53   Resp: 18   18   Temp: 97 8 °F (36 6 °C)   97 7 °F (36 5 °C)   TempSrc: Oral   Oral   SpO2: 99%   98%   Weight:  82 1 kg (181 lb)     Height:           Physical Exam:    HEENT/NECK:  PERRLA  No jugular venous distention  Cardiac: Bradycardic  Pulmonary:  Breath sounds clear bilaterally  Abdomen:  Hypo-active bowel sounds  Extremities: Extremities warm/dry, DP pulses 2 bilaterally and Trace edema B/L  Neuro: Alert and oriented X 3, Sensation is grossly intact and No focal deficits  Skin: Warm/Dry, without rashes or lesions  Assessment:    Severe coronary artery disease;  Ongoing CABG workup    Plan:  Patient agreeable to proceed with surgery;  Pre-op work-up underway   TTE for this AM   PFTs for this AM  Continue Mupirocin 2% nasal ointment q 12 hrs  Continue chlorhexidine mouth rise  Continue heparin infusion per cardiology  coronary artery bypass grafting scheduled for 12/19 with LISA Mendez O  1st case    SIGNATURE: Stefania Christina PA-C  DATE: December 17, 2019  TIME: 8:59 AM

## 2019-12-17 NOTE — PROGRESS NOTES
INTERNAL MEDICINE RESIDENCY PROGRESS NOTE     Name: Gabrielle Coyne   Age & Sex: 63 y o  male   MRN: [id number]  Unit/Bed#: [facility] 408-01   Encounter: [id number]  Team: SOD Team A    PATIENT INFORMATION     Name: Gabrielle Coyne   Age & Sex: 63 y o  male   MRN: [id number]  Hospital Stay Days: 4    ASSESSMENT/PLAN     Principal Problem:    Unstable angina ([facility] [address] )  Active Problems:    Tobacco abuse    Pure hypercholesterolemia    S/P primary angioplasty with coronary stent    Bradycardia    Pulmonary nodule    Atherosclerosis of native coronary artery with angina pectoris ([facility] [address] )      * Unstable angina ([facility] [address] )  Assessment & Plan  - Presented to [facility] with chest pain  EKG negative and troponin negative at the time  - S/p PCI this admission which significant for 3 vessel disease 70% ostial LAD, 99% RCA, 90% circumflex    - Nuclear medicine stress test done 09/2018 was negative at that time  - Evaluated by CT surgery, planned for CABG on Thursday 12/19    Plan:  - Continue ASA, hold Plavix for the anticipated CABG  - Imdur increased to 60 mg Q 24 hours  - Atorvastatin increased to 80 mg daily  - Beta-blockers on hold secondary to bradycardia  - CABG next Thursday 12/19    Pulmonary nodule  Assessment & Plan  - Incidental noncalcified 10 x 7 mm left upper lobe pulmonary nodules detected on CT chest   - CT/PET or tissue sampling recommended in the setting of smoking history  - CT head: no intracranial abnormalities or masses  - CT abdomen/pelvis:  No evidence of Mets    Bradycardia  Assessment & Plan  - Patient states that his baseline is HR 60s  - Asymptomatic  - Continue telemetry   - Beta-blocker on hold      S/P primary angioplasty with coronary stent  Assessment & Plan  CAD s/p TAMARA of pCFX, mRCA on 10/2017    -plan as above     Pure hypercholesterolemia  Assessment & Plan  Stable  , TG 44 most recently     - Atorvastatin increased to 80 mg daily     Tobacco abuse  Assessment & Plan  - Smokes 0 5 ppd for the past 40 years  - Tobacco cessation education provided  - Declining nicotine patch      Disposition:  Inpatient, planned for CABG   SUBJECTIVE     Patient seen and examined  No acute events overnight  Complained of central sharp chest pain while walking lasting few minutes, no shortness of breath  Spontaneously resolved with rest    Denies abdominal pain, nausea, vomiting, diarrhea or constipation  OBJECTIVE     Vitals:    19 0255 19 0600 19 0649 19 0722   BP: 139/88  153/88 137/79   BP Location: Right arm  Right arm Left arm   Pulse: 57  (!) 54 (!) 53   Resp: 18   18   Temp: 97 8 °F (36 6 °C)   97 7 °F (36 5 °C)   TempSrc: Oral   Oral   SpO2: 99%   98%   Weight:  82 1 kg (181 lb)     Height:          Temperature:   Temp (24hrs), Av 8 °F (36 6 °C), Min:97 6 °F (36 4 °C), Max:97 9 °F (36 6 °C)    Temperature: 97 7 °F (36 5 °C)  Intake & Output:  I/O       12/15 07 -  0700  07 -  0700  07 -  0700    P  O  960 610     Total Intake(mL/kg) 960 (11 6) 610 (7 4)     Urine (mL/kg/hr) 600 (0 3) 1100 (0 6)     Total Output 600 1100     Net +360 -490                Weights:   IBW: 66 1 kg    Body mass index is 28 35 kg/m²  Weight (last 2 days)     Date/Time   Weight    19 06   82 1 (181)    19 0600   82 5 (181 88)    12/15/19 0600   82 1 (181)            Physical Exam   Constitutional: He is oriented to person, place, and time  He appears well-developed and well-nourished  HENT:   Head: Normocephalic and atraumatic  Eyes: Pupils are equal, round, and reactive to light  EOM are normal    Neck: Normal range of motion  Neck supple  Cardiovascular: Regular rhythm and normal heart sounds  Exam reveals no gallop and no friction rub  No murmur heard  Bradycardia   Pulmonary/Chest: Effort normal and breath sounds normal  No respiratory distress  He has no wheezes  He has no rales  Abdominal: Soft   He exhibits no distension  There is no tenderness  Musculoskeletal: Normal range of motion  He exhibits no edema  Neurological: He is alert and oriented to person, place, and time  No cranial nerve deficit or sensory deficit  Skin: Skin is warm  Psychiatric: He has a normal mood and affect  LABORATORY DATA     Labs: I have personally reviewed pertinent reports  Results from last 7 days   Lab Units 12/15/19  1006 12/12/19  0415   WBC Thousand/uL  --  10 48*   HEMOGLOBIN g/dL  --  15 5   HEMATOCRIT %  --  46 8   PLATELETS Thousands/uL 260 266   NEUTROS PCT %  --  63   MONOS PCT %  --  11      Results from last 7 days   Lab Units 12/16/19  0510 12/12/19  0415   POTASSIUM mmol/L 3 8 3 8   CHLORIDE mmol/L 109* 106   CO2 mmol/L 25 26   BUN mg/dL 22 18   CREATININE mg/dL 1 09 1 23   CALCIUM mg/dL 9 0 8 7   ALK PHOS U/L  --  93   ALT U/L  --  46   AST U/L  --  26                      Results from last 7 days   Lab Units 12/16/19  0510 12/16/19  0212 12/15/19  2230   TROPONIN I ng/mL 0 04 0 04 0 03       IMAGING & DIAGNOSTIC TESTING     Radiology Results: I have personally reviewed pertinent reports  Ct Abdomen Pelvis Wo Contrast    Result Date: 12/16/2019  Impression: 1  No evidence of metastatic disease in the abdomen or pelvis  2  Moderate constipation  Workstation performed: [id number]     Ct Head Wo Contrast    Result Date: 12/16/2019  Impression: No acute intracranial abnormality  Discrete mass, edema or hemorrhage are not evident  Contrast would increase conspicuity of small lesions not otherwise evident without contrast  Workstation performed: [id number]     Ct Chest Wo Contrast    Result Date: 12/14/2019  Impression: Noncalcified 10 x 7 mm left upper lobe pulmonary nodule  Based on current Fleischner Society 2017 Guidelines on incidental pulmonary nodule, either PET/CT scan evaluation or tissue sampling may be considered appropriate in this patient with a smoking history   Subtle groundglass parenchymal densities which could represent atelectasis or perhaps mild interstitial edema related to pulmonary vascular congestive change  No pleural effusions  The study was marked in EPIC for significant notification  Workstation performed: [id number]     Other Diagnostic Testing: I have personally reviewed pertinent reports  ACTIVE MEDICATIONS     Current Facility-Administered Medications   Medication Dose Route Frequency    acetaminophen (TYLENOL) tablet 650 mg  650 mg Oral Q4H PRN    albuterol inhalation solution 2 5 mg  2 5 mg Nebulization Once    aspirin (ECOTRIN LOW STRENGTH) EC tablet 81 mg  81 mg Oral Daily    atorvastatin (LIPITOR) tablet 80 mg  80 mg Oral Daily With Dinner    calcium carbonate (TUMS) chewable tablet 1,000 mg  1,000 mg Oral Daily PRN    chlorhexidine (PERIDEX) 0 12 % oral rinse 15 mL  15 mL Mouth/Throat Q12H LATASHA    enoxaparin (LOVENOX) subcutaneous injection 40 mg  40 mg Subcutaneous Q24H LATASHA    isosorbide mononitrate (IMDUR) 24 hr tablet 60 mg  60 mg Oral Daily    lidocaine (LIDODERM) 5 % patch 1 patch  1 patch Topical Daily    magnesium hydroxide (MILK OF MAGNESIA) 400 mg/5 mL oral suspension 30 mL  30 mL Oral Daily PRN    mupirocin (BACTROBAN) 2 % nasal ointment 1 application  1 application Nasal R22C [facility]    nicotine (NICODERM CQ) 14 mg/24hr TD 24 hr patch 1 patch  1 patch Transdermal Daily    ondansetron (ZOFRAN) injection 4 mg  4 mg Intravenous Q6H PRN       VTE Pharmacologic Prophylaxis: Enoxaparin (Lovenox)  VTE Mechanical Prophylaxis: sequential compression device    Portions of the record may have been created with voice recognition software  Occasional wrong word or "sound a like" substitutions may have occurred due to the inherent limitations of voice recognition software    Read the chart carefully and recognize, using context, where substitutions have occurred   ==  Gm Magdaleno MD  [address]  Internal Medicine Residency PGY-1

## 2019-12-17 NOTE — PROGRESS NOTES
General Cardiology   Progress Note -  Team One   Kadie Archuleta 63 y o  male MRN: [id number]    Unit/Bed#: [facility] 408-01 Encounter: [id number]    Assessment:  UA/Multi-vessel CAD:  History proximal circumflex and mid RCA stenting 10/2017  Presented with unstable angina at [facility] 12/12  Troponin negative  EKG with no acute ischemic change  Cardiac catheterization 12/13 revealed multi-vessel CAD involving 70% LAD, 99% RCA and 90% circumflex  Last dose of Plavix was 12/13  Transferred from [place] [address] for CABG evaluation  · Evaluated by CT surgery with CABG tentatively scheduled for Thursday,  12/19  · On aspirin, statin  · Imdur up-titrated to 60 mg this morning  · Beta-blocker held since 12/13 due to bradycardia  Patient reports history of low resting heart rates most of his life due to being very athletic  · Recurrent chest discomfort 12/15, resolved now  Troponin remained negative  Preserved biventricular systolic function:  Echocardiogram 09/2018 EF 55-65% with no WMAs, mild MR, mild TR  Hyperlipidemia: , TG 68, HDL 33,  on atorvastatin 80 mg  Tobacco abuse:  Complete smoking cessation advised     Plan/Recommendations:  · Continue aspirin, Imdur and statin   · CABG scheduled for 12/19      Subjective    Patient seen and examined  No acute events overnight  His chest pain resolved spontaneously yesterday  He had 20 minues of sharp/presurre this morning with no radiation or associated sob, palpitations, diaphoresis or nausea  He denies any current cp, sob, palpitations, lightheadedness or dizziness  Review of Systems   Constitution: Negative  Negative for chills  Cardiovascular: Positive for chest pain  Negative for dyspnea on exertion, leg swelling, near-syncope, orthopnea, palpitations and paroxysmal nocturnal dyspnea  Respiratory: Negative  Negative for shortness of breath  Gastrointestinal: Negative for diarrhea, nausea and vomiting     Neurological: Negative for dizziness, light-headedness and weakness  Psychiatric/Behavioral: Negative for altered mental status  All other systems reviewed and are negative  Objective:   Vitals: Blood pressure 137/79, pulse (!) 53, temperature 97 7 °F (36 5 °C), temperature source Oral, resp  rate 18, height 5' 7" (1 702 m), weight 82 1 kg (181 lb), SpO2 98 %  ,       Body mass index is 28 35 kg/m²  ,     Systolic (85YAW), UAS:859 , Min:123 , LAJ:592     Diastolic (41NPW), OMH:98, Min:68, Max:88      Intake/Output Summary (Last 24 hours) at 12/17/2019 1130  Last data filed at 12/17/2019 0830  Gross per 24 hour   Intake 660 ml   Output 1100 ml   Net -440 ml     Weight (last 2 days)     Date/Time   Weight    12/17/19 0600   82 1 (181)    12/16/19 0600   82 5 (181 88)    12/15/19 0600   82 1 (181)            Telemetry Review: No significant arrhythmias seen on telemetry review  Physical Exam   Constitutional: He is oriented to person, place, and time  He appears well-developed and well-nourished  ON RA in NAD   HENT:   Head: Normocephalic and atraumatic  Neck: Normal range of motion  Neck supple  No JVD present  Cardiovascular: Normal rate, regular rhythm and normal heart sounds  No murmur heard  Pulmonary/Chest: Effort normal and breath sounds normal  No respiratory distress  He has no wheezes  He has no rales  Abdominal: Soft  Bowel sounds are normal    Musculoskeletal: Normal range of motion  He exhibits no edema  Neurological: He is alert and oriented to person, place, and time  Skin: Skin is warm and dry  Psychiatric: He has a normal mood and affect  His behavior is normal  Judgment and thought content normal    Nursing note and vitals reviewed        LABORATORY RESULTS  Results from last 7 days   Lab Units 12/16/19  0510 12/16/19  0212 12/15/19  2230   TROPONIN I ng/mL 0 04 0 04 0 03     CBC with diff:   Results from last 7 days   Lab Units 12/15/19  1006 12/12/19  0415   WBC Thousand/uL  --  10 48* HEMOGLOBIN g/dL  --  15 5   HEMATOCRIT %  --  46 8   MCV fL  --  93   PLATELETS Thousands/uL 260 266   MCH pg  --  30 9   MCHC g/dL  --  33 1   RDW %  --  12 9   MPV fL 10 1 10 2   NRBC AUTO /100 WBCs  --  0       CMP:  Results from last 7 days   Lab Units 19  0510 19  0415   POTASSIUM mmol/L 3 8 3 8   CHLORIDE mmol/L 109* 106   CO2 mmol/L 25 26   BUN mg/dL 22 18   CREATININE mg/dL 1 09 1 23   CALCIUM mg/dL 9 0 8 7   AST U/L  --  26   ALT U/L  --  46   ALK PHOS U/L  --  93   EGFR ml/min/1 73sq m 74 64       BMP:  Results from last 7 days   Lab Units 19  0510 19  0415   POTASSIUM mmol/L 3 8 3 8   CHLORIDE mmol/L 109* 106   CO2 mmol/L 25 26   BUN mg/dL 22 18   CREATININE mg/dL 1 09 1 23   CALCIUM mg/dL 9 0 8 7       Lab Results   Component Value Date    NTBNP 82 2019                    Results from last 7 days   Lab Units 19  0953   HEMOGLOBIN A1C % 5 6          Results from last 7 days   Lab Units 12/15/19  1006   TSH 3RD GENERATON uIU/mL 3 040             Lipid Profile:   No results found for: CHOL  Lab Results   Component Value Date    HDL 33 (L) 2019    HDL 39 (L) 2019    HDL 44 10/26/2017     Lab Results   Component Value Date    LDLCALC 107 (H) 2019    LDLCALC 101 (H) 2019    LDLCALC 196 (H) 10/26/2017     Lab Results   Component Value Date    TRIG 68 2019    TRIG 44 2019    TRIG 48 10/26/2017       Cardiac testing:   Results for orders placed during the hospital encounter of 18   Echo complete with contrast if indicated    Narrative [facility] [facility]  [phone]    Transthoracic Echocardiogram  2D, M-mode, and Color Doppler    Study date:  27-Sep-2018    Patient: Hal Castellano  MR number: [id number]  Account number: [de-identified]  : [id number]  Age: 65 years  Gender: Male  Status: Outpatient  Location: [facility]  Height: 66 in  Weight: 183 lb  BP: 126/ 74 mmHg    Indications: Chest pain  Diagnoses: R07 9 - Chest pain, unspecified    Sonographer:  Lopez RCS  Interpreting Physician:  Charli Johnston MD  Referring Physician:  Charli Johnston MD  Group:  [facility]    SUMMARY    LEFT VENTRICLE:  Systolic function was normal  Ejection fraction was estimated in the range of 55 % to 65 %  There were no regional wall motion abnormalities  MITRAL VALVE:  There was mild regurgitation  TRICUSPID VALVE:  There was mild regurgitation  SUMMARY MEASUREMENTS  Unspecified Scan Mode measurements:  Aortic Valve:   HR was 49 /min  Left Ventricle:   HR was 50 /min  HISTORY: PRIOR HISTORY: CAD  Medication-treated hyperlipidemia  Risk factors: a history of current cigarette use (within the last month) and a family history of coronary artery disease  PRIOR PROCEDURES: Stent  PROCEDURE: The study was performed in the [address] [place] [address]  This was a routine study  The transthoracic approach was used  The study included complete 2D imaging, M-mode, and color Doppler  The heart rate was 48 bpm, at the  start of the study  Images were obtained from the parasternal, apical, subcostal, and suprasternal notch acoustic windows  Image quality was adequate  LEFT VENTRICLE: Size was normal  Systolic function was normal  Ejection fraction was estimated in the range of 55 % to 65 %  There were no regional wall motion abnormalities  Wall thickness was normal  DOPPLER: Left ventricular diastolic  function parameters were normal     RIGHT VENTRICLE: The size was normal  Systolic function was normal  Wall thickness was normal     LEFT ATRIUM: Size was normal     RIGHT ATRIUM: Size was normal     MITRAL VALVE: Valve structure was normal  There was normal leaflet separation  DOPPLER: The transmitral velocity was within the normal range  There was no evidence for stenosis  There was mild regurgitation      AORTIC VALVE: The valve was trileaflet  Leaflets exhibited normal thickness and normal cuspal separation  DOPPLER: Transaortic velocity was within the normal range  There was no evidence for stenosis  There was no regurgitation  TRICUSPID VALVE: The valve structure was normal  There was normal leaflet separation  DOPPLER: The transtricuspid velocity was within the normal range  There was no evidence for stenosis  There was mild regurgitation  PULMONIC VALVE: Leaflets exhibited normal thickness, no calcification, and normal cuspal separation  DOPPLER: The transpulmonic velocity was within the normal range  There was no regurgitation  PERICARDIUM: There was no pericardial effusion  The pericardium was normal in appearance  AORTA: The root exhibited normal size  SYSTEMIC VEINS: IVC: The inferior vena cava was normal in size and course  Respirophasic changes were normal     SYSTEM MEASUREMENT TABLES    Apical four chamber  TAPSE: 22 6 mm    Unspecified Scan Mode  HR: 49 /min  HR: 50 /min    [facility]    Prepared and electronically signed by    Christie Gant MD  Signed 27-Sep-2018 13:12:03       No results found for this or any previous visit  No results found for this or any previous visit  No procedure found  No results found for this or any previous visit        Meds/Allergies   all current active meds have been reviewed, current meds:   Current Facility-Administered Medications   Medication Dose Route Frequency    acetaminophen (TYLENOL) tablet 650 mg  650 mg Oral Q4H PRN    aspirin (ECOTRIN LOW STRENGTH) EC tablet 81 mg  81 mg Oral Daily    atorvastatin (LIPITOR) tablet 80 mg  80 mg Oral Daily With Dinner    bisacodyl (DULCOLAX) rectal suppository 10 mg  10 mg Rectal Daily PRN    calcium carbonate (TUMS) chewable tablet 1,000 mg  1,000 mg Oral Daily PRN    chlorhexidine (PERIDEX) 0 12 % oral rinse 15 mL  15 mL Mouth/Throat Q12H [address]    docusate sodium (COLACE) capsule 100 mg  100 mg Oral BID    heparin (porcine) subcutaneous injection 5,000 Units  5,000 Units Subcutaneous Q8H [address]    isosorbide mononitrate (IMDUR) 24 hr tablet 60 mg  60 mg Oral Daily    lidocaine (LIDODERM) 5 % patch 1 patch  1 patch Topical Daily    magnesium hydroxide (MILK OF MAGNESIA) 400 mg/5 mL oral suspension 30 mL  30 mL Oral Daily PRN    mupirocin (BACTROBAN) 2 % nasal ointment 1 application  1 application Nasal C[address]    nicotine (NICODERM CQ) 14 mg/24hr TD 24 hr patch 1 patch  1 patch Transdermal Daily    ondansetron (ZOFRAN) injection 4 mg  4 mg Intravenous Q6H PRN    polyethylene glycol (MIRALAX) packet 17 g  17 g Oral Daily    psyllium (METAMUCIL) 1 packet  1 packet Oral Daily PRN    and PTA meds:   Prior to Admission Medications   Prescriptions Last Dose Informant Patient Reported?  Taking?   aspirin (ECOTRIN LOW STRENGTH) 81 mg EC tablet   No No   Sig: Take 1 tablet by mouth daily   atorvastatin (LIPITOR) 40 mg tablet   No No   Sig: Take 1 tablet (40 mg total) by mouth daily with dinner   clopidogrel (PLAVIX) 75 mg tablet   No No   Sig: Take 1 tablet (75 mg total) by mouth daily   cyclobenzaprine (FLEXERIL) 10 mg tablet   No No   Sig: Take 1 tablet (10 mg total) by mouth 3 (three) times a day as needed for muscle spasms   isosorbide mononitrate (IMDUR) 30 mg 24 hr tablet   No No   Sig: Take 1 tablet (30 mg total) by mouth daily   metoprolol succinate (TOPROL-XL) 25 mg 24 hr tablet   No No   Sig: Take 1 tablet (25 mg total) by mouth daily   naproxen (NAPROSYN) 500 mg tablet   No No   Sig: Take 1 tablet (500 mg total) by mouth every 12 (twelve) hours      Facility-Administered Medications: None     Medications Prior to Admission   Medication    aspirin (ECOTRIN LOW STRENGTH) 81 mg EC tablet    atorvastatin (LIPITOR) 40 mg tablet    clopidogrel (PLAVIX) 75 mg tablet    cyclobenzaprine (FLEXERIL) 10 mg tablet    isosorbide mononitrate (IMDUR) 30 mg 24 hr tablet    metoprolol succinate (TOPROL-XL) 25 mg 24 hr tablet    naproxen (NAPROSYN) 500 mg tablet     Counseling / Coordination of Care  Total floor / unit time spent today 20 minutes  Greater than 50% of total time was spent with the patient and / or family counseling and / or coordination of care  ** Please Note: Dragon 360 Dictation voice to text software may have been used in the creation of this document   **

## 2019-12-18 ENCOUNTER — ANESTHESIA EVENT (INPATIENT)
Dept: PERIOP | Facility: HOSPITAL | Age: 59
DRG: 235 | End: 2019-12-18

## 2019-12-18 LAB
ABO GROUP BLD: NORMAL
BASOPHILS # BLD AUTO: 0.06 THOUSANDS/ΜL (ref 0–0.1)
BASOPHILS NFR BLD AUTO: 1 % (ref 0–1)
BLD GP AB SCN SERPL QL: NEGATIVE
EOSINOPHIL # BLD AUTO: 0.24 THOUSAND/ΜL (ref 0–0.61)
EOSINOPHIL NFR BLD AUTO: 3 % (ref 0–6)
ERYTHROCYTE [DISTWIDTH] IN BLOOD BY AUTOMATED COUNT: 12.6 % (ref 11.6–15.1)
HCT VFR BLD AUTO: 46 % (ref 36.5–49.3)
HGB BLD-MCNC: 15.2 G/DL (ref 12–17)
IMM GRANULOCYTES # BLD AUTO: 0.05 THOUSAND/UL (ref 0–0.2)
IMM GRANULOCYTES NFR BLD AUTO: 1 % (ref 0–2)
LYMPHOCYTES # BLD AUTO: 2.6 THOUSANDS/ΜL (ref 0.6–4.47)
LYMPHOCYTES NFR BLD AUTO: 35 % (ref 14–44)
MCH RBC QN AUTO: 31.1 PG (ref 26.8–34.3)
MCHC RBC AUTO-ENTMCNC: 33 G/DL (ref 31.4–37.4)
MCV RBC AUTO: 94 FL (ref 82–98)
MONOCYTES # BLD AUTO: 0.83 THOUSAND/ΜL (ref 0.17–1.22)
MONOCYTES NFR BLD AUTO: 11 % (ref 4–12)
NEUTROPHILS # BLD AUTO: 3.6 THOUSANDS/ΜL (ref 1.85–7.62)
NEUTS SEG NFR BLD AUTO: 49 % (ref 43–75)
NRBC BLD AUTO-RTO: 0 /100 WBCS
PLATELET # BLD AUTO: 244 THOUSANDS/UL (ref 149–390)
PMV BLD AUTO: 11 FL (ref 8.9–12.7)
RBC # BLD AUTO: 4.89 MILLION/UL (ref 3.88–5.62)
RH BLD: POSITIVE
SPECIMEN EXPIRATION DATE: NORMAL
WBC # BLD AUTO: 7.38 THOUSAND/UL (ref 4.31–10.16)

## 2019-12-18 PROCEDURE — 86900 BLOOD TYPING SEROLOGIC ABO: CPT | Performed by: PHYSICIAN ASSISTANT

## 2019-12-18 PROCEDURE — 99232 SBSQ HOSP IP/OBS MODERATE 35: CPT | Performed by: INTERNAL MEDICINE

## 2019-12-18 PROCEDURE — 86923 COMPATIBILITY TEST ELECTRIC: CPT

## 2019-12-18 PROCEDURE — 86901 BLOOD TYPING SEROLOGIC RH(D): CPT | Performed by: PHYSICIAN ASSISTANT

## 2019-12-18 PROCEDURE — 86850 RBC ANTIBODY SCREEN: CPT | Performed by: PHYSICIAN ASSISTANT

## 2019-12-18 PROCEDURE — 85025 COMPLETE CBC W/AUTO DIFF WBC: CPT | Performed by: STUDENT IN AN ORGANIZED HEALTH CARE EDUCATION/TRAINING PROGRAM

## 2019-12-18 PROCEDURE — NC001 PR NO CHARGE: Performed by: THORACIC SURGERY (CARDIOTHORACIC VASCULAR SURGERY)

## 2019-12-18 RX ORDER — CHLORHEXIDINE GLUCONATE 0.12 MG/ML
15 RINSE ORAL ONCE
Status: COMPLETED | OUTPATIENT
Start: 2019-12-19 | End: 2019-12-19

## 2019-12-18 RX ORDER — HEPARIN SODIUM 1000 [USP'U]/ML
10000 INJECTION, SOLUTION INTRAVENOUS; SUBCUTANEOUS ONCE
Status: CANCELLED | OUTPATIENT
Start: 2019-12-19

## 2019-12-18 RX ORDER — CHLORHEXIDINE GLUCONATE 0.12 MG/ML
15 RINSE ORAL EVERY 12 HOURS SCHEDULED
Status: DISCONTINUED | OUTPATIENT
Start: 2019-12-18 | End: 2019-12-18

## 2019-12-18 RX ORDER — HEPARIN SODIUM 1000 [USP'U]/ML
400 INJECTION, SOLUTION INTRAVENOUS; SUBCUTANEOUS ONCE
Status: CANCELLED | OUTPATIENT
Start: 2019-12-19

## 2019-12-18 RX ORDER — CEFAZOLIN SODIUM 2 G/50ML
2000 SOLUTION INTRAVENOUS ONCE
Status: CANCELLED | OUTPATIENT
Start: 2019-12-19

## 2019-12-18 RX ADMIN — LIDOCAINE 1 PATCH: 50 PATCH TOPICAL at 08:38

## 2019-12-18 RX ADMIN — Medication 1 APPLICATION: at 08:34

## 2019-12-18 RX ADMIN — CHLORHEXIDINE GLUCONATE 0.12% ORAL RINSE 15 ML: 1.2 LIQUID ORAL at 08:37

## 2019-12-18 RX ADMIN — ISOSORBIDE MONONITRATE 60 MG: 60 TABLET, EXTENDED RELEASE ORAL at 08:35

## 2019-12-18 RX ADMIN — DOCUSATE SODIUM 100 MG: 100 CAPSULE, LIQUID FILLED ORAL at 17:48

## 2019-12-18 RX ADMIN — HEPARIN SODIUM 5000 UNITS: 5000 INJECTION INTRAVENOUS; SUBCUTANEOUS at 21:30

## 2019-12-18 RX ADMIN — POLYETHYLENE GLYCOL 3350 17 G: 17 POWDER, FOR SOLUTION ORAL at 08:36

## 2019-12-18 RX ADMIN — NICOTINE 1 PATCH: 14 PATCH TRANSDERMAL at 08:41

## 2019-12-18 RX ADMIN — CHLORHEXIDINE GLUCONATE 0.12% ORAL RINSE 15 ML: 1.2 LIQUID ORAL at 21:30

## 2019-12-18 RX ADMIN — ASPIRIN 81 MG: 81 TABLET ORAL at 08:35

## 2019-12-18 RX ADMIN — DOCUSATE SODIUM 100 MG: 100 CAPSULE, LIQUID FILLED ORAL at 08:35

## 2019-12-18 RX ADMIN — ATORVASTATIN CALCIUM 80 MG: 80 TABLET, FILM COATED ORAL at 17:48

## 2019-12-18 RX ADMIN — HEPARIN SODIUM 5000 UNITS: 5000 INJECTION INTRAVENOUS; SUBCUTANEOUS at 05:08

## 2019-12-18 RX ADMIN — HEPARIN SODIUM 5000 UNITS: 5000 INJECTION INTRAVENOUS; SUBCUTANEOUS at 14:55

## 2019-12-18 RX ADMIN — Medication 1 APPLICATION: at 21:30

## 2019-12-18 NOTE — PROGRESS NOTES
General Cardiology   Progress Note -  Team One   Anish Hernandez 63 y o  male MRN: [id number]    Unit/Bed#: [facility] 408-01 Encounter: [id number]    Assessment:  UA/Multi-vessel CAD:  History proximal circumflex and mid RCA stenting 10/2017   Presented with unstable angina at [facility] 12/12   Troponin negative   EKG with no acute ischemic change   Cardiac catheterization 12/13 revealed multi-vessel CAD involving 70% LAD, 99% RCA and 90% circumflex   Last dose of Plavix was 12/13  Transferred from [place] [address] for CABG evaluation  · Evaluated by CT surgery with CABG tentatively scheduled for 12/19  · On aspirin, statin and Imdur 60 mg   · Beta-blocker held since 12/13 due to bradycardia    · Chest pain-free since up titration of Imdur   Preserved biventricular systolic function:  Echocardiogram EF 65%, no WMA, trace MR, trace TR  Hyperlipidemia: TC 154, TG 68, HDL 33,  on atorvastatin 80 mg  Tobacco abuse:  Complete smoking cessation advised     Plan/Recommendations:  · Continue aspirin, Imdur and statin   · CABG scheduled for tomorrow      Subjective    Patient seen examined  No acute events overnight  Denies any chest pain, shortness of breath palpitations, lightheadedness or dizziness  Review of Systems   Constitution: Negative  Negative for chills  Cardiovascular: Negative for chest pain, dyspnea on exertion, leg swelling, near-syncope, orthopnea, palpitations, paroxysmal nocturnal dyspnea and syncope  Respiratory: Negative  Negative for shortness of breath  Gastrointestinal: Negative for diarrhea, nausea and vomiting  Neurological: Negative for dizziness, light-headedness and weakness  Psychiatric/Behavioral: Negative for altered mental status  All other systems reviewed and are negative  Objective:   Vitals: Blood pressure 130/72, pulse (!) 53, temperature 97 8 °F (36 6 °C), temperature source Oral, resp   rate 18, height 5' 7" (1 702 m), weight 81 9 kg (180 lb 8 9 oz), SpO2 98 % ,       Body mass index is 28 28 kg/m²  ,     Systolic (70LPC), ANDREA:727 , Min:116 , HMX:304     Diastolic (18MXW), HAB:20, Min:61, Max:80      Intake/Output Summary (Last 24 hours) at 12/18/2019 0946  Last data filed at 12/18/2019 0820  Gross per 24 hour   Intake 160 ml   Output 200 ml   Net -40 ml     Weight (last 2 days)     Date/Time   Weight    12/18/19 0444   81 9 (180 56)    12/17/19 0600   82 1 (181)    12/16/19 0600   82 5 (181 88)            Telemetry Review: No significant arrhythmias seen on telemetry review  Physical Exam   Constitutional: He is oriented to person, place, and time  He appears well-developed and well-nourished  On room in NAD   HENT:   Head: Normocephalic and atraumatic  Neck: Normal range of motion  Neck supple  No JVD present  Cardiovascular: Normal rate and regular rhythm  No murmur heard  Distant heart sounds   Pulmonary/Chest: Effort normal and breath sounds normal  No respiratory distress  He has no wheezes  He has no rales  Abdominal: Soft  Bowel sounds are normal    Musculoskeletal: Normal range of motion  He exhibits no edema  Neurological: He is alert and oriented to person, place, and time  Skin: Skin is warm and dry  Psychiatric: He has a normal mood and affect  His behavior is normal  Judgment and thought content normal    Nursing note and vitals reviewed        LABORATORY RESULTS  Results from last 7 days   Lab Units 12/16/19  0510 12/16/19  0212 12/15/19  2230   TROPONIN I ng/mL 0 04 0 04 0 03     CBC with diff:   Results from last 7 days   Lab Units 12/18/19  0442 12/15/19  1006 12/12/19  0415   WBC Thousand/uL 7 38  --  10 48*   HEMOGLOBIN g/dL 15 2  --  15 5   HEMATOCRIT % 46 0  --  46 8   MCV fL 94  --  93   PLATELETS Thousands/uL 244 260 266   MCH pg 31 1  --  30 9   MCHC g/dL 33 0  --  33 1   RDW % 12 6  --  12 9   MPV fL 11 0 10 1 10 2   NRBC AUTO /100 WBCs 0  --  0       CMP:  Results from last 7 days   Lab Units 12/16/19  0510 19  0415   POTASSIUM mmol/L 3 8 3 8   CHLORIDE mmol/L 109* 106   CO2 mmol/L 25 26   BUN mg/dL 22 18   CREATININE mg/dL 1 09 1 23   CALCIUM mg/dL 9 0 8 7   AST U/L  --  26   ALT U/L  --  46   ALK PHOS U/L  --  93   EGFR ml/min/1 73sq m 74 64       BMP:  Results from last 7 days   Lab Units 19  0510 19  0415   POTASSIUM mmol/L 3 8 3 8   CHLORIDE mmol/L 109* 106   CO2 mmol/L 25 26   BUN mg/dL 22 18   CREATININE mg/dL 1 09 1 23   CALCIUM mg/dL 9 0 8 7       Lab Results   Component Value Date    NTBNP 82 2019                    Results from last 7 days   Lab Units 19  0953   HEMOGLOBIN A1C % 5 6          Results from last 7 days   Lab Units 12/15/19  1006   TSH 3RD GENERATON uIU/mL 3 040             Lipid Profile:   No results found for: CHOL  Lab Results   Component Value Date    HDL 33 (L) 2019    HDL 39 (L) 2019    HDL 44 10/26/2017     Lab Results   Component Value Date    LDLCALC 107 (H) 2019    LDLCALC 101 (H) 2019    LDLCALC 196 (H) 10/26/2017     Lab Results   Component Value Date    TRIG 68 2019    TRIG 44 2019    TRIG 48 10/26/2017       Cardiac testing:   Results for orders placed during the hospital encounter of 19   Echo complete with contrast if indicated    Narrative Michelle [address]  [address]  [phone]    Transthoracic Echocardiogram  2D, M-mode, Doppler, and Color Doppler    Study date:  17-Dec-2019    Patient: Monica Eckert  MR number: [id number]  Account number: [de-identified]  : 1960  Age: 63 years  Gender: Male  Status: Inpatient  Location: Bedside  Height: 67 in  Weight: 180 6 lb  BP: 153/ 88 mmHg    Indications: CAD    Diagnoses: I25 119 - Atherosclerotic heart disease of native coronary artery with unspecified angina pectoris    Sonographer:  [address]  Referring Physician:  Judy Rosado PA-C  Group:  [facility]  Cardiology Fellow: Annell Olszewski, MD  Interpreting Physician:  DO DARYA Morgan    LEFT VENTRICLE:  Systolic function was normal  Ejection fraction was estimated to be 65 %  There were no regional wall motion abnormalities  MITRAL VALVE:  There was trace regurgitation  TRICUSPID VALVE:  There was trace regurgitation  COMPARISONS:  There has been no significant interval change  Comparison was made with the previous study of 27-Sep-2018  HISTORY: PRIOR HISTORY: CAD with RCA stent placement, HTN, tobacco use, lung nodule, HLD, bradycardia    PROCEDURE: The procedure was performed at the bedside  This was a routine study  The transthoracic approach was used  The study included complete 2D imaging, M-mode, complete spectral Doppler, and color Doppler  Image quality was adequate  LEFT VENTRICLE: Size was normal  Systolic function was normal  Ejection fraction was estimated to be 65 %  There were no regional wall motion abnormalities  DOPPLER: Left ventricular diastolic function parameters were normal     RIGHT VENTRICLE: The size was normal  Systolic function was normal     LEFT ATRIUM: Size was normal     RIGHT ATRIUM: Size was normal     MITRAL VALVE: Valve structure was normal  There was normal leaflet separation  DOPPLER: The transmitral velocity was within the normal range  There was no evidence for stenosis  There was trace regurgitation  AORTIC VALVE: The valve was probably trileaflet  Leaflets exhibited normal thickness and normal cuspal separation  DOPPLER: Transaortic velocity was within the normal range  There was no evidence for stenosis  There was no regurgitation  TRICUSPID VALVE: The valve structure was normal  There was normal leaflet separation  DOPPLER: The transtricuspid velocity was within the normal range  There was no evidence for stenosis  There was trace regurgitation      PULMONIC VALVE: Leaflets exhibited normal thickness, no calcification, and normal cuspal separation  DOPPLER: The transpulmonic velocity was within the normal range  There was no evidence for stenosis  There was no significant  regurgitation  PERICARDIUM: There was no pericardial effusion  The pericardium was normal in appearance  AORTA: The root exhibited normal size  SYSTEMIC VEINS: IVC: The inferior vena cava was normal in size and course  Respirophasic changes were normal     SYSTEM MEASUREMENT TABLES    2D  %FS: 48 03 %  Ao Diam: 2 71 cm  EDV(Teich): 81 54 ml  EF(Teich): 79 73 %  ESV(Teich): 16 53 ml  IVSd: 1 12 cm  LA Area: 14 31 cm2  LA Diam: 2 54 cm  LVEDV MOD A4C: 83 55 ml  LVEF MOD A4C: 67 39 %  LVESV MOD A4C: 27 25 ml  LVIDd: 4 27 cm  LVIDs: 2 22 cm  LVLd A4C: 8 11 cm  LVLs A4C: 6 56 cm  LVPWd: 0 99 cm  RA Area: 13 26 cm2  RVIDd: 3 28 cm  SV MOD A4C: 56 3 ml  SV(Teich): 65 01 ml    CW  AV Vmax: 1 75 m/s  AV maxP 27 mmHg    MM  TAPSE: 2 76 cm    PW  E': 0 11 m/s  E/E': 8 58  LVOT Vmax: 1 2 m/s  LVOT maxP 72 mmHg  Lateral E': 0 13 m/s  MV A Luis Alberto: 0 61 m/s  MV Dec Ross: 3 91 m/s2  MV DecT: 244 12 ms  MV E Luis Alberto: 0 95 m/s  MV E/A Ratio: 1 57  MV PHT: 70 8 ms  MVA By PHT: 3 11 cm2    [facility] Accredited Echocardiography [facility]    Prepared and electronically signed by    Lobito Pena DO  Signed 17-Dec-2019 14:20:35       No results found for this or any previous visit  No results found for this or any previous visit  No procedure found  No results found for this or any previous visit        Meds/Allergies   all current active meds have been reviewed, current meds:   Current Facility-Administered Medications   Medication Dose Route Frequency    acetaminophen (TYLENOL) tablet 650 mg  650 mg Oral Q4H PRN    aspirin (ECOTRIN LOW STRENGTH) EC tablet 81 mg  81 mg Oral Daily    atorvastatin (LIPITOR) tablet 80 mg  80 mg Oral Daily With Dinner    bisacodyl (DULCOLAX) rectal suppository 10 mg  10 mg Rectal Daily PRN    calcium carbonate (TUMS) chewable tablet 1,000 mg  1,000 mg Oral Daily PRN    chlorhexidine (PERIDEX) 0 12 % oral rinse 15 mL  15 mL Mouth/Throat Q12H LATASHA    docusate sodium (COLACE) capsule 100 mg  100 mg Oral BID    heparin (porcine) subcutaneous injection 5,000 Units  5,000 Units Subcutaneous Q8H [address]    isosorbide mononitrate (IMDUR) 24 hr tablet 60 mg  60 mg Oral Daily    lidocaine (LIDODERM) 5 % patch 1 patch  1 patch Topical Daily    magnesium hydroxide (MILK OF MAGNESIA) 400 mg/5 mL oral suspension 30 mL  30 mL Oral Daily PRN    mupirocin (BACTROBAN) 2 % nasal ointment 1 application  1 application Nasal V80E [address]    nicotine (NICODERM CQ) 14 mg/24hr TD 24 hr patch 1 patch  1 patch Transdermal Daily    ondansetron (ZOFRAN) injection 4 mg  4 mg Intravenous Q6H PRN    polyethylene glycol (MIRALAX) packet 17 g  17 g Oral Daily    psyllium (METAMUCIL) 1 packet  1 packet Oral Daily PRN    and PTA meds:   Prior to Admission Medications   Prescriptions Last Dose Informant Patient Reported?  Taking?   aspirin (ECOTRIN LOW STRENGTH) 81 mg EC tablet   No No   Sig: Take 1 tablet by mouth daily   atorvastatin (LIPITOR) 40 mg tablet   No No   Sig: Take 1 tablet (40 mg total) by mouth daily with dinner   clopidogrel (PLAVIX) 75 mg tablet   No No   Sig: Take 1 tablet (75 mg total) by mouth daily   cyclobenzaprine (FLEXERIL) 10 mg tablet   No No   Sig: Take 1 tablet (10 mg total) by mouth 3 (three) times a day as needed for muscle spasms   isosorbide mononitrate (IMDUR) 30 mg 24 hr tablet   No No   Sig: Take 1 tablet (30 mg total) by mouth daily   metoprolol succinate (TOPROL-XL) 25 mg 24 hr tablet   No No   Sig: Take 1 tablet (25 mg total) by mouth daily   naproxen (NAPROSYN) 500 mg tablet   No No   Sig: Take 1 tablet (500 mg total) by mouth every 12 (twelve) hours      Facility-Administered Medications: None     Medications Prior to Admission   Medication    aspirin (ECOTRIN LOW STRENGTH) 81 mg EC tablet    atorvastatin (LIPITOR) 40 mg tablet    clopidogrel (PLAVIX) 75 mg tablet    cyclobenzaprine (FLEXERIL) 10 mg tablet    isosorbide mononitrate (IMDUR) 30 mg 24 hr tablet    metoprolol succinate (TOPROL-XL) 25 mg 24 hr tablet    naproxen (NAPROSYN) 500 mg tablet       Counseling / Coordination of Care  Total floor / unit time spent today 20 minutes  Greater than 50% of total time was spent with the patient and / or family counseling and / or coordination of care  ** Please Note: Dragon 360 Dictation voice to text software may have been used in the creation of this document   **

## 2019-12-18 NOTE — PROGRESS NOTES
INTERNAL MEDICINE RESIDENCY PROGRESS NOTE     Name: [facility]   Age & Sex: 63 y o  male   MRN: [id number]  Unit/Bed#: [facility] 408-01   Encounter: [id number]  Team: SOD Team A    PATIENT INFORMATION     Name: [facility]   Age & Sex: 63 y o  male   MRN: [id number]  Hospital Stay Days: 5    ASSESSMENT/PLAN     Principal Problem:    Unstable angina ([facility] [address] )  Active Problems:    Tobacco abuse    Pure hypercholesterolemia    S/P primary angioplasty with coronary stent    Bradycardia    Pulmonary nodule    Atherosclerosis of native coronary artery with angina pectoris ([facility] [address] )      * Unstable angina ([facility] [address] )  Assessment & Plan  - Presented to [facility] with chest pain  EKG negative and troponin negative at the time  - S/p PCI this admission which significant for 3 vessel disease 70% ostial LAD, 99% RCA, 90% circumflex    - Nuclear medicine stress test done 09/2018 was negative at that time  - Echocardiogram ejection fraction 65%, no regional wall motion abnormalities  - Evaluated by CT surgery, planned for CABG on Thursday 12/19    Plan:  - Continue ASA, hold Plavix for the anticipated CABG  - Imdur increased to 60 mg Q 24 hours  - Atorvastatin increased to 80 mg daily  - Beta-blockers on hold secondary to bradycardia  - CABG tomorrow Thursday 12/19    Pulmonary nodule  Assessment & Plan  - Incidental noncalcified 10 x 7 mm left upper lobe pulmonary nodules detected on CT chest   - CT/PET or tissue sampling recommended in the setting of smoking history  - CT head: no intracranial abnormalities or masses  - CT abdomen/pelvis:  No evidence of Mets    Bradycardia  Assessment & Plan  - Patient states that his baseline is HR 60s  - Asymptomatic  - Continue telemetry   - Beta-blocker on hold      S/P primary angioplasty with coronary stent  Assessment & Plan  CAD s/p TAMARA of pCFX, mRCA on 10/2017    -plan as above     Pure hypercholesterolemia  Assessment & Plan  Stable  , TG 44 most recently  - Atorvastatin increased to 80 mg daily     Tobacco abuse  Assessment & Plan  - Smokes 0 5 ppd for the past 40 years  - Tobacco cessation education provided        Disposition:  Inpatient, CABG tomorrow  SUBJECTIVE     Patient seen and examined  No acute events overnight  Denies chest pain, shortness of breath, palpitations, abdominal pain, nausea, vomiting, or diarrhea  Anxious about his CABG tomorrow  OBJECTIVE     Vitals:    19 2244 19 0308 19 0444 19 0739   BP: 130/61 118/66  130/72   BP Location: Left arm Left arm  Left arm   Pulse: (!) 51 (!) 52  (!) 53   Resp:    Temp: 97 6 °F (36 4 °C) 97 5 °F (36 4 °C)  97 8 °F (36 6 °C)   TempSrc: Oral Oral  Oral   SpO2: 95% 98%  98%   Weight:   81 9 kg (180 lb 8 9 oz)    Height:          Temperature:   Temp (24hrs), Av 9 °F (36 6 °C), Min:97 5 °F (36 4 °C), Max:98 3 °F (36 8 °C)    Temperature: 97 8 °F (36 6 °C)  Intake & Output:  I/O        07 -  0700  0701 -  0700  07 -  0700    P  O  610 200 160    Total Intake(mL/kg) 610 (7 4) 200 (2 4) 160 (2)    Urine (mL/kg/hr) 1100 (0 6) 200 (0 1)     Total Output 1100 200     Net -490 0 +160               Weights:   IBW: 66 1 kg    Body mass index is 28 28 kg/m²  Weight (last 2 days)     Date/Time   Weight    19 0444   81 9 (180 56)    19 0600   82 1 (181)    19 0600   82 5 (181 88)            Physical Exam   Constitutional: He is oriented to person, place, and time  He appears well-developed and well-nourished  HENT:   Head: Normocephalic and atraumatic  Eyes: Pupils are equal, round, and reactive to light  Conjunctivae and EOM are normal    Neck: Normal range of motion  Neck supple  No JVD present  Cardiovascular: Regular rhythm and normal heart sounds  Exam reveals no friction rub  No murmur heard  Bradycardia   Pulmonary/Chest: Effort normal and breath sounds normal  No respiratory distress  He has no wheezes   He has no rales  Abdominal: Soft  He exhibits no distension  There is no tenderness  Musculoskeletal: Normal range of motion  He exhibits no edema  Neurological: He is alert and oriented to person, place, and time  Skin: Skin is warm  LABORATORY DATA     Labs: I have personally reviewed pertinent reports  Results from last 7 days   Lab Units 12/18/19  0442 12/15/19  1006 12/12/19  0415   WBC Thousand/uL 7 38  --  10 48*   HEMOGLOBIN g/dL 15 2  --  15 5   HEMATOCRIT % 46 0  --  46 8   PLATELETS Thousands/uL 244 260 266   NEUTROS PCT % 49  --  63   MONOS PCT % 11  --  11      Results from last 7 days   Lab Units 12/16/19  0510 12/12/19  0415   POTASSIUM mmol/L 3 8 3 8   CHLORIDE mmol/L 109* 106   CO2 mmol/L 25 26   BUN mg/dL 22 18   CREATININE mg/dL 1 09 1 23   CALCIUM mg/dL 9 0 8 7   ALK PHOS U/L  --  93   ALT U/L  --  46   AST U/L  --  26                      Results from last 7 days   Lab Units 12/16/19  0510 12/16/19  0212 12/15/19  2230   TROPONIN I ng/mL 0 04 0 04 0 03       IMAGING & DIAGNOSTIC TESTING     Radiology Results: I have personally reviewed pertinent reports  Ct Abdomen Pelvis Wo Contrast    Result Date: 12/16/2019  Impression: 1  No evidence of metastatic disease in the abdomen or pelvis  2  Moderate constipation  Workstation performed: [id number]     Ct Head Wo Contrast    Result Date: 12/16/2019  Impression: No acute intracranial abnormality  Discrete mass, edema or hemorrhage are not evident  Contrast would increase conspicuity of small lesions not otherwise evident without contrast  Workstation performed: [id number]     Ct Chest Wo Contrast    Result Date: 12/14/2019  Impression: Noncalcified 10 x 7 mm left upper lobe pulmonary nodule  Based on current Fleischner Society 2017 Guidelines on incidental pulmonary nodule, either PET/CT scan evaluation or tissue sampling may be considered appropriate in this patient with a smoking history   Subtle groundglass parenchymal densities which could represent atelectasis or perhaps mild interstitial edema related to pulmonary vascular congestive change  No pleural effusions  The study was marked in EPIC for significant notification  Workstation performed: [id number]     Other Diagnostic Testing: I have personally reviewed pertinent reports  ACTIVE MEDICATIONS     Current Facility-Administered Medications   Medication Dose Route Frequency    acetaminophen (TYLENOL) tablet 650 mg  650 mg Oral Q4H PRN    aspirin (ECOTRIN LOW STRENGTH) EC tablet 81 mg  81 mg Oral Daily    atorvastatin (LIPITOR) tablet 80 mg  80 mg Oral Daily With Dinner    bisacodyl (DULCOLAX) rectal suppository 10 mg  10 mg Rectal Daily PRN    calcium carbonate (TUMS) chewable tablet 1,000 mg  1,000 mg Oral Daily PRN    chlorhexidine (PERIDEX) 0 12 % oral rinse 15 mL  15 mL Mouth/Throat Q12H LATASHA    docusate sodium (COLACE) capsule 100 mg  100 mg Oral BID    heparin (porcine) subcutaneous injection 5,000 Units  5,000 Units Subcutaneous Q8H [address]    isosorbide mononitrate (IMDUR) 24 hr tablet 60 mg  60 mg Oral Daily    lidocaine (LIDODERM) 5 % patch 1 patch  1 patch Topical Daily    magnesium hydroxide (MILK OF MAGNESIA) 400 mg/5 mL oral suspension 30 mL  30 mL Oral Daily PRN    mupirocin (BACTROBAN) 2 % nasal ointment 1 application  1 application Nasal [address]    nicotine (NICODERM CQ) 14 mg/24hr TD 24 hr patch 1 patch  1 patch Transdermal Daily    ondansetron (ZOFRAN) injection 4 mg  4 mg Intravenous Q6H PRN    polyethylene glycol (MIRALAX) packet 17 g  17 g Oral Daily    psyllium (METAMUCIL) 1 packet  1 packet Oral Daily PRN       VTE Pharmacologic Prophylaxis: Heparin  VTE Mechanical Prophylaxis: sequential compression device    Portions of the record may have been created with voice recognition software  Occasional wrong word or "sound a like" substitutions may have occurred due to the inherent limitations of voice recognition software    Read the chart carefully and recognize, using context, where substitutions have occurred   ==  Marysol Brandt MD  [address]  Internal Medicine Residency PGY-1

## 2019-12-18 NOTE — ANESTHESIA PREPROCEDURE EVALUATION
Review of Systems/Medical History  Patient summary reviewed  Chart reviewed      Cardiovascular  Hyperlipidemia, CAD , CAD status: 3VD, History of percutaneous transluminal coronary angioplasty, Angina at rest,   Comment: LEFT VENTRICLE: Size was normal  Systolic function was normal  Ejection fraction was estimated to be 65 %  There were no regional wall motion abnormalities  DOPPLER: Left ventricular diastolic function parameters were normal      RIGHT VENTRICLE: The size was normal  Systolic function was normal      LEFT ATRIUM: Size was normal      RIGHT ATRIUM: Size was normal      MITRAL VALVE: Valve structure was normal  There was normal leaflet separation  DOPPLER: The transmitral velocity was within the normal range  There was no evidence for stenosis  There was trace regurgitation      AORTIC VALVE: The valve was probably trileaflet  Leaflets exhibited normal thickness and normal cuspal separation  DOPPLER: Transaortic velocity was within the normal range  There was no evidence for stenosis  There was no regurgitation      TRICUSPID VALVE: The valve structure was normal  There was normal leaflet separation  DOPPLER: The transtricuspid velocity was within the normal range  There was no evidence for stenosis  There was trace regurgitation      PULMONIC VALVE: Leaflets exhibited normal thickness, no calcification, and normal cuspal separation  DOPPLER: The transpulmonic velocity was within the normal range  There was no evidence for stenosis  There was no significant  regurgitation      PERICARDIUM: There was no pericardial effusion  The pericardium was normal in appearance      AORTA: The root exhibited normal size      SYSTEMIC VEINS: IVC: The inferior vena cava was normal in size and course   Respirophasic changes were normal    ,  Pulmonary  Smoker ex-smoker  ,        GI/Hepatic            Endo/Other     GYN       Hematology   Musculoskeletal       Neurology   Psychology           Physical Exam    Airway    Mallampati score: I  TM Distance: >3 FB  Neck ROM: full     Dental       Cardiovascular      Pulmonary      Other Findings        Anesthesia Plan  ASA Score- 4     Anesthesia Type- general with ASA Monitors  Additional Monitors: pulmonary artery catheter, central venous line and arterial line  Airway Plan:         Plan Factors-    Induction- intravenous      Postoperative Plan-     Informed Consent-

## 2019-12-18 NOTE — RESPIRATORY THERAPY NOTE
RT Protocol Note  Bonnie Bo 63 y o  male MRN: [id number]  Unit/Bed#: [facility] 408-01 Encounter: [id number]    Assessment    Principal Problem:    Unstable angina ([address] )  Active Problems:    Tobacco abuse    Pure hypercholesterolemia    S/P primary angioplasty with coronary stent    Bradycardia    Pulmonary nodule    Atherosclerosis of native coronary artery with angina pectoris (HCC)      Home Pulmonary Medications:  None       Past Medical History:   Diagnosis Date    CAD (coronary artery disease)     Family history of MI (myocardial infarction)     Former smoker     Hyperlipidemia     Irregular heart beat      Social History     Socioeconomic History    Marital status: /Civil Union     Spouse name: Not on file    Number of children: Not on file    Years of education: Not on file    Highest education level: Not on file   Occupational History    Not on file   Social Needs    Financial resource strain: Not on file    Food insecurity:     Worry: Not on file     Inability: Not on file    Transportation needs:     Medical: Not on file     Non-medical: Not on file   Tobacco Use    Smoking status: Current Every Day Smoker     Packs/day: 0 50     Types: Cigarettes    Smokeless tobacco: Never Used   Substance and Sexual Activity    Alcohol use: Never     Frequency: Never     Drinks per session: Patient refused     Binge frequency: Never    Drug use: No    Sexual activity: Never   Lifestyle    Physical activity:     Days per week: Not on file     Minutes per session: Not on file    Stress: Not on file   Relationships    Social connections:     Talks on phone: Not on file     Gets together: Not on file     Attends [employer] service: Not on file     Active member of club or organization: Not on file     Attends meetings of clubs or organizations: Not on file     Relationship status: Not on file    Intimate partner violence:     Fear of current or ex partner: Not on file     Emotionally abused: Not on file     Physically abused: Not on file     Forced sexual activity: Not on file   Other Topics Concern    Not on file   Social History Narrative    Not on file       Subjective         Objective    Physical Exam:   Assessment Type: Assess only  General Appearance: Alert, Awake  Respiratory Pattern: Normal  Chest Assessment: Chest expansion symmetrical  Bilateral Breath Sounds: Clear, Diminished  Cough: None  O2 Device: Pt evlauted per resp protocol, he is scheduled for a CABGX3 tomorrow  Pt doesnt have a pulmonary history and doesnt use bronchodilators at home howver he is a smoker  Pt instructed on IS therapy  Vitals:  Blood pressure 119/72, pulse 74, temperature 97 6 °F (36 4 °C), temperature source Oral, resp  rate 18, height 5' 7" (1 702 m), weight 81 9 kg (180 lb 8 9 oz), SpO2 99 %  Imaging and other studies: I have personally reviewed pertinent reports  O2 Device: Pt evlauted per resp protocol, he is scheduled for a CABGX3 tomorrow  Pt doesnt have a pulmonary history and doesnt use bronchodilators at home howver he is a smoker  Pt instructed on IS therapy        Plan    Respiratory Plan: No distress/Pulmonary history

## 2019-12-18 NOTE — PLAN OF CARE
Problem: Potential for Falls  Goal: Patient will remain free of falls  Description  INTERVENTIONS:  - Assess patient frequently for physical needs  -  Identify cognitive and physical deficits and behaviors that affect risk of falls    -  [place] fall precautions as indicated by assessment   - Educate patient/family on patient safety including physical limitations  - Instruct patient to call for assistance with activity based on assessment  - Modify environment to reduce risk of injury  - Consider OT/PT consult to assist with strengthening/mobility  Outcome: Progressing     Problem: PAIN - ADULT  Goal: Verbalizes/displays adequate comfort level or baseline comfort level  Description  Interventions:  - Encourage patient to monitor pain and request assistance  - Assess pain using appropriate pain scale  - Administer analgesics based on type and severity of pain and evaluate response  - Implement non-pharmacological measures as appropriate and evaluate response  - Consider cultural and social influences on pain and pain management  - Notify physician/advanced practitioner if interventions unsuccessful or patient reports new pain  Outcome: Progressing     Problem: INFECTION - ADULT  Goal: Absence or prevention of progression during hospitalization  Description  INTERVENTIONS:  - Assess and monitor for signs and symptoms of infection  - Monitor lab/diagnostic results  - Monitor all insertion sites, i e  indwelling lines, tubes, and drains  - Monitor endotracheal if appropriate and nasal secretions for changes in amount and color  - [place] appropriate cooling/warming therapies per order  - Administer medications as ordered  - Instruct and encourage patient and family to use good hand hygiene technique  - Identify and instruct in appropriate isolation precautions for identified infection/condition  Outcome: Progressing  Goal: Absence of fever/infection during neutropenic period  Description  INTERVENTIONS:  - Monitor WBC    Outcome: Progressing     Problem: SAFETY ADULT  Goal: Maintain or return to baseline ADL function  Description  INTERVENTIONS:  -  Assess patient's ability to carry out ADLs; assess patient's baseline for ADL function and identify physical deficits which impact ability to perform ADLs (bathing, care of mouth/teeth, toileting, grooming, dressing, etc )  - Assess/evaluate cause of self-care deficits   - Assess range of motion  - Assess patient's mobility; develop plan if impaired  - Assess patient's need for assistive devices and provide as appropriate  - Encourage maximum independence but intervene and supervise when necessary  - Involve family in performance of ADLs  - Assess for home care needs following discharge   - Consider OT consult to assist with ADL evaluation and planning for discharge  - Provide patient education as appropriate  Outcome: Progressing  Goal: Maintain or return mobility status to optimal level  Description  INTERVENTIONS:  - Assess patient's baseline mobility status (ambulation, transfers, stairs, etc )    - Identify cognitive and physical deficits and behaviors that affect mobility  - Identify mobility aids required to assist with transfers and/or ambulation (gait belt, sit-to-stand, lift, walker, cane, etc )  - [place] fall precautions as indicated by assessment  - Record patient progress and toleration of activity level on Mobility SBAR; progress patient to next Phase/Stage  - Instruct patient to call for assistance with activity based on assessment  - Consider rehabilitation consult to assist with strengthening/weightbearing, etc   Outcome: Progressing     Problem: DISCHARGE PLANNING  Goal: Discharge to home or other facility with appropriate resources  Description  INTERVENTIONS:  - Identify barriers to discharge w/patient and caregiver  - Arrange for needed discharge resources and transportation as appropriate  - Identify discharge learning needs (meds, wound care, etc )  - Arrange for interpretive services to assist at discharge as needed  - Refer to Case Management Department for coordinating discharge planning if the patient needs post-hospital services based on physician/advanced practitioner order or complex needs related to functional status, cognitive ability, or social support system  Outcome: Progressing     Problem: Knowledge Deficit  Goal: Patient/family/caregiver demonstrates understanding of disease process, treatment plan, medications, and discharge instructions  Description  Complete learning assessment and assess knowledge base    Interventions:  - Provide teaching at level of understanding  - Provide teaching via preferred learning methods  Outcome: Progressing

## 2019-12-19 ENCOUNTER — APPOINTMENT (INPATIENT)
Dept: NON INVASIVE DIAGNOSTICS | Facility: HOSPITAL | Age: 59
DRG: 235 | End: 2019-12-19

## 2019-12-19 ENCOUNTER — APPOINTMENT (INPATIENT)
Dept: RADIOLOGY | Facility: HOSPITAL | Age: 59
DRG: 235 | End: 2019-12-19

## 2019-12-19 ENCOUNTER — ANESTHESIA (INPATIENT)
Dept: PERIOP | Facility: HOSPITAL | Age: 59
DRG: 235 | End: 2019-12-19

## 2019-12-19 PROBLEM — Z95.1 S/P CABG X 3: Status: ACTIVE | Noted: 2019-12-19

## 2019-12-19 LAB
ABO GROUP BLD BPU: NORMAL
ANION GAP SERPL CALCULATED.3IONS-SCNC: 5 MMOL/L (ref 4–13)
ATRIAL RATE: 60 BPM
BASE EX.OXY STD BLDV CALC-SCNC: 42.2 % (ref 60–80)
BASE EXCESS BLDA CALC-SCNC: -1 MMOL/L (ref -2–3)
BASE EXCESS BLDA CALC-SCNC: -1 MMOL/L (ref -2–3)
BASE EXCESS BLDA CALC-SCNC: -2 MMOL/L (ref -2–3)
BASE EXCESS BLDA CALC-SCNC: -5 MMOL/L (ref -2–3)
BASE EXCESS BLDA CALC-SCNC: 0 MMOL/L (ref -2–3)
BASE EXCESS BLDA CALC-SCNC: 1 MMOL/L (ref -2–3)
BASE EXCESS BLDA CALC-SCNC: 1 MMOL/L (ref -2–3)
BASE EXCESS BLDA CALC-SCNC: 3 MMOL/L (ref -2–3)
BASE EXCESS BLDV CALC-SCNC: -0.6 MMOL/L
BPU ID: NORMAL
BUN SERPL-MCNC: 16 MG/DL (ref 5–25)
CA-I BLD-SCNC: 0.97 MMOL/L (ref 1.12–1.32)
CA-I BLD-SCNC: 0.99 MMOL/L (ref 1.12–1.32)
CA-I BLD-SCNC: 1 MMOL/L (ref 1.12–1.32)
CA-I BLD-SCNC: 1.01 MMOL/L (ref 1.12–1.32)
CA-I BLD-SCNC: 1.01 MMOL/L (ref 1.12–1.32)
CA-I BLD-SCNC: 1.13 MMOL/L (ref 1.12–1.32)
CA-I BLD-SCNC: 1.15 MMOL/L (ref 1.12–1.32)
CA-I BLD-SCNC: 1.18 MMOL/L (ref 1.12–1.32)
CA-I BLD-SCNC: 1.21 MMOL/L (ref 1.12–1.32)
CA-I BLD-SCNC: 1.29 MMOL/L (ref 1.12–1.32)
CALCIUM SERPL-MCNC: 7.9 MG/DL (ref 8.3–10.1)
CHLORIDE SERPL-SCNC: 114 MMOL/L (ref 100–108)
CO2 SERPL-SCNC: 24 MMOL/L (ref 21–32)
CREAT SERPL-MCNC: 1.12 MG/DL (ref 0.6–1.3)
CROSSMATCH: NORMAL
FIO2 GAS DIL.REBREATH: 100 L
FIO2 GAS DIL.REBREATH: 70 L
GFR SERPL CREATININE-BSD FRML MDRD: 72 ML/MIN/1.73SQ M
GLUCOSE SERPL-MCNC: 104 MG/DL (ref 65–140)
GLUCOSE SERPL-MCNC: 106 MG/DL (ref 65–140)
GLUCOSE SERPL-MCNC: 109 MG/DL (ref 65–140)
GLUCOSE SERPL-MCNC: 116 MG/DL (ref 65–140)
GLUCOSE SERPL-MCNC: 121 MG/DL (ref 65–140)
GLUCOSE SERPL-MCNC: 124 MG/DL (ref 65–140)
GLUCOSE SERPL-MCNC: 127 MG/DL (ref 65–140)
GLUCOSE SERPL-MCNC: 127 MG/DL (ref 65–140)
GLUCOSE SERPL-MCNC: 129 MG/DL (ref 65–140)
GLUCOSE SERPL-MCNC: 133 MG/DL (ref 65–140)
GLUCOSE SERPL-MCNC: 133 MG/DL (ref 65–140)
GLUCOSE SERPL-MCNC: 136 MG/DL (ref 65–140)
GLUCOSE SERPL-MCNC: 147 MG/DL (ref 65–140)
GLUCOSE SERPL-MCNC: 151 MG/DL (ref 65–140)
GLUCOSE SERPL-MCNC: 158 MG/DL (ref 65–140)
GLUCOSE SERPL-MCNC: 160 MG/DL (ref 65–140)
GLUCOSE SERPL-MCNC: 173 MG/DL (ref 65–140)
HCO3 BLDA-SCNC: 23 MMOL/L (ref 22–28)
HCO3 BLDA-SCNC: 23.3 MMOL/L (ref 22–28)
HCO3 BLDA-SCNC: 23.3 MMOL/L (ref 22–28)
HCO3 BLDA-SCNC: 24.2 MMOL/L (ref 22–28)
HCO3 BLDA-SCNC: 25.2 MMOL/L (ref 22–28)
HCO3 BLDA-SCNC: 25.2 MMOL/L (ref 22–28)
HCO3 BLDA-SCNC: 26.1 MMOL/L (ref 22–28)
HCO3 BLDA-SCNC: 26.2 MMOL/L (ref 22–28)
HCO3 BLDA-SCNC: 26.8 MMOL/L (ref 24–30)
HCO3 BLDA-SCNC: 28.3 MMOL/L (ref 22–28)
HCO3 BLDV-SCNC: 26.5 MMOL/L (ref 24–30)
HCT VFR BLD AUTO: 36.9 % (ref 36.5–49.3)
HCT VFR BLD AUTO: 40.8 % (ref 36.5–49.3)
HCT VFR BLD CALC: 32 % (ref 36.5–49.3)
HCT VFR BLD CALC: 32 % (ref 36.5–49.3)
HCT VFR BLD CALC: 33 % (ref 36.5–49.3)
HCT VFR BLD CALC: 35 % (ref 36.5–49.3)
HCT VFR BLD CALC: 36 % (ref 36.5–49.3)
HCT VFR BLD CALC: 39 % (ref 36.5–49.3)
HCT VFR BLD CALC: 41 % (ref 36.5–49.3)
HCT VFR BLD CALC: 44 % (ref 36.5–49.3)
HGB BLD-MCNC: 12.2 G/DL (ref 12–17)
HGB BLD-MCNC: 13.5 G/DL (ref 12–17)
HGB BLDA-MCNC: 10.9 G/DL (ref 12–17)
HGB BLDA-MCNC: 10.9 G/DL (ref 12–17)
HGB BLDA-MCNC: 11.2 G/DL (ref 12–17)
HGB BLDA-MCNC: 11.9 G/DL (ref 12–17)
HGB BLDA-MCNC: 12.2 G/DL (ref 12–17)
HGB BLDA-MCNC: 13.3 G/DL (ref 12–17)
HGB BLDA-MCNC: 13.9 G/DL (ref 12–17)
HGB BLDA-MCNC: 15 G/DL (ref 12–17)
KCT BLD-ACNC: 115 SEC (ref 89–137)
KCT BLD-ACNC: 380 SEC (ref 89–137)
KCT BLD-ACNC: 393 SEC (ref 89–137)
KCT BLD-ACNC: 430 SEC (ref 89–137)
KCT BLD-ACNC: 491 SEC (ref 89–137)
KCT BLD-ACNC: 502 SEC (ref 89–137)
KCT BLD-ACNC: 508 SEC (ref 89–137)
KCT BLD-ACNC: 548 SEC (ref 89–137)
KCT BLD-ACNC: 554 SEC (ref 89–137)
KCT BLD-ACNC: 98 SEC (ref 89–137)
O2 CT BLDV-SCNC: 7.5 ML/DL
P AXIS: 25 DEGREES
PCO2 BLD: 24 MMOL/L (ref 21–32)
PCO2 BLD: 25 MMOL/L (ref 21–32)
PCO2 BLD: 25 MMOL/L (ref 21–32)
PCO2 BLD: 26 MMOL/L (ref 21–32)
PCO2 BLD: 26 MMOL/L (ref 21–32)
PCO2 BLD: 27 MMOL/L (ref 21–32)
PCO2 BLD: 27 MMOL/L (ref 21–32)
PCO2 BLD: 28 MMOL/L (ref 21–32)
PCO2 BLD: 28 MMOL/L (ref 21–32)
PCO2 BLD: 30 MMOL/L (ref 21–32)
PCO2 BLD: 39.2 MM HG (ref 36–44)
PCO2 BLD: 41.4 MM HG (ref 36–44)
PCO2 BLD: 42.6 MM HG (ref 36–44)
PCO2 BLD: 43.1 MM HG (ref 36–44)
PCO2 BLD: 46 MM HG (ref 36–44)
PCO2 BLD: 46.8 MM HG (ref 36–44)
PCO2 BLD: 47 MM HG (ref 42–50)
PCO2 BLD: 47.1 MM HG (ref 36–44)
PCO2 BLD: 50.2 MM HG (ref 36–44)
PCO2 BLD: 52.2 MM HG (ref 36–44)
PCO2 BLDV: 54.4 MM HG (ref 42–50)
PH BLD: 7.25 [PH] (ref 7.35–7.45)
PH BLD: 7.33 [PH] (ref 7.35–7.45)
PH BLD: 7.33 [PH] (ref 7.35–7.45)
PH BLD: 7.34 [PH] (ref 7.35–7.45)
PH BLD: 7.34 [PH] (ref 7.35–7.45)
PH BLD: 7.36 [PH] (ref 7.3–7.4)
PH BLD: 7.38 [PH] (ref 7.35–7.45)
PH BLD: 7.38 [PH] (ref 7.35–7.45)
PH BLD: 7.39 [PH] (ref 7.35–7.45)
PH BLD: 7.41 [PH] (ref 7.35–7.45)
PH BLDV: 7.3 [PH] (ref 7.3–7.4)
PLATELET # BLD AUTO: 160 THOUSANDS/UL (ref 149–390)
PLATELET # BLD AUTO: 197 THOUSANDS/UL (ref 149–390)
PMV BLD AUTO: 10.2 FL (ref 8.9–12.7)
PMV BLD AUTO: 10.8 FL (ref 8.9–12.7)
PO2 BLD: 249 MM HG (ref 75–129)
PO2 BLD: 350 MM HG (ref 75–129)
PO2 BLD: 355 MM HG (ref 75–129)
PO2 BLD: 363 MM HG (ref 75–129)
PO2 BLD: 386 MM HG (ref 75–129)
PO2 BLD: 48 MM HG (ref 35–45)
PO2 BLD: 71 MM HG (ref 75–129)
PO2 BLD: 77 MM HG (ref 75–129)
PO2 BLD: 85 MM HG (ref 75–129)
PO2 BLD: >400 MM HG (ref 75–129)
PO2 BLDV: 25.4 MM HG (ref 35–45)
POTASSIUM BLD-SCNC: 4.1 MMOL/L (ref 3.5–5.3)
POTASSIUM BLD-SCNC: 4.2 MMOL/L (ref 3.5–5.3)
POTASSIUM BLD-SCNC: 4.3 MMOL/L (ref 3.5–5.3)
POTASSIUM BLD-SCNC: 4.8 MMOL/L (ref 3.5–5.3)
POTASSIUM BLD-SCNC: 5 MMOL/L (ref 3.5–5.3)
POTASSIUM BLD-SCNC: 5.1 MMOL/L (ref 3.5–5.3)
POTASSIUM BLD-SCNC: 5.7 MMOL/L (ref 3.5–5.3)
POTASSIUM BLD-SCNC: 5.9 MMOL/L (ref 3.5–5.3)
POTASSIUM BLD-SCNC: 6 MMOL/L (ref 3.5–5.3)
POTASSIUM BLD-SCNC: 6 MMOL/L (ref 3.5–5.3)
POTASSIUM SERPL-SCNC: 4.4 MMOL/L (ref 3.5–5.3)
POTASSIUM SERPL-SCNC: 4.9 MMOL/L (ref 3.5–5.3)
PR INTERVAL: 129 MS
QRS AXIS: -1 DEGREES
QRSD INTERVAL: 104 MS
QT INTERVAL: 413 MS
QTC INTERVAL: 413 MS
SAO2 % BLD FROM PO2: 100 % (ref 60–85)
SAO2 % BLD FROM PO2: 81 % (ref 60–85)
SAO2 % BLD FROM PO2: 93 % (ref 60–85)
SAO2 % BLD FROM PO2: 94 % (ref 60–85)
SAO2 % BLD FROM PO2: 96 % (ref 60–85)
SODIUM BLD-SCNC: 133 MMOL/L (ref 136–145)
SODIUM BLD-SCNC: 136 MMOL/L (ref 136–145)
SODIUM BLD-SCNC: 137 MMOL/L (ref 136–145)
SODIUM BLD-SCNC: 137 MMOL/L (ref 136–145)
SODIUM BLD-SCNC: 138 MMOL/L (ref 136–145)
SODIUM BLD-SCNC: 140 MMOL/L (ref 136–145)
SODIUM BLD-SCNC: 140 MMOL/L (ref 136–145)
SODIUM BLD-SCNC: 141 MMOL/L (ref 136–145)
SODIUM SERPL-SCNC: 143 MMOL/L (ref 136–145)
SPECIMEN SOURCE: ABNORMAL
SPECIMEN SOURCE: NORMAL
SPECIMEN SOURCE: NORMAL
T WAVE AXIS: 22 DEGREES
UNIT DISPENSE STATUS: NORMAL
UNIT PRODUCT CODE: NORMAL
UNIT RH: NORMAL
VENTRICULAR RATE: 60 BPM

## 2019-12-19 PROCEDURE — 82330 ASSAY OF CALCIUM: CPT

## 2019-12-19 PROCEDURE — NC001 PR NO CHARGE: Performed by: PHYSICIAN ASSISTANT

## 2019-12-19 PROCEDURE — 5A1223Z PERFORMANCE OF CARDIAC PACING, CONTINUOUS: ICD-10-PCS | Performed by: THORACIC SURGERY (CARDIOTHORACIC VASCULAR SURGERY)

## 2019-12-19 PROCEDURE — 93005 ELECTROCARDIOGRAM TRACING: CPT

## 2019-12-19 PROCEDURE — 82805 BLOOD GASES W/O2 SATURATION: CPT | Performed by: THORACIC SURGERY (CARDIOTHORACIC VASCULAR SURGERY)

## 2019-12-19 PROCEDURE — 94760 N-INVAS EAR/PLS OXIMETRY 1: CPT

## 2019-12-19 PROCEDURE — 82947 ASSAY GLUCOSE BLOOD QUANT: CPT

## 2019-12-19 PROCEDURE — 33508 ENDOSCOPIC VEIN HARVEST: CPT | Performed by: PHYSICIAN ASSISTANT

## 2019-12-19 PROCEDURE — 021109W BYPASS CORONARY ARTERY, TWO ARTERIES FROM AORTA WITH AUTOLOGOUS VENOUS TISSUE, OPEN APPROACH: ICD-10-PCS | Performed by: THORACIC SURGERY (CARDIOTHORACIC VASCULAR SURGERY)

## 2019-12-19 PROCEDURE — 93320 DOPPLER ECHO COMPLETE: CPT | Performed by: INTERNAL MEDICINE

## 2019-12-19 PROCEDURE — 93312 ECHO TRANSESOPHAGEAL: CPT | Performed by: INTERNAL MEDICINE

## 2019-12-19 PROCEDURE — 94002 VENT MGMT INPAT INIT DAY: CPT

## 2019-12-19 PROCEDURE — 85014 HEMATOCRIT: CPT | Performed by: PHYSICIAN ASSISTANT

## 2019-12-19 PROCEDURE — 85049 AUTOMATED PLATELET COUNT: CPT | Performed by: THORACIC SURGERY (CARDIOTHORACIC VASCULAR SURGERY)

## 2019-12-19 PROCEDURE — 33518 CABG ARTERY-VEIN TWO: CPT | Performed by: PHYSICIAN ASSISTANT

## 2019-12-19 PROCEDURE — 02100Z9 BYPASS CORONARY ARTERY, ONE ARTERY FROM LEFT INTERNAL MAMMARY, OPEN APPROACH: ICD-10-PCS | Performed by: THORACIC SURGERY (CARDIOTHORACIC VASCULAR SURGERY)

## 2019-12-19 PROCEDURE — 71045 X-RAY EXAM CHEST 1 VIEW: CPT

## 2019-12-19 PROCEDURE — 33518 CABG ARTERY-VEIN TWO: CPT | Performed by: THORACIC SURGERY (CARDIOTHORACIC VASCULAR SURGERY)

## 2019-12-19 PROCEDURE — 85018 HEMOGLOBIN: CPT | Performed by: PHYSICIAN ASSISTANT

## 2019-12-19 PROCEDURE — 93325 DOPPLER ECHO COLOR FLOW MAPG: CPT | Performed by: INTERNAL MEDICINE

## 2019-12-19 PROCEDURE — 85049 AUTOMATED PLATELET COUNT: CPT | Performed by: PHYSICIAN ASSISTANT

## 2019-12-19 PROCEDURE — 80048 BASIC METABOLIC PNL TOTAL CA: CPT | Performed by: PHYSICIAN ASSISTANT

## 2019-12-19 PROCEDURE — 93010 ELECTROCARDIOGRAM REPORT: CPT | Performed by: INTERNAL MEDICINE

## 2019-12-19 PROCEDURE — 30233N0 TRANSFUSION OF AUTOLOGOUS RED BLOOD CELLS INTO PERIPHERAL VEIN, PERCUTANEOUS APPROACH: ICD-10-PCS | Performed by: ANESTHESIOLOGY

## 2019-12-19 PROCEDURE — 84132 ASSAY OF SERUM POTASSIUM: CPT | Performed by: PHYSICIAN ASSISTANT

## 2019-12-19 PROCEDURE — 85347 COAGULATION TIME ACTIVATED: CPT

## 2019-12-19 PROCEDURE — 06BQ4ZZ EXCISION OF LEFT SAPHENOUS VEIN, PERCUTANEOUS ENDOSCOPIC APPROACH: ICD-10-PCS | Performed by: THORACIC SURGERY (CARDIOTHORACIC VASCULAR SURGERY)

## 2019-12-19 PROCEDURE — 84132 ASSAY OF SERUM POTASSIUM: CPT

## 2019-12-19 PROCEDURE — 33533 CABG ARTERIAL SINGLE: CPT | Performed by: THORACIC SURGERY (CARDIOTHORACIC VASCULAR SURGERY)

## 2019-12-19 PROCEDURE — 93312 ECHO TRANSESOPHAGEAL: CPT

## 2019-12-19 PROCEDURE — 02HV33Z INSERTION OF INFUSION DEVICE INTO SUPERIOR VENA CAVA, PERCUTANEOUS APPROACH: ICD-10-PCS | Performed by: ANESTHESIOLOGY

## 2019-12-19 PROCEDURE — 85014 HEMATOCRIT: CPT

## 2019-12-19 PROCEDURE — 33508 ENDOSCOPIC VEIN HARVEST: CPT | Performed by: THORACIC SURGERY (CARDIOTHORACIC VASCULAR SURGERY)

## 2019-12-19 PROCEDURE — 33533 CABG ARTERIAL SINGLE: CPT | Performed by: PHYSICIAN ASSISTANT

## 2019-12-19 PROCEDURE — 84295 ASSAY OF SERUM SODIUM: CPT

## 2019-12-19 PROCEDURE — 82948 REAGENT STRIP/BLOOD GLUCOSE: CPT

## 2019-12-19 PROCEDURE — 82803 BLOOD GASES ANY COMBINATION: CPT

## 2019-12-19 PROCEDURE — 5A1221Z PERFORMANCE OF CARDIAC OUTPUT, CONTINUOUS: ICD-10-PCS | Performed by: THORACIC SURGERY (CARDIOTHORACIC VASCULAR SURGERY)

## 2019-12-19 PROCEDURE — 99291 CRITICAL CARE FIRST HOUR: CPT | Performed by: NURSE PRACTITIONER

## 2019-12-19 DEVICE — MARKER CORONARY BYPASS VOSS GRAFT: Type: IMPLANTABLE DEVICE | Site: AORTA | Status: FUNCTIONAL

## 2019-12-19 RX ORDER — CEFAZOLIN SODIUM 2 G/50ML
2000 SOLUTION INTRAVENOUS ONCE
Status: DISCONTINUED | OUTPATIENT
Start: 2019-12-19 | End: 2019-12-19 | Stop reason: HOSPADM

## 2019-12-19 RX ORDER — MAGNESIUM SULFATE HEPTAHYDRATE 40 MG/ML
2 INJECTION, SOLUTION INTRAVENOUS ONCE
Status: COMPLETED | OUTPATIENT
Start: 2019-12-19 | End: 2019-12-19

## 2019-12-19 RX ORDER — SODIUM CHLORIDE 9 MG/ML
INJECTION, SOLUTION INTRAVENOUS CONTINUOUS PRN
Status: DISCONTINUED | OUTPATIENT
Start: 2019-12-19 | End: 2019-12-19 | Stop reason: SURG

## 2019-12-19 RX ORDER — ACETAMINOPHEN 650 MG/1
650 SUPPOSITORY RECTAL EVERY 4 HOURS PRN
Status: DISCONTINUED | OUTPATIENT
Start: 2019-12-19 | End: 2019-12-20

## 2019-12-19 RX ORDER — POTASSIUM CHLORIDE 14.9 MG/ML
20 INJECTION INTRAVENOUS ONCE AS NEEDED
Status: DISCONTINUED | OUTPATIENT
Start: 2019-12-19 | End: 2019-12-20

## 2019-12-19 RX ORDER — OXYCODONE HYDROCHLORIDE AND ACETAMINOPHEN 5; 325 MG/1; MG/1
1 TABLET ORAL EVERY 4 HOURS PRN
Status: DISCONTINUED | OUTPATIENT
Start: 2019-12-19 | End: 2019-12-20

## 2019-12-19 RX ORDER — ALBUMIN, HUMAN INJ 5% 5 %
12.5 SOLUTION INTRAVENOUS ONCE
Status: COMPLETED | OUTPATIENT
Start: 2019-12-19 | End: 2019-12-19

## 2019-12-19 RX ORDER — GLYCOPYRROLATE 0.2 MG/ML
INJECTION INTRAMUSCULAR; INTRAVENOUS AS NEEDED
Status: DISCONTINUED | OUTPATIENT
Start: 2019-12-19 | End: 2019-12-19 | Stop reason: SURG

## 2019-12-19 RX ORDER — FENTANYL CITRATE 50 UG/ML
50 INJECTION, SOLUTION INTRAMUSCULAR; INTRAVENOUS
Status: DISCONTINUED | OUTPATIENT
Start: 2019-12-19 | End: 2019-12-20

## 2019-12-19 RX ORDER — SODIUM CHLORIDE 450 MG/100ML
20 INJECTION, SOLUTION INTRAVENOUS CONTINUOUS
Status: DISCONTINUED | OUTPATIENT
Start: 2019-12-19 | End: 2019-12-20

## 2019-12-19 RX ORDER — ONDANSETRON 2 MG/ML
4 INJECTION INTRAMUSCULAR; INTRAVENOUS EVERY 6 HOURS PRN
Status: DISCONTINUED | OUTPATIENT
Start: 2019-12-19 | End: 2019-12-26 | Stop reason: HOSPADM

## 2019-12-19 RX ORDER — PANTOPRAZOLE SODIUM 40 MG/1
40 TABLET, DELAYED RELEASE ORAL
Status: DISCONTINUED | OUTPATIENT
Start: 2019-12-20 | End: 2019-12-21

## 2019-12-19 RX ORDER — ACETAMINOPHEN 325 MG/1
650 TABLET ORAL EVERY 4 HOURS PRN
Status: DISCONTINUED | OUTPATIENT
Start: 2019-12-19 | End: 2019-12-20

## 2019-12-19 RX ORDER — MAGNESIUM HYDROXIDE 1200 MG/15ML
LIQUID ORAL AS NEEDED
Status: DISCONTINUED | OUTPATIENT
Start: 2019-12-19 | End: 2019-12-19 | Stop reason: HOSPADM

## 2019-12-19 RX ORDER — MILRINONE LACTATE 0.2 MG/ML
0.25 INJECTION, SOLUTION INTRAVENOUS CONTINUOUS
Status: DISCONTINUED | OUTPATIENT
Start: 2019-12-19 | End: 2019-12-20

## 2019-12-19 RX ORDER — POTASSIUM CHLORIDE 14.9 MG/ML
20 INJECTION INTRAVENOUS
Status: DISCONTINUED | OUTPATIENT
Start: 2019-12-19 | End: 2019-12-20

## 2019-12-19 RX ORDER — CEFAZOLIN SODIUM 2 G/50ML
SOLUTION INTRAVENOUS AS NEEDED
Status: DISCONTINUED | OUTPATIENT
Start: 2019-12-19 | End: 2019-12-19 | Stop reason: SURG

## 2019-12-19 RX ORDER — SODIUM CHLORIDE, SODIUM LACTATE, POTASSIUM CHLORIDE, CALCIUM CHLORIDE 600; 310; 30; 20 MG/100ML; MG/100ML; MG/100ML; MG/100ML
INJECTION, SOLUTION INTRAVENOUS CONTINUOUS PRN
Status: DISCONTINUED | OUTPATIENT
Start: 2019-12-19 | End: 2019-12-19 | Stop reason: SURG

## 2019-12-19 RX ORDER — CEFAZOLIN SODIUM 2 G/50ML
2000 SOLUTION INTRAVENOUS EVERY 8 HOURS
Status: COMPLETED | OUTPATIENT
Start: 2019-12-19 | End: 2019-12-20

## 2019-12-19 RX ORDER — PROPOFOL 10 MG/ML
INJECTION, EMULSION INTRAVENOUS AS NEEDED
Status: DISCONTINUED | OUTPATIENT
Start: 2019-12-19 | End: 2019-12-19 | Stop reason: SURG

## 2019-12-19 RX ORDER — MIDAZOLAM HYDROCHLORIDE 2 MG/2ML
INJECTION, SOLUTION INTRAMUSCULAR; INTRAVENOUS AS NEEDED
Status: DISCONTINUED | OUTPATIENT
Start: 2019-12-19 | End: 2019-12-19 | Stop reason: SURG

## 2019-12-19 RX ORDER — HYDROMORPHONE HCL/PF 1 MG/ML
0.5 SYRINGE (ML) INJECTION
Status: DISCONTINUED | OUTPATIENT
Start: 2019-12-19 | End: 2019-12-20

## 2019-12-19 RX ORDER — VANCOMYCIN HYDROCHLORIDE 1 G/20ML
INJECTION, POWDER, LYOPHILIZED, FOR SOLUTION INTRAVENOUS AS NEEDED
Status: DISCONTINUED | OUTPATIENT
Start: 2019-12-19 | End: 2019-12-19 | Stop reason: HOSPADM

## 2019-12-19 RX ORDER — BISACODYL 10 MG
10 SUPPOSITORY, RECTAL RECTAL DAILY PRN
Status: DISCONTINUED | OUTPATIENT
Start: 2019-12-19 | End: 2019-12-23

## 2019-12-19 RX ORDER — FUROSEMIDE 10 MG/ML
20 INJECTION INTRAMUSCULAR; INTRAVENOUS ONCE
Status: COMPLETED | OUTPATIENT
Start: 2019-12-19 | End: 2019-12-19

## 2019-12-19 RX ORDER — PROPOFOL 10 MG/ML
INJECTION, EMULSION INTRAVENOUS
Status: COMPLETED
Start: 2019-12-19 | End: 2019-12-19

## 2019-12-19 RX ORDER — FUROSEMIDE 10 MG/ML
40 INJECTION INTRAMUSCULAR; INTRAVENOUS EVERY 6 HOURS PRN
Status: DISCONTINUED | OUTPATIENT
Start: 2019-12-19 | End: 2019-12-20

## 2019-12-19 RX ORDER — HEPARIN SODIUM 1000 [USP'U]/ML
INJECTION, SOLUTION INTRAVENOUS; SUBCUTANEOUS AS NEEDED
Status: DISCONTINUED | OUTPATIENT
Start: 2019-12-19 | End: 2019-12-19 | Stop reason: SURG

## 2019-12-19 RX ORDER — FENTANYL CITRATE 50 UG/ML
50 INJECTION, SOLUTION INTRAMUSCULAR; INTRAVENOUS ONCE
Status: COMPLETED | OUTPATIENT
Start: 2019-12-19 | End: 2019-12-19

## 2019-12-19 RX ORDER — CHLORHEXIDINE GLUCONATE 0.12 MG/ML
15 RINSE ORAL EVERY 12 HOURS SCHEDULED
Status: DISCONTINUED | OUTPATIENT
Start: 2019-12-19 | End: 2019-12-23

## 2019-12-19 RX ORDER — ATORVASTATIN CALCIUM 80 MG/1
80 TABLET, FILM COATED ORAL
Status: DISCONTINUED | OUTPATIENT
Start: 2019-12-19 | End: 2019-12-26 | Stop reason: HOSPADM

## 2019-12-19 RX ORDER — FENTANYL CITRATE 50 UG/ML
INJECTION, SOLUTION INTRAMUSCULAR; INTRAVENOUS AS NEEDED
Status: DISCONTINUED | OUTPATIENT
Start: 2019-12-19 | End: 2019-12-19 | Stop reason: SURG

## 2019-12-19 RX ORDER — OXYCODONE HYDROCHLORIDE AND ACETAMINOPHEN 5; 325 MG/1; MG/1
2 TABLET ORAL EVERY 6 HOURS PRN
Status: DISCONTINUED | OUTPATIENT
Start: 2019-12-19 | End: 2019-12-20

## 2019-12-19 RX ORDER — ASPIRIN 325 MG
325 TABLET ORAL DAILY
Status: DISCONTINUED | OUTPATIENT
Start: 2019-12-19 | End: 2019-12-26 | Stop reason: HOSPADM

## 2019-12-19 RX ORDER — LIDOCAINE HYDROCHLORIDE 10 MG/ML
INJECTION, SOLUTION EPIDURAL; INFILTRATION; INTRACAUDAL; PERINEURAL
Status: COMPLETED | OUTPATIENT
Start: 2019-12-19 | End: 2019-12-19

## 2019-12-19 RX ORDER — NEOSTIGMINE METHYLSULFATE 1 MG/ML
INJECTION INTRAVENOUS AS NEEDED
Status: DISCONTINUED | OUTPATIENT
Start: 2019-12-19 | End: 2019-12-19 | Stop reason: SURG

## 2019-12-19 RX ORDER — CALCIUM CHLORIDE 100 MG/ML
1 INJECTION INTRAVENOUS; INTRAVENTRICULAR ONCE
Status: DISCONTINUED | OUTPATIENT
Start: 2019-12-19 | End: 2019-12-20

## 2019-12-19 RX ORDER — AMIODARONE HYDROCHLORIDE 200 MG/1
200 TABLET ORAL EVERY 8 HOURS SCHEDULED
Status: DISCONTINUED | OUTPATIENT
Start: 2019-12-19 | End: 2019-12-26 | Stop reason: HOSPADM

## 2019-12-19 RX ORDER — ALBUMIN, HUMAN INJ 5% 5 %
SOLUTION INTRAVENOUS CONTINUOUS PRN
Status: DISCONTINUED | OUTPATIENT
Start: 2019-12-19 | End: 2019-12-19 | Stop reason: SURG

## 2019-12-19 RX ORDER — EPINEPHRINE 1 MG/ML
INJECTION, SOLUTION, CONCENTRATE INTRAVENOUS
Status: COMPLETED
Start: 2019-12-19 | End: 2019-12-19

## 2019-12-19 RX ORDER — POLYETHYLENE GLYCOL 3350 17 G/17G
17 POWDER, FOR SOLUTION ORAL DAILY
Status: DISCONTINUED | OUTPATIENT
Start: 2019-12-19 | End: 2019-12-26 | Stop reason: HOSPADM

## 2019-12-19 RX ORDER — HYDROMORPHONE HCL/PF 1 MG/ML
1 SYRINGE (ML) INJECTION
Status: DISCONTINUED | OUTPATIENT
Start: 2019-12-19 | End: 2019-12-20

## 2019-12-19 RX ORDER — PROTAMINE SULFATE 10 MG/ML
INJECTION, SOLUTION INTRAVENOUS AS NEEDED
Status: DISCONTINUED | OUTPATIENT
Start: 2019-12-19 | End: 2019-12-19 | Stop reason: SURG

## 2019-12-19 RX ORDER — ROCURONIUM BROMIDE 10 MG/ML
INJECTION, SOLUTION INTRAVENOUS AS NEEDED
Status: DISCONTINUED | OUTPATIENT
Start: 2019-12-19 | End: 2019-12-19 | Stop reason: SURG

## 2019-12-19 RX ADMIN — ROCURONIUM BROMIDE 50 MG: 50 INJECTION, SOLUTION INTRAVENOUS at 10:55

## 2019-12-19 RX ADMIN — FENTANYL CITRATE 500 MCG: 50 INJECTION, SOLUTION INTRAMUSCULAR; INTRAVENOUS at 08:42

## 2019-12-19 RX ADMIN — GLYCOPYRROLATE 0.8 MG: 0.2 INJECTION, SOLUTION INTRAMUSCULAR; INTRAVENOUS at 13:14

## 2019-12-19 RX ADMIN — SODIUM CHLORIDE, SODIUM LACTATE, POTASSIUM CHLORIDE, AND CALCIUM CHLORIDE 250 ML: .6; .31; .03; .02 INJECTION, SOLUTION INTRAVENOUS at 16:36

## 2019-12-19 RX ADMIN — SODIUM CHLORIDE, SODIUM LACTATE, POTASSIUM CHLORIDE, AND CALCIUM CHLORIDE: .6; .31; .03; .02 INJECTION, SOLUTION INTRAVENOUS at 08:06

## 2019-12-19 RX ADMIN — SODIUM CHLORIDE: 0.9 INJECTION, SOLUTION INTRAVENOUS at 07:35

## 2019-12-19 RX ADMIN — PHENYLEPHRINE HYDROCHLORIDE 50 MCG/MIN: 10 INJECTION INTRAVENOUS at 12:09

## 2019-12-19 RX ADMIN — MILRINONE LACTATE IN DEXTROSE 0.25 MCG/KG/MIN: 200 INJECTION, SOLUTION INTRAVENOUS at 23:22

## 2019-12-19 RX ADMIN — SODIUM CHLORIDE 20 ML/HR: 0.45 INJECTION, SOLUTION INTRAVENOUS at 16:12

## 2019-12-19 RX ADMIN — CHLORHEXIDINE GLUCONATE 0.12% ORAL RINSE 15 ML: 1.2 LIQUID ORAL at 06:24

## 2019-12-19 RX ADMIN — PROTAMINE SULFATE 330 MG: 10 INJECTION, SOLUTION INTRAVENOUS at 12:11

## 2019-12-19 RX ADMIN — DEXMEDETOMIDINE 0.3 MCG/KG/HR: 100 INJECTION, SOLUTION, CONCENTRATE INTRAVENOUS at 23:15

## 2019-12-19 RX ADMIN — MIDAZOLAM 6 MG: 1 INJECTION INTRAMUSCULAR; INTRAVENOUS at 07:49

## 2019-12-19 RX ADMIN — METOPROLOL TARTRATE 12.5 MG: 25 TABLET ORAL at 06:24

## 2019-12-19 RX ADMIN — HYDROMORPHONE HYDROCHLORIDE 1 MG: 1 INJECTION, SOLUTION INTRAMUSCULAR; INTRAVENOUS; SUBCUTANEOUS at 14:58

## 2019-12-19 RX ADMIN — ALBUMIN (HUMAN) 12.5 G: 12.5 SOLUTION INTRAVENOUS at 20:14

## 2019-12-19 RX ADMIN — SODIUM CHLORIDE 3 UNITS/HR: 9 INJECTION, SOLUTION INTRAVENOUS at 16:07

## 2019-12-19 RX ADMIN — SODIUM CHLORIDE 10 MG/HR: 0.9 INJECTION, SOLUTION INTRAVENOUS at 08:37

## 2019-12-19 RX ADMIN — CEFAZOLIN SODIUM 2000 MG: 2 SOLUTION INTRAVENOUS at 11:53

## 2019-12-19 RX ADMIN — ALBUMIN (HUMAN): 12.5 SOLUTION INTRAVENOUS at 12:28

## 2019-12-19 RX ADMIN — HYDROMORPHONE HYDROCHLORIDE 0.5 MG: 1 INJECTION, SOLUTION INTRAMUSCULAR; INTRAVENOUS; SUBCUTANEOUS at 17:06

## 2019-12-19 RX ADMIN — SODIUM CHLORIDE 2 UNITS/HR: 9 INJECTION, SOLUTION INTRAVENOUS at 11:07

## 2019-12-19 RX ADMIN — MUPIROCIN 1 APPLICATION: 20 OINTMENT TOPICAL at 06:24

## 2019-12-19 RX ADMIN — SODIUM CHLORIDE, SODIUM LACTATE, POTASSIUM CHLORIDE, AND CALCIUM CHLORIDE 250 ML: .6; .31; .03; .02 INJECTION, SOLUTION INTRAVENOUS at 17:00

## 2019-12-19 RX ADMIN — MAGNESIUM SULFATE HEPTAHYDRATE 2 G: 40 INJECTION, SOLUTION INTRAVENOUS at 16:22

## 2019-12-19 RX ADMIN — HEPARIN SODIUM 5000 UNITS: 5000 INJECTION INTRAVENOUS; SUBCUTANEOUS at 06:25

## 2019-12-19 RX ADMIN — MIDAZOLAM 4 MG: 1 INJECTION INTRAMUSCULAR; INTRAVENOUS at 07:34

## 2019-12-19 RX ADMIN — AMINOCAPROIC ACID 5 G: 250 INJECTION, SOLUTION INTRAVENOUS at 08:09

## 2019-12-19 RX ADMIN — CEFAZOLIN SODIUM 2000 MG: 2 SOLUTION INTRAVENOUS at 21:23

## 2019-12-19 RX ADMIN — CEFAZOLIN SODIUM 2000 MG: 2 SOLUTION INTRAVENOUS at 08:02

## 2019-12-19 RX ADMIN — ALBUMIN (HUMAN) 12.5 G: 12.5 SOLUTION INTRAVENOUS at 21:50

## 2019-12-19 RX ADMIN — Medication 1 APPLICATION: at 21:23

## 2019-12-19 RX ADMIN — AMINOCAPROIC ACID 1000 MG/HR: 250 INJECTION, SOLUTION INTRAVENOUS at 08:09

## 2019-12-19 RX ADMIN — LIDOCAINE HYDROCHLORIDE 0.5 ML: 10 INJECTION, SOLUTION EPIDURAL; INFILTRATION; INTRACAUDAL; PERINEURAL at 08:08

## 2019-12-19 RX ADMIN — ROCURONIUM BROMIDE 100 MG: 50 INJECTION, SOLUTION INTRAVENOUS at 07:49

## 2019-12-19 RX ADMIN — FENTANYL CITRATE 50 MCG: 50 INJECTION, SOLUTION INTRAMUSCULAR; INTRAVENOUS at 14:58

## 2019-12-19 RX ADMIN — HYDROMORPHONE HYDROCHLORIDE 0.5 MG: 1 INJECTION, SOLUTION INTRAMUSCULAR; INTRAVENOUS; SUBCUTANEOUS at 19:46

## 2019-12-19 RX ADMIN — HYDROMORPHONE HYDROCHLORIDE 0.5 MG: 1 INJECTION, SOLUTION INTRAMUSCULAR; INTRAVENOUS; SUBCUTANEOUS at 23:34

## 2019-12-19 RX ADMIN — DEXMEDETOMIDINE 1 MCG/KG/HR: 100 INJECTION, SOLUTION, CONCENTRATE INTRAVENOUS at 15:25

## 2019-12-19 RX ADMIN — HEPARIN SODIUM 5000 UNITS: 1000 INJECTION INTRAVENOUS; SUBCUTANEOUS at 09:38

## 2019-12-19 RX ADMIN — CHLORHEXIDINE GLUCONATE 0.12% ORAL RINSE 15 ML: 1.2 LIQUID ORAL at 21:23

## 2019-12-19 RX ADMIN — EPINEPHRINE 1 MG: 1 INJECTION, SOLUTION, CONCENTRATE INTRAVENOUS at 20:14

## 2019-12-19 RX ADMIN — PHENYLEPHRINE HYDROCHLORIDE 20 MCG/MIN: 10 INJECTION INTRAVENOUS at 18:44

## 2019-12-19 RX ADMIN — PROPOFOL 100 MG: 10 INJECTION, EMULSION INTRAVENOUS at 07:49

## 2019-12-19 RX ADMIN — PROPOFOL 20 MCG: 10 INJECTION, EMULSION INTRAVENOUS at 15:15

## 2019-12-19 RX ADMIN — FUROSEMIDE 20 MG: 10 INJECTION, SOLUTION INTRAMUSCULAR; INTRAVENOUS at 14:58

## 2019-12-19 RX ADMIN — FENTANYL CITRATE 50 MCG: 50 INJECTION, SOLUTION INTRAMUSCULAR; INTRAVENOUS at 14:59

## 2019-12-19 RX ADMIN — SODIUM CHLORIDE, SODIUM LACTATE, POTASSIUM CHLORIDE, AND CALCIUM CHLORIDE 500 ML: .6; .31; .03; .02 INJECTION, SOLUTION INTRAVENOUS at 18:00

## 2019-12-19 RX ADMIN — ALBUMIN (HUMAN) 12.5 G: 12.5 SOLUTION INTRAVENOUS at 15:20

## 2019-12-19 RX ADMIN — FENTANYL CITRATE 500 MCG: 50 INJECTION, SOLUTION INTRAMUSCULAR; INTRAVENOUS at 07:49

## 2019-12-19 RX ADMIN — PROPOFOL 100 MG: 10 INJECTION, EMULSION INTRAVENOUS at 08:37

## 2019-12-19 RX ADMIN — NEOSTIGMINE METHYLSULFATE 5 MG: 1 INJECTION INTRAVENOUS at 13:14

## 2019-12-19 RX ADMIN — HEPARIN SODIUM 27800 UNITS: 1000 INJECTION INTRAVENOUS; SUBCUTANEOUS at 09:53

## 2019-12-19 NOTE — ANESTHESIA PROCEDURE NOTES
Central Line Insertion  Performed by: Mi Calles MD  Authorized by: Mi Calles MD   Date/Time: 12/19/2019 7:57 AM  Catheter Type:  triple lumen  Consent: Written consent obtained    Risks and benefits: risks, benefits and alternatives were discussed  Patient identity confirmed: arm band  Indications: vascular access  Location details: right internal jugular  Catheter size: 7 Fr  Patient position: Trendelenburg    Sedation:  Patient sedated: no    Assessment: blood return through all ports,  free fluid flow,  placement verified by x-ray and no pneumothorax on x-ray  Preparation: skin prepped with 2% chlorhexidine  Skin prep agent dried: skin prep agent completely dried prior to procedure  Sterile barriers: all five maximum sterile barriers used - cap, mask, sterile gown, sterile gloves, and large sterile sheet  Hand hygiene: hand hygiene performed prior to central venous catheter insertion  Ultrasound guidance: yes  sterile gel and probe cover used in ultrasound-guided central venous catheter insertionultrasound permanent image saved  Pre-procedure: landmarks identified  Vessel of Catheter Tip End: SVC  Number of attempts: 1  Successful placement: yes  Post-procedure: chlorhexidine patch applied,  dressing applied and line sutured  Patient tolerance: Patient tolerated the procedure well with no immediate complications

## 2019-12-19 NOTE — INTERVAL H&P NOTE
H&P reviewed  After examining the patient I find no changes in the patients condition since the H&P had been written  Anticipated Length of Stay:  Patient will be admitted on an Inpatient basis with an anticipated length of stay of  greater than 2 midnights  Justification for Hospital Stay:  Post surgical recovery following open heart surgery        Vitals:    12/19/19 0232   BP: 141/71   Pulse: (!) 50   Resp: 18   Temp: 98 7 °F (37 1 °C)   SpO2: 98%

## 2019-12-19 NOTE — ANESTHESIA PROCEDURE NOTES
Procedure Performed: PHILIP Anesthesia  Start Time:  12/19/2019 8:49 AM        Preanesthesia Checklist    Patient identified, IV assessed, risks and benefits discussed, monitors and equipment assessed, procedure being performed at surgeon's request and anesthesia consent obtained  Procedure    Type of PHILIP: complete PHILIP with interpretation  Images Saved: ultrasound permanent image saved  Physician Requesting Echo: Craig Schwartz DO  Location performed: OR  Intubated  Bite block not placed  Heart visualized  Insertion of PHILIP Probe:  Atraumatic  Probe Type:  Multiplane  Modalities:  3D, color flow mapping and continuous wave Doppler  Echocardiographic and Doppler Measurements    PREPROCEDURE    LEFT VENTRICLE:  Systolic Function: normal  Ejection Fraction: 65%  Cavity size: normal  Regional Wall Motion Abnormalities: none  RIGHT VENTRICLE:  Systolic Function: normal   Cavity size normal  No hypertrophy              AORTIC VALVE:  Leaflets: normal and trileaflet  Leaflet motions normal and normal  Stenosis: none  Mean Gradient: 4 mmHg  Peak Gradient: 11 mmHg  Maximum Velocity: 2 m/s  Regurgitation: none  MITRAL VALVE:  Leaflets: normal  Leaflet Motions: normal  Regurgitation: none  Stenosis: none  TRICUSPID VALVE:  Leaflets: normal  Leaflet Motions: normal  Stenosis: none  PULMONIC VALVE:  Leaflets: normal  Regurgitation: none  Stenosis: none  ASCENDING AORTA:  Size:  normal  Dissection not present  AORTIC ARCH:  Size:  normal  dissection not present  Grade 1: normal to mild intimal thickening  DESCENDING AORTA:  Size: normal  Dissection not present  Grade 1: normal to mild intimal thickening          RIGHT ATRIUM:  Size:  normal  No spontaneous echo contrast     LEFT ATRIUM:  Size: normal  No spontaneous echo contrast     LEFT ATRIAL APPENDAGE:  Size: normal  No spontaneous echo contrast         ATRIAL SEPTUM:  Intra-atrial septal morphology: normal          VENTRICULAR SEPTUM:  Intra-ventricular septum morphology: normal          EPIAORTIC:  Plaque Thickness: 0-5 mm  OTHER FINDINGS:  Pericardium:  normal  Pleural Effusion:  none  POSTPROCEDURE    LEFT VENTRICLE: Unchanged   Systolic Function: normal  Ejection Fraction: 65 %  Regional Wall Motion Abnormalities: none  RIGHT VENTRICLE: Unchanged   Systolic Function: normal          AORTIC VALVE: Unchanged   Leaflets: native  MITRAL VALVE: Unchanged   Leaflets: native  TRICUSPID VALVE: Unchanged   Leaflets: native  ATRIA: Unchanged   Left Atrial Appendage [place]: No        AORTA: Unchanged   Dissection: Dissection not present  REMOVAL:  Probe Removal: atraumatic

## 2019-12-19 NOTE — ANESTHESIA POSTPROCEDURE EVALUATION
Post-Op Assessment Note    CV Status:  Stable  Pain Score: 0    Pain management: adequate     Mental Status:  Somnolent and unresponsive   Hydration Status:  Stable and euvolemic   PONV Controlled:  Controlled   Airway Patency:  Patent  Airway: intubated   Post Op Vitals Reviewed: Yes      Staff: Anesthesiologist           BP   132/76   Temp      Pulse 64   Resp      SpO2 99 % (12/19/19 1342)

## 2019-12-19 NOTE — PLAN OF CARE
Problem: Potential for Falls  Goal: Patient will remain free of falls  Description  INTERVENTIONS:  - Assess patient frequently for physical needs  -  Identify cognitive and physical deficits and behaviors that affect risk of falls    -  [place] fall precautions as indicated by assessment   - Educate patient/family on patient safety including physical limitations  - Instruct patient to call for assistance with activity based on assessment  - Modify environment to reduce risk of injury  - Consider OT/PT consult to assist with strengthening/mobility  Outcome: Progressing     Problem: PAIN - ADULT  Goal: Verbalizes/displays adequate comfort level or baseline comfort level  Description  Interventions:  - Encourage patient to monitor pain and request assistance  - Assess pain using appropriate pain scale  - Administer analgesics based on type and severity of pain and evaluate response  - Implement non-pharmacological measures as appropriate and evaluate response  - Consider cultural and social influences on pain and pain management  - Notify physician/advanced practitioner if interventions unsuccessful or patient reports new pain  Outcome: Progressing     Problem: INFECTION - ADULT  Goal: Absence or prevention of progression during hospitalization  Description  INTERVENTIONS:  - Assess and monitor for signs and symptoms of infection  - Monitor lab/diagnostic results  - Monitor all insertion sites, i e  indwelling lines, tubes, and drains  - Monitor endotracheal if appropriate and nasal secretions for changes in amount and color  - [place] appropriate cooling/warming therapies per order  - Administer medications as ordered  - Instruct and encourage patient and family to use good hand hygiene technique  - Identify and instruct in appropriate isolation precautions for identified infection/condition  Outcome: Progressing  Goal: Absence of fever/infection during neutropenic period  Description  INTERVENTIONS:  - Monitor WBC    Outcome: Progressing     Problem: SAFETY ADULT  Goal: Maintain or return to baseline ADL function  Description  INTERVENTIONS:  -  Assess patient's ability to carry out ADLs; assess patient's baseline for ADL function and identify physical deficits which impact ability to perform ADLs (bathing, care of mouth/teeth, toileting, grooming, dressing, etc )  - Assess/evaluate cause of self-care deficits   - Assess range of motion  - Assess patient's mobility; develop plan if impaired  - Assess patient's need for assistive devices and provide as appropriate  - Encourage maximum independence but intervene and supervise when necessary  - Involve family in performance of ADLs  - Assess for home care needs following discharge   - Consider OT consult to assist with ADL evaluation and planning for discharge  - Provide patient education as appropriate  Outcome: Progressing  Goal: Maintain or return mobility status to optimal level  Description  INTERVENTIONS:  - Assess patient's baseline mobility status (ambulation, transfers, stairs, etc )    - Identify cognitive and physical deficits and behaviors that affect mobility  - Identify mobility aids required to assist with transfers and/or ambulation (gait belt, sit-to-stand, lift, walker, cane, etc )  - [place] fall precautions as indicated by assessment  - Record patient progress and toleration of activity level on Mobility SBAR; progress patient to next Phase/Stage  - Instruct patient to call for assistance with activity based on assessment  - Consider rehabilitation consult to assist with strengthening/weightbearing, etc   Outcome: Progressing     Problem: DISCHARGE PLANNING  Goal: Discharge to home or other facility with appropriate resources  Description  INTERVENTIONS:  - Identify barriers to discharge w/patient and caregiver  - Arrange for needed discharge resources and transportation as appropriate  - Identify discharge learning needs (meds, wound care, etc )  - Arrange for interpretive services to assist at discharge as needed  - Refer to Case Management Department for coordinating discharge planning if the patient needs post-hospital services based on physician/advanced practitioner order or complex needs related to functional status, cognitive ability, or social support system  Outcome: Progressing     Problem: Knowledge Deficit  Goal: Patient/family/caregiver demonstrates understanding of disease process, treatment plan, medications, and discharge instructions  Description  Complete learning assessment and assess knowledge base    Interventions:  - Provide teaching at level of understanding  - Provide teaching via preferred learning methods  Outcome: Progressing

## 2019-12-19 NOTE — ANESTHESIA PROCEDURE NOTES
Arterial Line Insertion  Performed by: Cortez Valente MD  Authorized by: Cortez Valente MD   Consent: Written consent obtained  Risks and benefits: risks, benefits and alternatives were discussed  Consent given by: patient  Preparation: Patient was prepped and draped in the usual sterile fashion    Indications: multiple ABGs and hemodynamic monitoring  Orientation:  Right  Location: radial arterylidocaine (PF) (XYLOCAINE-MPF) 1 % infiltration, 0 5 mL  Sedation:  Patient sedated: yes  Sedatives: midazolam      Procedure Details:  Needle gauge: 20  Seldinger technique: Seldinger technique used      Post-procedure:  Post-procedure: dressing applied  Waveform: good waveform  Post-procedure CNS: normal  Patient tolerance: Patient tolerated the procedure well with no immediate complications

## 2019-12-19 NOTE — ANESTHESIA PROCEDURE NOTES
Introducer/Brinklow-Panfilo  Performed by: Francesca Pickard MD  Authorized by: Francesca Pickard MD   Date/Time: 12/19/2019 7:57 AM  Consent: Written consent obtained    Risks and benefits: risks, benefits and alternatives were discussed  Consent given by: patient  Patient identity confirmed: arm band  Indications: central pressure monitoring and vascular access  Location details: right internal jugular  Catheter size: 9 Fr  Patient position: Trendelenburg    Sedation:  Patient sedated: no    Assessment: blood return through all ports,  free fluid flow,  placement verified by x-ray and no pneumothorax on x-ray  Preparation: skin prepped with 2% chlorhexidine  Skin prep agent dried: skin prep agent completely dried prior to procedure  Sterile barriers: all five maximum sterile barriers used - cap, mask, sterile gown, sterile gloves, and large sterile sheet  Hand hygiene: hand hygiene performed prior to central venous catheter insertion  Ultrasound guidance: yes  ultrasound permanent image saved  Pre-procedure: landmarks identified  Number of attempts: 1  Successful placement: yes  Post-procedure: line sutured  Patient tolerance: Patient tolerated the procedure well with no immediate complications

## 2019-12-19 NOTE — OP NOTE
OPERATIVE REPORT  PATIENT NAME: Angel Patel    :  1960  MRN: [id number]  Pt Location: BE OR ROOM 10    SURGERY DATE: 2019    SURGEON: Luz Cabrera DO    ASSISTANT: Arti Phan PA-C    PREOPERATIVE DIAGNOSIS:  Multivessel coronary artery disease    POSTOPERATIVE DIAGNOSIS:  Multivessel coronary artery disease    NYHA Class: 2    CCS Class: 3    PROCEDURE: Coronary artery bypass grafting x 3 with left internal mammary artery to left anterior descending, saphenous vein graft to right posterior descending artery, saphenous vein graft to obtuse marginal 1  ANESTHESIA: General endotracheal anesthesia with transesophageal echocardiogram guidance, Dr Dionne Gomez: 96 minutes  CROSSCLAMP TIME: 84 minutes  PACKS/TUBES/DRAINS: Chest tubes x 3  MATERIALS: Pacing wires: A x1  V x1  TRANSFUSION: None  SPECIMENS: None  ESTIMATED BLOOD LOSS: 750 mL    OPERATIVE TECHNIQUE:    The patient was taken to the operating room and placed supine on the operating table  Following the satisfactory induction of general anesthesia and placement of monitoring lines, the patient was prepped and draped in the usual sterile fashion  A time-out procedure was performed  The patient underwent median sternotomy, LIMA harvest, endoscopic left greater saphenous vein harvest, systemic heparinization and conduit preparation  The patient underwent pericardiotomy and epiaortic ultrasound was used to evaluate the ascending aorta, which was found to be free of significant atheromatous disease  The patient underwent aortic and right atrial cannulation and was initiated on bypass  The ascending aorta was crossclamped  [place] cardioplegia was delivered with an excellent arrest     The saphenous vein was anastomosed to the right posterior descending artery in end-to-side fashion using running 7-0 Prolene suture   The saphenous vein was anastomosed to the  obtuse marginal 1in end-to-side fashion using running 7-0 Prolene suture  The left internal mammary artery was anastomosed to the left anterior descending in end-to-side fashion using running 7-0 Prolene suture  A total of two proximal anastomoses were completed on the ascending aorta in end-to-side fashion using running 6-0 Prolene suture  The heart was de-aired and the crossclamp was removed  Atrial and ventricular pacing wires were placed  Following a period of reperfusion, the patient was weaned from cardiopulmonary bypass and decannulated  Protamine was administered with normalization of the ACT  Hemostasis was confirmed in all fields  Thoracostomy tubes were placed  The sternum was closed with stainless steel wires  The fascia, subdermis and skin were closed with multiple layers of running absorbable suture  As the attending surgeon, I was present and scrubbed for all critical portions of this procedure  There was no qualified surgical resident available  Sponge, needle and instrument counts were reported as correct by the nursing staff  Final transesophageal echocardiogram demonstrated normal biventricular function  The assistance of a PA was required to complete this case, specifically for assistance with endoscopic saphenous vein harvesting, cannulation, decannulation, and construction of the bypass grafts             SIGNATURE: Adan Guzman DO  DATE: December 19, 2019  TIME: 1:18 PM

## 2019-12-19 NOTE — CONSULTS
[address] [facility] 63 y o  male MRN: [id number]  Unit/Bed#: [facility] 417-01 Encounter: [id number]      Physician Requesting Consult: Rashaun Teixeira DO    Reason for Consult / Principal Problem: Unstable angina [facility])    HPI: Gilberto Trent is a 63 y o  male who presents for scheduled CABG  He presented to [facility] on 12/12 with acute onset of chest pain  His troponin's were found to be negative but given his cardiac history, he underwent cardiac catheterization that showed multivessel disease  He therefore was transferred to [facility] for CT Surgery evaluation  Today he presents s/p CABG x 3 (LIMA to LAD, SVG to PDA, SVG to OM1)  Intraoperative course was uneventful  He received 2L of cyrstalloid and 500mL of albumin on the OR  Post op PHILIP EF 65%  He arrives to the ICU on no continuous medications  Once in the ICU, patient had PAPs 30s/20s with CVP 17  Shortly after arrival to the ICU, patient woke up agitated and restless and then became hypoxic and hypotensive with elevated filling pressures (PAP 56/33, CVP 37) with low CI (1 7)  CXR showed pulmonary vascular congestion  Vent settings increased to 100% Fio2  Given acute change in clinical status, STAT PHILIP was performed that showed no pericardial effusion but did show dilated right atrium  Otherwise Adequate EF  Dr Beltran Bales aware  Patient was given 20mg IV lasix  Hemodynamics improved to baseline quickly  Cardiopulmonary bypass time: 96 minutes  Cross clamp time: 84 minutes    Past medical history includes hyperlipidemia, CAD s/p PCI in 2017 (TAMARA to circ & RCA), tobacco use  History obtained from chart review due to patient being intubated and sedated  ---------------------------------------------------------------------------------------------------------------------------------------------------------------------  Impressions:  1  CAD s/p CABG x3  2  Acute post operative pulmonary insufficieny  3   Pulmonary vascular congestion on CXR  4  Hyperlipidemia    Plan:    Neuro: Discontinue continuous sedation  PRN fentanyl, dialudid for pain  Trend neuro exam   Delirium precautions  CV: MAP goal >65  SBP goal <130  CI>2 2  Post-op medications: None  Volume resuscitation as needed  Monitor rhythm on telemetry  Epicardial pacing wires Ax1, Vx1  Intra-op PHILIP LVEF 65%  Lung: Check STAT post-op ABG and CXR  Wean vent with spontaneous breathing trial with goal to extubate  GI: GI prophylaxis with PPI  Bowel regimen  Zofran PRN for nausea  FEN: NPO  Replete K >4 0, mag >2 0 and calcium >7 0  : Check STAT post-op BMP  Banks in place  Monitor UOP with goal >0 5cc/kg/hour  Lasix versus volume resuscitate as needed depending on hemodynamics and volume status  ID: Prophylactic post-op abx  Maintain normothermia  Trend temps  Heme: Check STAT post-op H/H and platelets  Monitor incision site, invasive lines, and chest tube outputs for bleeding  Send coag panel if needed  Endo: Insulin gtt for blood sugar control  Results from last 6 Months   Lab Units 12/14/19  0953   HEMOGLOBIN A1C % 5 6     Disposition: ICU Care   ---------------------------------------------------------------------------------------------------------------------------------------------------------------------  Historical Information   Past Medical History:   Diagnosis Date    CAD (coronary artery disease)     Family history of MI (myocardial infarction)     Former smoker     Hyperlipidemia     Irregular heart beat      Past Surgical History:   Procedure Laterality Date    CORONARY ANGIOPLASTY WITH STENT PLACEMENT  10/27/2017    TAMARA to prox   Cfx     Social History   Social History     Substance and Sexual Activity   Alcohol Use Never    Frequency: Never    Drinks per session: Patient refused    Binge frequency: Never     Social History     Substance and Sexual Activity   Drug Use No     Social History     Tobacco Use   Smoking Status Current Every Day Smoker    Packs/day: 0 50    Types: Cigarettes   Smokeless Tobacco Never Used     Family History   Family history unknown: Yes     I have reviewed this patient's family history and commented on sigificant items within the HPI    ROS: ROS unable to be obtained due to patient being intubated and sedated  Allergies: No Known Allergies    Home Medications:   Prior to Admission medications    Medication Sig Start Date End Date Taking?  Authorizing Provider   aspirin (ECOTRIN LOW STRENGTH) 81 mg EC tablet Take 1 tablet by mouth daily 10/29/17   Ramiro Vargas MD   atorvastatin (LIPITOR) 40 mg tablet Take 1 tablet (40 mg total) by mouth daily with dinner 6/21/19   Lila Godfrey MD   clopidogrel (PLAVIX) 75 mg tablet Take 1 tablet (75 mg total) by mouth daily 6/21/19   Lila Godfrey MD   cyclobenzaprine (FLEXERIL) 10 mg tablet Take 1 tablet (10 mg total) by mouth 3 (three) times a day as needed for muscle spasms 12/3/19   Cheri Trivedi PA-C   isosorbide mononitrate (IMDUR) 30 mg 24 hr tablet Take 1 tablet (30 mg total) by mouth daily 6/21/19   Lila Godfrey MD   metoprolol succinate (TOPROL-XL) 25 mg 24 hr tablet Take 1 tablet (25 mg total) by mouth daily 6/21/19   Lila Godfrey MD   naproxen (NAPROSYN) 500 mg tablet Take 1 tablet (500 mg total) by mouth every 12 (twelve) hours 6/21/19   Osiel Deluna PA-C     Inpatient Medications:  Scheduled Meds:  Current Facility-Administered Medications:  acetaminophen 650 mg Rectal Q4H PRN Arvind Oliver PA-C   acetaminophen 650 mg Oral Q4H PRN Loretta Torres PA-C   amiodarone 200 mg Oral [facility] Loretta Torres [place]   aspirin 325 mg Oral Daily Loretta Godwin PA-C   atorvastatin 80 mg Oral Daily With 5min [employer] MEGA Godwin   bisacodyl 10 mg Rectal Daily PRN Loretta Godwin PA-C   calcium chloride 1 g Intravenous Once Loretta Godwin PA-C   cefazolin 2,000 mg Intravenous Q8H Feli Hilliard PA-C   chlorhexidine 15 mL Swish & Spit Q12H [address] SanamSahil Card Seed   dexmedetomidine 0 1-0 7 mcg/kg/hr Intravenous Titrated RALPH Alarcon   fentanyl citrate (PF) 50 mcg Intravenous Q1H PRN Sanam Cleveland PA-C   furosemide 40 mg Intravenous Q6H PRN Sanam Cleveland PA-C   HYDROmorphone 0 5 mg Intravenous Q1H PRN Sanam Cleveland PA-C   HYDROmorphone 1 mg Intravenous Q1H PRN Sanam Cleveland PA-C   insulin regular (HumuLIN R,NovoLIN R) infusion 0 3-21 Units/hr Intravenous Titrated Sanam Godwin PA-C   lactated ringers 500 mL Intravenous Q30 Min PRN Sanam Godwin PA-C   lidocaine (cardiac) 100 mg Intravenous Q30 Min PRN Sanam Cleveland PA-C   magnesium sulfate 2 g Intravenous Once Chika Tran PA-C   mupirocin 1 application Nasal C84K [address] Sanam Cleveland PA-C   niCARdipine 2 5-15 mg/hr Intravenous Titrated Sanam Cleveland PA-C   ondansetron 4 mg Intravenous Q6H PRN Chika Tran PA-C   oxyCODONE-acetaminophen 1 tablet Oral Q4H PRN TAMARA Stanley-MIESHA   oxyCODONE-acetaminophen 2 tablet Oral Q6H PRN Sahil Chang   [START ON 12/20/2019] pantoprazole 40 mg Oral Early Morning Sanam Cleveland PA-C   polyethylene glycol 17 g Oral Daily Sanam Godwin PA-C   potassium chloride 20 mEq Intravenous Once PRN Sanam Godwin PA-C   potassium chloride 20 mEq Intravenous Q1H PRN Sanam Godwin PA-C   potassium chloride 20 mEq Intravenous Q30 Min PRN Sanam Godwin PA-C   propofol       sodium chloride 20 mL/hr Intravenous Continuous Sanam Godwin PA-C     Continuous Infusions:  dexmedetomidine 0 1-0 7 mcg/kg/hr   insulin regular (HumuLIN R,NovoLIN R) infusion 0 3-21 Units/hr   niCARdipine 2 5-15 mg/hr   sodium chloride 20 mL/hr     PRN Meds:    acetaminophen 650 mg Q4H PRN   acetaminophen 650 mg Q4H PRN   bisacodyl 10 mg Daily PRN   fentanyl citrate (PF) 50 mcg Q1H PRN   furosemide 40 mg Q6H PRN   HYDROmorphone 0 5 mg Q1H PRN   HYDROmorphone 1 mg Q1H PRN   lactated ringers 500 mL Q30 Min PRN   lidocaine (cardiac) 100 mg Q30 Min PRN   ondansetron 4 mg Q6H PRN   oxyCODONE-acetaminophen 1 tablet Q4H PRN   oxyCODONE-acetaminophen 2 tablet Q6H PRN   potassium chloride 20 mEq Once PRN   potassium chloride 20 mEq Q1H PRN   potassium chloride 20 mEq Q30 Min PRN     ---------------------------------------------------------------------------------------------------------------------------------------------------------------------  Vitals:   Vitals:    19 1415 19 1430 19 1437 19 1500   BP:       BP Location:       Pulse: 58 58     Resp: 14 (!) 23     Temp: 98 °F (36 7 °C)   (P) 98 °F (36 7 °C)   TempSrc: Probe   (P) Probe   SpO2: 97% 96% 96%    Weight:       Height:         Arterial Line:  Arterial Line BP: 106/62  Arterial Line MAP (mmHg): 76 mmHg    Temperature: Temp (24hrs), Av °F (36 7 °C), Min:97 7 °F (36 5 °C), Max:98 7 °F (37 1 °C)  Current: Temperature: (P) 98 °F (36 7 °C)    Weights: IBW: 66 1 kg  Body mass index is 28 56 kg/m²      Hemodynamic Monitoring:  PAP: PAP: 56/33, CVP: CVP (mean): 37 mmHg, CO: CO (L/min): 3 7 L/min, CI: CI (L/min/m2): 1 9 L/min/m2, SVR: SVR (dyne*sec)/cm5: 1361 (dyne*sec)/cm5    Ventilator Settings:  Respiratory    Lab Data (Last 4 hours)    None         O2/Vent Data (Last 4)       1342  1437  1444  1536     Vent Mode AC/VC AC/PC      Resp Rate (BPM) (BPM) 14       Vt (mL) (mL) 500       FIO2 (%) (%) 70       PEEP (cmH2O) (cmH2O) 5       MV 7 31       Resp Rate (BPM) (BPM)  18      Pressure Control (cmH2O) (cm)  16      Insp Time (sec) (sec)  0 9      FiO2 (%) (%)  100  80    PEEP (cmH2O) (cmH2O)  10 12 5     MV  7 24                Labs:   Results from last 7 days   Lab Units 19  1438 19  1401 19  1351  19  1118  19  0442   WBC Thousand/uL  --   --   --   --   --   --  7 38   HEMOGLOBIN g/dL  --   --  13 5  --   --   --  15 2   I STAT HEMOGLOBIN g/dl 12 2 13 3  --    < >  --    < >  --    HEMATOCRIT %  --   --  40 8  --   --   --  46 0   HEMATOCRIT, ISTAT % 36* 39  --    < >  --    < >  --    PLATELETS Thousands/uL  --   --  160  --  197  --  244   NEUTROS PCT %  --   --   --   --   --   --  49   MONOS PCT %  --   --   --   --   --   --  11    < > = values in this interval not displayed  Results from last 7 days   Lab Units 19  1438 19  1401 19  1351 19  1213  19  0510   SODIUM mmol/L  --   --  143  --   --  140   POTASSIUM mmol/L  --   --  4 4  --   --  3 8   CHLORIDE mmol/L  --   --  114*  --   --  109*   CO2 mmol/L  --   --  24  --   --  25   CO2, I-STAT mmol/L 25 24  --  25   < >  --    BUN mg/dL  --   --  16  --   --  22   CREATININE mg/dL  --   --  1 12  --   --  1 09   CALCIUM mg/dL  --   --  7 9*  --   --  9 0   GLUCOSE, ISTAT mg/dl 133 127  --  147*   < >  --     < > = values in this interval not displayed  Post-op AB 38/39/71/23/(-2)/94%  Post-op CXR: Widened mediastinum with pulmonary vascular congestion  Lines and tubes in adequate position  I have personally reviewed pertinent films in PACS  Post-op EKG: Normal sinus rhythm  This was personally reviewed by myself  Physical Exam   Constitutional: He appears well-developed and well-nourished  No distress  He is intubated  HENT:   Head: Normocephalic and atraumatic  Mouth/Throat: No oropharyngeal exudate  Eyes: Pupils are equal, round, and reactive to light  No scleral icterus  Neck: Normal range of motion  Neck supple  No JVD present  No thyromegaly present  R IJ TLC/swan    Cardiovascular: Normal rate  Exam reveals friction rub  Significant pericardial friction rub  AV wires  Pulmonary/Chest: Effort normal  No accessory muscle usage  He is intubated  He has decreased breath sounds  He has no wheezes  He has no rhonchi  Chest tubes with minimal dark red drainage   Abdominal: Soft  Bowel sounds are normal  He exhibits no distension  There is no tenderness  Genitourinary:   Genitourinary Comments: Banks with yellow urine  Musculoskeletal: He exhibits no edema  Neurological:   Intubated, sedated   Skin: Skin is warm and dry  Capillary refill takes less than 2 seconds  He is not diaphoretic  No erythema  Invasive lines and devices: Invasive Devices     Central Venous Catheter Line            CVC Central Lines 12/19/19 Triple less than 1 day    Introducer 12/19/19 less than 1 day          Peripheral Intravenous Line            Peripheral IV 12/15/19 Left;Proximal;Ventral (anterior) Forearm 3 days    Peripheral IV 12/19/19 Right Forearm less than 1 day          Arterial Line            Arterial Line 12/19/19 Right Radial less than 1 day          Line            Pacer Wires less than 1 day    Pacer Wires less than 1 day          Drain            Chest Tube 1 Posterior Mediastinal 24 Fr  less than 1 day    Chest Tube 2 Anterior Mediastinal 32 Fr  less than 1 day    Chest Tube 3 Left Pleural 32 Fr  less than 1 day    Urethral Catheter Non-latex; Temperature probe 16 Fr  less than 1 day          Airway            ETT  Hi-Lo; Cuffed;Oral 8 mm less than 1 day              ---------------------------------------------------------------------------------------------------------------------------------------------------------------------  Counseling / Coordination of Care  Total Critical Care time spent 32 minutes excluding procedures, teaching and family updates        SIGNATURE: RALPH Orona  DATE: December 19, 2019  TIME: 3:55 PM

## 2019-12-19 NOTE — RESPIRATORY THERAPY NOTE
RT Ventilator Management Note  Wing Emerson 63 y o  male MRN: [id number]  Unit/Bed#: [facility] 417-01 Encounter: [id number]      Daily Screen     No data found in the last 10 encounters  Physical Exam:   Assessment Type: Assess only  General Appearance: Sedated  Respiratory Pattern: Assisted  Chest Assessment: Chest expansion symmetrical  Bilateral Breath Sounds: Clear      Resp Comments: Pt arrival via OR, placed on vent as documented  Plan is to wean and extubate when awake  Weaning Fio2 as tolerated

## 2019-12-20 ENCOUNTER — APPOINTMENT (INPATIENT)
Dept: RADIOLOGY | Facility: HOSPITAL | Age: 59
DRG: 235 | End: 2019-12-20

## 2019-12-20 ENCOUNTER — APPOINTMENT (INPATIENT)
Dept: NON INVASIVE DIAGNOSTICS | Facility: HOSPITAL | Age: 59
DRG: 235 | End: 2019-12-20

## 2019-12-20 ENCOUNTER — TELEPHONE (OUTPATIENT)
Dept: CARDIOLOGY CLINIC | Facility: CLINIC | Age: 59
End: 2019-12-20

## 2019-12-20 PROBLEM — R00.1 BRADYCARDIA: Chronic | Status: ACTIVE | Noted: 2019-12-13

## 2019-12-20 PROBLEM — Z72.0 TOBACCO ABUSE: Chronic | Status: ACTIVE | Noted: 2017-10-26

## 2019-12-20 PROBLEM — J81.1 PULMONARY EDEMA: Status: ACTIVE | Noted: 2019-12-20

## 2019-12-20 PROBLEM — E78.00 PURE HYPERCHOLESTEROLEMIA: Chronic | Status: ACTIVE | Noted: 2017-10-28

## 2019-12-20 LAB
ANION GAP SERPL CALCULATED.3IONS-SCNC: 5 MMOL/L (ref 4–13)
ANION GAP SERPL CALCULATED.3IONS-SCNC: 6 MMOL/L (ref 4–13)
ARTERIAL PATENCY WRIST A: NO
ARTERIAL PATENCY WRIST A: NO
ATRIAL RATE: 57 BPM
BASE EX.OXY STD BLDV CALC-SCNC: 49.3 % (ref 60–80)
BASE EXCESS BLDA CALC-SCNC: -2.9 MMOL/L
BASE EXCESS BLDV CALC-SCNC: -2.1 MMOL/L
BUN SERPL-MCNC: 18 MG/DL (ref 5–25)
BUN SERPL-MCNC: 21 MG/DL (ref 5–25)
CALCIUM SERPL-MCNC: 8.1 MG/DL (ref 8.3–10.1)
CALCIUM SERPL-MCNC: 8.6 MG/DL (ref 8.3–10.1)
CHLORIDE SERPL-SCNC: 109 MMOL/L (ref 100–108)
CHLORIDE SERPL-SCNC: 112 MMOL/L (ref 100–108)
CO2 SERPL-SCNC: 24 MMOL/L (ref 21–32)
CO2 SERPL-SCNC: 26 MMOL/L (ref 21–32)
CREAT SERPL-MCNC: 1.28 MG/DL (ref 0.6–1.3)
CREAT SERPL-MCNC: 1.35 MG/DL (ref 0.6–1.3)
ERYTHROCYTE [DISTWIDTH] IN BLOOD BY AUTOMATED COUNT: 13 % (ref 11.6–15.1)
GFR SERPL CREATININE-BSD FRML MDRD: 57 ML/MIN/1.73SQ M
GFR SERPL CREATININE-BSD FRML MDRD: 61 ML/MIN/1.73SQ M
GLUCOSE SERPL-MCNC: 101 MG/DL (ref 65–140)
GLUCOSE SERPL-MCNC: 106 MG/DL (ref 65–140)
GLUCOSE SERPL-MCNC: 109 MG/DL (ref 65–140)
GLUCOSE SERPL-MCNC: 114 MG/DL (ref 65–140)
GLUCOSE SERPL-MCNC: 116 MG/DL (ref 65–140)
GLUCOSE SERPL-MCNC: 120 MG/DL (ref 65–140)
GLUCOSE SERPL-MCNC: 126 MG/DL (ref 65–140)
GLUCOSE SERPL-MCNC: 130 MG/DL (ref 65–140)
GLUCOSE SERPL-MCNC: 141 MG/DL (ref 65–140)
GLUCOSE SERPL-MCNC: 145 MG/DL (ref 65–140)
HCO3 BLDA-SCNC: 22.7 MMOL/L (ref 22–28)
HCO3 BLDV-SCNC: 24.4 MMOL/L (ref 24–30)
HCT VFR BLD AUTO: 36.3 % (ref 36.5–49.3)
HGB BLD-MCNC: 11.7 G/DL (ref 12–17)
HOROWITZ INDEX BLDA+IHG-RTO: 40 MM[HG]
HOROWITZ INDEX BLDA+IHG-RTO: 40 MM[HG]
I-TIME: 0.9
I-TIME: 0.9
MAGNESIUM SERPL-MCNC: 2.5 MG/DL (ref 1.6–2.6)
MCH RBC QN AUTO: 31 PG (ref 26.8–34.3)
MCHC RBC AUTO-ENTMCNC: 32.2 G/DL (ref 31.4–37.4)
MCV RBC AUTO: 96 FL (ref 82–98)
O2 CT BLDA-SCNC: 16.6 ML/DL (ref 16–23)
O2 CT BLDV-SCNC: 8.9 ML/DL
OXYHGB MFR BLDA: 93 % (ref 94–97)
P AXIS: 58 DEGREES
PCO2 BLDA: 42.5 MM HG (ref 36–44)
PCO2 BLDV: 49 MM HG (ref 42–50)
PEEP RESPIRATORY: 5 CM[H2O]
PEEP RESPIRATORY: 5 CM[H2O]
PH BLDA: 7.35 [PH] (ref 7.35–7.45)
PH BLDV: 7.32 [PH] (ref 7.3–7.4)
PLATELET # BLD AUTO: 145 THOUSANDS/UL (ref 149–390)
PMV BLD AUTO: 11 FL (ref 8.9–12.7)
PO2 BLDA: 72.7 MM HG (ref 75–129)
PO2 BLDV: 29.3 MM HG (ref 35–45)
POTASSIUM SERPL-SCNC: 4 MMOL/L (ref 3.5–5.3)
POTASSIUM SERPL-SCNC: 4.4 MMOL/L (ref 3.5–5.3)
PR INTERVAL: 150 MS
PRESSURE CONTROL: 18
PRESSURE CONTROL: 18
QRS AXIS: -5 DEGREES
QRSD INTERVAL: 83 MS
QT INTERVAL: 429 MS
QTC INTERVAL: 418 MS
RBC # BLD AUTO: 3.78 MILLION/UL (ref 3.88–5.62)
SODIUM SERPL-SCNC: 140 MMOL/L (ref 136–145)
SODIUM SERPL-SCNC: 142 MMOL/L (ref 136–145)
SPECIMEN SOURCE: ABNORMAL
T WAVE AXIS: -1 DEGREES
VENT- AC: AC
VENT- AC: AC
VENTRICULAR RATE: 57 BPM
WBC # BLD AUTO: 14.12 THOUSAND/UL (ref 4.31–10.16)

## 2019-12-20 PROCEDURE — 93308 TTE F-UP OR LMTD: CPT | Performed by: INTERNAL MEDICINE

## 2019-12-20 PROCEDURE — 84295 ASSAY OF SERUM SODIUM: CPT

## 2019-12-20 PROCEDURE — 82805 BLOOD GASES W/O2 SATURATION: CPT | Performed by: PHYSICIAN ASSISTANT

## 2019-12-20 PROCEDURE — 85027 COMPLETE CBC AUTOMATED: CPT | Performed by: PHYSICIAN ASSISTANT

## 2019-12-20 PROCEDURE — 94760 N-INVAS EAR/PLS OXIMETRY 1: CPT

## 2019-12-20 PROCEDURE — NC001 PR NO CHARGE: Performed by: INTERNAL MEDICINE

## 2019-12-20 PROCEDURE — 99024 POSTOP FOLLOW-UP VISIT: CPT | Performed by: THORACIC SURGERY (CARDIOTHORACIC VASCULAR SURGERY)

## 2019-12-20 PROCEDURE — 82947 ASSAY GLUCOSE BLOOD QUANT: CPT

## 2019-12-20 PROCEDURE — 93325 DOPPLER ECHO COLOR FLOW MAPG: CPT | Performed by: INTERNAL MEDICINE

## 2019-12-20 PROCEDURE — 80048 BASIC METABOLIC PNL TOTAL CA: CPT | Performed by: PHYSICIAN ASSISTANT

## 2019-12-20 PROCEDURE — 71045 X-RAY EXAM CHEST 1 VIEW: CPT

## 2019-12-20 PROCEDURE — 94664 DEMO&/EVAL PT USE INHALER: CPT

## 2019-12-20 PROCEDURE — 85014 HEMATOCRIT: CPT

## 2019-12-20 PROCEDURE — 94640 AIRWAY INHALATION TREATMENT: CPT

## 2019-12-20 PROCEDURE — 99291 CRITICAL CARE FIRST HOUR: CPT | Performed by: PHYSICIAN ASSISTANT

## 2019-12-20 PROCEDURE — 94660 CPAP INITIATION&MGMT: CPT

## 2019-12-20 PROCEDURE — 83735 ASSAY OF MAGNESIUM: CPT | Performed by: PHYSICIAN ASSISTANT

## 2019-12-20 PROCEDURE — 82330 ASSAY OF CALCIUM: CPT

## 2019-12-20 PROCEDURE — 82948 REAGENT STRIP/BLOOD GLUCOSE: CPT

## 2019-12-20 PROCEDURE — 82803 BLOOD GASES ANY COMBINATION: CPT

## 2019-12-20 PROCEDURE — 93010 ELECTROCARDIOGRAM REPORT: CPT | Performed by: INTERNAL MEDICINE

## 2019-12-20 PROCEDURE — 93005 ELECTROCARDIOGRAM TRACING: CPT

## 2019-12-20 PROCEDURE — 93308 TTE F-UP OR LMTD: CPT

## 2019-12-20 PROCEDURE — 93321 DOPPLER ECHO F-UP/LMTD STD: CPT | Performed by: INTERNAL MEDICINE

## 2019-12-20 PROCEDURE — 84132 ASSAY OF SERUM POTASSIUM: CPT

## 2019-12-20 RX ORDER — FUROSEMIDE 10 MG/ML
40 INJECTION INTRAMUSCULAR; INTRAVENOUS
Status: DISCONTINUED | OUTPATIENT
Start: 2019-12-21 | End: 2019-12-23

## 2019-12-20 RX ORDER — TEMAZEPAM 15 MG/1
15 CAPSULE ORAL
Status: DISCONTINUED | OUTPATIENT
Start: 2019-12-20 | End: 2019-12-26 | Stop reason: HOSPADM

## 2019-12-20 RX ORDER — FUROSEMIDE 10 MG/ML
40 INJECTION INTRAMUSCULAR; INTRAVENOUS
Status: DISCONTINUED | OUTPATIENT
Start: 2019-12-20 | End: 2019-12-20

## 2019-12-20 RX ORDER — FUROSEMIDE 10 MG/ML
20 INJECTION INTRAMUSCULAR; INTRAVENOUS ONCE
Status: COMPLETED | OUTPATIENT
Start: 2019-12-20 | End: 2019-12-20

## 2019-12-20 RX ORDER — FUROSEMIDE 10 MG/ML
40 INJECTION INTRAMUSCULAR; INTRAVENOUS ONCE
Status: COMPLETED | OUTPATIENT
Start: 2019-12-20 | End: 2019-12-20

## 2019-12-20 RX ORDER — OXYCODONE HYDROCHLORIDE 5 MG/1
5 TABLET ORAL EVERY 4 HOURS PRN
Status: DISCONTINUED | OUTPATIENT
Start: 2019-12-20 | End: 2019-12-22

## 2019-12-20 RX ORDER — HYDROMORPHONE HCL/PF 1 MG/ML
1 SYRINGE (ML) INJECTION EVERY 2 HOUR PRN
Status: DISCONTINUED | OUTPATIENT
Start: 2019-12-20 | End: 2019-12-20

## 2019-12-20 RX ORDER — ALBUTEROL SULFATE 2.5 MG/3ML
2.5 SOLUTION RESPIRATORY (INHALATION) EVERY 4 HOURS PRN
Status: DISCONTINUED | OUTPATIENT
Start: 2019-12-20 | End: 2019-12-26 | Stop reason: HOSPADM

## 2019-12-20 RX ORDER — HYDROMORPHONE HCL/PF 1 MG/ML
1 SYRINGE (ML) INJECTION
Status: DISCONTINUED | OUTPATIENT
Start: 2019-12-20 | End: 2019-12-21

## 2019-12-20 RX ORDER — KETOROLAC TROMETHAMINE 30 MG/ML
15 INJECTION, SOLUTION INTRAMUSCULAR; INTRAVENOUS ONCE
Status: COMPLETED | OUTPATIENT
Start: 2019-12-20 | End: 2019-12-20

## 2019-12-20 RX ORDER — DOCUSATE SODIUM 100 MG/1
100 CAPSULE, LIQUID FILLED ORAL 2 TIMES DAILY
Status: DISCONTINUED | OUTPATIENT
Start: 2019-12-20 | End: 2019-12-23

## 2019-12-20 RX ORDER — POTASSIUM CHLORIDE 20 MEQ/1
20 TABLET, EXTENDED RELEASE ORAL 2 TIMES DAILY
Status: DISCONTINUED | OUTPATIENT
Start: 2019-12-20 | End: 2019-12-20

## 2019-12-20 RX ORDER — HYDROMORPHONE HCL/PF 1 MG/ML
0.5 SYRINGE (ML) INJECTION EVERY 2 HOUR PRN
Status: DISCONTINUED | OUTPATIENT
Start: 2019-12-20 | End: 2019-12-20

## 2019-12-20 RX ORDER — FONDAPARINUX SODIUM 2.5 MG/.5ML
2.5 INJECTION SUBCUTANEOUS EVERY 24 HOURS
Status: DISCONTINUED | OUTPATIENT
Start: 2019-12-20 | End: 2019-12-24

## 2019-12-20 RX ORDER — LEVALBUTEROL 1.25 MG/.5ML
1.25 SOLUTION, CONCENTRATE RESPIRATORY (INHALATION)
Status: DISCONTINUED | OUTPATIENT
Start: 2019-12-20 | End: 2019-12-26

## 2019-12-20 RX ORDER — POTASSIUM CHLORIDE 20 MEQ/1
20 TABLET, EXTENDED RELEASE ORAL
Status: DISCONTINUED | OUTPATIENT
Start: 2019-12-20 | End: 2019-12-20

## 2019-12-20 RX ORDER — POTASSIUM CHLORIDE 20 MEQ/1
20 TABLET, EXTENDED RELEASE ORAL 2 TIMES DAILY
Status: DISCONTINUED | OUTPATIENT
Start: 2019-12-21 | End: 2019-12-26 | Stop reason: HOSPADM

## 2019-12-20 RX ORDER — HYDROMORPHONE HCL/PF 1 MG/ML
0.5 SYRINGE (ML) INJECTION ONCE
Status: COMPLETED | OUTPATIENT
Start: 2019-12-20 | End: 2019-12-20

## 2019-12-20 RX ORDER — OXYCODONE HYDROCHLORIDE 10 MG/1
10 TABLET ORAL EVERY 4 HOURS PRN
Status: DISCONTINUED | OUTPATIENT
Start: 2019-12-20 | End: 2019-12-22

## 2019-12-20 RX ORDER — ACETAMINOPHEN 325 MG/1
975 TABLET ORAL EVERY 8 HOURS SCHEDULED
Status: DISCONTINUED | OUTPATIENT
Start: 2019-12-20 | End: 2019-12-21 | Stop reason: SDUPTHER

## 2019-12-20 RX ADMIN — HYDROMORPHONE HYDROCHLORIDE 1 MG: 1 INJECTION, SOLUTION INTRAMUSCULAR; INTRAVENOUS; SUBCUTANEOUS at 23:22

## 2019-12-20 RX ADMIN — CEFAZOLIN SODIUM 2000 MG: 2 SOLUTION INTRAVENOUS at 13:19

## 2019-12-20 RX ADMIN — FUROSEMIDE 40 MG: 10 INJECTION, SOLUTION INTRAMUSCULAR; INTRAVENOUS at 06:28

## 2019-12-20 RX ADMIN — Medication 1 APPLICATION: at 22:07

## 2019-12-20 RX ADMIN — AMIODARONE HYDROCHLORIDE 200 MG: 200 TABLET ORAL at 22:07

## 2019-12-20 RX ADMIN — OXYCODONE HYDROCHLORIDE 10 MG: 10 TABLET ORAL at 18:12

## 2019-12-20 RX ADMIN — DEXMEDETOMIDINE 0.3 MCG/KG/HR: 100 INJECTION, SOLUTION, CONCENTRATE INTRAVENOUS at 23:39

## 2019-12-20 RX ADMIN — ASPIRIN 325 MG ORAL TABLET 325 MG: 325 PILL ORAL at 09:53

## 2019-12-20 RX ADMIN — ALBUTEROL SULFATE 2.5 MG: 2.5 SOLUTION RESPIRATORY (INHALATION) at 18:57

## 2019-12-20 RX ADMIN — HYDROMORPHONE HYDROCHLORIDE 0.5 MG: 1 INJECTION, SOLUTION INTRAMUSCULAR; INTRAVENOUS; SUBCUTANEOUS at 04:57

## 2019-12-20 RX ADMIN — TEMAZEPAM 15 MG: 15 CAPSULE ORAL at 22:07

## 2019-12-20 RX ADMIN — HYDROMORPHONE HYDROCHLORIDE 1 MG: 1 INJECTION, SOLUTION INTRAMUSCULAR; INTRAVENOUS; SUBCUTANEOUS at 22:06

## 2019-12-20 RX ADMIN — POLYETHYLENE GLYCOL 3350 17 G: 17 POWDER, FOR SOLUTION ORAL at 09:54

## 2019-12-20 RX ADMIN — Medication 1 APPLICATION: at 09:53

## 2019-12-20 RX ADMIN — FUROSEMIDE 40 MG: 10 INJECTION, SOLUTION INTRAMUSCULAR; INTRAVENOUS at 13:19

## 2019-12-20 RX ADMIN — HYDROMORPHONE HYDROCHLORIDE 0.5 MG: 1 INJECTION, SOLUTION INTRAMUSCULAR; INTRAVENOUS; SUBCUTANEOUS at 01:11

## 2019-12-20 RX ADMIN — KETOROLAC TROMETHAMINE 15 MG: 30 INJECTION, SOLUTION INTRAMUSCULAR at 18:40

## 2019-12-20 RX ADMIN — ATORVASTATIN CALCIUM 80 MG: 80 TABLET, FILM COATED ORAL at 17:51

## 2019-12-20 RX ADMIN — DOCUSATE SODIUM 100 MG: 100 CAPSULE, LIQUID FILLED ORAL at 09:52

## 2019-12-20 RX ADMIN — HYDROMORPHONE HYDROCHLORIDE 1 MG: 1 INJECTION, SOLUTION INTRAMUSCULAR; INTRAVENOUS; SUBCUTANEOUS at 20:57

## 2019-12-20 RX ADMIN — HYDROMORPHONE HYDROCHLORIDE 0.5 MG: 1 INJECTION, SOLUTION INTRAMUSCULAR; INTRAVENOUS; SUBCUTANEOUS at 17:51

## 2019-12-20 RX ADMIN — ACETAMINOPHEN 975 MG: 325 TABLET ORAL at 09:55

## 2019-12-20 RX ADMIN — OXYCODONE HYDROCHLORIDE 10 MG: 10 TABLET ORAL at 13:52

## 2019-12-20 RX ADMIN — IPRATROPIUM BROMIDE 0.5 MG: 0.5 SOLUTION RESPIRATORY (INHALATION) at 20:15

## 2019-12-20 RX ADMIN — FONDAPARINUX SODIUM 2.5 MG: 2.5 INJECTION, SOLUTION SUBCUTANEOUS at 09:54

## 2019-12-20 RX ADMIN — SODIUM CHLORIDE 4 MG/HR: 0.9 INJECTION, SOLUTION INTRAVENOUS at 17:54

## 2019-12-20 RX ADMIN — OXYCODONE HYDROCHLORIDE 5 MG: 5 TABLET ORAL at 09:53

## 2019-12-20 RX ADMIN — LEVALBUTEROL HYDROCHLORIDE 1.25 MG: 1.25 SOLUTION, CONCENTRATE RESPIRATORY (INHALATION) at 20:15

## 2019-12-20 RX ADMIN — HYDROMORPHONE HYDROCHLORIDE 0.5 MG: 1 INJECTION, SOLUTION INTRAMUSCULAR; INTRAVENOUS; SUBCUTANEOUS at 03:15

## 2019-12-20 RX ADMIN — CHLORHEXIDINE GLUCONATE 0.12% ORAL RINSE 15 ML: 1.2 LIQUID ORAL at 09:54

## 2019-12-20 RX ADMIN — HYDROMORPHONE HYDROCHLORIDE 1 MG: 1 INJECTION, SOLUTION INTRAMUSCULAR; INTRAVENOUS; SUBCUTANEOUS at 19:01

## 2019-12-20 RX ADMIN — AMIODARONE HYDROCHLORIDE 200 MG: 200 TABLET ORAL at 14:00

## 2019-12-20 RX ADMIN — PERFLUTREN 0.8 ML/MIN: 6.52 INJECTION, SUSPENSION INTRAVENOUS at 14:45

## 2019-12-20 RX ADMIN — HYDROMORPHONE HYDROCHLORIDE 0.5 MG: 1 INJECTION, SOLUTION INTRAMUSCULAR; INTRAVENOUS; SUBCUTANEOUS at 18:24

## 2019-12-20 RX ADMIN — CEFAZOLIN SODIUM 2000 MG: 2 SOLUTION INTRAVENOUS at 04:18

## 2019-12-20 RX ADMIN — SODIUM CHLORIDE 6 MG/HR: 0.9 INJECTION, SOLUTION INTRAVENOUS at 23:00

## 2019-12-20 RX ADMIN — HYDROMORPHONE HYDROCHLORIDE 0.5 MG: 1 INJECTION, SOLUTION INTRAMUSCULAR; INTRAVENOUS; SUBCUTANEOUS at 07:29

## 2019-12-20 RX ADMIN — HYDROMORPHONE HYDROCHLORIDE 0.5 MG: 1 INJECTION, SOLUTION INTRAMUSCULAR; INTRAVENOUS; SUBCUTANEOUS at 14:39

## 2019-12-20 RX ADMIN — HYDROMORPHONE HYDROCHLORIDE 1 MG: 1 INJECTION, SOLUTION INTRAMUSCULAR; INTRAVENOUS; SUBCUTANEOUS at 23:10

## 2019-12-20 RX ADMIN — ACETAMINOPHEN 975 MG: 325 TABLET ORAL at 14:00

## 2019-12-20 RX ADMIN — FUROSEMIDE 20 MG: 10 INJECTION, SOLUTION INTRAMUSCULAR; INTRAVENOUS at 02:18

## 2019-12-20 RX ADMIN — CHLORHEXIDINE GLUCONATE 0.12% ORAL RINSE 15 ML: 1.2 LIQUID ORAL at 22:07

## 2019-12-20 RX ADMIN — DOCUSATE SODIUM 100 MG: 100 CAPSULE, LIQUID FILLED ORAL at 17:51

## 2019-12-20 RX ADMIN — OXYCODONE HYDROCHLORIDE 10 MG: 10 TABLET ORAL at 22:07

## 2019-12-20 RX ADMIN — FENTANYL CITRATE 50 MCG: 50 INJECTION, SOLUTION INTRAMUSCULAR; INTRAVENOUS at 04:57

## 2019-12-20 RX ADMIN — POTASSIUM CHLORIDE 20 MEQ: 1500 TABLET, EXTENDED RELEASE ORAL at 09:52

## 2019-12-20 RX ADMIN — ACETAMINOPHEN 975 MG: 325 TABLET ORAL at 22:06

## 2019-12-20 NOTE — PROGRESS NOTES
Patient visited with  was anointed and a blessing provided       12/20/19 1100   Clinical Encounter Type   Visited With Patient and family together   Routine Visit Introduction   Presybeterian Encounters   Presybeterian Needs Prayer   Sacramental Encounters   Sacrament of Sick-Anointing Anointed

## 2019-12-20 NOTE — RESPIRATORY THERAPY NOTE
RT Ventilator Management Note  Juni Ibrahim 63 y o  male MRN: [id number]  Unit/Bed#: [facility] 417-01 Encounter: [id number]      Daily Screen     No data found in the last 10 encounters  Physical Exam:   Assessment Type: Assess only  General Appearance: Sedated  Respiratory Pattern: Assisted  Chest Assessment: Chest expansion symmetrical  Bilateral Breath Sounds: Clear  O2 Device: vent      Resp Comments: pt remained on AC/PC mode/settings during the night  Pressure settings changed per ABG results   No significant issues during the night on the vent

## 2019-12-20 NOTE — PROGRESS NOTES
Progress Note - Cardiothoracic Surgery   [facility] 63 y o  male MRN: [id number]  Unit/Bed#: [facility] 417-01 Encounter: [id number]      POD # 1 s/p CABG    Pt seen/examined  Interval history and data reviewed with critical care team   Pt doing well  No specific complaints  Low CI, started on Milrinone, better now  Elevated filling pressures      Medications:   Scheduled Meds:  Current Facility-Administered Medications:  acetaminophen 975 mg Oral [facility] Belle Eller PA-C    amiodarone 200 mg Oral Q8H [facility] Feli Alexandrea [place], [place]    aspirin 325 mg Oral Daily [facility] MEGA Cleveland    atorvastatin 80 mg Oral Daily With [employer] MEGA Godwin    bisacodyl 10 mg Rectal Daily PRN [facility] MEGA Cleveland    cefazolin 2,000 mg Intravenous Q8H [place], [place] Last Rate: 2,000 mg (12/20/19 8948)   chlorhexidine 15 mL Swish & Spit Q12H [place], [place]    fondaparinux 2 5 mg Subcutaneous Q24H Belle Eller PA-C    furosemide 40 mg Intravenous BID (diuretic) Belle Eller PA-C    lidocaine (cardiac) 100 mg Intravenous Q30 Min PRN [facility] MEGA Godwin    [facility]) infusion 0 25 mcg/kg/min Intravenous Continuous Mela Fournier PA-C Last Rate: 0 25 mcg/kg/min (12/19/19 2322)   mupirocin 1 application Nasal V21S [facility] MEGA Cleveland    ondansetron 4 mg Intravenous Q6H PRN Jimmy Banner MEGA Cleveland    oxyCODONE 5 mg Oral Q4H PRN Belle Eller PA-C    Or        oxyCODONE 10 mg Oral Q4H PRN Belle Eller PA-C    pantoprazole 40 mg Oral Early Morning Jimmy Banner MEGA Cleveland    polyethylene glycol 17 g Oral Daily Jimmytaylor Godwin PA-C      Continuous Infusions:  [facility]) infusion 0 25 mcg/kg/min Last Rate: 0 25 mcg/kg/min (12/19/19 6965)     PRN Meds: bisacodyl    lidocaine (cardiac)    ondansetron    oxyCODONE **OR** oxyCODONE    Vitals: Blood pressure 102/59, pulse (!) 54, temperature 99 °F (37 2 °C), temperature source Probe, resp   rate 18, height 5' 7" (1 702 m), weight 82 7 kg (182 lb 5 1 oz), SpO2 97 %  ,Body mass index is 28 56 kg/m²  I/O last 24 hours: In: 5453 9 [I V :2478 9; IV Piggyback:2300]  Out: 2714 [Urine:3155; Blood:675; Chest Tube:385]  Invasive Devices     Central Venous Catheter Line            CVC Central Lines 12/19/19 Triple less than 1 day    Introducer 12/19/19 less than 1 day          Peripheral Intravenous Line            Peripheral IV 12/15/19 Left;Proximal;Ventral (anterior) Forearm 4 days    Peripheral IV 12/19/19 Right Forearm 1 day          Arterial Line            Arterial Line 12/19/19 Right Radial less than 1 day          Line            Pacer Wires less than 1 day    Pacer Wires less than 1 day          Drain            Urethral Catheter Non-latex; Temperature probe 16 Fr  1 day    Chest Tube 1 Posterior Mediastinal 24 Fr  less than 1 day    Chest Tube 2 Anterior Mediastinal 32 Fr  less than 1 day    Chest Tube 3 Left Pleural 32 Fr  less than 1 day          Airway            ETT  Hi-Lo; Cuffed;Oral 8 mm 1 day                  Lab, Imaging and other studies:   Results from last 7 days   Lab Units 12/20/19  0425 12/19/19  2118 12/19/19  1438  12/19/19  1351  12/19/19  1118  12/18/19  0442   WBC Thousand/uL 14 12*  --   --   --   --   --   --   --  7 38   HEMOGLOBIN g/dL 11 7* 12 2  --   --  13 5  --   --   --  15 2   I STAT HEMOGLOBIN g/dl  --   --  12 2   < >  --    < >  --    < >  --    HEMATOCRIT % 36 3* 36 9  --   --  40 8  --   --   --  46 0   HEMATOCRIT, ISTAT %  --   --  36*   < >  --    < >  --    < >  --    PLATELETS Thousands/uL 145*  --   --   --  160  --  197  --  244    < > = values in this interval not displayed       Results from last 7 days   Lab Units 12/20/19  0425 12/19/19  1731 12/19/19  1438 12/19/19  1401 12/19/19  1351  12/16/19  0510   POTASSIUM mmol/L 4 4 4 9  --   --  4 4  --  3 8   CHLORIDE mmol/L 112*  --   --   --  114*  --  109*   CO2 mmol/L 24  --   --   --  24  --  25   CO2, I-STAT mmol/L  -- --  25 24  --    < >  --    BUN mg/dL 18  --   --   --  16  --  22   CREATININE mg/dL 1 28  --   --   --  1 12  --  1 09   GLUCOSE, ISTAT mg/dl  --   --  133 127  --    < >  --    CALCIUM mg/dL 8 1*  --   --   --  7 9*  --  9 0    < > = values in this interval not displayed  Recent Labs     12/20/19  0425   PHART 7 346*   [id number] 22 7   PO2ART 72 7*   [id number] 42 5   BEART -2 9           Plan:    Keep Langley/Cordis/Miracle/Banks  Continue chest tubes, pacing wires  Continue Milrinone  Limited TTE  Ambulate  Incentive spirometry  Diuresis  PO ASA/Statin/B blocker          SIGNATURE: Jesenia Patel MD  DATE: December 20, 2019  TIME: 7:56 AM

## 2019-12-20 NOTE — PROGRESS NOTES
Progress Note - Critical Care   Tesfaye Ward 63 y o  male MRN: [id number]  Unit/Bed#: [facility] 417-01 Encounter: [id number]      Attending Physician: Jarrell Rasheed DO    24 Hour Events/HPI: POD # 1 s/p CABG x 3  Bradycardia overnight  EPW not capturing  Milrinone started for low CI and low SVO2  Low UOP/high CVP= lasix x 3 doses  Trouble weaning vent due to agitation  Maintained on precedex  ROS: Review of Systems   Unable to perform ROS: Intubated     ---------------------------------------------------------------------------------------------------------------------------------------------------------------------  Impressions:  1  CAD s/p CABG x3  2  Acute post-op pulm insufficiency  3  Pulmonary edema and volume overload  4  HLD  5  Bradycardia    Plan:    Neuro:   · Sedation plan: precedex  · RASS goal: 0 Alert and Calm  · Pain controlled with: percocet PRN  · Regulate sleep/wake cycle  · Delirium precautions  · CAM-ICU daily  · Trend neuro exam    CV:   · Cardiac infusions: Primacor, 0 25 mcg/kg/min, Neosynephrine, 20 mcg/min  · Wean both down as able as able  · MAP goal > 65 and CI >2 2  · Keep Salina Jonah catheter  · Keep Arterial line  · Rhythm: Sinus Bradycardia  · Follow rhythm on telemetry  · Hold BB today  · Maintain epicardial pacing wires for pacing PRN   · STAT ECHO    Lung:   · Acute respiratory failure; Requiring Intubation with ventilator support  Secondary to poor inspiratory effort secondary to pain  Continue incentive spirometry/coughing/deep breathing exercises    Wean supplemental oxygen as tolerated for saturation > 90%  · SBT plan: attempting this AM  · Wean vent as tolerated  · Chlorhexidine ordered: yes  · Chest tube output remains persistently high; Continue chest tubes to suction today    GI:   · Continue PPI for stress ulcer prophylaxis  · Continue bowel regimen  · Trend abdominal exam and bowel function    FEN:   · Diuretic plan: Lasix 40 mg IV q BID  · K-dur 20 mEQ PO q BID  · Nutrition/diet plan: PO diet once extubated  · Replete electrolytes with goals: K >4 0, Mag >2 0, and Phos >3 0    :   · Indwelling Banks present: yes   · Keep Banks  · Trend UOP and BUN/creat  · Strict I and O    ID:   · Trend temps and WBC count  · Maintain normothermia    Heme:   · Trend hgb and plts    Endo:   · Glycemic control plan: No history of diabetes: Glucose well-controlled   Discontinue continuous insulin infusion and add Insulin sliding scale coverage    MSK/Skin:  · Mobility goal: OOB and ambulating when able  · PT consult: yes  · OT consult: yes  · Frequent turning and pressure off-loading  · Local wound care as needed    Disposition:  Continue ICU care  ---------------------------------------------------------------------------------------------------------------------------------------------------------------------  Allergies: No Known Allergies  Medications:   Scheduled Meds:    Current Facility-Administered Medications:  acetaminophen 650 mg Rectal Q4H PRN Karlene Ness PA-C    acetaminophen 650 mg Oral Q4H PRN [facility]al [facility]    [facility]arone 200 [facility]    aspirin 325 mg Oral Daily Cristal Epps PA-C    atorvastatin 80 mg Oral Daily [employer] MEGA Godwin    bisacodyl 10 mg Rectal Daily PRN [employer] Godwin PA-C    calcium chloride 1 g Intravenous Once Cristal Godwin PA-C    cefazolin 2,000 mg Intravenous Q8H CristalDonavan Salazar Last Rate: 2,000 mg (12/20/19 0418)   chlorhexidine 15 mL Swish [employer] Spit Q12H [facility] Donavan Neumann    dexmedetomidine 0 1-0 7 mcg/kg/hr Intravenous Titrated RLAPH Velasco Last Rate: 0 4 mcg/kg/hr (12/20/19 0300)   fentanyl citrate (PF) 50 mcg Intravenous Q1H PRN Karlene Ness PA-C    fondaparinux 2 5 mg Subcutaneous Q24H [place]er PA-C    furosemide 40 mg Intravenous Q6H PRN [facility]    furosemide 40 mg Intravenous Once Anderson Viramontes PA-C HYDROmorphone 0 5 mg Intravenous Q1H PRN Anderson Ruvalcaba PA-C    HYDROmorphone 1 mg Intravenous Q1H PRN Anderson Ruvalcaba PA-C    insulin regular (HumuLIN R,NovoLIN R) infusion 0 3-21 Units/hr Intravenous Titrated Anderson Ruvalcaba, PA-C Last Rate: 0 5 Units/hr (12/19/19 1800)   lactated ringers 500 mL Intravenous Q30 Min PRN Hellen Barraza PA-C Last Rate: Stopped (12/19/19 1832)   lidocaine (cardiac) 100 mg Intravenous Q30 Min PRN TAMARA You-C    milrinone [facility]) infusion 0 25 mcg/kg/min Intravenous Continuous Heidimarie I TAMARA Fournier-MIESHA Last Rate: 0 25 mcg/kg/min (12/19/19 2322)   mupirocin 1 application Nasal K68C [address] Anderson Ruvalcaba PA-C    niCARdipine 2 5-15 mg/hr Intravenous Titrated Anderson Ruvalcaba, PA-C Last Rate: Stopped (12/19/19 1611)   ondansetron 4 mg Intravenous Q6H PRN Hellen Bealec, PA-C    oxyCODONE-acetaminophen 1 tablet Oral Q4H PRN Hellen Bevel, PA-C    oxyCODONE-acetaminophen 2 tablet Oral Q6H PRN Anderson Ruvalcaba, PA-C    pantoprazole 40 mg Oral Early Morning Anderson Ruvalcaba PA-C    phenylephine  mcg/min Intravenous Titrated Phoebe Spickard, CRNP Last Rate: 80 mcg/min (12/20/19 0445)   polyethylene glycol 17 g Oral Daily TAMARA You-C    potassium chloride 20 mEq Intravenous Once PRN TAMARA You-C    potassium chloride 20 mEq Intravenous Q1H PRN TAMARA You-C    potassium chloride 20 mEq Intravenous Q30 Min PRN TAMARA You-C    sodium chloride 20 mL/hr Intravenous Continuous Hellen Bealec, PA-C Last Rate: 20 mL/hr (12/19/19 1612)     VTE Pharmacologic Prophylaxis: Fondaparinux (Arixtra)  VTE Mechanical Prophylaxis: sequential compression device    Continuous Infusions:  dexmedetomidine 0 1-0 7 mcg/kg/hr Last Rate: 0 4 mcg/kg/hr (12/20/19 0300)   insulin regular (HumuLIN R,NovoLIN R) infusion 0 3-21 Units/hr Last Rate: 0 5 Units/hr (12/19/19 1800)   milrinone (PRIMACOR) infusion 0 25 mcg/kg/min Last Rate: 0 25 mcg/kg/min (12/19/19 2322)   niCARdipine 2 5-15 mg/hr Last Rate: Stopped (12/19/19 1611)   phenylephine  mcg/min Last Rate: 80 mcg/min (12/20/19 6305)   sodium chloride 20 mL/hr Last Rate: 20 mL/hr (12/19/19 1612)     PRN Meds:    acetaminophen 650 mg Q4H PRN   acetaminophen 650 mg Q4H PRN   bisacodyl 10 mg Daily PRN   fentanyl citrate (PF) 50 mcg Q1H PRN   furosemide 40 mg Q6H PRN   HYDROmorphone 0 5 mg Q1H PRN   HYDROmorphone 1 mg Q1H PRN   lactated ringers 500 mL Q30 Min PRN   lidocaine (cardiac) 100 mg Q30 Min PRN   ondansetron 4 mg Q6H PRN   oxyCODONE-acetaminophen 1 tablet Q4H PRN   oxyCODONE-acetaminophen 2 tablet Q6H PRN   potassium chloride 20 mEq Once PRN   potassium chloride 20 mEq Q1H PRN   potassium chloride 20 mEq Q30 Min PRN     Home Medications:   Prior to Admission medications    Medication Sig Start Date End Date Taking?  Authorizing Provider   aspirin (ECOTRIN LOW STRENGTH) 81 mg EC tablet Take 1 tablet by mouth daily 10/29/17   Tiarra Luevano MD   atorvastatin (LIPITOR) 40 mg tablet Take 1 tablet (40 mg total) by mouth daily with dinner 6/21/19   Rosa M Zelaya MD   clopidogrel (PLAVIX) 75 mg tablet Take 1 tablet (75 mg total) by mouth daily 6/21/19   Rosa M Zelaya MD   cyclobenzaprine (FLEXERIL) 10 mg tablet Take 1 tablet (10 mg total) by mouth 3 (three) times a day as needed for muscle spasms 12/3/19   Claudean Sine, PA-C   isosorbide mononitrate (IMDUR) 30 mg 24 hr tablet Take 1 tablet (30 mg total) by mouth daily 6/21/19   Rosa M Zelaya MD   metoprolol succinate (TOPROL-XL) 25 mg 24 hr tablet Take 1 tablet (25 mg total) by mouth daily 6/21/19   Rosa M Zelaya MD   naproxen (NAPROSYN) 500 mg tablet Take 1 tablet (500 mg total) by mouth every 12 (twelve) hours 6/21/19   Kathi Giron PA-C ---------------------------------------------------------------------------------------------------------------------------------------------------------------------  Vitals:   Vitals:    19 0300 19 0343 19 0400 19 0500   BP:   102/59    BP Location:       Pulse: 58  56 56   Resp:   18 (!) 29   Temp:   99 °F (37 2 °C)    TempSrc:   Probe    SpO2: 96% 94% 93% 93%   Weight:       Height:         Arterial Line:  Arterial Line BP: 94/54  Arterial Line MAP (mmHg): 64 mmHg    Tele Rhythm: Sinus Bradycardia This was personally reviewed by myself  PSV  50%    Ventilator:  Respiratory    Lab Data (Last 4 hours)       0425            pH, Arterial       7 346           pCO2, Arterial       42 5           pO2, Arterial       72 7           HCO3, Arterial       22 7           Base Excess, Arterial       -2 9                O2/Vent Data       343   Most Recent         Vent Mode AC/PC  AC/PC      Resp Rate (BPM) (BPM) 18  18      Pressure Control (cmH2O) (cm) 20  20      Insp Time (sec) (sec) 0 9  0 9      FiO2 (%) (%) 40  40      PEEP (cmH2O) (cmH2O) 5  5      MV 8 23  8 23                Temperature: Temp (24hrs), Av °F (36 7 °C), Min:97 5 °F (36 4 °C), Max:99 °F (37 2 °C)  Current: Temperature: 99 °F (37 2 °C)    Weights:   Weight (last 2 days)     Date/Time   Weight    19   82 7 (182 32)    19   81 9 (180 56)            Body mass index is 28 56 kg/m²      Hemodynamic Monitoring:  PAP: PAP: /, CVP: CVP (mean): 23 mmHg, CO: CO (L/min): 4 4 L/min, CI: CI (L/min/m2): 2 2 L/min/m2, SVR: SVR (dyne*sec)/cm5: 989 (dyne*sec)/cm5  PAP: (26-56)/(13-33) 48/29  CO:  [3 7 L/min-4 8 L/min] 4 4 L/min  CI:  [1 9 L/min/m2-2 4 L/min/m2] 2 2 L/min/m2    Intake and Outputs:    Intake/Output Summary (Last 24 hours) at 2019 0627  Last data filed at 2019 0400  Gross per 24 hour   Intake 5453 92 ml   Output 4215 ml   Net 1238 92 ml     I/O last 24 hours: In: 5453 9 [I V :2478 9; IV Piggyback:2300]  Out: 7096 [Urine:3680; Blood:675; Chest Tube:385]    UOP: /hour     Chest tube Output:  Mediastinal tubes: 100 mL/8 hours  260 mL/24 hours   Pleural tubes: 45 mL/8 hours  125 mL/24 hours     Labs:  Results from last 7 days   Lab Units 12/20/19  0425 12/19/19  2118 12/19/19  1438  12/19/19  1351  12/19/19  1118  12/18/19  0442   WBC Thousand/uL 14 12*  --   --   --   --   --   --   --  7 38   HEMOGLOBIN g/dL 11 7* 12 2  --   --  13 5  --   --   --  15 2   I STAT HEMOGLOBIN g/dl  --   --  12 2   < >  --    < >  --    < >  --    HEMATOCRIT % 36 3* 36 9  --   --  40 8  --   --   --  46 0   HEMATOCRIT, ISTAT %  --   --  36*   < >  --    < >  --    < >  --    PLATELETS Thousands/uL 145*  --   --   --  160  --  197  --  244   NEUTROS PCT %  --   --   --   --   --   --   --   --  49   MONOS PCT %  --   --   --   --   --   --   --   --  11    < > = values in this interval not displayed  Results from last 7 days   Lab Units 12/20/19  0425 12/19/19  1731 12/19/19  1438 12/19/19  1401 12/19/19  1351 12/19/19  1213  12/16/19  0510   SODIUM mmol/L 142  --   --   --  143  --   --  140   POTASSIUM mmol/L 4 4 4 9  --   --  4 4  --   --  3 8   CHLORIDE mmol/L 112*  --   --   --  114*  --   --  109*   CO2 mmol/L 24  --   --   --  24  --   --  25   CO2, I-STAT mmol/L  --   --  25 24  --  25   < >  --    BUN mg/dL 18  --   --   --  16  --   --  22   CREATININE mg/dL 1 28  --   --   --  1 12  --   --  1 09   CALCIUM mg/dL 8 1*  --   --   --  7 9*  --   --  9 0   GLUCOSE, ISTAT mg/dl  --   --  133 127  --  147*   < >  --     < > = values in this interval not displayed       Baseline Creat: 1 0    Results from last 7 days   Lab Units 12/20/19  0425   MAGNESIUM mg/dL 2 5         Results from last 7 days   Lab Units 12/20/19  0425   PH ART  7 346*   PCO2 ART mm Hg 42 5   PO2 ART mm Hg 72 7*   HCO3 ART mmol/L 22 7   BASE EXC ART mmol/L -2 9   ABG SOURCE  Line, Arterial Results from last 7 days   Lab Units 12/15/19  1006   TSH 3RD GENERATON uIU/mL 3 040     SVO2: 49--42%    Micro:   Blood Culture: No results found for: BLOODCX  Urine Culture: No results found for: URINECX  Sputum Culture: No components found for: SPUTUMCX  Wound Culure: No results found for: WOUNDCULT    Diagnositic Studies:  12/20/19 CXR: Persistent vascular congestion  Lines in good position  No large pTX  This was personally reviewed by myself in PACS   12/20/19 EKG: Sinus richie  Inferior Q waves This was personally reviewed by myself  Nutrition:        Diet Orders   (From admission, onward)             Start     Ordered    12/20/19 0000  Diet Cardiovascular; Cardiac; Fluid Restriction 1800 ML  Diet effective 0500     Question Answer Comment   Diet Type Cardiovascular    Cardiac Cardiac    Other Restriction(s): Fluid Restriction 1800 ML    RD to adjust diet per protocol? Yes        12/19/19 1341              Physical Exam   Constitutional: He appears well-developed and well-nourished  He is sedated and intubated  Cardiovascular: Regular rhythm, S1 normal and S2 normal  Bradycardia present  Exam reveals no gallop and no friction rub  No murmur heard  Pulmonary/Chest: Effort normal and breath sounds normal  He is intubated  He has no wheezes  He has no rhonchi  He has no rales  Abdominal: Soft  Neurological:   Sedated  Skin: Skin is warm and dry  No rash noted  He is not diaphoretic  Psychiatric:   Cannot assess  Invasive lines and devices:   Invasive Devices     Central Venous Catheter Line            CVC Central Lines 12/19/19 Triple less than 1 day    Introducer 12/19/19 less than 1 day          Peripheral Intravenous Line            Peripheral IV 12/15/19 Left;Proximal;Ventral (anterior) Forearm 4 days    Peripheral IV 12/19/19 Right Forearm less than 1 day          Arterial Line            Arterial Line 12/19/19 Right Radial less than 1 day          Line            Pacer Wires less than 1 day    Pacer Wires less than 1 day          Drain            Chest Tube 1 Posterior Mediastinal 24 Fr  less than 1 day    Chest Tube 2 Anterior Mediastinal 32 Fr  less than 1 day    Chest Tube 3 Left Pleural 32 Fr  less than 1 day    Urethral Catheter Non-latex; Temperature probe 16 Fr  less than 1 day          Airway            ETT  Hi-Lo; Cuffed;Oral 8 mm less than 1 day              ---------------------------------------------------------------------------------------------------------------------------------------------------------------------  Code Status: Level 1 - Full Code    Counseling / Coordination of Care  Total Critical Care time spent 36 minutes excluding procedures, teaching and family updates  Critical care time provided between 0600 and 0641  Collaborative bedside rounds performed with cardiac surgery attending and bedside RN      SIGNATURE: Arelis Barbour PA-C  DATE: December 20, 2019  TIME: 6:27 AM

## 2019-12-20 NOTE — RESPIRATORY THERAPY NOTE
RT Ventilator Management Note  Gabrielle Coyne 63 y o  male MRN: [id number]  Unit/Bed#: [facility] 417-01 Encounter: [id number]      Daily Screen       12/20/2019  0740             Patient safety screen outcome[de-identified]  Passed    Spont breathing trial % for 30 min:      Spont breathing trial outcome[de-identified]  Passed    Preparing to extubate/ Notify Nurse:  Yes    Extubation order obtained:  Yes    Consider Cuff Test:  Yes    Patient extubated:  Yes    RSBI:  20    FVC (mL):  900            Physical Exam:   Assessment Type: Assess only  General Appearance: Alert, Awake  Respiratory Pattern: Tachypneic  Chest Assessment: Chest expansion symmetrical  Bilateral Breath Sounds: Diminished  O2 Device: HFNC      Resp Comments: Pt extuabted w/o complications  Pt able to vocalize and no stridor post extubation  Prior to extubation, pt was tachypneic with RR in the 40's and TAMARA Barron aware  Pt unable to maintain normal saturations post extubation  Pt placed on HFNC for WOB and hypoxia  Pt currently on 65% FiO2 and 60Lpm  Pt instructed on IS therapy post extubation however he refuses IS due to pain

## 2019-12-20 NOTE — PHYSICAL THERAPY NOTE
Physical Therapy Cancellation Note    PT order received; chart reviewd; noted pt is currently intubated; will hold PT at this time; will follow and initiate PT assessment/mobilization as clinical course allows      Trip Perales, PT

## 2019-12-20 NOTE — PROGRESS NOTES
Critical Care Interval Progress Note     Anish Hernandez 63 y o  male MRN: [id number]    Unit/Bed#: [facility] 417-01 Encounter: [id number]    Impression:  Principal Problem:    Unstable angina ([address] )  Active Problems:    Tobacco abuse    Pure hypercholesterolemia    S/P primary angioplasty with coronary stent    Bradycardia    Pulmonary nodule    Atherosclerosis of native coronary artery with angina pectoris (HCC)    S/P CABG x 3  Resolved Problems:    * No resolved hospital problems  *    Status post CABG x3 with a postop PHILIP of 65% with the right atrium dilation  Acute respiratory insufficiency      Plan:  - started Primacor 0 25 for index is 1 8-2 1 and a VBG with of 42% sVo2   - inability to wean on ventilator  - Lasix 40 mg IV x1 given  - repeat VBG in the a m  index is improved with Primacor 2 2-2 4         Counseling / Coordination of Care: Total Critical Care time spent 60 minutes excluding procedures, teaching and family updates  __________________________________________________________________    Recent Events / Nursing Concern:   Patient had an episode during the day very had flash pulmonary edema unclear origin vera PHILIP was performed and showed a right atrium dilated with elevated pressure but normal-appearing TV and no RV OT outlet obstruction  Patient was diuresed with an initial dose of Lasix, where he diuresed approximately 700 ml, indexes were ranging between 2 and 2 1 occasionally dropping to 1 9  Attempted to wean patient during the night of the ventilator, so soon patient is awake his respirations go to 30 and has tidal volumes remain at 200 ml, he is holding his saturation but at the same time his PA pressures jumped over 50 and his blood pressure decreased    Attempted to paced patient on a higher rate than 50-60 but pacing wires are not operational    Was able to wean patient off Evan, unable to wean off vent, so patient wakes up all his numbers get skewed his PA pressures go high, respiration rate goes high       Vitals:   Vitals:    19 0200 19 0300 19 0343 19 0400   BP:       BP Location:       Pulse: 60 58  56   Resp:    Temp: 98 5 °F (36 9 °C)   99 °F (37 2 °C)   TempSrc:    Probe   SpO2: 95% 96% 94% 93%   Weight:       Height:         Arterial Line BP: 106/60  Arterial Line MAP (mmHg): 74 mmHg    Temperature: Temp (24hrs), Av °F (36 7 °C), Min:97 5 °F (36 4 °C), Max:99 °F (37 2 °C)  Current: Temperature: 99 °F (37 2 °C)    Hemodynamic Monitoring:  N/A  PAP: (26-56)/(13-33) 34/18  CO:  [3 7 L/min-4 8 L/min] 4 7 L/min  CI:  [1 9 L/min/m2-2 4 L/min/m2] 2 4 L/min/m2    Respiratory:  SpO2: SpO2: 93 %, SpO2 Activity: SpO2 Activity: At Rest, SpO2 Device: O2 Device: Ventilator       Physical Exam:  Physical Exam   Constitutional: He appears well-developed and well-nourished  HENT:   Head: Normocephalic and atraumatic  Eyes: Pupils are equal, round, and reactive to light  Neck: Normal range of motion  Neck supple  No JVD present  No tracheal deviation present  Cardiovascular: Regular rhythm, normal heart sounds and intact distal pulses  Exam reveals no friction rub  No murmur heard  Sinus bradycardia   Pulmonary/Chest: Effort normal  He has no rales  Breath sound diminished bilaterally   Abdominal: Soft  Bowel sounds are normal  He exhibits no distension  There is no tenderness  There is no guarding  Musculoskeletal: Normal range of motion  He exhibits no edema, tenderness or deformity  Neurological: He is alert  He is alert and follows commands when awake but she is breath stacking and bright breathing at a fast rate soon he is a little sedated he is back to normal   Skin: Skin is warm and dry  Nursing note and vitals reviewed          Allergies: No Known Allergies    Medications:   Scheduled Meds:  Current Facility-Administered Medications:  acetaminophen 650 mg Rectal Q4H PRN Magdalene Tripp PA-C    acetaminophen 650 mg Oral Q4H PRN Nancy Alvarez PA-C    amiodarone 200 mg Oral [facility] [place]    [facility]irin [facility] mg [facility] [place]    atorvastatin 80 mg Oral Daily With Reelio MEGA Godwin    bisacodyl 10 mg Rectal Daily PRN Bruneian Pillarun Cleveland PA-C    calcium chloride 1 g Intravenous Once Nancy Alvarez PA-C    cefazolin 2,000 mg Intravenous Q8H [facility] [place] Last Rate: 2,000 mg (12/20/19 0418)   chlorhexidine 15 mL [employer]it Q12H [address] [place], [place]    dexmedetomidine 0 1-0 7 mcg/kg/hr Intravenous Titrated RALPH Henderson Last Rate: 0 3 mcg/kg/hr (12/19/19 2315)   fentanyl citrate (PF) 50 mcg Intravenous Q1H PRN Crow Cleveland PA-C    furosemide 40 mg Intravenous Q6H PRN Bruneian Pillarun Cleveland PA-C    HYDROmorphone 0 5 mg Intravenous Q1H PRN Bruneian Pillarun Cleveland PA-C    HYDROmorphone 1 mg Intravenous Q1H PRN Bruneian Pillarun Godwin PA-C    insulin regular (HumuLIN R,NovoLIN R) infusion 0 3-21 Units/hr Intravenous Titrated Nancy Alvarez PA-C Last Rate: 0 5 Units/hr (12/19/19 1800)   lactated ringers 500 mL Intravenous Q30 Min PRN Nancy Alvarez PA-C Last Rate: Stopped (12/19/19 1832)   lidocaine (cardiac) 100 mg Intravenous Q30 Min PRN Crow Godwin PA-C    milrinone [facility]) infusion 0 25 mcg/kg/min Intravenous Continuous Heidimarie I MEGA Fournier Last Rate: 0 25 mcg/kg/min (12/19/19 2322)   mupirocin 1 application Nasal D30N [address] Crow Godwin PA-C    niCARdipine 2 5-15 mg/hr Intravenous Titrated Crow Cleveland PA-C Last Rate: Stopped (12/19/19 1611)   ondansetron 4 mg Intravenous Q6H PRN Nancy Alvarez PA-C    oxyCODONE-acetaminophen 1 tablet Oral Q4H PRN Nancy Alvarez PA-C    oxyCODONE-acetaminophen 2 tablet Oral Q6H PRN Bruneian Pillarun Godwin PA-C    pantoprazole 40 mg Oral Early Morning Bruneian Pillarun Cleveland PA-C    phenylephine  mcg/min Intravenous Titrated RALPH Henderson Last Rate: 20 mcg/min (12/19/19 1844)   polyethylene glycol 17 g Oral Daily Jimmy Backbone Lugiano, PA-C    potassium chloride 20 mEq Intravenous Once PRN Jimmy Backbone Lugiano, PA-C    potassium chloride 20 mEq Intravenous Q1H PRN Jimmy Backbone Lugiano, PA-C    potassium chloride 20 mEq Intravenous Q30 Min PRN Jimmy Backbone Lugiano, PA-C    sodium chloride 20 mL/hr Intravenous Continuous Reola Kandice, PA-C Last Rate: 20 mL/hr (12/19/19 1612)     Continuous Infusions:  dexmedetomidine 0 1-0 7 mcg/kg/hr Last Rate: 0 3 mcg/kg/hr (12/19/19 2315)   insulin regular (HumuLIN R,NovoLIN R) infusion 0 3-21 Units/hr Last Rate: 0 5 Units/hr (12/19/19 1800)   milrinone (PRIMACOR) infusion 0 25 mcg/kg/min Last Rate: 0 25 mcg/kg/min (12/19/19 2322)   niCARdipine 2 5-15 mg/hr Last Rate: Stopped (12/19/19 1611)   phenylephine  mcg/min Last Rate: 20 mcg/min (12/19/19 1844)   sodium chloride 20 mL/hr Last Rate: 20 mL/hr (12/19/19 1612)     PRN Meds:    acetaminophen 650 mg Q4H PRN   acetaminophen 650 mg Q4H PRN   bisacodyl 10 mg Daily PRN   fentanyl citrate (PF) 50 mcg Q1H PRN   furosemide 40 mg Q6H PRN   HYDROmorphone 0 5 mg Q1H PRN   HYDROmorphone 1 mg Q1H PRN   lactated ringers 500 mL Q30 Min PRN   lidocaine (cardiac) 100 mg Q30 Min PRN   ondansetron 4 mg Q6H PRN   oxyCODONE-acetaminophen 1 tablet Q4H PRN   oxyCODONE-acetaminophen 2 tablet Q6H PRN   potassium chloride 20 mEq Once PRN   potassium chloride 20 mEq Q1H PRN   potassium chloride 20 mEq Q30 Min PRN       Labs:   Results from last 7 days   Lab Units 12/19/19  2118 12/19/19  1438 12/19/19  1401 12/19/19  1351 12/19/19  1213 12/19/19  1155 12/19/19  1130 12/19/19  1118  12/18/19  0442 12/15/19  1006   WBC Thousand/uL  --   --   --   --   --   --   --   --   --  7 38  --    HEMOGLOBIN g/dL 12 2  --   --  13 5  --   --   --   --   --  15 2  --    I STAT HEMOGLOBIN g/dl  --  12 2 13 3  --  11 9* 11 2* 11 2*  --    < >  --   --    HEMATOCRIT % 36 9  --   --  40 8  --   --   --   --   --  46 0  -- HEMATOCRIT, ISTAT %  --  36* 39  --  35* 33* 33*  --    < >  --   --    PLATELETS Thousands/uL  --   --   --  160  --   --   --  197  --  244 260   NEUTROS PCT %  --   --   --   --   --   --   --   --   --  49  --    MONOS PCT %  --   --   --   --   --   --   --   --   --  11  --     < > = values in this interval not displayed  Results from last 7 days   Lab Units 12/19/19  1731 12/19/19  1438 12/19/19  1401 12/19/19  1351 12/19/19  1213 12/19/19  1155 12/19/19  1130 12/19/19  1100  12/16/19  0510   SODIUM mmol/L  --   --   --  143  --   --   --   --   --  140   POTASSIUM mmol/L 4 9  --   --  4 4  --   --   --   --   --  3 8   CHLORIDE mmol/L  --   --   --  114*  --   --   --   --   --  109*   CO2 mmol/L  --   --   --  24  --   --   --   --   --  25   CO2, I-STAT mmol/L  --  25 24  --  25 26 27 30   < >  --    ANION GAP mmol/L  --   --   --  5  --   --   --   --   --  6   BUN mg/dL  --   --   --  16  --   --   --   --   --  22   CREATININE mg/dL  --   --   --  1 12  --   --   --   --   --  1 09   CALCIUM mg/dL  --   --   --  7 9*  --   --   --   --   --  9 0   GLUCOSE RANDOM mg/dL  --   --   --  127  --   --   --   --   --  170*    < > = values in this interval not displayed  Results from last 7 days   Lab Units 12/16/19  0510 12/16/19  0212 12/15/19  2230 12/15/19  1920   TROPONIN I ng/mL 0 04 0 04 0 03 0 05*         ABG:    VBG:  Results from last 7 days   Lab Units 12/19/19  2326   PH CONSTANTINO  7 305   PCO2 CONSTANTINO mm Hg 54 4*   PO2 CONSTANTINO mm Hg 25 4*   HCO3 CONSTANTINO mmol/L 26 5   BASE EXC CONSTANTINO mmol/L -0 6           Diagnostic Imaging / Data: I have personally reviewed pertinent reports  EKG: Sinus bradycardia  Code Status: Level 1 - Full Code    Portions of the record may have been created with voice recognition software  Occasional wrong word or "sound a like" substitutions may have occurred due to the inherent limitations of voice recognition software    Read the chart carefully and recognize, using context, where substitutions have occurred      SIGNATURECatrosario Melgoza PA-C  DATE: December 20, 2019  TIME: 4:25 AM

## 2019-12-20 NOTE — DISCHARGE INSTRUCTIONS
Sternal Precautions   WHAT YOU NEED TO KNOW:   What are sternal precautions? Sternal precautions are used to help protect your sternum (breastbone) after open chest surgery  Wires are placed during surgery to hold the sternum together as it heals  Sternal precautions help prevent the wires from cutting through the sternum  The precautions also help prevent the sternum from coming apart from an injury, and prevent pain and bleeding  You may need to use the precautions for up to 12 weeks after surgery  Your surgeon will give you specific instructions based on the type of surgery you had  It is important to follow the instructions carefully  An injury to the healing sternum can be life-threatening  What are some general sternal precautions? Start slowly and do more as you get stronger  Pain medicine might make it harder for you to know when to slow down or be careful  Stop immediately if you hear a crunch or pop in your sternum  · Protect your sternum  Hug a pillow to your chest or cross your arms over your chest when you laugh, sneeze, or cough  · Be careful when you get into or out of a chair or bed  Hug a pillow or cross your arms when you stand or sit  Do not twist as you move  Use only your legs to sit and stand  You may need to use a raised toilet seat if you have trouble standing up without using your arms  Your healthcare provider may teach you to use your elbow for support as you move from lying to sitting  · Ask when you may take a bath or shower  You may need to use a bath chair if you have trouble getting into or out of the tub  Do not use a grab bar  · Do not lift or carry anything heavier than 10 pounds  For example, a gallon of milk weighs 8 pounds  · Keep your arms down as much as possible  Do not put your arms out to the side, behind you, or over your head  Do not let anyone pull your arms to help you move or dress  Do not reach for items  · Do not push or pull anything  Examples include a car door or a vacuum   · Do not drive while you are healing  Your surgeon will tell you when it is safe for you to start driving again  How do I care for my surgery wound? Always wash your hands before you care for your wound  Wash your wound as directed  Do not rub the wound as you wash or dry the area  Pat the area dry with a clean towel  Change the bandages as directed and when they get wet or dirty  Do not smoke:  Nicotine can damage blood vessels and make it more difficult to heal  Do not use e-cigarettes or smokeless tobacco in place of cigarettes or to help you quit  They still contain nicotine  Ask your healthcare provider for information if you currently smoke and need help quitting  Call 911 for any of the following:   · You have sudden pain in your sternum and hear a crunch or pop  · You have bleeding that does not stop even after you apply pressure for 5 minutes  When should I seek immediate care? · You hear crunching or grinding in your sternum  · You have signs of an infection, such as a fever, red or warm skin, or pus in the surgery wound  When should I contact my healthcare provider? · You continue to feel pain, even after you take your pain medicine  · You have new or worsening pain, or any pain with movement  · You have questions or concerns about your condition or care  CARE AGREEMENT:   You have the right to help plan your care  Learn about your health condition and how it may be treated  Discuss treatment options with your caregivers to decide what care you want to receive  You always have the right to refuse treatment  The above information is an  only  It is not intended as medical advice for individual conditions or treatments  Talk to your doctor, nurse or pharmacist before following any medical regimen to see if it is safe and effective for you    © 2017 [employer] Information is for End User's use only and may not be sold, redistributed or otherwise used for commercial purposes  All illustrations and images included in CareNotes® are the copyrighted property of [employer]  or Andrew Clemons  Coronary Artery Bypass Graft   WHAT YOU NEED TO KNOW:   A coronary artery bypass graft (CABG) is open heart surgery to clear blocked arteries in your heart  CABG surgery improves blood flow to your heart by bypassing (sending blood around) the blocked part of an artery  This restores blood flow to your heart and helps prevent a heart attack  DISCHARGE INSTRUCTIONS:   Call 911 for any of the following:   · You feel lightheaded, short of breath, and have chest pain  · You cough up blood  · You have a fast heartbeat that flutters  · You feel like you are going to faint  Seek care immediately if:   · Your arm or leg feels warm, tender, and painful  It may look swollen and red  · You have numbness or tingling in your arms or legs  · You have a severe headache  Contact your healthcare provider if:   · You have a fever higher than 101°F (38 4°C)  · You have gained 2 pounds in 24 hours  · Your wound is red, swollen, or draining pus  · Your signs and symptoms return  · You feel depressed  · You have questions or concerns about your condition or care  Medicines: You may need any of the following:  · Prescription pain medicine  may be given  Ask how to take this medicine safely  · Antiplatelets , such as aspirin, help prevent blood clots  Take your antiplatelet medicine exactly as directed  These medicines make it more likely for you to bleed or bruise  If you are told to take aspirin, do not take acetaminophen or ibuprofen instead  · Cholesterol medicine  helps lower cholesterol and lipid levels in your blood  · Antibiotics  help prevent a bacterial infection  · Heart medicine  helps strengthen and regulate your heartbeat      · Blood pressure medicine  helps lower or control your blood pressure  · Take your medicine as directed  Contact your healthcare provider if you think your medicine is not helping or if you have side effects  Tell him or her if you are allergic to any medicine  Keep a list of the medicines, vitamins, and herbs you take  Include the amounts, and when and why you take them  Bring the list or the pill bottles to follow-up visits  Carry your medicine list with you in case of an emergency  Go to cardiac rehabilitation:  Cardiac rehabilitation (rehab) is a program run by specialists who will help you safely strengthen your heart and prevent more heart disease  This plan includes exercise, relaxation, stress management, and heart-healthy nutrition  Healthcare providers will also check to make sure any medicines you take are working  The plan may also include instructions for when you can drive, return to work, and do other normal daily activities  Care for your wound as directed:  Carefully wash the wound with soap and water  If you do not have a bandage, gently pat the incision dry with a clean towel  If you have a bandage, dry the area and put on a new, clean bandage  Change your bandage if it gets wet or dirty  Prevent another blocked artery:   · Eat heart healthy foods  You may need to eat foods that are low in salt, fat, or cholesterol  Healthy foods include fruits, vegetables, whole-grain breads, low-fat dairy products, beans, lean meats, and fish  Ask your healthcare provider for more information about a heart healthy diet  · Do not smoke  Nicotine and other chemicals in cigarettes and cigars can cause heart and lung damage  Ask your healthcare provider for information if you currently smoke and need help to quit  E-cigarettes or smokeless tobacco still contain nicotine  Talk to your healthcare provider before you use these products  · Maintain a healthy weight  Ask your healthcare provider how much you should weigh   Extra weight can increase the stress on your heart  Ask him to help you create a weight loss plan if you are overweight  Get a flu shot: The flu can be dangerous for a person who has heart disease  To prevent influenza (flu), all adults should get the influenza vaccine every year as soon as it becomes available  Follow up with your healthcare provider as directed:  Write down your questions so you remember to ask them during your visits  © 2017 [employer] Information is for End User's use only and may not be sold, redistributed or otherwise used for commercial purposes  All illustrations and images included in CareNotes® are the copyrighted property of [employer]  or Andrew Clemons  The above information is an  only  It is not intended as medical advice for individual conditions or treatments  Talk to your doctor, nurse or pharmacist before following any medical regimen to see if it is safe and effective for you  Heart Healthy Diet   WHAT YOU NEED TO KNOW:   What is a heart healthy diet? A heart healthy diet is an eating plan low in total fat, unhealthy fats, and sodium (salt)  A heart healthy diet helps decrease your risk for heart disease and stroke  Limit the amount of fat you eat to 25% to 35% of your total daily calories  Limit sodium to less than 2,300 mg each day  What is healthy fat, and where is it found? Healthy fats can help improve cholesterol levels  The risk for heart disease is decreased when cholesterol levels are normal  Choose healthy fats, such as the following:  · Unsaturated fat  is found in foods such as soybean, canola, olive, corn, and safflower oils  It is also found in soft tub margarine that is made with liquid vegetable oil  · Omega-3 fat  is found in certain fish, such as salmon, tuna, and trout, and in walnuts and flaxseed  What is unhealthy fat, and where is it found?   Unhealthy fats can cause unhealthy cholesterol levels in your blood and increase your risk of heart disease  Limit unhealthy fats, such as the following:  · Cholesterol  is found in animal foods, such as eggs and lobster, and in dairy products made from whole milk  Limit cholesterol to less than 300 milligrams (mg) each day  You may need to limit cholesterol to 200 mg each day if you have heart disease  · Saturated fat  is found in meats, such as jose and hamburger  It is also found in chicken or turkey skin, whole milk, and butter  Limit saturated fat to less than 7% of your total daily calories  Limit saturated fat to less than 6% if you have heart disease or are at increased risk for it  · Trans fat  is found in packaged foods, such as potato chips and cookies  It is also in hard margarine, some fried foods, and shortening  Avoid trans fats as much as possible  What can I eat and drink on a heart healthy diet?   Ask your dietitian or healthcare provider how many servings to have from each of the following food groups:  · Grains:      ¨ Whole-wheat breads, cereals, and pastas, and brown rice    ¨ Low-fat, low-sodium crackers and chips    · Vegetables:      ¨ Broccoli, green beans, green peas, and spinach    ¨ Collards, kale, and lima beans    ¨ Carrots, sweet potatoes, tomatoes, and peppers    ¨ Canned vegetables with no salt added    · Fruits:      ¨ Bananas, peaches, pears, and pineapple    ¨ Grapes, raisins, and dates    ¨ Oranges, tangerines, grapefruit, orange juice, and grapefruit juice    ¨ Apricots, mangoes, melons, and papaya    ¨ Raspberries and strawberries    ¨ Canned fruit with no added sugar    · Low-fat dairy products:      ¨ Nonfat (skim) milk, 1% milk, and low-fat almond, cashew, or soy milks fortified with calcium    ¨ Low-fat cheese, regular or frozen yogurt, and cottage cheese    · Meats and proteins , such as lean cuts of beef and pork (loin, leg, round), skinless chicken and turkey, legumes, soy products, egg whites, and nuts  Which foods and drinks do I need to limit or avoid? Ask your dietitian or healthcare provider about these and other foods that are high in unhealthy fat, sodium, and sugar:  · Snack or packaged foods , such as frozen dinners, cookies, macaroni and cheese, and cereals with more than 300 mg of sodium per serving    · Canned or dry mixes  for cakes, soups, sauces, or gravies    · Vegetables with added sodium , such as instant potatoes, vegetables with added sauces, or regular canned vegetables    · Other foods high in sodium , such as ketchup, barbecue sauce, salad dressing, pickles, olives, soy sauce, and miso    · High-fat dairy foods  such as whole or 2% milk, cream cheese, or sour cream, and cheeses     · High-fat protein foods  such as high-fat cuts of beef (T-bone steaks, ribs), chicken or turkey with skin, and organ meats, such as liver    · Cured or smoked meats , such as hot dogs, jose, and sausage    · Unhealthy fats and oils , such as butter, stick margarine, shortening, and cooking oils such as coconut or palm oil    · Food and drinks high in sugar , such as soft drinks (soda), sports drinks, sweetened tea, candy, cake, cookies, pies, and doughnuts  What other diet guidelines should I follow? · Eat more foods containing omega-3 fats  Eat fish high in omega-3 fats at least 2 times a week  · Limit alcohol  Too much alcohol can damage your heart and raise your blood pressure  Women should limit alcohol to 1 drink a day  Men should limit alcohol to 2 drinks a day  A drink of alcohol is 12 ounces of beer, 5 ounces of wine, or 1½ ounces of liquor  · Choose low-sodium foods  High-sodium foods can lead to high blood pressure  Add little or no salt to food you prepare  Use herbs and spices in place of salt  · Eat more fiber  to help lower cholesterol levels  Eat at least 5 servings of fruits and vegetables each day  Eat 3 ounces of whole-grain foods each day  Legumes (beans) are also a good source of fiber    What lifestyle guidelines should I follow? · Do not smoke  Nicotine and other chemicals in cigarettes and cigars can cause lung and heart damage  Ask your healthcare provider for information if you currently smoke and need help to quit  E-cigarettes or smokeless tobacco still contain nicotine  Talk to your healthcare provider before you use these products  · Exercise regularly  to help you maintain a healthy weight and improve your blood pressure and cholesterol levels  Ask your healthcare provider about the best exercise plan for you  Do not start an exercise program without asking your healthcare provider  CARE AGREEMENT:   You have the right to help plan your care  Discuss treatment options with your caregivers to decide what care you want to receive  You always have the right to refuse treatment  The above information is an  only  It is not intended as medical advice for individual conditions or treatments  Talk to your doctor, nurse or pharmacist before following any medical regimen to see if it is safe and effective for you  © 2017 [employer] Information is for End User's use only and may not be sold, redistributed or otherwise used for commercial purposes  All illustrations and images included in CareNotes® are the copyrighted property of [employer]  or Andrew Clemons  Narcotic Pain Management   WHAT YOU NEED TO KNOW:   What do I need to know about narcotics? A narcotic is a type of medicine used to treat pain  Examples of narcotics are codeine, oxycodone, and fentanyl  Why is it important to manage my pain? Pain can cause changes in your physical and emotional health, such as depression and sleep problems  Pain control and management may help you rest, heal, and return to your daily activities  What are the side effects of narcotic medicines? The most common side effect is constipation   Drink more liquids and eat high-fiber foods to help prevent constipation  Ask your healthcare provider what liquids are right for you and how much you should drink  Also ask for a list of foods that contain fiber  Other side effects include nausea, sleepiness, and itchiness  You may need to take your narcotic medicine with food to decrease nausea  Ask your healthcare provider other ways to manage side effects  Why it is important that I take narcotic medicines as directed? · Health problems such as  trouble breathing, liver or kidney damage, or stomach bleeding may occur  Any of these problems can become life-threatening  · Acetaminophen or ibuprofen  may be included in some narcotic medicines  Too much of these medicines can cause liver or kidney damage, or stomach bleeding  These problems can become life-threatening  · Dependence  means your body needs the medicine to keep it from going through withdrawal      · Tolerance  means the medicine does not control pain as well as it used to  You need higher doses of the medicine to get pain relief  · Addiction  means you are not able to control the use of the medicine  You use it when you do not have pain and you have cravings for the medicine  What do I need to know about narcotic safety? · Take your medicine as directed  Ask if you need more information on how to take your medicine correctly  Follow up with your healthcare provider regularly  You may need to have your dose adjusted  Do not use narcotic medicine if you are pregnant or breastfeeding  Narcotic medicines can be transferred to your baby through your blood and breast milk  · Give your healthcare provider a list of all your medicines  Include any over-the-counter medicines, vitamins, and herbs  It can be dangerous to take narcotics with certain other medicines, such as antihistamines  · Keep your medicine in a safe place  Store your narcotic medicine in a locked cabinet to keep it away from children and others      · Do not drink alcohol while you use narcotics  Alcohol use with a narcotic medicine can make you sleepy and slow your breathing rate  You may stop breathing completely  · Do not drive or operate heavy machinery after you take narcotic medicine  Narcotic medicine can make you drowsy and make it hard to concentrate  You may injure yourself or others if you drive or operate heavy machinery while taking your medicine  Call 911 or have someone call 911 for any of the following:   · You are breathing slower than normal, or you have trouble breathing  · You cannot be woken  · You have a seizure  When should I seek immediate care? · Your heart is beating slower than usual     · Your heart feels like it is jumping or fluttering  · You have trouble staying awake  · You have severe muscle pain or weakness  · You see or hear things that are not real   When should I contact my healthcare provider? · You are too dizzy to stand up  · Your pain gets worse or you have new pain  · Your pain does not get better after you use your narcotic medicine  · You cannot do your usual activities because of side effects from the narcotic  · You are constipated or have abdominal pain  · You have questions or concerns about your condition or care  CARE AGREEMENT:   You have the right to help plan your care  Learn about your health condition and how it may be treated  Discuss treatment options with your caregivers to decide what care you want to receive  You always have the right to refuse treatment  The above information is an  only  It is not intended as medical advice for individual conditions or treatments  Talk to your doctor, nurse or pharmacist before following any medical regimen to see if it is safe and effective for you  © 2017 [employer] [employer] Information is for End User's use only and may not be sold, redistributed or otherwise used for commercial purposes   All illustrations and images included in CareNotes® are the copyrighted property of [employer]  or Andrew Clemons

## 2019-12-20 NOTE — RESPIRATORY THERAPY NOTE
respiratory      12/19/19 1346   Respiratory Assessment   Resp Comments Pt trialed on PSV  VT and RR normal while pt is sleeping  When pt is awake pt becomes tachypnic with VT less then 100ml  O2 saturation dropped to 87%  Pt placed back on PC settings and ETT secured for the evening

## 2019-12-21 ENCOUNTER — APPOINTMENT (INPATIENT)
Dept: RADIOLOGY | Facility: HOSPITAL | Age: 59
DRG: 235 | End: 2019-12-21

## 2019-12-21 PROBLEM — J96.01 ACUTE RESPIRATORY FAILURE WITH HYPOXIA (HCC): Status: ACTIVE | Noted: 2019-12-21

## 2019-12-21 LAB
ANION GAP SERPL CALCULATED.3IONS-SCNC: 4 MMOL/L (ref 4–13)
BASE EXCESS BLDA CALC-SCNC: -1 MMOL/L (ref -2–3)
BASE EXCESS BLDA CALC-SCNC: 1.1 MMOL/L
BUN SERPL-MCNC: 29 MG/DL (ref 5–25)
CA-I BLD-SCNC: 1.04 MMOL/L (ref 1.12–1.32)
CALCIUM SERPL-MCNC: 8.2 MG/DL (ref 8.3–10.1)
CHLORIDE SERPL-SCNC: 109 MMOL/L (ref 100–108)
CO2 SERPL-SCNC: 28 MMOL/L (ref 21–32)
CREAT SERPL-MCNC: 1.26 MG/DL (ref 0.6–1.3)
ERYTHROCYTE [DISTWIDTH] IN BLOOD BY AUTOMATED COUNT: 13.2 % (ref 11.6–15.1)
FIO2 GAS DIL.REBREATH: 100 L
GFR SERPL CREATININE-BSD FRML MDRD: 62 ML/MIN/1.73SQ M
GLUCOSE SERPL-MCNC: 110 MG/DL (ref 65–140)
GLUCOSE SERPL-MCNC: 110 MG/DL (ref 65–140)
GLUCOSE SERPL-MCNC: 134 MG/DL (ref 65–140)
GLUCOSE SERPL-MCNC: 135 MG/DL (ref 65–140)
GLUCOSE SERPL-MCNC: 144 MG/DL (ref 65–140)
GLUCOSE SERPL-MCNC: 164 MG/DL (ref 65–140)
HCO3 BLDA-SCNC: 24.2 MMOL/L (ref 22–28)
HCO3 BLDA-SCNC: 26.2 MMOL/L (ref 22–28)
HCT VFR BLD AUTO: 32.1 % (ref 36.5–49.3)
HCT VFR BLD CALC: 33 % (ref 36.5–49.3)
HGB BLD-MCNC: 10.5 G/DL (ref 12–17)
HGB BLDA-MCNC: 11.2 G/DL (ref 12–17)
HOROWITZ INDEX BLDA+IHG-RTO: 80 MM[HG]
MAGNESIUM SERPL-MCNC: 2.3 MG/DL (ref 1.6–2.6)
MCH RBC QN AUTO: 31.3 PG (ref 26.8–34.3)
MCHC RBC AUTO-ENTMCNC: 32.7 G/DL (ref 31.4–37.4)
MCV RBC AUTO: 96 FL (ref 82–98)
O2 CT BLDA-SCNC: 16.1 ML/DL (ref 16–23)
OXYHGB MFR BLDA: 98 % (ref 94–97)
PCO2 BLD: 25 MMOL/L (ref 21–32)
PCO2 BLD: 39.9 MM HG (ref 36–44)
PCO2 BLDA: 43.3 MM HG (ref 36–44)
PEEP RESPIRATORY: 10 CM[H2O]
PH BLD: 7.39 [PH] (ref 7.35–7.45)
PH BLDA: 7.4 [PH] (ref 7.35–7.45)
PLATELET # BLD AUTO: 120 THOUSANDS/UL (ref 149–390)
PMV BLD AUTO: 11.5 FL (ref 8.9–12.7)
PO2 BLD: 51 MM HG (ref 75–129)
PO2 BLDA: 136.5 MM HG (ref 75–129)
POTASSIUM BLD-SCNC: 3.6 MMOL/L (ref 3.5–5.3)
POTASSIUM SERPL-SCNC: 4.3 MMOL/L (ref 3.5–5.3)
RBC # BLD AUTO: 3.35 MILLION/UL (ref 3.88–5.62)
SAO2 % BLD FROM PO2: 86 % (ref 60–85)
SODIUM BLD-SCNC: 142 MMOL/L (ref 136–145)
SODIUM SERPL-SCNC: 141 MMOL/L (ref 136–145)
SPECIMEN SOURCE: ABNORMAL
SPECIMEN SOURCE: ABNORMAL
VENT AC: 16
VENT- AC: AC
WBC # BLD AUTO: 19.63 THOUSAND/UL (ref 4.31–10.16)

## 2019-12-21 PROCEDURE — 82948 REAGENT STRIP/BLOOD GLUCOSE: CPT

## 2019-12-21 PROCEDURE — 31622 DX BRONCHOSCOPE/WASH: CPT | Performed by: EMERGENCY MEDICINE

## 2019-12-21 PROCEDURE — 85027 COMPLETE CBC AUTOMATED: CPT | Performed by: PHYSICIAN ASSISTANT

## 2019-12-21 PROCEDURE — 82805 BLOOD GASES W/O2 SATURATION: CPT | Performed by: THORACIC SURGERY (CARDIOTHORACIC VASCULAR SURGERY)

## 2019-12-21 PROCEDURE — 94668 MNPJ CHEST WALL SBSQ: CPT

## 2019-12-21 PROCEDURE — 94760 N-INVAS EAR/PLS OXIMETRY 1: CPT

## 2019-12-21 PROCEDURE — 31500 INSERT EMERGENCY AIRWAY: CPT | Performed by: EMERGENCY MEDICINE

## 2019-12-21 PROCEDURE — 94002 VENT MGMT INPAT INIT DAY: CPT

## 2019-12-21 PROCEDURE — 71045 X-RAY EXAM CHEST 1 VIEW: CPT

## 2019-12-21 PROCEDURE — 0BC38ZZ EXTIRPATION OF MATTER FROM RIGHT MAIN BRONCHUS, VIA NATURAL OR ARTIFICIAL OPENING ENDOSCOPIC: ICD-10-PCS | Performed by: EMERGENCY MEDICINE

## 2019-12-21 PROCEDURE — 80048 BASIC METABOLIC PNL TOTAL CA: CPT | Performed by: PHYSICIAN ASSISTANT

## 2019-12-21 PROCEDURE — 94003 VENT MGMT INPAT SUBQ DAY: CPT

## 2019-12-21 PROCEDURE — 0BC78ZZ EXTIRPATION OF MATTER FROM LEFT MAIN BRONCHUS, VIA NATURAL OR ARTIFICIAL OPENING ENDOSCOPIC: ICD-10-PCS | Performed by: EMERGENCY MEDICINE

## 2019-12-21 PROCEDURE — C9113 INJ PANTOPRAZOLE SODIUM, VIA: HCPCS | Performed by: PHYSICIAN ASSISTANT

## 2019-12-21 PROCEDURE — 94640 AIRWAY INHALATION TREATMENT: CPT

## 2019-12-21 PROCEDURE — 94669 MECHANICAL CHEST WALL OSCILL: CPT

## 2019-12-21 PROCEDURE — 94664 DEMO&/EVAL PT USE INHALER: CPT

## 2019-12-21 PROCEDURE — 99291 CRITICAL CARE FIRST HOUR: CPT | Performed by: EMERGENCY MEDICINE

## 2019-12-21 PROCEDURE — NC001 PR NO CHARGE: Performed by: INTERNAL MEDICINE

## 2019-12-21 PROCEDURE — NC001 PR NO CHARGE: Performed by: EMERGENCY MEDICINE

## 2019-12-21 PROCEDURE — 83735 ASSAY OF MAGNESIUM: CPT | Performed by: PHYSICIAN ASSISTANT

## 2019-12-21 PROCEDURE — 5A1945Z RESPIRATORY VENTILATION, 24-96 CONSECUTIVE HOURS: ICD-10-PCS | Performed by: EMERGENCY MEDICINE

## 2019-12-21 PROCEDURE — 99232 SBSQ HOSP IP/OBS MODERATE 35: CPT | Performed by: INTERNAL MEDICINE

## 2019-12-21 PROCEDURE — 0BH17EZ INSERTION OF ENDOTRACHEAL AIRWAY INTO TRACHEA, VIA NATURAL OR ARTIFICIAL OPENING: ICD-10-PCS | Performed by: EMERGENCY MEDICINE

## 2019-12-21 RX ORDER — PROPOFOL 10 MG/ML
5-50 INJECTION, EMULSION INTRAVENOUS
Status: DISCONTINUED | OUTPATIENT
Start: 2019-12-21 | End: 2019-12-22

## 2019-12-21 RX ORDER — SENNOSIDES 8.8 MG/5ML
8.8 LIQUID ORAL
Status: DISCONTINUED | OUTPATIENT
Start: 2019-12-21 | End: 2019-12-23

## 2019-12-21 RX ORDER — FENTANYL CITRATE 50 UG/ML
INJECTION, SOLUTION INTRAMUSCULAR; INTRAVENOUS
Status: COMPLETED
Start: 2019-12-21 | End: 2019-12-21

## 2019-12-21 RX ORDER — PANTOPRAZOLE SODIUM 40 MG/1
40 INJECTION, POWDER, FOR SOLUTION INTRAVENOUS
Status: DISCONTINUED | OUTPATIENT
Start: 2019-12-21 | End: 2019-12-26 | Stop reason: HOSPADM

## 2019-12-21 RX ORDER — ACETAMINOPHEN 160 MG/5ML
975 SUSPENSION, ORAL (FINAL DOSE FORM) ORAL EVERY 8 HOURS
Status: DISCONTINUED | OUTPATIENT
Start: 2019-12-21 | End: 2019-12-23

## 2019-12-21 RX ORDER — PROPOFOL 10 MG/ML
INJECTION, EMULSION INTRAVENOUS
Status: COMPLETED
Start: 2019-12-21 | End: 2019-12-21

## 2019-12-21 RX ORDER — ALBUMIN, HUMAN INJ 5% 5 %
25 SOLUTION INTRAVENOUS ONCE
Status: COMPLETED | OUTPATIENT
Start: 2019-12-21 | End: 2019-12-21

## 2019-12-21 RX ORDER — FENTANYL CITRATE 50 UG/ML
50 INJECTION, SOLUTION INTRAMUSCULAR; INTRAVENOUS
Status: DISCONTINUED | OUTPATIENT
Start: 2019-12-21 | End: 2019-12-22

## 2019-12-21 RX ORDER — ACETYLCYSTEINE 200 MG/ML
3 SOLUTION ORAL; RESPIRATORY (INHALATION)
Status: DISCONTINUED | OUTPATIENT
Start: 2019-12-21 | End: 2019-12-22

## 2019-12-21 RX ORDER — FENTANYL CITRATE-0.9 % NACL/PF 10 MCG/ML
75 PLASTIC BAG, INJECTION (ML) INTRAVENOUS CONTINUOUS
Status: DISCONTINUED | OUTPATIENT
Start: 2019-12-21 | End: 2019-12-22

## 2019-12-21 RX ADMIN — PROPOFOL 10 MCG/KG/MIN: 10 INJECTION, EMULSION INTRAVENOUS at 14:12

## 2019-12-21 RX ADMIN — PHENYLEPHRINE HYDROCHLORIDE 70 MCG/MIN: 10 INJECTION INTRAVENOUS at 11:47

## 2019-12-21 RX ADMIN — Medication 75 MCG/HR: at 04:09

## 2019-12-21 RX ADMIN — PROPOFOL 10 MCG: 10 INJECTION, EMULSION INTRAVENOUS at 00:30

## 2019-12-21 RX ADMIN — ACETAMINOPHEN 975 MG: 650 SUSPENSION ORAL at 22:00

## 2019-12-21 RX ADMIN — ALBUMIN (HUMAN) 25 G: 12.5 INJECTION, SOLUTION INTRAVENOUS at 01:25

## 2019-12-21 RX ADMIN — POLYETHYLENE GLYCOL 3350 17 G: 17 POWDER, FOR SOLUTION ORAL at 08:34

## 2019-12-21 RX ADMIN — ACETYLCYSTEINE 600 MG: 200 SOLUTION ORAL; RESPIRATORY (INHALATION) at 02:26

## 2019-12-21 RX ADMIN — POTASSIUM CHLORIDE 20 MEQ: 1500 TABLET, EXTENDED RELEASE ORAL at 08:34

## 2019-12-21 RX ADMIN — SENNOSIDES 8.8 MG: 8.8 SYRUP ORAL at 21:41

## 2019-12-21 RX ADMIN — ACETAMINOPHEN 975 MG: 650 SUSPENSION ORAL at 14:09

## 2019-12-21 RX ADMIN — ALBUTEROL SULFATE 2.5 MG: 2.5 SOLUTION RESPIRATORY (INHALATION) at 23:43

## 2019-12-21 RX ADMIN — AMIODARONE HYDROCHLORIDE 200 MG: 200 TABLET ORAL at 21:42

## 2019-12-21 RX ADMIN — AMIODARONE HYDROCHLORIDE 200 MG: 200 TABLET ORAL at 14:09

## 2019-12-21 RX ADMIN — Medication 75 MCG/HR: at 14:09

## 2019-12-21 RX ADMIN — PROPOFOL 10 MCG/KG/MIN: 10 INJECTION, EMULSION INTRAVENOUS at 06:39

## 2019-12-21 RX ADMIN — CHLORHEXIDINE GLUCONATE 0.12% ORAL RINSE 15 ML: 1.2 LIQUID ORAL at 21:41

## 2019-12-21 RX ADMIN — ACETYLCYSTEINE 800 MG: 200 SOLUTION ORAL; RESPIRATORY (INHALATION) at 08:04

## 2019-12-21 RX ADMIN — FUROSEMIDE 40 MG: 10 INJECTION, SOLUTION INTRAMUSCULAR; INTRAVENOUS at 17:42

## 2019-12-21 RX ADMIN — PROPOFOL 20 MCG/KG/MIN: 10 INJECTION, EMULSION INTRAVENOUS at 23:55

## 2019-12-21 RX ADMIN — ATORVASTATIN CALCIUM 80 MG: 80 TABLET, FILM COATED ORAL at 17:42

## 2019-12-21 RX ADMIN — IPRATROPIUM BROMIDE 0.5 MG: 0.5 SOLUTION RESPIRATORY (INHALATION) at 19:37

## 2019-12-21 RX ADMIN — IPRATROPIUM BROMIDE 0.5 MG: 0.5 SOLUTION RESPIRATORY (INHALATION) at 08:03

## 2019-12-21 RX ADMIN — FONDAPARINUX SODIUM 2.5 MG: 2.5 INJECTION, SOLUTION SUBCUTANEOUS at 08:34

## 2019-12-21 RX ADMIN — ALBUTEROL SULFATE 2.5 MG: 2.5 SOLUTION RESPIRATORY (INHALATION) at 02:25

## 2019-12-21 RX ADMIN — ACETYLCYSTEINE 3 ML: 200 SOLUTION ORAL; RESPIRATORY (INHALATION) at 23:43

## 2019-12-21 RX ADMIN — PANTOPRAZOLE SODIUM 40 MG: 40 INJECTION, POWDER, FOR SOLUTION INTRAVENOUS at 08:34

## 2019-12-21 RX ADMIN — Medication 1 APPLICATION: at 08:34

## 2019-12-21 RX ADMIN — DOCUSATE SODIUM 100 MG: 100 CAPSULE, LIQUID FILLED ORAL at 08:34

## 2019-12-21 RX ADMIN — ALBUTEROL SULFATE 2.5 MG: 2.5 SOLUTION RESPIRATORY (INHALATION) at 15:57

## 2019-12-21 RX ADMIN — INSULIN LISPRO 1 UNITS: 100 INJECTION, SOLUTION INTRAVENOUS; SUBCUTANEOUS at 14:09

## 2019-12-21 RX ADMIN — LEVALBUTEROL HYDROCHLORIDE 1.25 MG: 1.25 SOLUTION, CONCENTRATE RESPIRATORY (INHALATION) at 08:03

## 2019-12-21 RX ADMIN — FENTANYL CITRATE 100 MCG: 50 INJECTION, SOLUTION INTRAMUSCULAR; INTRAVENOUS at 00:15

## 2019-12-21 RX ADMIN — CHLORHEXIDINE GLUCONATE 0.12% ORAL RINSE 15 ML: 1.2 LIQUID ORAL at 08:34

## 2019-12-21 RX ADMIN — AMIODARONE HYDROCHLORIDE 200 MG: 200 TABLET ORAL at 06:00

## 2019-12-21 RX ADMIN — LEVALBUTEROL HYDROCHLORIDE 1.25 MG: 1.25 SOLUTION, CONCENTRATE RESPIRATORY (INHALATION) at 13:41

## 2019-12-21 RX ADMIN — Medication 1 APPLICATION: at 21:42

## 2019-12-21 RX ADMIN — ASPIRIN 325 MG ORAL TABLET 325 MG: 325 PILL ORAL at 08:34

## 2019-12-21 RX ADMIN — POTASSIUM CHLORIDE 20 MEQ: 1500 TABLET, EXTENDED RELEASE ORAL at 17:42

## 2019-12-21 RX ADMIN — FENTANYL CITRATE 100 MCG: 50 INJECTION, SOLUTION INTRAMUSCULAR; INTRAVENOUS at 00:30

## 2019-12-21 RX ADMIN — ACETYLCYSTEINE 800 MG: 200 SOLUTION ORAL; RESPIRATORY (INHALATION) at 15:57

## 2019-12-21 RX ADMIN — DEXMEDETOMIDINE 0.2 MCG/KG/HR: 100 INJECTION, SOLUTION, CONCENTRATE INTRAVENOUS at 06:39

## 2019-12-21 RX ADMIN — LEVALBUTEROL HYDROCHLORIDE 1.25 MG: 1.25 SOLUTION, CONCENTRATE RESPIRATORY (INHALATION) at 19:37

## 2019-12-21 RX ADMIN — FUROSEMIDE 40 MG: 10 INJECTION, SOLUTION INTRAMUSCULAR; INTRAVENOUS at 08:34

## 2019-12-21 RX ADMIN — ACETAMINOPHEN 975 MG: 650 SUSPENSION ORAL at 06:43

## 2019-12-21 RX ADMIN — IPRATROPIUM BROMIDE 0.5 MG: 0.5 SOLUTION RESPIRATORY (INHALATION) at 13:41

## 2019-12-21 NOTE — PROGRESS NOTES
Patient with hypoxia requiring bipap and multiple setting adjustments  ABG evaluated patient appears to be ventilating appropriately but PaO2 remains low at approximately 59  Oxygen saturations in the mid 80s  Patient having increased work of breathing and reports that he is feeling tired  After multiple adjustments of BiPAP without improvement a decision was made to intubate patient  Patient intubated via RSI with etomidate and succinylcholine  Bronchoscopy performed immediately after intubation which showed thick brown secretions  After bronchoscopy patient's oxygen saturation improved to 95% on pressure control ventilation with peep of 10 FiO2 of 100%  Plan to check chest x-ray  Aggressive pulmonary hygiene/airway clearance initiated with Mucomyst and chest PT with percussion vest   Continue with ventilatory support  Attempt to wean FiO2  Critical care time 37 minutes critical care time does not include procedures performed  Family updated as to events by critical care advanced practitioner  On-call CT surgery attending informed as to events

## 2019-12-21 NOTE — PROCEDURES
Intubation  Date/Time: 12/21/2019 1:05 AM  Performed by: Denise Danielson DO  Authorized by: Denise Danielson DO     Patient location:  Bedside  Consent:     Consent obtained:  Emergent situation  Universal protocol:     Immediately prior to procedure, a time out was called: yes      Patient identity confirmed:  Arm band  Pre-procedure details:     Patient status:  Awake    Mallampati score:  2    Pretreatment medications:  Fentanyl and etomidate (Precedex)    Paralytics:  Succinylcholine  Indications:     Indications for intubation: hypoxemia    Procedure details:     Preoxygenation:  BiPAP    CPR in progress: no      Intubation method:  Oral    Oral intubation technique:  Glidescope    Tube size (mm):  7 5    Tube type:  Cuffed    Number of attempts:  2    Ventilation between attempts: yes      Cricoid pressure: yes      Tube visualized through cords: yes    Placement assessment:     ETT to lip:  24cm    Tube secured with:  ETT moran    Breath sounds:  Equal    Placement verification: chest rise, condensation, direct visualization, equal breath sounds, fiberoptic scope and tube exhalation      CXR findings:  ETT in proper place  Post-procedure details:     Patient tolerance of procedure: Tolerated well, no immediate complications    Complication (if applicable):  Patietn was hypoxic with o2 sat 86% prior to rsi  Attempt without paralytics unable to open mouth, succhylcholine administered  Between attempts bvm ventilation  Patient did desaturate to 77%, recovered with bvm     Comments:      ANUM first attempt with lukas unsuccessful, second attempt attending  with gluidescope successful  Procedure does not include cc time

## 2019-12-21 NOTE — PROCEDURES
Procedure: Epicardial Wire Removal    12/21/19    Patient was returned to bed  EPW x 2 d/c'd in typical fashion  No immediate complications  Site dressed with dry sterile dressing  Patient and nurse aware of mandatory 1 hour bedrest protocol  Vital signs ordered Q 15 minutes for one hour as per protocol      Shantal Márquez PA-C

## 2019-12-21 NOTE — RESPIRATORY THERAPY NOTE
RT Ventilator Management Note  Pretty Carlson 63 y o  male MRN: [id number]  Unit/Bed#: [place] 417-01 Encounter: [id number]      Daily Screen       12/20/2019  0740             Patient safety screen outcome[de-identified]  Passed    Spont breathing trial % for 30 min:      Spont breathing trial outcome[de-identified]  Passed    Preparing to extubate/ Notify Nurse:  Yes    Extubation order obtained:  Yes    Consider Cuff Test:  Yes    Patient extubated:  Yes    RSBI:  20    FVC (mL):  900            Physical Exam:   Cough: None      Pt reintubated by medical staff for worsening hypoxia while on bipap  Currently on  vent, AC mode with settings PC +[id number], PEEP +[id number], RR 16, Fio2 100%  ETT #7 5 secured @ 23cm at lip  Pt bronched at the bedside by M D , resulting in several large mucus plug obtained  Pt tolerated procedure well with no adverse reactions noted  Vent alarms are on, set and working properly  Will continue to monitor status

## 2019-12-21 NOTE — PROGRESS NOTES
Heart Failure/ Pulmonary Hypertension Progress Note - Gin Level 63 y o  male MRN: [id number]    Unit/Bed#: [facility] 417-01 Encounter: [id number]      Assessment:    Principal Problem:    Unstable angina ([address] )  Active Problems:    Tobacco abuse    Pure hypercholesterolemia    S/P primary angioplasty with coronary stent    Bradycardia    Pulmonary nodule    Atherosclerosis of native coronary artery with angina pectoris (HCC)    S/P CABG x 3    Pulmonary edema    Acute respiratory failure with hypoxia (HCC)      Subjective: Intubated overnight for respiratory distress- bronched for secretions    Objective: Intake/ Output: [id number]  Weight:   Tele: short run of AT      # s/p CABG x 3 - after presenting with unstable angina 12/12  LHC 12/13 revealed multi-vessel CAD involving 70% LAD, 99% RCA and 90% circumflex  Echo 12/20/19: LVEF: 65%  Echo 12/17: LVEF: 65%  Med Rx: asa 325 mg  # post op volume overload  Lasix 40 mg IV BID  # Afib prevention- amiodarone  # hyperlipidemia- atorvastatin 80 mg  # tobacco use    Plan:  Given bradycardia, hold on beta blocker  Continue diuretic    Central Line (day, reason): Banks catheter (day, reason):    Vitals: Blood pressure 110/62, pulse (!) 54, temperature 100 °F (37 8 °C), temperature source Probe, resp  rate 16, height 5' 7" (1 702 m), weight 91 5 kg (201 lb 11 5 oz), SpO2 99 %  , Body mass index is 31 59 kg/m² , I/O last 3 completed shifts: In: 2532 3 [I V :1532 3; IV Piggyback:1000]  Out: 4000 [JSQHU:5768; Chest Tube:825]  I/O this shift:  In: 219 [I V :219]  Out: 650 [Urine:650]  Wt Readings from Last 3 Encounters:   12/21/19 91 5 kg (201 lb 11 5 oz)   12/13/19 85 kg (187 lb 6 3 oz)   06/21/19 81 6 kg (180 lb)       Intake/Output Summary (Last 24 hours) at 12/21/2019 1408  Last data filed at 12/21/2019 1200  Gross per 24 hour   Intake 1451 6 ml   Output 2035 ml   Net -583 4 ml     I/O last 3 completed shifts:   In: 2532 3 [I V :1532 3; IV Piggyback:1000]  Out: 4000 [BOBJL:1491; Chest Tube:825]        Physical Exam:  Vitals:    12/21/19 0700 12/21/19 0800 12/21/19 1000 12/21/19 1200   BP: (!) 108/46  119/55 110/62   Pulse: 56 56 (!) 54 (!) 54   Resp: 18 19 16 16   Temp: (!) 100 6 °F (38 1 °C) 100 2 °F (37 9 °C) 100 °F (37 8 °C) 100 °F (37 8 °C)   TempSrc:  Probe Probe Probe   SpO2: 100% 100% 100% 99%   Weight:       Height:           GEN: JOANNA Coughlin intubated  HEENT: pupils equal, round, and reactive to light; extraocular muscles intact  NECK: supple, no carotid bruits   HEART: regular rhythm, normal S1 and S2, no murmurs, clicks, gallops or rubs, JVP is    LUNGS: clear to auscultation bilaterally; no wheezes, rales, or rhonchi   ABDOMEN: normal bowel sounds, soft, no tenderness, no distention  EXTREMITIES: peripheral pulses normal; no clubbing, cyanosis, or edema  NEURO: no focal findings   SKIN: normal without suspicious lesions on exposed skin      Current Facility-Administered Medications:     acetaminophen (TYLENOL) oral suspension 975 mg, 975 mg, Oral, Q8H, Belle Eller PA-C, 975 mg at 12/21/19 0643    acetylcysteine (MUCOMYST) 200 mg/mL inhalation solution 600 mg, 3 mL, Nebulization, Q8H, Mela Fournier PA-C, 800 mg at 12/21/19 0804    albuterol inhalation solution 2 5 mg, 2 5 mg, Nebulization, Q4H PRN, Holli Jin DO, 2 5 mg at 12/21/19 0225    amiodarone tablet 200 mg, 200 mg, Oral, Q8H [address], Clarke Godwin PA-C, 200 mg at 12/21/19 0600    aspirin tablet 325 mg, 325 mg, Oral, Daily, Clarke Godwin PA-C, 325 mg at 12/21/19 1144    atorvastatin (LIPITOR) tablet 80 mg, 80 mg, Oral, Daily With Dinner, Ricardo Fabian PA-C, 80 mg at 12/20/19 1751    bisacodyl (DULCOLAX) rectal suppository 10 mg, 10 mg, Rectal, Daily PRN, Clarke Godwin PA-C    chlorhexidine (PERIDEX) 0 12 % oral rinse 15 mL, 15 mL, [employer], Q12H [address], Clarke Kerr PA-C, 15 mL at 12/21/19 0834    dexmedetomidine (PRECEDEX) 400 mcg in sodium chloride 0 9 % 100 mL infusion, 0 1-0 7 mcg/kg/hr, Intravenous, Titrated, Mela Fournier PA-C, Last Rate: 4 1 mL/hr at 12/21/19 0639, 0 2 mcg/kg/hr at 12/21/19 2022    docusate sodium (COLACE) capsule 100 mg, 100 mg, Oral, BID, Belle Eller PA-C, 100 mg at 12/21/19 0834    fentaNYL 1000 mcg in sodium chloride 0 9% 100mL infusion, 75 mcg/hr, Intravenous, Continuous, Mela Fournier PA-C, Last Rate: 7 5 mL/hr at 12/21/19 0409, 75 mcg/hr at 12/21/19 0409    fentanyl citrate (PF) 100 MCG/2ML 50 mcg, 50 mcg, Intravenous, Q1H PRN, Mela Fournier PA-C    fondaparinux (ARIXTRA) subcutaneous injection 2 5 mg, 2 5 mg, Subcutaneous, Q24H, Belle Eller PA-C, 2 5 mg at 12/21/19 0834    furosemide (LASIX) injection 40 mg, 40 mg, Intravenous, BID (diuretic), Belle Eller PA-C, 40 mg at 12/21/19 0834    insulin lispro (HumaLOG) 100 units/mL subcutaneous injection 1-5 Units, 1-5 Units, Subcutaneous, TID AC **AND** Fingerstick Glucose (POCT), , , TID AC, Belle Eller PA-C    insulin lispro (HumaLOG) 100 units/mL subcutaneous injection 1-5 Units, 1-5 Units, Subcutaneous, HS, Belle Eller PA-C    ipratropium (ATROVENT) 0 02 % inhalation solution 0 5 mg, 0 5 mg, Nebulization, TID, Alfonza Garre, DO, 0 5 mg at 12/21/19 1341    levalbuterol (XOPENEX) inhalation solution 1 25 mg, 1 25 mg, Nebulization, TID, Alfonza Garre, DO, 1 25 mg at 12/21/19 1341    lidocaine (cardiac) injection 100 mg, 100 mg, Intravenous, Q30 Min PRN, Sanam Cleveland PA-C    mupirocin (BACTROBAN) 2 % nasal ointment 1 application, 1 application, Nasal, X24Y [address], Sanam Cleveland PA-C, 1 application at 23/87/97 0834    ondansetron (ZOFRAN) injection 4 mg, 4 mg, Intravenous, Q6H PRN, Sanam Godwin PA-C    oxyCODONE (ROXICODONE) IR tablet 5 mg, 5 mg, Oral, Q4H PRN, 5 mg at 12/20/19 7300 **OR** oxyCODONE (ROXICODONE) immediate release tablet 10 mg, 10 mg, Oral, Q4H PRN, Arnav Dong PA-C, 10 mg at 12/20/19 8814   pantoprazole (PROTONIX) injection 40 mg, 40 mg, Intravenous, Q24H [facility], Belle Eller PA-C, 40 mg at 12/21/19 0834    phenylephrine (GWEN-SYNEPHRINE) 50 mg (STANDARD CONCENTRATION) in sodium chloride 0 9% 250 mL,  mcg/min, Intravenous, Titrated, Susy Romeo CRNA, Last Rate: 15 mL/hr at 12/21/19 1201, 50 mcg/min at 12/21/19 1201    polyethylene glycol (MIRALAX) packet 17 g, 17 g, Oral, Daily, Berto Godwin PA-C, 17 g at 12/21/19 0834    potassium chloride (K-DUR,KLOR-CON) CR tablet 20 mEq, 20 mEq, Oral, BID, Belle Eller PA-C, 20 mEq at 12/21/19 0834    propofol (DIPRIVAN) 1000 mg in 100 mL infusion (premix), 5-50 mcg/kg/min, Intravenous, Titrated, Mela Fournier PA-C, Last Rate: 5 mL/hr at 12/21/19 0639, 10 mcg/kg/min at 12/21/19 3902    senna 8 8 mg/5 mL oral syrup 8 8 mg, 8 8 mg, Oral, HS, Belle Eller PA-C    temazepam (RESTORIL) capsule 15 mg, 15 mg, Oral, HS PRN, Arelis Barbour PA-C, 15 mg at 12/20/19 2207      Labs & Results:    Results from last 7 days   Lab Units 12/16/19  0510 12/16/19  0212 12/15/19  2230   TROPONIN I ng/mL 0 04 0 04 0 03     Results from last 7 days   Lab Units 12/21/19  0456 12/20/19  2357 12/20/19  0425  12/19/19  1351  12/18/19  0442   WBC Thousand/uL 19 63*  --  14 12*  --   --   --  7 38   HEMOGLOBIN g/dL 10 5*  --  11 7*   < > 13 5  --  15 2   I STAT HEMOGLOBIN g/dl  --  11 2*  --    < >  --    < >  --    HEMATOCRIT % 32 1*  --  36 3*   < > 40 8  --  46 0   HEMATOCRIT, ISTAT %  --  33*  --    < >  --    < >  --    PLATELETS Thousands/uL 120*  --  145*  --  160   < > 244    < > = values in this interval not displayed           Results from last 7 days   Lab Units 12/21/19  0456 12/20/19  2357 12/20/19  1816 12/20/19  0425  12/19/19  1438 12/19/19  1401   POTASSIUM mmol/L 4 3  --  4 0 4 4   < >  --   --    CHLORIDE mmol/L 109*  --  109* 112*  --   --   --    CO2 mmol/L 28  --  26 24  --   --   --    CO2, I-STAT mmol/L  --  25  --   --   --  25 24   BUN mg/dL 29*  --  21 18  --   --   --    CREATININE mg/dL 1 26  --  1 35* 1 28  --   --   --    CALCIUM mg/dL 8 2*  --  8 6 8 1*  --   --   --    GLUCOSE, ISTAT mg/dl  --  134  --   --   --  133 127    < > = values in this interval not displayed  Counseling / Coordination of Care  Total floor / unit time spent today 25 minutes  Greater than 50% of total time was spent with the patient and / or family counseling and / or coordination of care  A description of the counseling / coordination of care: 15  Thank you for the opportunity to participate in the care of this patient  Francisco ARBOLEDA    Director Heart Failure/ Medical Director [address] [place] [address]

## 2019-12-21 NOTE — RESPIRATORY THERAPY NOTE
RT Ventilator Management Note  Lavonne Garcia 63 y o  male MRN: [id number]  Unit/Bed#: [facility] 417-01 Encounter: [id number]      Daily Screen       12/20/2019  0740 12/21/2019  0808          Patient safety screen outcome[de-identified]  Passed  Failed      Not Ready for Weaning due to[de-identified]    Underline problem not resolved;PEEP > 8cmH2O      Spont breathing trial % for 30 min:          Spont breathing trial outcome[de-identified]  Passed        Preparing to extubate/ Notify Nurse: Yes        Extubation order obtained: Yes        Consider Cuff Test:  Yes        Patient extubated: Yes        RSBI:  20        FVC (mL):  900                Physical Exam:   Assessment Type: (P) Assess only  General Appearance: (P) Awake  Respiratory Pattern: (P) Assisted, Symmetrical  Chest Assessment: (P) Chest expansion symmetrical  Bilateral Breath Sounds: (P) Clear  Cough: None  Suction: (P) ET Tube      Resp Comments: (P) Pt continues on ACPC settings, tolerating well  No resp distress noted  Pt not weaned at this time due to peep of 10 and underlying issue  FIO2 weaned per ABG  Pt received UDN as well as vest therapy   Will cont to wean as tolerated

## 2019-12-21 NOTE — PROCEDURES
BRONCHOSCOPY NOTE   Ayana Law 63 y o  male MRN: [id number]  Unit/Bed#: [facility] 417-01 Encounter: [id number]      PRE OPERATIVE DIAGNOSIS:  Hypoxia    POST OPERATIVE DIAGNOSIS:   Hypoxia    LOCATION:   417 bedside    Emergent procedure  Final Time Out was performed  ASA: 4    MALLAMPATI SCORE:   Intubated    MONITORING:  EKG, pulse, pulse oximetry were monitored continuously during the procedure  Blood pressure was monitored every 3 minutes during the procedure  SEDATION:   Patient on ventilator receiving Precedex infusion as well as propofol and fentanyl boluses    PROCEDURE:  Standard airway preparation completed per respiratory therapy protocol  After appropriate level of conscious sedation was achieved, a standard bronchoscope was inserted into the trachea and advanced to the main stef  The trachea was abnormal in appearance and that there was large thick secretions visualized at the end of the endotracheal tube  The main stef was normal in appearance  Airway survey was completed bilaterally to at least the second segmental level  The anatomy was normal   The mucosa was edematous more apparent in left bronchial tree when compared to the right  Left mainstem bronchus appear to have external compression and some collapse  More significant secretions encountered on the left airway evaluation  There were no endobronchial masses, lesions, or obstructions noted  During the procedure patient did have episodes of hypoxia  Prior to intubation patient was hypoxic with oxygen saturations as low as 77%  In between aspiration attempts patient was allowed to recover on the ventilator and bronchoscopy was resumed once patient's saturation improved past 88%  A significant amount of thick brown secretions was aspirated throughout the bronchial tree with more secretions apparent on the left compared to the right    A airway edema was evident more on the left than the right with what appeared to be external compression of the left mainstem bronchus  No biopsies or violation of the mucosa was performed  No bronchial alveolar lavage performed  Bronchoscope removed  The patient recovered in stable condition  COMPLICATIONS:  None  There were no unplanned events  ESTIMATED BLOOD LOSS: Minimal    RECOMMENDATIONS:  Patient is to maintain on the ventilator  Endotracheal tube positioned appropriately  Obtain chest x-ray  Follow-up ABG and ventilator to be adjusted  Airway clearance protocol initiated with percussion vest and Mucomyst   Daily spontaneous breathing trials      Procedure does not include critical care time

## 2019-12-21 NOTE — PROGRESS NOTES
Progress Note - Critical Care   Ayana Law 63 y o  male MRN: [id number]  Unit/Bed#: [facility] 417-01 Encounter: [id number]      Attending Physician: Doug Aviles DO    24 Hour Events/HPI: POD # 2 s/p CABG x3  Progressive hypoxia throughout the day  Maintained on high-flow and ultimately placed on BiPAP 14/8 @ 100%  Patient continue with hypoxia and severe pain  Precedex infusion was started as well as Dilaudid scheduled  Ultimately the patient was intubated and bronched  There are copious amounts of thick blood tinged left lung secretions and the tissue was severely inflamed  Post bronchoscopy, the patient's oxygen saturations finally improved  During the day yesterday, the patient was weaned off of Milrinone  He was started on BID Lasix with adequate urine output  ROS: Review of systems was unable to be performed secondary to intubation  ---------------------------------------------------------------------------------------------------------------------------------------------------------------------  Impressions:  1  CAD s/p CABG x3  2  Acute hypoxic respiratory failure  3  Pulmonary edema and volume overload  4  HLD  5  Bradycardia    Plan:    Neuro:   · Sedation plan: propofol and precedex  · RASS goal: 0 Alert and Calm  · Pain controlled with: fentanyl infusion and tylenol ATC  Oxycodone, fentanyl and dilaudid PRN  · Regulate sleep/wake cycle  · Delirium precautions  · CAM-ICU daily  · Trend neuro exam    CV:   · Cardiac infusions: Evan 70  · MAP goal > 65 and CI >2 2  · D/C Trevor Giovanny catheter  · Keep Arterial line  · Rhythm: Sinus Bradycardia  · Follow rhythm on telemetry  · Hold BB  · Epicardial pacing wires no longer required  Remove today    Lung:   · Acute respiratory failure; Requiring Intubation with ventilator support  Secondary to poor inspiratory effort secondary to pain and secretion management  Continue incentive spirometry/coughing/deep breathing exercises    Wean supplemental oxygen as tolerated for saturation > 90%  · SBT plan: Attempt when FIO2 and PEEP lower  · Wean vent as tolerated  · Chlorhexidine ordered: yes  · Chest tube output remains persistently high; Continue chest tubes to suction today    GI:   · Continue PPI for stress ulcer prophylaxis  · Continue bowel regimen  · Trend abdominal exam and bowel function    FEN:   · Diuretic plan: Lasix 40 mg IV q BID  · K-dur 20 mEQ PO q BID  · Nutrition/diet plan: NPO  Consider TFs  · Replete electrolytes with goals: K >4 0, Mag >2 0, and Phos >3 0    :   · Indwelling Banks present: yes   · Keep Banks  · Trend UOP and BUN/creat  · Strict I and O    ID:   · Trend temps and WBC count  · Maintain normothermia  · Consider cultures and ABX for lung    Heme:   · Trend hgb and plts    Endo:   · Glycemic control plan: No history of diabetes: Glucose well-controlled  Insulin sliding scale coverage      MSK/Skin:  · Mobility goal: OOB when able  · PT consult: yes  · OT consult: yes  · Frequent turning and pressure off-loading  · Local wound care as needed    Disposition:  Continue ICU care  ---------------------------------------------------------------------------------------------------------------------------------------------------------------------  Allergies: No Known Allergies  Medications:   Scheduled Meds:    Current Facility-Administered Medications:  acetaminophen 975 mg Oral Q8H Belle Eller PA-C    acetylcysteine 3 mL Nebulization Q8H Meal Fournier PA-C    albuterol 2 5 mg Nebulization Q4H PRN Rashaun Given, DO    amiodarone 200 mg Oral [facility] Feli [place], [place]    aspirin 325 mg Oral Daily Bean [place] PA-C    atorvastatin 80 mg Oral Daily With [employer] MEGA Godwin    bisacodyl 10 mg Rectal Daily PRN Bean [place] PA-C    chlorhexidine 15 mL Swish & Spit Q12H [address] Bean [place] PA-C    dexmedetomidine 0 1-0 7 mcg/kg/hr Intravenous Titrated Mela Fournier PA-C Last Rate: 0 2 mcg/kg/hr (12/21/19 0521)   docusate sodium 100 mg Oral BID Belle Eller PA-C    fentaNYL 75 mcg/hr Intravenous Continuous Susymarazam Forunier PA-C Last Rate: 75 mcg/hr (12/21/19 0409)   fentanyl citrate (PF) 50 mcg Intravenous Q1H PRN Mela Fournier PA-C    fondaparinux 2 5 mg Subcutaneous Q24H Belle Eller PA-C    furosemide 40 mg Intravenous BID (diuretic) Belle Eller PA-C    HYDROmorphone 1 mg Intravenous Q1H PRN Mela Fournier PA-C    insulin lispro 1-5 Units Subcutaneous TID AC Belle Eller PA-C    insulin lispro 1-5 Units Subcutaneous HS Belle Eller PA-C    ipratropium 0 5 mg Nebulization TID Ivone Serum, DO    levalbuterol 1 25 mg Nebulization TID Ivone Serum, DO    lidocaine (cardiac) 100 mg Intravenous Q30 Min PRN Jimmy Backbone Louie Pablo PA-C    mupirocin 1 application Nasal L33L [address] Jimmy Backbone Louie Pablo PA-C    niCARdipine 1-15 mg/hr Intravenous Titrated Gunnar Puckett PA-C Last Rate: Stopped (12/21/19 0029)   ondansetron 4 mg Intravenous Q6H PRN Renetta Woodson PA-C    oxyCODONE 5 mg Oral Q4H PRN Belle Eller PA-C    Or        oxyCODONE 10 mg Oral Q4H PRN Belle Eller PA-C    pantoprazole 40 mg Oral Early Morning Jimmy Lobo Pablo PA-C    phenylephine  mcg/min Intravenous Titrated Susy Greiss, CRNA Last Rate: 70 mcg/min (12/21/19 0521)   polyethylene glycol 17 g Oral Daily Jimmy Godwin PA-C    potassium chloride 20 mEq Oral BID Belle Eller PA-C    propofol 5-50 mcg/kg/min Intravenous Titrated Jelaniie I MEGA Fournier Last Rate: 10 mcg/kg/min (12/21/19 0521)   temazepam 15 mg Oral HS PRN Redfield Dimes, PA-C      VTE Pharmacologic Prophylaxis: Fondaparinux (Arixtra)  VTE Mechanical Prophylaxis: sequential compression device    Continuous Infusions:    dexmedetomidine 0 1-0 7 mcg/kg/hr Last Rate: 0 2 mcg/kg/hr (12/21/19 0521)   fentaNYL 75 mcg/hr Last Rate: 75 mcg/hr (12/21/19 0409)   niCARdipine 1-15 mg/hr Last Rate: Stopped (12/21/19 0029)   phenylephine  mcg/min Last Rate: 70 mcg/min (12/21/19 0521)   propofol 5-50 mcg/kg/min Last Rate: 10 mcg/kg/min (12/21/19 0521)     PRN Meds:    albuterol 2 5 mg Q4H PRN   bisacodyl 10 mg Daily PRN   fentanyl citrate (PF) 50 mcg Q1H PRN   HYDROmorphone 1 mg Q1H PRN   lidocaine (cardiac) 100 mg Q30 Min PRN   ondansetron 4 mg Q6H PRN   oxyCODONE 5 mg Q4H PRN   Or     oxyCODONE 10 mg Q4H PRN   temazepam 15 mg HS PRN     Home Medications:   Prior to Admission medications    Medication Sig Start Date End Date Taking?  Authorizing Provider   aspirin (ECOTRIN LOW STRENGTH) 81 mg EC tablet Take 1 tablet by mouth daily 10/29/17   Yamilex Lester MD   atorvastatin (LIPITOR) 40 mg tablet Take 1 tablet (40 mg total) by mouth daily with dinner 6/21/19   Layton Champagne MD   clopidogrel (PLAVIX) 75 mg tablet Take 1 tablet (75 mg total) by mouth daily 6/21/19   Layton Champagne MD   cyclobenzaprine (FLEXERIL) 10 mg tablet Take 1 tablet (10 mg total) by mouth 3 (three) times a day as needed for muscle spasms 12/3/19   Scott Guerrero PA-C   isosorbide mononitrate (IMDUR) 30 mg 24 hr tablet Take 1 tablet (30 mg total) by mouth daily 6/21/19   Layton Champagne MD   metoprolol succinate (TOPROL-XL) 25 mg 24 hr tablet Take 1 tablet (25 mg total) by mouth daily 6/21/19   Layton Champagne MD   naproxen (NAPROSYN) 500 mg tablet Take 1 tablet (500 mg total) by mouth every 12 (twelve) hours 6/21/19   Laila Perales PA-C     ---------------------------------------------------------------------------------------------------------------------------------------------------------------------  Vitals:   Vitals:    12/21/19 0300 12/21/19 0400 12/21/19 0500 12/21/19 0600   BP:  114/53 111/53 (!) 107/45   Pulse: 60 60 58 58   Resp: (!) 34 16 15 16   Temp: 99 9 °F (37 7 °C) 100 2 °F (37 9 °C) (!) 100 6 °F (38 1 °C) (!) 100 8 °F (38 2 °C)   TempSrc:       SpO2: 100% 100% 100% 100%   Weight:       Height:         Arterial Line:  Arterial Line BP: 110/56  Arterial Line MAP (mmHg): 74 mmHg    Tele Rhythm: Sinus Bradycardia This was personally reviewed by myself  Ventilator:  Respiratory    Lab Data (Last 4 hours)       0458            pH, Arterial       7 399           pCO2, Arterial       43 3           pO2, Arterial       136 5           HCO3, Arterial       26 2           Base Excess, Arterial       1 1                O2/Vent Data        0350   Most Recent         Vent Mode AC/VC  AC/VC      Resp Rate (BPM) (BPM) 16  16      Pressure Control (cmH2O) (cm) 14  14      Insp Time (sec) (sec) 0 8  0 8      FiO2 (%) (%) 80  80      PEEP (cmH2O) (cmH2O) 10  10      MV 9 6  9 6                Temperature: Temp (24hrs), Av 2 °F (37 3 °C), Min:98 4 °F (36 9 °C), Max:100 8 °F (38 2 °C)  Current: Temperature: (!) 100 8 °F (38 2 °C)    Weights:   Weight (last 2 days)     Date/Time   Weight    19 0417   82 7 (182 32)            Body mass index is 28 56 kg/m²  Hemodynamic Monitoring:  PAP: PAP: 38/18, CVP: CVP (mean): 12 mmHg, CO: CO (L/min): 4 6 L/min, CI: CI (L/min/m2): 2 3 L/min/m2, SVR: SVR (dyne*sec)/cm5: 1095 (dyne*sec)/cm5  PAP: (31-64)/(6-29) 38/18  CO:  [4 6 L/min-7 4 L/min] 4 6 L/min  CI:  [2 3 L/min/m2-3 8 L/min/m2] 2 3 L/min/m2    Intake and Outputs:    Intake/Output Summary (Last 24 hours) at 2019 0625  Last data filed at 2019 0400  Gross per 24 hour   Intake 1397 61 ml   Output 2515 ml   Net -1117 39 ml     I/O last 24 hours:   In: 2441 3 [I V :1441 3; IV Piggyback:1000]  Out: 0216 [Urine:3125; Chest Tube:735]    UOP: 40-80/hour     Chest tube Output:  Mediastinal tubes: 80 mL/8 hours  280 mL/24 hours   Pleural tubes: 0 mL/8 hours  210 mL/24 hours     Labs:  Results from last 7 days   Lab Units 19  0456 19  2357 19  0425  19  1351  19  0442   WBC Thousand/uL 19 63*  --  14 12*  --   --   --  7 38   HEMOGLOBIN g/dL 10 5*  --  11 7*   < > 13 5  --  15 2   I STAT HEMOGLOBIN g/dl  --  11 2*  --    < >  -- < >  --    HEMATOCRIT % 32 1*  --  36 3*   < > 40 8  --  46 0   HEMATOCRIT, ISTAT %  --  33*  --    < >  --    < >  --    PLATELETS Thousands/uL 120*  --  145*  --  160   < > 244   NEUTROS PCT %  --   --   --   --   --   --  49   MONOS PCT %  --   --   --   --   --   --  11    < > = values in this interval not displayed  Results from last 7 days   Lab Units 12/21/19  0456 12/20/19  2357 12/20/19  1816 12/20/19  0425  12/19/19  1438 12/19/19  1401   SODIUM mmol/L 141  --  140 142  --   --   --    POTASSIUM mmol/L 4 3  --  4 0 4 4   < >  --   --    CHLORIDE mmol/L 109*  --  109* 112*  --   --   --    CO2 mmol/L 28  --  26 24  --   --   --    CO2, I-STAT mmol/L  --  25  --   --   --  25 24   BUN mg/dL 29*  --  21 18  --   --   --    CREATININE mg/dL 1 26  --  1 35* 1 28  --   --   --    CALCIUM mg/dL 8 2*  --  8 6 8 1*  --   --   --    GLUCOSE, ISTAT mg/dl  --  134  --   --   --  133 127    < > = values in this interval not displayed  Baseline Creat: 1 0    Results from last 7 days   Lab Units 12/21/19 0456 12/20/19  0425   MAGNESIUM mg/dL 2 3 2 5         Results from last 7 days   Lab Units 12/21/19  0458 12/20/19  0425   PH ART  7 399 7 346*   PCO2 ART mm Hg 43 3 42 5   PO2 ART mm Hg 136 5* 72 7*   HCO3 ART mmol/L 26 2 22 7   BASE EXC ART mmol/L 1 1 -2 9   ABG SOURCE  Line, Arterial Line, Arterial         Results from last 7 days   Lab Units 12/15/19  1006   TSH 3RD GENERATON uIU/mL 3 040     Micro:   Blood Culture: No results found for: BLOODCX  Urine Culture: No results found for: URINECX  Sputum Culture: No components found for: SPUTUMCX  Wound Culure: No results found for: WOUNDCULT    Diagnositic Studies:  12/21/19 CXR: LEFT lung opacity  Lines and tubes in good position  This was personally reviewed by myself in PACS   12/20 ECHO: EF 70%  No effusion       Nutrition:        Diet Orders   (From admission, onward)             Start     Ordered    12/21/19 0617  Diet NPO  Diet effective 0500     Question Answer Comment   Diet Type NPO    RD to adjust diet per protocol? Yes        12/21/19 0616              Physical Exam   Constitutional: He appears well-developed and well-nourished  No distress  He is sedated and intubated  Cardiovascular: Regular rhythm, S1 normal and S2 normal  Bradycardia present  Exam reveals no gallop and no friction rub  No murmur heard  Pulmonary/Chest: Effort normal and breath sounds normal  He is intubated  He has no wheezes  He has no rhonchi  He has no rales  Abdominal: Soft  Normal appearance  Neurological:   Sedated  Skin: Skin is warm and dry  No rash noted  He is not diaphoretic  Psychiatric:   Cannot assess  Invasive lines and devices: Invasive Devices     Central Venous Catheter Line            CVC Central Lines 12/19/19 Triple 1 day    Introducer 12/19/19 1 day          Peripheral Intravenous Line            Peripheral IV 12/15/19 Left;Proximal;Ventral (anterior) Forearm 5 days    Peripheral IV 12/19/19 Right Forearm 1 day          Arterial Line            Arterial Line 12/19/19 Right Radial 1 day          Line            Pacer Wires 1 day    Pacer Wires 1 day          Drain            Chest Tube 1 Posterior Mediastinal 24 Fr  1 day    Chest Tube 2 Anterior Mediastinal 32 Fr  1 day    Chest Tube 3 Left Pleural 32 Fr  1 day    Urethral Catheter Non-latex; Temperature probe 16 Fr  1 day          Airway            ETT  Hi-Lo; Cuffed;Oral 7 5 mm less than 1 day              ---------------------------------------------------------------------------------------------------------------------------------------------------------------------  Code Status: Level 1 - Full Code    Counseling / Coordination of Care  Total Critical Care time spent 31 minutes excluding procedures, teaching and family updates  Critical care time provided between Zulma Arora 136 and 0620  Collaborative bedside rounds performed with cardiac surgery attending and bedside RN      SIGNATURE: Ha Mckinley Kathleene Meckel  DATE: December 21, 2019  TIME: 6:25 AM

## 2019-12-22 ENCOUNTER — APPOINTMENT (INPATIENT)
Dept: RADIOLOGY | Facility: HOSPITAL | Age: 59
DRG: 235 | End: 2019-12-22

## 2019-12-22 ENCOUNTER — APPOINTMENT (INPATIENT)
Dept: GASTROENTEROLOGY | Facility: HOSPITAL | Age: 59
DRG: 235 | End: 2019-12-22
Attending: INTERNAL MEDICINE

## 2019-12-22 LAB
ANION GAP SERPL CALCULATED.3IONS-SCNC: 4 MMOL/L (ref 4–13)
ATRIAL RATE: 60 BPM
BASE EXCESS BLDA CALC-SCNC: 1.9 MMOL/L
BASOPHILS # BLD AUTO: 0.06 THOUSANDS/ΜL (ref 0–0.1)
BASOPHILS NFR BLD AUTO: 0 % (ref 0–1)
BUN SERPL-MCNC: 28 MG/DL (ref 5–25)
CALCIUM SERPL-MCNC: 8.8 MG/DL (ref 8.3–10.1)
CHLORIDE SERPL-SCNC: 110 MMOL/L (ref 100–108)
CO2 SERPL-SCNC: 27 MMOL/L (ref 21–32)
CREAT SERPL-MCNC: 1.01 MG/DL (ref 0.6–1.3)
EOSINOPHIL # BLD AUTO: 0.13 THOUSAND/ΜL (ref 0–0.61)
EOSINOPHIL NFR BLD AUTO: 1 % (ref 0–6)
ERYTHROCYTE [DISTWIDTH] IN BLOOD BY AUTOMATED COUNT: 13.2 % (ref 11.6–15.1)
GFR SERPL CREATININE-BSD FRML MDRD: 82 ML/MIN/1.73SQ M
GLUCOSE SERPL-MCNC: 105 MG/DL (ref 65–140)
GLUCOSE SERPL-MCNC: 105 MG/DL (ref 65–140)
GLUCOSE SERPL-MCNC: 109 MG/DL (ref 65–140)
GLUCOSE SERPL-MCNC: 135 MG/DL (ref 65–140)
GLUCOSE SERPL-MCNC: 147 MG/DL (ref 65–140)
HCO3 BLDA-SCNC: 24.5 MMOL/L (ref 22–28)
HCT VFR BLD AUTO: 31.6 % (ref 36.5–49.3)
HGB BLD-MCNC: 10.2 G/DL (ref 12–17)
HOROWITZ INDEX BLDA+IHG-RTO: 40 MM[HG]
IMM GRANULOCYTES # BLD AUTO: 0.1 THOUSAND/UL (ref 0–0.2)
IMM GRANULOCYTES NFR BLD AUTO: 1 % (ref 0–2)
LYMPHOCYTES # BLD AUTO: 2.8 THOUSANDS/ΜL (ref 0.6–4.47)
LYMPHOCYTES NFR BLD AUTO: 20 % (ref 14–44)
MAGNESIUM SERPL-MCNC: 2.3 MG/DL (ref 1.6–2.6)
MCH RBC QN AUTO: 30.8 PG (ref 26.8–34.3)
MCHC RBC AUTO-ENTMCNC: 32.3 G/DL (ref 31.4–37.4)
MCV RBC AUTO: 96 FL (ref 82–98)
MONOCYTES # BLD AUTO: 1.77 THOUSAND/ΜL (ref 0.17–1.22)
MONOCYTES NFR BLD AUTO: 12 % (ref 4–12)
NEUTROPHILS # BLD AUTO: 9.5 THOUSANDS/ΜL (ref 1.85–7.62)
NEUTS SEG NFR BLD AUTO: 66 % (ref 43–75)
NRBC BLD AUTO-RTO: 0 /100 WBCS
O2 CT BLDA-SCNC: 15.1 ML/DL (ref 16–23)
OXYHGB MFR BLDA: 95.4 % (ref 94–97)
P AXIS: 68 DEGREES
PCO2 BLDA: 31.2 MM HG (ref 36–44)
PEEP RESPIRATORY: 8 CM[H2O]
PH BLDA: 7.51 [PH] (ref 7.35–7.45)
PLATELET # BLD AUTO: 134 THOUSANDS/UL (ref 149–390)
PMV BLD AUTO: 11.2 FL (ref 8.9–12.7)
PO2 BLDA: 81.7 MM HG (ref 75–129)
POTASSIUM SERPL-SCNC: 3.6 MMOL/L (ref 3.5–5.3)
PR INTERVAL: 158 MS
PRESSURE CONTROL: 18
PROCALCITONIN SERPL-MCNC: 0.11 NG/ML
QRS AXIS: -5 DEGREES
QRSD INTERVAL: 88 MS
QT INTERVAL: 413 MS
QTC INTERVAL: 413 MS
RBC # BLD AUTO: 3.31 MILLION/UL (ref 3.88–5.62)
SODIUM SERPL-SCNC: 141 MMOL/L (ref 136–145)
SPECIMEN SOURCE: ABNORMAL
T WAVE AXIS: 22 DEGREES
VENT- AC: AC
VENTRICULAR RATE: 60 BPM
WBC # BLD AUTO: 14.36 THOUSAND/UL (ref 4.31–10.16)

## 2019-12-22 PROCEDURE — 31622 DX BRONCHOSCOPE/WASH: CPT | Performed by: INTERNAL MEDICINE

## 2019-12-22 PROCEDURE — 94669 MECHANICAL CHEST WALL OSCILL: CPT

## 2019-12-22 PROCEDURE — 93010 ELECTROCARDIOGRAM REPORT: CPT | Performed by: INTERNAL MEDICINE

## 2019-12-22 PROCEDURE — 82805 BLOOD GASES W/O2 SATURATION: CPT | Performed by: PHYSICIAN ASSISTANT

## 2019-12-22 PROCEDURE — 85025 COMPLETE CBC W/AUTO DIFF WBC: CPT | Performed by: PHYSICIAN ASSISTANT

## 2019-12-22 PROCEDURE — 94640 AIRWAY INHALATION TREATMENT: CPT

## 2019-12-22 PROCEDURE — 0B9B8ZZ DRAINAGE OF LEFT LOWER LOBE BRONCHUS, VIA NATURAL OR ARTIFICIAL OPENING ENDOSCOPIC: ICD-10-PCS | Performed by: INTERNAL MEDICINE

## 2019-12-22 PROCEDURE — NC001 PR NO CHARGE: Performed by: INTERNAL MEDICINE

## 2019-12-22 PROCEDURE — 94660 CPAP INITIATION&MGMT: CPT

## 2019-12-22 PROCEDURE — 94760 N-INVAS EAR/PLS OXIMETRY 1: CPT

## 2019-12-22 PROCEDURE — 94003 VENT MGMT INPAT SUBQ DAY: CPT

## 2019-12-22 PROCEDURE — C9113 INJ PANTOPRAZOLE SODIUM, VIA: HCPCS | Performed by: PHYSICIAN ASSISTANT

## 2019-12-22 PROCEDURE — 82948 REAGENT STRIP/BLOOD GLUCOSE: CPT

## 2019-12-22 PROCEDURE — 99233 SBSQ HOSP IP/OBS HIGH 50: CPT | Performed by: INTERNAL MEDICINE

## 2019-12-22 PROCEDURE — 71045 X-RAY EXAM CHEST 1 VIEW: CPT

## 2019-12-22 PROCEDURE — 94668 MNPJ CHEST WALL SBSQ: CPT

## 2019-12-22 PROCEDURE — 0B968ZZ DRAINAGE OF RIGHT LOWER LOBE BRONCHUS, VIA NATURAL OR ARTIFICIAL OPENING ENDOSCOPIC: ICD-10-PCS | Performed by: INTERNAL MEDICINE

## 2019-12-22 PROCEDURE — 83735 ASSAY OF MAGNESIUM: CPT | Performed by: PHYSICIAN ASSISTANT

## 2019-12-22 PROCEDURE — 84145 PROCALCITONIN (PCT): CPT | Performed by: PHYSICIAN ASSISTANT

## 2019-12-22 PROCEDURE — 80048 BASIC METABOLIC PNL TOTAL CA: CPT | Performed by: PHYSICIAN ASSISTANT

## 2019-12-22 RX ORDER — ACETYLCYSTEINE 200 MG/ML
3 SOLUTION ORAL; RESPIRATORY (INHALATION)
Status: DISCONTINUED | OUTPATIENT
Start: 2019-12-22 | End: 2019-12-22

## 2019-12-22 RX ORDER — MIDAZOLAM HYDROCHLORIDE 2 MG/2ML
INJECTION, SOLUTION INTRAMUSCULAR; INTRAVENOUS
Status: COMPLETED
Start: 2019-12-22 | End: 2019-12-22

## 2019-12-22 RX ORDER — OXYCODONE HYDROCHLORIDE 5 MG/1
5 TABLET ORAL EVERY 2 HOUR PRN
Status: DISCONTINUED | OUTPATIENT
Start: 2019-12-22 | End: 2019-12-25

## 2019-12-22 RX ORDER — HYDROMORPHONE HCL/PF 1 MG/ML
1 SYRINGE (ML) INJECTION
Status: DISCONTINUED | OUTPATIENT
Start: 2019-12-22 | End: 2019-12-25

## 2019-12-22 RX ORDER — METHOCARBAMOL 500 MG/1
500 TABLET, FILM COATED ORAL EVERY 6 HOURS SCHEDULED
Status: DISCONTINUED | OUTPATIENT
Start: 2019-12-22 | End: 2019-12-25

## 2019-12-22 RX ORDER — LIDOCAINE HYDROCHLORIDE 10 MG/ML
INJECTION, SOLUTION EPIDURAL; INFILTRATION; INTRACAUDAL; PERINEURAL
Status: COMPLETED
Start: 2019-12-22 | End: 2019-12-22

## 2019-12-22 RX ORDER — ACETYLCYSTEINE 200 MG/ML
3 SOLUTION ORAL; RESPIRATORY (INHALATION)
Status: COMPLETED | OUTPATIENT
Start: 2019-12-22 | End: 2019-12-23

## 2019-12-22 RX ORDER — POTASSIUM CHLORIDE 14.9 MG/ML
20 INJECTION INTRAVENOUS ONCE
Status: COMPLETED | OUTPATIENT
Start: 2019-12-22 | End: 2019-12-22

## 2019-12-22 RX ORDER — OXYCODONE HYDROCHLORIDE 10 MG/1
10 TABLET ORAL EVERY 2 HOUR PRN
Status: DISCONTINUED | OUTPATIENT
Start: 2019-12-22 | End: 2019-12-25

## 2019-12-22 RX ORDER — KETOROLAC TROMETHAMINE 30 MG/ML
15 INJECTION, SOLUTION INTRAMUSCULAR; INTRAVENOUS EVERY 8 HOURS SCHEDULED
Status: COMPLETED | OUTPATIENT
Start: 2019-12-22 | End: 2019-12-23

## 2019-12-22 RX ADMIN — MIDAZOLAM 2 MG: 1 INJECTION INTRAMUSCULAR; INTRAVENOUS at 11:13

## 2019-12-22 RX ADMIN — DOCUSATE SODIUM 100 MG: 100 CAPSULE, LIQUID FILLED ORAL at 17:55

## 2019-12-22 RX ADMIN — OXYCODONE HYDROCHLORIDE 10 MG: 10 TABLET ORAL at 22:49

## 2019-12-22 RX ADMIN — CHLORHEXIDINE GLUCONATE 0.12% ORAL RINSE 15 ML: 1.2 LIQUID ORAL at 21:24

## 2019-12-22 RX ADMIN — PROPOFOL 20 MCG/KG/MIN: 10 INJECTION, EMULSION INTRAVENOUS at 06:39

## 2019-12-22 RX ADMIN — LEVALBUTEROL HYDROCHLORIDE 1.25 MG: 1.25 SOLUTION, CONCENTRATE RESPIRATORY (INHALATION) at 07:53

## 2019-12-22 RX ADMIN — AMIODARONE HYDROCHLORIDE 200 MG: 200 TABLET ORAL at 14:38

## 2019-12-22 RX ADMIN — FONDAPARINUX SODIUM 2.5 MG: 2.5 INJECTION, SOLUTION SUBCUTANEOUS at 08:14

## 2019-12-22 RX ADMIN — FUROSEMIDE 40 MG: 10 INJECTION, SOLUTION INTRAMUSCULAR; INTRAVENOUS at 08:05

## 2019-12-22 RX ADMIN — ACETAMINOPHEN 975 MG: 650 SUSPENSION ORAL at 23:25

## 2019-12-22 RX ADMIN — IPRATROPIUM BROMIDE 0.5 MG: 0.5 SOLUTION RESPIRATORY (INHALATION) at 07:53

## 2019-12-22 RX ADMIN — HYDROMORPHONE HYDROCHLORIDE 1 MG: 1 INJECTION, SOLUTION INTRAMUSCULAR; INTRAVENOUS; SUBCUTANEOUS at 17:29

## 2019-12-22 RX ADMIN — POTASSIUM CHLORIDE 20 MEQ: 1500 TABLET, EXTENDED RELEASE ORAL at 08:15

## 2019-12-22 RX ADMIN — ACETAMINOPHEN 975 MG: 650 SUSPENSION ORAL at 06:39

## 2019-12-22 RX ADMIN — ACETYLCYSTEINE 3 ML: 200 SOLUTION ORAL; RESPIRATORY (INHALATION) at 19:00

## 2019-12-22 RX ADMIN — FUROSEMIDE 40 MG: 10 INJECTION, SOLUTION INTRAMUSCULAR; INTRAVENOUS at 16:26

## 2019-12-22 RX ADMIN — IPRATROPIUM BROMIDE 0.5 MG: 0.5 SOLUTION RESPIRATORY (INHALATION) at 19:00

## 2019-12-22 RX ADMIN — METHOCARBAMOL 500 MG: 500 TABLET, FILM COATED ORAL at 23:25

## 2019-12-22 RX ADMIN — AMIODARONE HYDROCHLORIDE 200 MG: 200 TABLET ORAL at 06:39

## 2019-12-22 RX ADMIN — KETOROLAC TROMETHAMINE 15 MG: 30 INJECTION, SOLUTION INTRAMUSCULAR at 21:24

## 2019-12-22 RX ADMIN — FENTANYL CITRATE 50 MCG: 50 INJECTION, SOLUTION INTRAMUSCULAR; INTRAVENOUS at 11:13

## 2019-12-22 RX ADMIN — SENNOSIDES 8.8 MG: 8.8 SYRUP ORAL at 21:24

## 2019-12-22 RX ADMIN — POLYETHYLENE GLYCOL 3350 17 G: 17 POWDER, FOR SOLUTION ORAL at 08:15

## 2019-12-22 RX ADMIN — SODIUM CHLORIDE 2.5 MG/HR: 0.9 INJECTION, SOLUTION INTRAVENOUS at 15:30

## 2019-12-22 RX ADMIN — ACETYLCYSTEINE 800 MG: 200 SOLUTION ORAL; RESPIRATORY (INHALATION) at 07:54

## 2019-12-22 RX ADMIN — Medication 1 APPLICATION: at 08:15

## 2019-12-22 RX ADMIN — DOCUSATE SODIUM 100 MG: 100 CAPSULE, LIQUID FILLED ORAL at 08:15

## 2019-12-22 RX ADMIN — PHENYLEPHRINE HYDROCHLORIDE 50 MCG/MIN: 10 INJECTION INTRAVENOUS at 03:33

## 2019-12-22 RX ADMIN — IPRATROPIUM BROMIDE 0.5 MG: 0.5 SOLUTION RESPIRATORY (INHALATION) at 13:04

## 2019-12-22 RX ADMIN — Medication 1 APPLICATION: at 21:24

## 2019-12-22 RX ADMIN — METHOCARBAMOL 500 MG: 500 TABLET, FILM COATED ORAL at 17:55

## 2019-12-22 RX ADMIN — LEVALBUTEROL HYDROCHLORIDE 1.25 MG: 1.25 SOLUTION, CONCENTRATE RESPIRATORY (INHALATION) at 13:04

## 2019-12-22 RX ADMIN — Medication 75 MCG/HR: at 02:30

## 2019-12-22 RX ADMIN — POTASSIUM CHLORIDE 20 MEQ: 1500 TABLET, EXTENDED RELEASE ORAL at 17:55

## 2019-12-22 RX ADMIN — CHLORHEXIDINE GLUCONATE 0.12% ORAL RINSE 15 ML: 1.2 LIQUID ORAL at 08:14

## 2019-12-22 RX ADMIN — OXYCODONE HYDROCHLORIDE 10 MG: 10 TABLET ORAL at 18:58

## 2019-12-22 RX ADMIN — POTASSIUM CHLORIDE 20 MEQ: 14.9 INJECTION, SOLUTION INTRAVENOUS at 08:08

## 2019-12-22 RX ADMIN — PANTOPRAZOLE SODIUM 40 MG: 40 INJECTION, POWDER, FOR SOLUTION INTRAVENOUS at 08:15

## 2019-12-22 RX ADMIN — ASPIRIN 325 MG ORAL TABLET 325 MG: 325 PILL ORAL at 08:14

## 2019-12-22 RX ADMIN — ACETAMINOPHEN 975 MG: 650 SUSPENSION ORAL at 14:37

## 2019-12-22 RX ADMIN — TEMAZEPAM 15 MG: 15 CAPSULE ORAL at 23:25

## 2019-12-22 RX ADMIN — AMIODARONE HYDROCHLORIDE 200 MG: 200 TABLET ORAL at 21:24

## 2019-12-22 RX ADMIN — ACETYLCYSTEINE 800 MG: 200 SOLUTION ORAL; RESPIRATORY (INHALATION) at 13:05

## 2019-12-22 RX ADMIN — DEXMEDETOMIDINE 0.2 MCG/KG/HR: 100 INJECTION, SOLUTION, CONCENTRATE INTRAVENOUS at 08:02

## 2019-12-22 RX ADMIN — SODIUM CHLORIDE 5 MG/HR: 0.9 INJECTION, SOLUTION INTRAVENOUS at 20:38

## 2019-12-22 RX ADMIN — OXYCODONE HYDROCHLORIDE 10 MG: 10 TABLET ORAL at 15:09

## 2019-12-22 RX ADMIN — LEVALBUTEROL HYDROCHLORIDE 1.25 MG: 1.25 SOLUTION, CONCENTRATE RESPIRATORY (INHALATION) at 19:00

## 2019-12-22 RX ADMIN — LIDOCAINE HYDROCHLORIDE 3 ML: 10 INJECTION, SOLUTION EPIDURAL; INFILTRATION; INTRACAUDAL; PERINEURAL at 10:53

## 2019-12-22 NOTE — RESPIRATORY THERAPY NOTE
resp care      12/22/19 1253   Respiratory Assessment   Resp Comments pt placed on 8/5 at this time, jasen hung at the bedside  Pt achieving 400ml, rsbi 70, spo2 97%  ETT  Hi-Lo; Cuffed;Oral 7 5 mm   Placement Date/Time: 12/21/19 0040   Previously placed by?: Other (Comment)  Mask Ventilation: Ventilated by mask with oral airway (2)  Preoxygenated: Yes  Technique: Stylet; Video laryngoscopy  Type: Hi-Lo; Cuffed;Oral  Tube Size: 7 5 mm  Laryngoscope:       Secured at (cm) 23   Measured from [facility] by [employer] tube moran   Site Condition Dry   HI-LO Suction  Intermittent suction   HI-LO Secretions Scant   HI-LO Intervention Patent   Additional Assessments   SpO2 97 %

## 2019-12-22 NOTE — RESPIRATORY THERAPY NOTE
RT Ventilator Management Note  Juni Ibrahim 63 y o  male MRN: [id number]  Unit/Bed#: [facility] 417-01 Encounter: [id number]      Daily Screen       12/21/2019  0808 12/22/2019  0755          Patient safety screen outcome[de-identified]  Failed  Failed      Not Ready for Weaning due to[de-identified]  Underline problem not resolved;PEEP > 8cmH2O  Underline problem not resolved              Physical Exam:   Assessment Type: Assess only  General Appearance: Awake  Respiratory Pattern: Assisted, Symmetrical  Chest Assessment: Chest expansion symmetrical  Bilateral Breath Sounds: Diminished  Cough: Productive  Suction: ET Tube  O2 Device: Ventilator      Resp Comments: Pt received on ACPC settings, pt tolerating well  no resp distress noted  mucomyst given with udn  vest therapy being completed  Per PA plans of bronch at 11am  Then possible extubation   Will cont to follow

## 2019-12-22 NOTE — PROCEDURES
Procedure: Chest Tube Removal    12/22/19    Mediastinal CT x 2 and LEFT pleural CT x 1 d/c'd in typical fashion  Patient tolerated procedure well  No immediate complications  Site dressed with Acticoat dressing   Patient's nurse was made aware of removal      Wanda Arias PA-C

## 2019-12-22 NOTE — PROGRESS NOTES
Progress Note - Critical Care   Irene Cost 63 y o  male MRN: [id number]  Unit/Bed#: [facility] 417-01 Encounter: [id number]      Attending Physician: Daniela Banks DO    24 Hour Events/HPI: POD # 3 s/p CABG x3  Weaned down ventilator support  Maintained on karely with sedation  Lasix BID for goal net negative  ROS: Review of systems was unable to be performed secondary to intubation  ---------------------------------------------------------------------------------------------------------------------------------------------------------------------  Impressions:  1  CAD s/p CABG x3  2  Acute hypoxic respiratory failure  3  Pulmonary edema and volume overload  4  HLD  5  Bradycardia  6  Poor pain control    Plan:    Neuro:   · Sedation plan: propofol and precedex  · RASS goal: 0 Alert and Calm  · Pain controlled with: fentanyl infusion and tylenol ATC  Oxycodone and fentanyl PRN  · Regulate sleep/wake cycle  · Delirium precautions  · CAM-ICU daily  · Trend neuro exam    CV:   · Cardiac infusions: Neosynephrine, 50-60 mcg/min  · Wean to off as able  · MAP goal > 65  · Keep Arterial line  · Rhythm: Sinus Bradycardia  · Follow rhythm on telemetry  · Hold BB   · Epicardial pacing wires out    Lung:   · Acute respiratory failure; Requiring Intubation with ventilator support  Secondary to poor inspiratory effort secondary to pain  Continue incentive spirometry/coughing/deep breathing exercises    Wean supplemental oxygen as tolerated for saturation > 90%  · SBT plan: attempt today after bronch  · Wean vent as tolerated  · Chlorhexidine ordered: yes  · Chest tube drainage diminished; D/C today    GI:   · Continue PPI for stress ulcer prophylaxis  · Continue bowel regimen  · Trend abdominal exam and bowel function    FEN:   · Diuretic plan: Lasix 40 mg IV q BID  · K-dur 20 mEQ PO q BID  · Nutrition/diet plan: PO diet once extubated  · Replete electrolytes with goals: K >4 0, Mag >2 0, and Phos >3 0    :   · Indwelling Jocelyn present: yes   · Keep Banks  · Trend UOP and BUN/creat  · Strict I and O    ID:   · Trend temps and WBC count  · Maintain normothermia    Heme:   · Trend hgb and plts    Endo:   · Glycemic control plan: No history of diabetes: Glucose well-controlled   Insulin sliding scale coverage    MSK/Skin:  · Mobility goal: OOB  · PT consult: yes  · OT consult: yes  · Frequent turning and pressure off-loading  · Local wound care as needed    Disposition:  Continue ICU care  ---------------------------------------------------------------------------------------------------------------------------------------------------------------------  Allergies: No Known Allergies  Medications:   Scheduled Meds:    Current Facility-Administered Medications:  acetaminophen 975 mg Oral Q8H Belle Eller PA-C    acetylcysteine 3 mL Nebulization Q8H Mela Fournier PA-C    albuterol 2 5 mg Nebulization Q4H PRN Anabell Garsia DO    amiodarone 200 mg Oral [facility] Sonya Alejo    aspirin 325 mg Oral Daily Jaelyn Cleveland PA-C    atorvastatin 80 mg Oral Daily With InboxQ MEGA Godwin    bisacodyl 10 mg Rectal Daily PRN Jaelyn Cleveland PA-C    chlorhexidine 15 mL Swish & Spit Q12H [address] Sonya Alejo    dexmedetomidine 0 1-0 7 mcg/kg/hr Intravenous Titrated Mela Fournier PA-C Last Rate: 0 2 mcg/kg/hr (12/21/19 3197)   docusate sodium 100 mg Oral BID Belel Eller PA-C    fentaNYL 75 mcg/hr Intravenous Continuous Mela Fournier PA-C Last Rate: 75 mcg/hr (12/22/19 0230)   fentanyl citrate (PF) 50 mcg Intravenous Q1H PRN Mela Fournier PA-C    fondaparinux 2 5 mg Subcutaneous Q24H Belle Eller PA-C    furosemide 40 mg Intravenous BID (diuretic) Belle Eller PA-C    insulin lispro 1-5 Units Subcutaneous TID AC Belle Eller PA-C    insulin lispro 1-5 Units Subcutaneous HS Belle Eller PA-C    ipratropium 0 5 mg Nebulization TID Anabell Garsia DO    levalbuterol 1 25 mg Nebulization TID Craig Schwartz DO    lidocaine (cardiac) 100 mg Intravenous Q30 Min PRN Sanam Allan PA-C    mupirocin 1 application Nasal N06Y [facility] Sanam Allan PA-C    ondansetron 4 mg Intravenous Q6H PRN Sanam Allan PA-C    oxyCODONE 5 mg Oral Q4H PRN Arnav Dong PA-C    Or        oxyCODONE 10 mg Oral Q4H PRN Belle Eller PA-C    pantoprazole 40 mg Intravenous Q24H [facility] Belle lEler PA-C    phenylephine  mcg/min Intravenous Titrated Susy Romeo CRNA Last Rate: 50 mcg/min (12/22/19 0333)   polyethylene glycol 17 g Oral Daily Sanam Godwin PA-C    potassium chloride 20 mEq Oral BID Belle Eller PA-C    propofol 5-50 mcg/kg/min Intravenous Titrated Mela Fournier PA-C Last Rate: 20 mcg/kg/min (12/21/19 2355)   senna 8 8 mg Oral HS Belle Eller PA-C    temazepam 15 mg Oral HS PRN Arnav Dong PA-C      VTE Pharmacologic Prophylaxis: Fondaparinux (Arixtra)  VTE Mechanical Prophylaxis: sequential compression device    Continuous Infusions:    dexmedetomidine 0 1-0 7 mcg/kg/hr Last Rate: 0 2 mcg/kg/hr (12/21/19 0639)   fentaNYL 75 mcg/hr Last Rate: 75 mcg/hr (12/22/19 0230)   phenylephine  mcg/min Last Rate: 50 mcg/min (12/22/19 0333)   propofol 5-50 mcg/kg/min Last Rate: 20 mcg/kg/min (12/21/19 2355)     PRN Meds:    albuterol 2 5 mg Q4H PRN   bisacodyl 10 mg Daily PRN   fentanyl citrate (PF) 50 mcg Q1H PRN   lidocaine (cardiac) 100 mg Q30 Min PRN   ondansetron 4 mg Q6H PRN   oxyCODONE 5 mg Q4H PRN   Or     oxyCODONE 10 mg Q4H PRN   temazepam 15 mg HS PRN     Home Medications:   Prior to Admission medications    Medication Sig Start Date End Date Taking?  Authorizing Provider   aspirin (ECOTRIN LOW STRENGTH) 81 mg EC tablet Take 1 tablet by mouth daily 10/29/17   Delmy Hansen MD   atorvastatin (LIPITOR) 40 mg tablet Take 1 tablet (40 mg total) by mouth daily with dinner 6/21/19   Gerardo Fernandes MD   clopidogrel (PLAVIX) 75 mg tablet Take 1 tablet (75 mg total) by mouth daily 19   Adelfo Garcia MD   cyclobenzaprine (FLEXERIL) 10 mg tablet Take 1 tablet (10 mg total) by mouth 3 (three) times a day as needed for muscle spasms 12/3/19   Jaime Garcia PA-C   isosorbide mononitrate (IMDUR) 30 mg 24 hr tablet Take 1 tablet (30 mg total) by mouth daily 19   Adelfo Garcia MD   metoprolol succinate (TOPROL-XL) 25 mg 24 hr tablet Take 1 tablet (25 mg total) by mouth daily 19   Adelfo Garcia MD   naproxen (NAPROSYN) 500 mg tablet Take 1 tablet (500 mg total) by mouth every 12 (twelve) hours 19   Ibeth Lehman PA-C     ---------------------------------------------------------------------------------------------------------------------------------------------------------------------  Vitals:   Vitals:    19 0325 19 0400 19 0500 19 0600   BP:  117/60 116/62 112/63   BP Location:       Pulse:  (!) 54 (!) 54 58   Resp:   18    Temp:       TempSrc:       SpO2: 100% 99% 100% 99%   Weight:       Height:         Arterial Line:  Arterial Line BP: 110/62  Arterial Line MAP (mmHg): 78 mmHg    Tele Rhythm: Sinus Bradycardia This was personally reviewed by myself      Ventilator:  Respiratory    Lab Data (Last 4 hours)       0524            pH, Arterial       7 512           pCO2, Arterial       31 2           pO2, Arterial       81 7           HCO3, Arterial       24 5           Base Excess, Arterial       1 9                O2/Vent Data        0325   Most Recent         Vent Mode AC/PC  AC/PC      Resp Rate (BPM) (BPM) 16  16      Pressure Control (cmH2O) (cm) 15  15      Insp Time (sec) (sec) 0 78  0 78      FiO2 (%) (%) 40  40      PEEP (cmH2O) (cmH2O) 10  10      MV 10 2  10 2                Temperature: Temp (24hrs), Av °F (37 8 °C), Min:99 2 °F (37 3 °C), Max:100 6 °F (38 1 °C)  Current: Temperature: 100 4 °F (38 °C)    Weights:   Weight (last 2 days)     Date/Time   Weight    19 0600   91 5 (201 72) Body mass index is 31 59 kg/m²  Intake and Outputs:    Intake/Output Summary (Last 24 hours) at 12/22/2019 0631  Last data filed at 12/22/2019 0600  Gross per 24 hour   Intake 756 47 ml   Output 2490 ml   Net -1733 53 ml     I/O last 24 hours: In: 1816 4 [I V :1316 4; IV Piggyback:500]  Out: 3110 [Urine:2850; Chest Tube:260]    UOP: /hour     Chest tube Output:  Mediastinal tubes: 0 mL/8 hours  50 mL/24 hours   Pleural tubes: 20 mL/8 hours  40 mL/24 hours     Labs:  Results from last 7 days   Lab Units 12/22/19  0524 12/21/19  0456 12/20/19  2357 12/20/19  0425  12/18/19  0442   WBC Thousand/uL 14 36* 19 63*  --  14 12*  --  7 38   HEMOGLOBIN g/dL 10 2* 10 5*  --  11 7*   < > 15 2   I STAT HEMOGLOBIN g/dl  --   --  11 2*  --    < >  --    HEMATOCRIT % 31 6* 32 1*  --  36 3*   < > 46 0   HEMATOCRIT, ISTAT %  --   --  33*  --    < >  --    PLATELETS Thousands/uL 134* 120*  --  145*   < > 244   NEUTROS PCT % 66  --   --   --   --  49   MONOS PCT % 12  --   --   --   --  11    < > = values in this interval not displayed  Results from last 7 days   Lab Units 12/22/19  0524 12/21/19  0456 12/20/19  2357 12/20/19  1816  12/19/19  1438 12/19/19  1401   SODIUM mmol/L 141 141  --  140   < >  --   --    POTASSIUM mmol/L 3 6 4 3  --  4 0   < >  --   --    CHLORIDE mmol/L 110* 109*  --  109*   < >  --   --    CO2 mmol/L 27 28  --  26   < >  --   --    CO2, I-STAT mmol/L  --   --  25  --   --  25 24   BUN mg/dL 28* 29*  --  21   < >  --   --    CREATININE mg/dL 1 01 1 26  --  1 35*   < >  --   --    CALCIUM mg/dL 8 8 8 2*  --  8 6   < >  --   --    GLUCOSE, ISTAT mg/dl  --   --  134  --   --  133 127    < > = values in this interval not displayed       Baseline Creat: 1 0    Results from last 7 days   Lab Units 12/22/19  0524 12/21/19  0456 12/20/19  0425   MAGNESIUM mg/dL 2 3 2 3 2 5         Results from last 7 days   Lab Units 12/22/19  0524 12/21/19  0458 12/20/19  0425   PH ART  7 512* 7 399 7 346*   PCO2 ART mm Hg 31 2* 43 3 42 5   PO2 ART mm Hg 81 7 136 5* 72 7*   HCO3 ART mmol/L 24 5 26 2 22 7   BASE EXC ART mmol/L 1 9 1 1 -2 9   ABG SOURCE  Line, Arterial Line, Arterial Line, Arterial     Results from last 7 days   Lab Units 12/22/19  0524   PROCALCITONIN ng/ml 0 11     Results from last 7 days   Lab Units 12/15/19  1006   TSH 3RD GENERATON uIU/mL 3 040     Micro:   Blood Culture: No results found for: BLOODCX  Urine Culture: No results found for: URINECX  Sputum Culture: No components found for: SPUTUMCX  Wound Culure: No results found for: WOUNDCULT    Diagnositic Studies:  12/22/19 CXR: Lines and tubes in place  Improved left lung aeration  +bowel gas  This was personally reviewed by myself in PACS  Nutrition:        Diet Orders   (From admission, onward)             Start     Ordered    12/21/19 0617  Diet NPO  Diet effective 0500     Question Answer Comment   Diet Type NPO    RD to adjust diet per protocol? Yes        12/21/19 0616              Physical Exam   Constitutional: He appears well-developed and well-nourished  Non-toxic appearance  He is sedated and intubated  Cardiovascular: Regular rhythm, S1 normal and S2 normal  Bradycardia present  Exam reveals friction rub  Exam reveals no gallop  No murmur heard  Pulmonary/Chest: Breath sounds normal  He is intubated  He has no wheezes  He has no rhonchi  He has no rales  Abdominal: Soft  Normal appearance  Neurological:   Sedated  Skin: Skin is warm and dry  No rash noted  He is not diaphoretic  Psychiatric:   Cannot assess  Invasive lines and devices:   Invasive Devices     Central Venous Catheter Line            CVC Central Lines 12/19/19 Triple 2 days          Peripheral Intravenous Line            Peripheral IV 12/15/19 Left;Proximal;Ventral (anterior) Forearm 6 days    Peripheral IV 12/19/19 Right Forearm 2 days          Arterial Line            Arterial Line 12/19/19 Right Radial 2 days          Drain            Chest Tube 1 Posterior Mediastinal 24 Fr  2 days    Chest Tube 2 Anterior Mediastinal 32 Fr  2 days    Chest Tube 3 Left Pleural 32 Fr  2 days    Urethral Catheter Non-latex; Temperature probe 16 Fr  2 days    NG/OG/Enteral Tube Orogastric less than 1 day          Airway            ETT  Hi-Lo; Cuffed;Oral 7 5 mm 1 day              ---------------------------------------------------------------------------------------------------------------------------------------------------------------------  Code Status: Level 1 - Full Code    Counseling / Coordination of Care  Total Critical Care time spent 0 minutes excluding procedures, teaching and family updates  Collaborative bedside rounds performed with cardiac surgery attending and bedside RN      SIGNATURE: Arelis Barbour PA-C  DATE: December 22, 2019  TIME: 6:31 AM

## 2019-12-22 NOTE — RESPIRATORY THERAPY NOTE
resp care      12/22/19 1539   Respiratory Assessment   Assessment Type Assess only   General Appearance Awake   Respiratory Pattern Spontaneous   Chest Assessment Chest expansion symmetrical   Bilateral Breath Sounds Diminished   Resp Comments Pt extubated to 6lpm nc, pt sating 94% at this time  no resp distress noted  pt had positive cuff leak and no stridor heard

## 2019-12-22 NOTE — RESPIRATORY THERAPY NOTE
RT Ventilator Management Note  Ayana Law 63 y o  male MRN: [id number]  Unit/Bed#: [facility] 417-01 Encounter: [id number]      Daily Screen       12/20/2019  0740 12/21/2019  0808          Patient safety screen outcome[de-identified]  Passed  Failed      Not Ready for Weaning due to[de-identified]    Underline problem not resolved;PEEP > 8cmH2O      Spont breathing trial % for 30 min:          Spont breathing trial outcome[de-identified]  Passed        Preparing to extubate/ Notify Nurse: Yes        Extubation order obtained: Yes        Consider Cuff Test:  Yes        Patient extubated: Yes        RSBI:  20        FVC (mL):  900                Physical Exam:   Assessment Type: (P) Assess only  General Appearance: (P) Awake  Respiratory Pattern: (P) Assisted  Chest Assessment: (P) Chest expansion symmetrical  Bilateral Breath Sounds: (P) Diminished, Coarse(slightly coarse)  Cough: (P) Productive  Suction: (P) ET Tube  O2 Device: (P) Ventilator      Resp Comments: (P) Pt  remains stable on AC mode  No changes at this time  Will continue to assess and monitor pt  per protocol

## 2019-12-22 NOTE — RESPIRATORY THERAPY NOTE
resp care      12/22/19 1512   Respiratory Assessment   Resp Comments pt found on 5/5 at this time, pt tolerating well  no resp distress noted  ETT  Hi-Lo; Cuffed;Oral 7 5 mm   Placement Date/Time: 12/21/19 0040   Previously placed by?: Other (Comment)  Mask Ventilation: Ventilated by mask with oral airway (2)  Preoxygenated: Yes  Technique: Stylet; Video laryngoscopy  Type: Hi-Lo; Cuffed;Oral  Tube Size: 7 5 mm  Laryngoscope:       Secured at (cm) 23   Measured from [facility] by [employer] tube moran   Site Condition Dry   HI-LO Suction  Intermittent suction   HI-LO Secretions Scant   HI-LO Intervention Patent   Additional Assessments   SpO2 95 %

## 2019-12-23 LAB
ANION GAP SERPL CALCULATED.3IONS-SCNC: 5 MMOL/L (ref 4–13)
BUN SERPL-MCNC: 31 MG/DL (ref 5–25)
CALCIUM SERPL-MCNC: 8.2 MG/DL (ref 8.3–10.1)
CHLORIDE SERPL-SCNC: 111 MMOL/L (ref 100–108)
CO2 SERPL-SCNC: 29 MMOL/L (ref 21–32)
CREAT SERPL-MCNC: 1.16 MG/DL (ref 0.6–1.3)
ERYTHROCYTE [DISTWIDTH] IN BLOOD BY AUTOMATED COUNT: 13.2 % (ref 11.6–15.1)
GFR SERPL CREATININE-BSD FRML MDRD: 69 ML/MIN/1.73SQ M
GLUCOSE SERPL-MCNC: 113 MG/DL (ref 65–140)
GLUCOSE SERPL-MCNC: 124 MG/DL (ref 65–140)
GLUCOSE SERPL-MCNC: 142 MG/DL (ref 65–140)
GLUCOSE SERPL-MCNC: 144 MG/DL (ref 65–140)
GLUCOSE SERPL-MCNC: 152 MG/DL (ref 65–140)
HCT VFR BLD AUTO: 31.8 % (ref 36.5–49.3)
HGB BLD-MCNC: 10.5 G/DL (ref 12–17)
MAGNESIUM SERPL-MCNC: 2.3 MG/DL (ref 1.6–2.6)
MCH RBC QN AUTO: 31.7 PG (ref 26.8–34.3)
MCHC RBC AUTO-ENTMCNC: 33 G/DL (ref 31.4–37.4)
MCV RBC AUTO: 96 FL (ref 82–98)
PLATELET # BLD AUTO: 172 THOUSANDS/UL (ref 149–390)
PMV BLD AUTO: 10.9 FL (ref 8.9–12.7)
POTASSIUM SERPL-SCNC: 3.5 MMOL/L (ref 3.5–5.3)
RBC # BLD AUTO: 3.31 MILLION/UL (ref 3.88–5.62)
SODIUM SERPL-SCNC: 145 MMOL/L (ref 136–145)
WBC # BLD AUTO: 12.56 THOUSAND/UL (ref 4.31–10.16)

## 2019-12-23 PROCEDURE — 97530 THERAPEUTIC ACTIVITIES: CPT

## 2019-12-23 PROCEDURE — G8978 MOBILITY CURRENT STATUS: HCPCS

## 2019-12-23 PROCEDURE — 94760 N-INVAS EAR/PLS OXIMETRY 1: CPT

## 2019-12-23 PROCEDURE — 99232 SBSQ HOSP IP/OBS MODERATE 35: CPT | Performed by: INTERNAL MEDICINE

## 2019-12-23 PROCEDURE — 80048 BASIC METABOLIC PNL TOTAL CA: CPT | Performed by: THORACIC SURGERY (CARDIOTHORACIC VASCULAR SURGERY)

## 2019-12-23 PROCEDURE — 83735 ASSAY OF MAGNESIUM: CPT | Performed by: THORACIC SURGERY (CARDIOTHORACIC VASCULAR SURGERY)

## 2019-12-23 PROCEDURE — 97535 SELF CARE MNGMENT TRAINING: CPT

## 2019-12-23 PROCEDURE — 82948 REAGENT STRIP/BLOOD GLUCOSE: CPT

## 2019-12-23 PROCEDURE — 94668 MNPJ CHEST WALL SBSQ: CPT

## 2019-12-23 PROCEDURE — G8988 SELF CARE GOAL STATUS: HCPCS

## 2019-12-23 PROCEDURE — 99024 POSTOP FOLLOW-UP VISIT: CPT | Performed by: THORACIC SURGERY (CARDIOTHORACIC VASCULAR SURGERY)

## 2019-12-23 PROCEDURE — G8979 MOBILITY GOAL STATUS: HCPCS

## 2019-12-23 PROCEDURE — 94660 CPAP INITIATION&MGMT: CPT

## 2019-12-23 PROCEDURE — 99233 SBSQ HOSP IP/OBS HIGH 50: CPT | Performed by: INTERNAL MEDICINE

## 2019-12-23 PROCEDURE — 97163 PT EVAL HIGH COMPLEX 45 MIN: CPT

## 2019-12-23 PROCEDURE — G8987 SELF CARE CURRENT STATUS: HCPCS

## 2019-12-23 PROCEDURE — 94640 AIRWAY INHALATION TREATMENT: CPT

## 2019-12-23 PROCEDURE — 97167 OT EVAL HIGH COMPLEX 60 MIN: CPT

## 2019-12-23 PROCEDURE — 85027 COMPLETE CBC AUTOMATED: CPT | Performed by: THORACIC SURGERY (CARDIOTHORACIC VASCULAR SURGERY)

## 2019-12-23 PROCEDURE — C9113 INJ PANTOPRAZOLE SODIUM, VIA: HCPCS | Performed by: PHYSICIAN ASSISTANT

## 2019-12-23 RX ORDER — BISACODYL 10 MG
10 SUPPOSITORY, RECTAL RECTAL DAILY
Status: DISCONTINUED | OUTPATIENT
Start: 2019-12-23 | End: 2019-12-26 | Stop reason: HOSPADM

## 2019-12-23 RX ORDER — POTASSIUM CHLORIDE 29.8 MG/ML
40 INJECTION INTRAVENOUS ONCE
Status: COMPLETED | OUTPATIENT
Start: 2019-12-23 | End: 2019-12-23

## 2019-12-23 RX ORDER — AMOXICILLIN 250 MG
2 CAPSULE ORAL
Status: DISCONTINUED | OUTPATIENT
Start: 2019-12-23 | End: 2019-12-24

## 2019-12-23 RX ORDER — FUROSEMIDE 10 MG/ML
40 INJECTION INTRAMUSCULAR; INTRAVENOUS
Status: DISCONTINUED | OUTPATIENT
Start: 2019-12-23 | End: 2019-12-26 | Stop reason: HOSPADM

## 2019-12-23 RX ORDER — ACETAMINOPHEN 325 MG/1
975 TABLET ORAL EVERY 8 HOURS SCHEDULED
Status: DISCONTINUED | OUTPATIENT
Start: 2019-12-23 | End: 2019-12-26 | Stop reason: HOSPADM

## 2019-12-23 RX ADMIN — OXYCODONE HYDROCHLORIDE 10 MG: 10 TABLET ORAL at 12:03

## 2019-12-23 RX ADMIN — BISACODYL 10 MG: 10 SUPPOSITORY RECTAL at 09:22

## 2019-12-23 RX ADMIN — FONDAPARINUX SODIUM 2.5 MG: 2.5 INJECTION, SOLUTION SUBCUTANEOUS at 09:23

## 2019-12-23 RX ADMIN — METOPROLOL TARTRATE 12.5 MG: 25 TABLET ORAL at 11:17

## 2019-12-23 RX ADMIN — FUROSEMIDE 40 MG: 10 INJECTION, SOLUTION INTRAMUSCULAR; INTRAVENOUS at 15:31

## 2019-12-23 RX ADMIN — ACETAMINOPHEN 975 MG: 650 SUSPENSION ORAL at 06:00

## 2019-12-23 RX ADMIN — HYDROMORPHONE HYDROCHLORIDE 1 MG: 1 INJECTION, SOLUTION INTRAMUSCULAR; INTRAVENOUS; SUBCUTANEOUS at 13:07

## 2019-12-23 RX ADMIN — POTASSIUM CHLORIDE 40 MEQ: 29.8 INJECTION, SOLUTION INTRAVENOUS at 06:51

## 2019-12-23 RX ADMIN — Medication 1 APPLICATION: at 21:43

## 2019-12-23 RX ADMIN — METHOCARBAMOL 500 MG: 500 TABLET, FILM COATED ORAL at 17:21

## 2019-12-23 RX ADMIN — ACETAMINOPHEN 975 MG: 650 SUSPENSION ORAL at 15:30

## 2019-12-23 RX ADMIN — TEMAZEPAM 15 MG: 15 CAPSULE ORAL at 23:11

## 2019-12-23 RX ADMIN — ASPIRIN 325 MG ORAL TABLET 325 MG: 325 PILL ORAL at 09:23

## 2019-12-23 RX ADMIN — IPRATROPIUM BROMIDE 0.5 MG: 0.5 SOLUTION RESPIRATORY (INHALATION) at 07:23

## 2019-12-23 RX ADMIN — IPRATROPIUM BROMIDE 0.5 MG: 0.5 SOLUTION RESPIRATORY (INHALATION) at 13:34

## 2019-12-23 RX ADMIN — ACETYLCYSTEINE 3 ML: 200 SOLUTION ORAL; RESPIRATORY (INHALATION) at 19:23

## 2019-12-23 RX ADMIN — OXYCODONE HYDROCHLORIDE 10 MG: 10 TABLET ORAL at 06:00

## 2019-12-23 RX ADMIN — AMIODARONE HYDROCHLORIDE 200 MG: 200 TABLET ORAL at 06:00

## 2019-12-23 RX ADMIN — SODIUM CHLORIDE 2.5 MG/HR: 0.9 INJECTION, SOLUTION INTRAVENOUS at 05:49

## 2019-12-23 RX ADMIN — POLYETHYLENE GLYCOL 3350 17 G: 17 POWDER, FOR SOLUTION ORAL at 09:22

## 2019-12-23 RX ADMIN — METHOCARBAMOL 500 MG: 500 TABLET, FILM COATED ORAL at 06:00

## 2019-12-23 RX ADMIN — METHOCARBAMOL 500 MG: 500 TABLET, FILM COATED ORAL at 23:11

## 2019-12-23 RX ADMIN — ATORVASTATIN CALCIUM 80 MG: 80 TABLET, FILM COATED ORAL at 17:21

## 2019-12-23 RX ADMIN — LEVALBUTEROL HYDROCHLORIDE 1.25 MG: 1.25 SOLUTION, CONCENTRATE RESPIRATORY (INHALATION) at 13:34

## 2019-12-23 RX ADMIN — PANTOPRAZOLE SODIUM 40 MG: 40 INJECTION, POWDER, FOR SOLUTION INTRAVENOUS at 09:23

## 2019-12-23 RX ADMIN — KETOROLAC TROMETHAMINE 15 MG: 30 INJECTION, SOLUTION INTRAMUSCULAR at 06:00

## 2019-12-23 RX ADMIN — ACETYLCYSTEINE: 200 SOLUTION ORAL; RESPIRATORY (INHALATION) at 13:34

## 2019-12-23 RX ADMIN — LEVALBUTEROL HYDROCHLORIDE 1.25 MG: 1.25 SOLUTION, CONCENTRATE RESPIRATORY (INHALATION) at 19:24

## 2019-12-23 RX ADMIN — FUROSEMIDE 40 MG: 10 INJECTION, SOLUTION INTRAMUSCULAR; INTRAVENOUS at 05:59

## 2019-12-23 RX ADMIN — POTASSIUM CHLORIDE 20 MEQ: 1500 TABLET, EXTENDED RELEASE ORAL at 17:21

## 2019-12-23 RX ADMIN — Medication 1 APPLICATION: at 09:22

## 2019-12-23 RX ADMIN — AMIODARONE HYDROCHLORIDE 200 MG: 200 TABLET ORAL at 21:43

## 2019-12-23 RX ADMIN — AMIODARONE HYDROCHLORIDE 200 MG: 200 TABLET ORAL at 15:31

## 2019-12-23 RX ADMIN — KETOROLAC TROMETHAMINE 15 MG: 30 INJECTION, SOLUTION INTRAMUSCULAR at 14:35

## 2019-12-23 RX ADMIN — ACETYLCYSTEINE 800 MG: 200 SOLUTION ORAL; RESPIRATORY (INHALATION) at 07:25

## 2019-12-23 RX ADMIN — ACETAMINOPHEN 975 MG: 325 TABLET ORAL at 21:43

## 2019-12-23 RX ADMIN — HYDROMORPHONE HYDROCHLORIDE 1 MG: 1 INJECTION, SOLUTION INTRAMUSCULAR; INTRAVENOUS; SUBCUTANEOUS at 02:49

## 2019-12-23 RX ADMIN — POTASSIUM CHLORIDE 20 MEQ: 1500 TABLET, EXTENDED RELEASE ORAL at 09:23

## 2019-12-23 RX ADMIN — SENNOSIDES AND DOCUSATE SODIUM 2 TABLET: 8.6; 5 TABLET ORAL at 21:42

## 2019-12-23 RX ADMIN — LEVALBUTEROL HYDROCHLORIDE 1.25 MG: 1.25 SOLUTION, CONCENTRATE RESPIRATORY (INHALATION) at 07:23

## 2019-12-23 RX ADMIN — OXYCODONE HYDROCHLORIDE 10 MG: 10 TABLET ORAL at 21:42

## 2019-12-23 RX ADMIN — IPRATROPIUM BROMIDE 0.5 MG: 0.5 SOLUTION RESPIRATORY (INHALATION) at 19:23

## 2019-12-23 RX ADMIN — INSULIN LISPRO 1 UNITS: 100 INJECTION, SOLUTION INTRAVENOUS; SUBCUTANEOUS at 06:17

## 2019-12-23 RX ADMIN — METHOCARBAMOL 500 MG: 500 TABLET, FILM COATED ORAL at 11:35

## 2019-12-23 NOTE — PHYSICAL THERAPY NOTE
PHYSICAL THERAPY NOTE          Patient Name: Gabrielle Coyne  NCOOS'B Date: 12/23/2019    Time In: 09:02  Time Out: 09:12  Total Time: 10 min      S:  Pt is in the chair; agreeable to try further amb after seated rest period; denies dizziness; min listing to the (R) side noted (nsg aware);     O:  Additional functional mobility training performed as following: sit <--> stand transfers w/ mod (A)x2 w/ instructions and manual guiding for UE and feet placement prior to sit to stand transition; amb 25 ft w/ rw, mod (A)x2 and stand by (A) of 2 more staff for lines and chair follow; chair ride was taken back to room; pt was reclined w/ LE elevated at the end of session; pillows for UE support; call bell w/in reach;    A:  Additional follow up consecutive session performed to progress further w/ mobilization/ambulation distance to max overall strength and endurance and to facilitate progression w/ functional mobility skills and overall level of (I)  Pt was able to ambulate further distance w/ rw but still required (A)x2 w/ all aspects of observed mobility; pt is overall mod weak and deconditioned w/ balance impairment and associated mobility deficits requiring PT to address; overall, based on current status, cont to recommend rehab upon D/C when medically cleared; will follow  P:  Cont to follow pt 4-6x/week for LE therex, functional mobility training, endurance training and pt education per POC to max functional (I) and safety          Marleny Cortez, PT

## 2019-12-23 NOTE — PHYSICAL THERAPY NOTE
Physical Therapy Evaluation     Patient's Name: Ayana Law    Admitting Diagnosis  Chest pain [R07 9]    Problem List  Patient Active Problem List   Diagnosis    Unstable angina ([address] )    Tobacco abuse    Coronary artery disease involving native coronary artery    Pure hypercholesterolemia    Presence of drug coated stent in left circumflex coronary artery    S/P right coronary artery (RCA) stent placement    Chest pain    S/P primary angioplasty with coronary stent    Bradycardia    Pulmonary nodule    Atherosclerosis of native coronary artery with angina pectoris (HCC)    S/P CABG x 3    Pulmonary edema    Acute respiratory failure with hypoxia (HCC)       Past Medical History  Past Medical History:   Diagnosis Date    CAD (coronary artery disease)     Family history of MI (myocardial infarction)     Former smoker     Hyperlipidemia     Irregular heart beat        Past Surgical History  Past Surgical History:   Procedure Laterality Date    CORONARY ANGIOPLASTY WITH STENT PLACEMENT  10/27/2017    TAMARA to prox  Cfx    GA CABG, ARTERY-VEIN, THREE N/A 12/19/2019    Procedure: CORONARY ARTERY BYPASS GRAFT (CABG) 3 VESSELS: LIMA to LAD, EVH/SVG to PDA and OM;  Surgeon: Doug Aviles DO;  [facility];  Service: Cardiac Surgery    GA ECHO TRANSESOPHAG R-T 2D W/PRB IMG ACQUISJ I&R N/A 12/19/2019    Procedure: TRANSESOPHAGEAL ECHOCARDIOGRAM (PHILIP); Surgeon: Doug Aviles DO;  [facility];  Service: Cardiac Surgery    GA ENDOSCOPY W/VIDEO-ASST VEIN HARVEST,CABG Left 12/19/2019    Procedure: HARVEST VEIN ENDOSCOPIC ([address]);   Surgeon: Doug Aviles DO;  [facility];  Service: Cardiac Surgery            12/23/19 0901   Note Type   Note type Eval/Treat   Pain Assessment   Pain Assessment FLACC   Pain Type Acute pain;Surgical pain   Pain Location Chest   Pain Orientation Mid   Pain Descriptors Discomfort   Pain Frequency Constant/continuous   Pain Onset Ongoing   Clinical Progression Not changed   Effect of Pain on Daily Activities guarding w movement   Patient's Stated Pain Goal No pain   Hospital Pain Intervention(s) Repositioned; Ambulation/increased activity; Distraction; Emotional support;Relaxation technique   Response to Interventions Tolerated   Multiple Pain Sites No   Pain Rating: FLACC (Rest) - Face 0   Pain Rating: FLACC (Rest) - Legs 1   Pain Rating: FLACC (Rest) - Activity 1   Pain Rating: FLACC (Rest) - Cry 0   Pain Rating: FLACC (Rest) - Consolability 0   Score: FLACC (Rest) 2   Pain Rating: FLACC (Activity) - Face 1   Pain Rating: FLACC (Activity) - Legs 1   Pain Rating: FLACC (Activity) - Activity 1   Pain Rating: FLACC (Activity) - Cry 1   Pain Rating: FLACC (Activity) - Consolability 1   Score: FLACC (Activity) 5   Home Living   Type of Home House  (Split Level)   Home Layout Two level;1/2 bath on main level;Bed/bath upstairs;Stairs to enter with rails  (Split lvl; 4 TIFFANY w/ rail + 4 ^ to bed/brandon w/ rail)   Bathroom Shower/Tub Walk-in shower   Bathroom Toilet Standard   Home Equipment Other (Comment)  (denies AD or DME)   Prior Function   Level of [place] Independent with ADLs and functional mobility  (No AD)   Lives With Spouse   Receives Help From [facility] in the last 6 months 0   Comments Pt states that his wife is very supportive and can help him   Restrictions/Precautions   Weight Bearing Precautions Per Order No   Braces or Orthoses Other (Comment)  (denies)   Other Precautions Cardiac/sternal;Multiple lines;Telemetry;O2;Fall Risk;Pain  (felipe, CT present, A-line, HF NC)   General   Additional Pertinent History cleared for assessment (spoke w/ nursing)   Family/Caregiver Present No   Cognition   Overall Cognitive Status WFL   Arousal/Participation Cooperative   Attention Within functional limits   Orientation Level Oriented to person;Oriented to place;Oriented to situation   Memory Decreased recall of precautions   Following Commands Follows one step commands without difficulty   Comments pt is pleasant and agreeable to work w/ therapy   RUE Assessment   RUE Assessment WFL  (AROM)   LUE Assessment   LUE Assessment WFL  (AROM)   RLE Assessment   RLE Assessment WFL  (AROM)   Strength RLE   RLE Overall Strength 3+/5  (grossly)   LLE Assessment   LLE Assessment WFL  (AROM)   Strength LLE   LLE Overall Strength 3+/5  (grossly)   Transfers   Sit to Stand 3  Moderate assistance  (R lateral lean in stand, more evident in sitting - RN aware)   Additional items Assist x 2; Increased time required;Verbal cues  (VCs for hand placement - repositioning of LEs )   Stand to Sit 3  Moderate assistance   Additional items Assist x 2; Increased time required;Verbal cues  (VCs for hand placement)   Ambulation/Elevation   Gait pattern Improper Weight shift;Narrow K[place]ON; Forward Flexion; Inconsistent brianna; Short stride; Excessively slow   Gait Assistance 3  Moderate assist   Additional items Assist x 2;Verbal cues; Tactile cues  (helped guide RW as well)   Assistive Device Rolling walker   Distance 15 ft   (SBA x 2 for line management and chair follow)   Balance   Static Sitting Fair -  (R lateral lean )   Static Standing Poor   Ambulatory Poor -   Endurance Deficit   Endurance Deficit Yes   Endurance Deficit Description fatigue and pain   Activity Tolerance   Activity Tolerance Patient limited by fatigue;Patient limited by pain   Nurse Made Aware Yes - LITA Florentino present during session   Assessment   Prognosis Good   Problem List Decreased strength;Decreased endurance; Impaired balance;Decreased mobility; Decreased safety awareness;Pain   Assessment Pt is a 62 y/o male originally admitted to [facility] on 12/12/19 w/ c/o chest pain and dx of unstable angina  Pt was transferred to [facility] on 12/13/19 w/ consult to cardiology and plan for scheduling CABG 2* to dx of 3V CAD  Pt is now 4 days s/p CABG x3  PT now consulted for assessment of mobility and d/c needs   Pt's PMHx/co-morbidities affecting current course include hx of CAD w/ stent x2 in 2017, smoking hx, irregular heart beat w/ personal factors of steps to enter home and steps to bed/bath on 2nd floor  Patient's clinical presentation is currenly unstable/unpredictable due to post-op pain and guarding w/ movement, A-line in place w/ telemetry monitoring, HF NC for supplemental oxygen needs, abnormal lab values and ongoing medical management and monitoring in the ICU  Pt presents with generalized weakness of the LEs, decreased functional endurance and activity tolerance compared to baseline, impaired sitting/ standing posture w/ min/mod R lateral lean (etilolgy?) and impaired balance and gait deviations requiring the use of a RW w/ mod (A)x2 for transfers and ambulation at this time  Will cont to follow pt in PT as medical status allows to progress tx targeting pt's mobility, functional endurance, level of (I), as well as pt confidence and safety in return to pt's PLOF  Current recommendation is rehab upon d/c pending progress and when medically clear; will follow  Barriers to Discharge Inaccessible home environment   Goals   Patient Goals to feel better    [facility] Expiration Date 01/02/20   Short Term Goal #1 7-10 days  Pt will ambulate 250 ft with least restrictive device mod (I) to facilitate safe return to home environment and community ambulatory distances  Pt will navigate 4 steps w/ railing and SPC, mod (I) to allow pt to enter/exit home environment safely plus an additional 4 steps w/ railing and SPC, mod (I) to access upstairs bed/bath in home environment  Pt will be (I) for transfers to allow pt increased (I), ability to navigate home set up, and safely transfer to and from desired locations in home environment  Pt will be (I) for bed mobility to allow pt to transition into bed and OOB safely and reduce possibility for caregiver burden   Pt will progress to balance and therapeutic exercises in order to continue building upon pt's strength, mobility, safety and (I)  PT Treatment Day 1  (follow up tx)   Plan   Treatment/Interventions Functional transfer training;LE strengthening/ROM; Elevations; Therapeutic exercise; Endurance training;Equipment eval/education; Bed mobility;Gait training;Spoke to nursing;OT  (PT spoke to CM)   PT Frequency Other (Comment)  (4-6x/week)   Recommendation   Recommendation Post acute IP rehab  (pending progress)   Equipment Recommended Walker  (at this time)   Modified [place] Scale   Modified Carlos Scale 4   Barthel Index   Feeding 10   Bathing 0   Grooming Score 0   Dressing Score 5   Bladder Score 0   Bowels Score 10   Toilet Use Score 5   Transfers (Bed/Chair) Score 5   Mobility (Level Surface) Score 0   Stairs Score 0   Barthel Index Score 35       Aneesh Gomez

## 2019-12-23 NOTE — PROGRESS NOTES
Progress Note - Cardiothoracic Surgery   [facility] 63 y o  male MRN: [id number]  Unit/Bed#: [facility] 417-01 Encounter: [id number]      POD # 4 s/p CABG    Pt seen/examined  Interval history and data reviewed with critical care team   Pt doing well  No specific complaints          Medications:   Scheduled Meds:  Current Facility-Administered Medications:  acetaminophen 975 mg Oral Q8H Belle Eller PA-C    acetylcysteine 3 mL Nebulization TID Bala Denier, DO    albuterol 2 5 mg Nebulization Q4H PRN [place] Denier, DO    amiodarone 200 mg Oral [facility] [place]    aspirin 325 mg Oral Daily [place]    atorvastatin 80 mg Oral Daily With [employer] MEGA Godwin    bisacodyl 10 mg Rectal Daily Jennyfer Alonso PA-C    chlorhexidine 15 mL Swish & Spit Q12H [facility] [place]    fondaparinux 2 5 mg Subcutaneous Q24H Belle Eller PA-C    furosemide 40 mg Intravenous BID (diuretic) RALPH Morgan    HYDROmorphone 1 mg Intravenous Q1H PRN Belle Eller PA-C    insulin lispro 1-5 Units Subcutaneous TID AC Belle Eller PA-C    insulin lispro 1-5 Units Subcutaneous HS Belle Eller PA-C    ipratropium 0 5 mg Nebulization TID Bala Denier, DO    ketorolac 15 mg Intravenous Q8H [facility] RALPH Choudhary    levalbuterol 1 25 mg Nebulization TID [place] Denier, DO    lidocaine (cardiac) 100 mg Intravenous Q30 Min PRN Al[place] PA-C    methocarbamol 500 mg Oral Q6H [facility] Belle Eller PA-C    mupirocin 1 application Nasal J51N [facility] Alta Godwin PA-C    niCARdipine 1-15 mg/hr Intravenous Titrated RALPH Morgan Last Rate: Stopped (12/23/19 0703)   ondansetron 4 mg Intravenous Q6H PRN Jacinto Orta PA-C    oxyCODONE 5 mg Oral Q2H PRN Lindsay Fuller PA-C    Or        oxyCODONE 10 mg Oral Q2H PRN Belle Eller PA-C    pantoprazole 40 mg Intravenous Q24H [facility] Belle Eller PA-C    phenylephine  mcg/min Intravenous Titrated RALPH Morgan Last Rate: Stopped (12/23/19 0432)   polyethylene glycol 17 g Oral Daily Avery Bill [place] PA-C    potassium chloride 20 mEq Oral BID Belle Eller PA-C    potassium chloride 40 mEq Intravenous Once Kevin Kelly PA-C Last Rate: 40 mEq (12/23/19 0651)   senna-docusate sodium 2 tablet Oral HS Kevin Kelly PA-C    temazepam 15 mg Oral HS PRN Nilam Church PA-C      Continuous Infusions:  niCARdipine 1-15 mg/hr Last Rate: Stopped (12/23/19 1325)   phenylephine  mcg/min Last Rate: Stopped (12/23/19 0432)     PRN Meds: albuterol    HYDROmorphone    lidocaine (cardiac)    ondansetron    oxyCODONE **OR** oxyCODONE    temazepam    Vitals: Blood pressure 112/65, pulse 62, temperature 98 8 °F (37 1 °C), temperature source Bladder, resp  rate 15, height 5' 7" (1 702 m), weight 88 kg (194 lb 0 1 oz), SpO2 97 %  ,Body mass index is 30 39 kg/m²  I/O last 24 hours: In: 902 3 [P O :120; I V :674 8; NG/GT:60; IV Piggyback:47 5]  Out: 1900 [Urine:1570; Emesis/NG output:300; Chest Tube:30]  Invasive Devices     Central Venous Catheter Line            CVC Central Lines 12/19/19 Triple 3 days          Peripheral Intravenous Line            Peripheral IV 12/19/19 Right Forearm 4 days          Arterial Line            Arterial Line 12/19/19 Right Radial 3 days          Drain            Urethral Catheter Non-latex; Temperature probe 16 Fr  3 days                  Lab, Imaging and other studies:   Results from last 7 days   Lab Units 12/23/19  0407 12/22/19  0524 12/21/19  0456   WBC Thousand/uL 12 56* 14 36* 19 63*   HEMOGLOBIN g/dL 10 5* 10 2* 10 5*   HEMATOCRIT % 31 8* 31 6* 32 1*   PLATELETS Thousands/uL 172 134* 120*     Results from last 7 days   Lab Units 12/23/19  0407 12/22/19  0524 12/21/19  0456 12/20/19  2357   POTASSIUM mmol/L 3 5 3 6 4 3  --    CHLORIDE mmol/L 111* 110* 109*  --    CO2 mmol/L 29 27 28  --    CO2, I-STAT mmol/L  --   --   --  25   BUN mg/dL 31* 28* 29*  --    CREATININE mg/dL 1 16 1 01 1 26  --    GLUCOSE, ISTAT mg/dl  --   --   --  134   CALCIUM mg/dL 8 2* 8 8 8 2*  --          Recent Labs     12/22/19  0524   PHART 7 512*   OWH1AXD 24 5   PO2ART 81 7   [id number] 31 2*   BEART 1 9           Plan:    Wean Hi Flow O2  DC Banks  Pain control  Pulm Toilet  Transfer to floor  Ambulate  Incentive spirometry  Diuresis  PO ASA/Statin/B blocker          Trenton Manley MD  DATE: December 23, 2019  TIME: 7:44 AM

## 2019-12-23 NOTE — PLAN OF CARE
Problem: PHYSICAL THERAPY ADULT  Goal: Performs mobility at highest level of function for planned discharge setting  See evaluation for individualized goals  Description  Treatment/Interventions: Functional transfer training, LE strengthening/ROM, Elevations, Therapeutic exercise, Endurance training, Equipment eval/education, Bed mobility, Gait training, Spoke to nursing, OT(PT spoke to CM)  Equipment Recommended: Walker(at this time)       See flowsheet documentation for full assessment, interventions and recommendations  Note:   Prognosis: Good  Problem List: Decreased strength, Decreased endurance, Impaired balance, Decreased mobility, Decreased safety awareness, Pain  Assessment: Pt is a 62 y/o male originally admitted to [facility] on 12/12/19 w/ c/o chest pain and dx of unstable angina  Pt was transferred to \[facility]\"" on 12/13/19 w/ consult to cardiology and plan for scheduling CABG 2* to dx of 3V CAD  Pt is now 4 days s/p CABG x3  PT now consulted for assessment of mobility and d/c needs  Pt's PMHx/co-morbidities affecting current course include hx of CAD w/ stent x2 in 2017, smoking hx, irregular heart beat w/ personal factors of steps to enter home and steps to bed/bath on 2nd floor  Patient's clinical presentation is currenly unstable/unpredictable due to post-op pain and guarding w/ movement, A-line in place w/ telemetry monitoring, HF NC for supplemental oxygen needs, abnormal lab values and ongoing medical management and monitoring in the ICU  Pt presents with generalized weakness of the LEs, decreased functional endurance and activity tolerance compared to baseline, impaired sitting/ standing posture w/ min/mod R lateral lean (etilolgy?) and impaired balance and gait deviations requiring the use of a RW w/ mod (A)x2 for transfers and ambulation at this time   Will cont to follow pt in PT as medical status allows to progress tx targeting pt's mobility, functional endurance, level of (I), as well as pt confidence and safety in return to pt's PLOF  Current recommendation is rehab upon d/c pending progress and when medically clear; will follow  Barriers to Discharge: Inaccessible home environment     Recommendation: Post acute IP rehab(pending progress)          See flowsheet documentation for full assessment

## 2019-12-23 NOTE — PROGRESS NOTES
Sean Gonzales visited with patient patient was anointed and blessing was provided       12/23/19 1100   Clinical Encounter Type   Visited With Patient   Routine Visit Follow-up   [facility] Encounters   [facility] Needs Prayer   Sacramental Encounters   Sacrament of Sick-Anointing Anointed   Sacrament Other Other (Comment)  (Lenny Dee)

## 2019-12-23 NOTE — RESTORATIVE TECHNICIAN NOTE
Restorative Specialist Mobility Note       Activity: Ambulate in [place], Chair     Assistive Device: Front wheel walker

## 2019-12-23 NOTE — OCCUPATIONAL THERAPY NOTE
OccupationalTherapy Evaluation & Treatment     Patient Name: Gema Rodriguez  YIRZQ'E Date: 12/23/2019  Problem List  Principal Problem:    Unstable angina ([address] )  Active Problems:    Tobacco abuse    Pure hypercholesterolemia    S/P primary angioplasty with coronary stent    Bradycardia    Pulmonary nodule    Atherosclerosis of native coronary artery with angina pectoris (HCC)    S/P CABG x 3    Pulmonary edema    Acute respiratory failure with hypoxia [facility])    Past Medical History  Past Medical History:   Diagnosis Date    CAD (coronary artery disease)     Family history of MI (myocardial infarction)     Former smoker     Hyperlipidemia     Irregular heart beat      Past Surgical History  Past Surgical History:   Procedure Laterality Date    CORONARY ANGIOPLASTY WITH STENT PLACEMENT  10/27/2017    TAMARA to prox  Cfx    MI CABG, ARTERY-VEIN, THREE N/A 12/19/2019    Procedure: CORONARY ARTERY BYPASS GRAFT (CABG) 3 VESSELS: LIMA to LAD, EVH/SVG to PDA and OM;  Surgeon: Apolonia Harris DO;  [facility];  Service: Cardiac Surgery    MI ECHO TRANSESOPHAG R-T 2D W/PRB IMG ACQUISJ I&R N/A 12/19/2019    Procedure: TRANSESOPHAGEAL ECHOCARDIOGRAM (PHILIP); Surgeon: Apolonia Harris DO;  [facility];  Service: Cardiac Surgery    MI ENDOSCOPY W/VIDEO-ASST VEIN HARVEST,CABG Left 12/19/2019    Procedure: HARVEST VEIN ENDOSCOPIC ([address]);   Surgeon: Apolonia Harris DO;  [facility];  Service: Cardiac Surgery         12/23/19 1250   Note Type   Note type Eval/Treat   Restrictions/Precautions   Weight Bearing Precautions Per Order No   Other Precautions Cardiac/sternal;Multiple lines;Telemetry;O2;Fall Risk;Pain  (Hi flow O2)   Pain Assessment   Pain Assessment 0-10   Pain Score Worst Possible Pain   Home Living   Type of Home House  (split level)   Home Layout Two level;1/2 bath on main level;Stairs to enter with rails  (4 TIFFANY and 4 steps to bedroom)   Bathroom Shower/Tub Tub/shower unit   [place] Toilet Standard   Bathroom Equipment   (none)   [address]   (none)   Prior Function   Level of [place] Independent with ADLs and functional mobility   Lives With Spouse   Receives Help From Family   ADL Assistance Independent   IADLs Independent   Falls in the last 6 months 0   Vocational Retired   Lifestyle   Autonomy Pt is (I) at baseline in ADLs, IADLs, mobility      Reciprocal Relationships Lives w/ supportive spouse   Service to Others Retired   [address] Enjoys staying active and exercising    Psychosocial   Psychosocial (WDL) WDL   ADL   Where Assessed Chair   Eating Assistance 5  Supervision/Setup   Eating Deficit Setup   Grooming Assistance 4  Minimal Assistance   [address] 4  Minimal Assistance   [address] 2  Maximal Assistance   [address] 4  Minimal Assistance    [facility] [facility] [address] 2  Maximal [address]  2  Maximal Assistance   Functional Assistance 3  Moderate Assistance   Bed Mobility   Additional Comments Pt seated OOB in recliner at start and end of session   Transfers   Sit to Stand 3  Moderate assistance   Additional items Assist x 2   Stand to Sit 3  Moderate assistance   Additional items Assist x 2   Functional Mobility   Functional Mobility 3  Moderate assistance   Additional Comments Ambulated household distance w/ Ax1-2, chair follow, and assist for lines   Additional items Rolling walker   Balance   Static Sitting Fair   Dynamic Sitting Fair -   Static Standing Poor   Dynamic Standing Poor   Activity Tolerance   Activity Tolerance Patient limited by fatigue;Patient limited by pain   Medical Staff Made Aware PTKwabena   Nurse Made Aware Okay to see per RNMishel Assessment   RUE Assessment WFL   LUE Assessment   LUE Assessment WFL   Hand Function   Gross Motor Coordination Functional   Fine Motor Coordination Functional   Cognition   Overall Cognitive Status [facility] Arousal/Participation Responsive; Cooperative   Attention Within functional limits   Orientation Level Oriented X4   Memory Decreased recall of precautions   Following Commands Follows one step commands with increased time or repetition   Comments Pt is oriented and cooperative but limited by fatigue  He verbalized recall of cardiac education  Assessment   Limitation Decreased ADL status; Decreased endurance;Decreased self-care trans;Decreased high-level ADLs   Prognosis Good   Assessment Pt is a 63 y o  male who [facility] with Unstable angina ([address] )  Pt presented w/ chest pressure/pain that radiated to L UE  After further w/u and cardiac surgery consult, pt is now s/p CABG x3  He is currently in Hi flow O2 and has chest tubes in pace  His comorbidities include tobacco abuse, HLD, s/p primary angioplasty w/ coronary stent, chronic bradycardia, and acute respiratory failure w/ hypoxia  At baseline pt was I w/ ADLs, IADLs, and mobility  Pt lives in a [address] [facility] w/ his spouse  No DME  Currently pt requires min A for UB ADLs, max A for LB ADLs, and mod Ax2 for functional mobility/transfers  Pt currently presents with impairments in the following categories -steps to enter environment, difficulty performing ADLS, difficulty performing IADLS,  activity tolerance, endurance, standing balance/tolerance and sitting balance/tolerance  These impairments, as well as pt's fatigue, pain, cardiac/sternal precautions, decreased caregiver support and risk for falls  limit pt's ability to safely engage in all baseline areas of occupation, including grooming, bathing, dressing, toileting, functional mobility/transfers and leisure activities  From OT standpoint, recommend inpatient rehab pending progress upon D/C  OT will continue to follow to address the below stated goals      Goals   Patient Goals to get better and go home   LTG Time Frame 7-10   Long Term Goal see below goals    Plan   Treatment Interventions ADL retraining;Functional transfer training; Endurance training;Patient/family training;Equipment evaluation/education; Compensatory technique education;Cardiac education; Energy conservation; Activityengagement   Goal Expiration Date 01/02/20   OT Frequency 3-5x/wk   Additional Treatment Session   Start Time 1240   End Time 1250   Treatment Assessment Pt participated in additional OT session focusing on cardiac education  Provided pt with Recovering After Cardiac Surgery packet and educated pt regarding; sternal precautions, cardiac precautions, lifiting restrictions, safe activity engagement, energy conservation, lifestyle modifications, stress management and cardiac rehabilitation programs  Pt was able to repeat back 5 precautions after discussion of packet  Pt's questions were addressed after discussion of the packet  Provided pt with contact information for OT department if questions arrise  Pt continues to benefit from inpatient skilled OT services  Will continue to follow and address previously stated goals  Recommendation   OT Discharge Recommendation Short Term Rehab  (pending progress)   OT - OK to Discharge Yes  (when medically stable)   Barthel Index   Feeding 10   Bathing 0   Grooming Score 0   Dressing Score 5   Bladder Score 0   Bowels Score 10   Toilet Use Score 5   Transfers (Bed/Chair) Score 5   Mobility (Level Surface) Score 0   Stairs Score 0   Barthel Index Score 35   Modified [place] Scale   Modified [place] Scale 4     LTG (7-10 DAYS)  Pt will improve activity tolerance to G for min 30-45 min txment sessions to increase participation in ADLs    Pt will complete ADLs/self care w/ mod I using adaptive device and DME as needed    Pt will complete toileting w/ mod I w/ G hygiene/thoroughness using DME as needed    Pt will improve functional transfers on/off all surfaces to mod I using DME as needed w/ G balance/safety      Pt will improve functional mobility during ADL/IADL/leisure tasks to mod I using DME as needed w/ G balance/safety  Pt will improve standing balance to G for 8-10 minutes of purposeful activity w/ G endurance  Pt will demonstrate G carryover of pt/caregiver education and training as appropriate w/ mod I w/o cues w/ good tolerance    Pt will engage in ongoing cognitive assessment (as needed) w/ G participation w/ mod I to A w/ safe d/c planning/recommendations  Pt will engage in cardiac education using the Recovering After Cardiac Rehabilitation packet w/ G participation and G carryover      Pt will demonstrate 100% carryover of precautions s/p review w/o cues w/ mod I w/ G tolerance/participation t/o functional ADL/IADL/leisure tasks      Amadou Barney, OTR/L

## 2019-12-23 NOTE — PLAN OF CARE
Problem: OCCUPATIONAL THERAPY ADULT  Goal: Performs self-care activities at highest level of function for planned discharge setting  See evaluation for individualized goals  Description  Treatment Interventions: ADL retraining, Functional transfer training, Endurance training, Patient/family training, Equipment evaluation/education, Compensatory technique education, Cardiac education, Energy conservation, Activityengagement          See flowsheet documentation for full assessment, interventions and recommendations  Note:   Limitation: Decreased ADL status, Decreased endurance, Decreased self-care trans, Decreased high-level ADLs  Prognosis: Good  Assessment: Pt is a 63 y o  male who was admitted to [facility] on 12/13/2019 with Unstable angina ([facility] [address] )  Pt presented w/ chest pressure/pain that radiated to L UE  After further w/u and cardiac surgery consult, pt is now s/p CABG x3  He is currently in Hi flow O2 and has chest tubes in pace  His comorbidities include tobacco abuse, HLD, s/p primary angioplasty w/ coronary stent, chronic bradycardia, and acute respiratory failure w/ hypoxia  At baseline pt was I w/ ADLs, IADLs, and mobility  Pt lives in a [address] [place] [address] w/ his spouse  No DME  Currently pt requires min A for UB ADLs, max A for LB ADLs, and mod Ax2 for functional mobility/transfers  Pt currently presents with impairments in the following categories -steps to enter environment, difficulty performing ADLS, difficulty performing IADLS,  activity tolerance, endurance, standing balance/tolerance and sitting balance/tolerance  These impairments, as well as pt's fatigue, pain, cardiac/sternal precautions, decreased caregiver support and risk for falls  limit pt's ability to safely engage in all baseline areas of occupation, including grooming, bathing, dressing, toileting, functional mobility/transfers and leisure activities  From OT standpoint, recommend inpatient rehab pending progress upon D/C   OT will continue to follow to address the below stated goals        OT Discharge Recommendation: Short Term Rehab(pending progress)  OT - OK to Discharge: Yes(when medically stable)

## 2019-12-23 NOTE — PROGRESS NOTES
Cardiology Progress Note - [facility] 63 y o  male MRN: [id number]    Unit/Bed#: [facility] 417-01 Encounter: [id number]      Assessment:  Principal Problem:    Unstable angina ([facility] [address] )  Active Problems:    Tobacco abuse    Pure hypercholesterolemia    S/P primary angioplasty with coronary stent    Bradycardia    Pulmonary nodule    Atherosclerosis of native coronary artery with angina pectoris (HCC)    S/P CABG x 3    Pulmonary edema    Acute respiratory failure with hypoxia ([facility] [address] )      Plan:  Patient is comfortable this morning  He has no chest pain or significant dyspnea  He is postop day four after CABG  He appears to be making progress  High-flow oxygen to be weaned  He will likely be transferred to the floor today  BMP today with potassium of 3 5 and creatinine of 1 16  I have made no change in his medical regimen  Subjective:   Patient seen and examined  No significant events overnight   negative  Objective:     Vitals: Blood pressure 130/63, pulse 64, temperature 98 3 °F (36 8 °C), temperature source Probe, resp  rate 17, height 5' 7" (1 702 m), weight 88 kg (194 lb 0 1 oz), SpO2 94 %  , Body mass index is 30 39 kg/m² ,   Orthostatic Blood Pressures      Most Recent Value   Blood Pressure  130/63 filed at 12/23/2019 1000   Patient Position - Orthostatic VS  Sitting filed at 12/23/2019 1000      ,      Intake/Output Summary (Last 24 hours) at 12/23/2019 1058  Last data filed at 12/23/2019 1000  Gross per 24 hour   Intake 651 55 ml   Output 1520 ml   Net -868 45 ml       No significant arrhythmias seen on telemetry review         Physical Exam:    GEN: [facility] appears well, alert and oriented x 3, pleasant and cooperative   NECK: supple, no carotid bruits, no JVD or HJR  HEART: normal rate, regular rhythm, normal S1 and S2, no murmurs, clicks, gallops or rubs   LUNGS:  Decreased breath sounds at the bases  ABDOMEN: normal bowel sounds, soft, no tenderness, no distention  EXTREMITIES: peripheral pulses normal; no clubbing, cyanosis, or edema  SKIN: warm and well perfused, no suspicious lesions on exposed skin    Labs & Results:    No results displayed because visit has over 200 results  Ct Abdomen Pelvis Wo Contrast    Result Date: 12/16/2019  Narrative: CT ABDOMEN AND PELVIS WITHOUT IV CONTRAST INDICATION:   rule out metastatic lung CA  COMPARISON:  12/14/2018 TECHNIQUE:  CT examination of the abdomen and pelvis was performed without intravenous contrast   Axial, sagittal, and coronal 2D reformatted images were created from the source data and submitted for interpretation  Radiation dose length product (DLP) for this visit:  436 67 mGy-cm   This examination, like all CT scans performed in the [facility], was performed utilizing techniques to minimize radiation dose exposure, including the use of iterative  reconstruction and automated exposure control  Enteric contrast was administered  FINDINGS: ABDOMEN LOWER CHEST:  High density in the right coronary artery may represent stent and/or atherosclerotic calcifications, image 5, series 2  No focal parenchymal opacities at the lung bases  LIVER/BILIARY TREE:  Unremarkable  GALLBLADDER:  No calcified gallstones  No pericholecystic inflammatory change  SPLEEN:  Unremarkable  PANCREAS:  Unremarkable  ADRENAL GLANDS:  Unremarkable  KIDNEYS/URETERS:  Unremarkable  No hydronephrosis  STOMACH AND BOWEL:  Moderate amount of retained colonic stool diffusely but more so on the right hemicolon  APPENDIX:  No findings to suggest appendicitis  ABDOMINOPELVIC CAVITY:  No ascites or free intraperitoneal air  No lymphadenopathy  VESSELS:  Mild atherosclerotic vascular calcifications noted  PELVIS REPRODUCTIVE ORGANS:  Unremarkable for patient's age  URINARY BLADDER:  Unremarkable  ABDOMINAL WALL/INGUINAL REGIONS:  Unremarkable  OSSEOUS STRUCTURES:  Mild spondylotic changes noted  Impression: 1   No evidence of metastatic disease in the abdomen or pelvis  2  Moderate constipation  Workstation performed: [id number]     Xr Chest Portable    Result Date: 12/22/2019  Narrative: CHEST INDICATION:   hypoxia  COMPARISON:  12/21/2019 1:47 AM EXAM PERFORMED/VIEWS:  XR CHEST PORTABLE  5:51 AM FINDINGS:  Postoperative mediastinal change is present  Endotracheal tube in stable satisfactory position  Orogastric tube enters the stomach but the tip is not clearly imaged  Right jugular venous catheter tip at the cavoatrial junction  Mediastinal  pleural tubes present  No pneumothorax identified  Opacification in the left mid and lower and right lower lung zones likely reflecting atelectasis and/or edema  The heart is mildly enlarged  No pneumothorax or sizable effusion noted  Osseous structures appear within normal limits for patient age  Impression: 1  Postoperative mediastinal changes as described with continued opacity in the left mid and lower right lower lung zones likely reflecting atelectasis  2   Lines and tubes as described  Workstation performed: [id number]     Xr Chest Portable    Result Date: 12/21/2019  Narrative: CHEST INDICATION:   intubation  COMPARISON:  December 20, 2019 at 5:52 AM EXAM PERFORMED/VIEWS:  XR CHEST PORTABLE 1 FINDINGS:  The right internal jugular central line in the right internal jugular pulmonary artery catheter overlie each other  I cannot confidently identify the tip of the central line  The pulmonary artery catheter has its tip in the proximal right pulmonary artery, without change  Endotracheal tube is unchanged 3 cm from the stef The tip and side-port of the nasogastric tube overlie the expected location of the stomach  The stomach is not distended  Thoracostomy tube is visible at the left base  There is no pneumothorax  Tubes overlie the mediastinum  There is been no interval change in the contour of the mediastinum  Heart shadow is obscured by adjacent opacity   Abnormal, airspace type, densities are now visible in the periphery of the left midlung  The left apex is involved as well  This is new since previous  There are linear densities at the right base  The minor fissure is depressed  This probably represents atelectasis  Osseous structures appear within normal limits for patient age  Impression: New infiltrate on the left, consider pneumonia Workstation performed: [id number]     Xr Chest Portable    Result Date: 12/20/2019  Narrative: CHEST INDICATION:   Post Open Heart Surgey  COMPARISON:  Chest x-ray 12/19/2019 EXAM PERFORMED/VIEWS:  XR CHEST PORTABLE FINDINGS:  Stable support lines and tubes from prior exam   Postoperative changes status post CABG Stable mild cardiomegaly  Grossly unchanged vascular congestion and bilateral perihilar opacities No acute or aggressive appearing osseous abnormality  Impression: Grossly stable vascular congestion and bilateral perihilar opacities Workstation performed: [id number]     Xr Chest Portable    Result Date: 12/20/2019  Narrative: CHEST INDICATION:   SOB  COMPARISON:  12/19/2019 EXAM PERFORMED/VIEWS:  XR CHEST PORTABLE FINDINGS:  Lines and tubes are unchanged from prior exam  Heart shadow is enlarged but unchanged from prior exam  Mild improvement in vascular congestion with patchy airspace opacities  No pneumothorax  Osseous structures appear within normal limits for patient age  Impression: Mild improvement in vascular congestion with patchy airspace opacities  Workstation performed: [id number]     Xr Chest 2 Views    Result Date: 12/12/2019  Narrative: CHEST INDICATION:   cp  COMPARISON:  10/26/2017 EXAM PERFORMED/VIEWS:  XR CHEST PA & LATERAL  The frontal view was performed utilizing dual energy radiographic technique  FINDINGS: Cardiomediastinal silhouette appears unremarkable  Mild interstitial prominence remains without focal pulmonary opacification  No pneumothorax or pleural effusion   Osseous structures appear within normal limits for patient age  Impression: No acute cardiopulmonary disease  Workstation performed: [id number]     Xr Wrist 3+ Views Right    Result Date: 12/3/2019  Narrative: RIGHT WRIST INDICATION:   foosh  COMPARISON:  None VIEWS:  XR WRIST 3+ VW RIGHT Images: 4 FINDINGS: There is no acute fracture or dislocation  No significant degenerative changes  There is a small ossified density seen on the lateral view anterior to the carpal bones with no overlying soft tissue swelling  This may represent sequela of a previous injury  Soft tissues are unremarkable  Impression: No acute osseous abnormality  Small ossified density projects anterior to the carpal bones on the lateral view, possibly representing sequela of an old injury  This does not have the appearance of an acute fracture  Correlate for focal pain  Workstation performed: [id number]     Xr Hand 3+ Vw Right    Result Date: 12/3/2019  Narrative: RIGHT HAND INDICATION:   foosh  COMPARISON:  None VIEWS:  XR HAND 3+ VW RIGHT Images: 3 For the purposes of institution wide universal language the following terms will apply: (thumb=1st digit/finger, index finger=2nd digit/finger, long finger=3rd digit/finger, ring=4th digit/finger and small finger=5th digit/finger) FINDINGS: There is no acute fracture or dislocation  No acute osseous abnormality  Small ossified density projects anterior to the carpal bones on the lateral view, possibly representing sequela of an old injury  This does not have the appearance of an acute fracture  Correlate for focal pain  No lytic or blastic lesions are seen  Soft tissues are unremarkable  Impression: No acute osseous abnormality  Small ossified density projects anterior to the carpal bones on the lateral view, possibly representing sequela of an old injury  This does not have the appearance of an acute fracture  Correlate for focal pain   Workstation performed: [id number]     Ct Head Wo Contrast    Result Date: 12/16/2019  Narrative: CT BRAIN - WITHOUT CONTRAST INDICATION:   rule out metastatic lung CA  COMPARISON:  None  TECHNIQUE:  CT examination of the brain was performed  In addition to axial images, coronal 2D reformatted images were created and submitted for interpretation  Radiation dose length product (DLP) for this visit:  895 7 mGy-cm   This examination, like all CT scans performed in the [facility], was performed utilizing techniques to minimize radiation dose exposure, including the use of iterative reconstruction and automated exposure control  IMAGE QUALITY:  Diagnostic  FINDINGS: PARENCHYMA:  No intracranial mass, mass effect or midline shift  No CT signs of acute infarction  No acute parenchymal hemorrhage  Scattered vascular calcifications  VENTRICLES AND EXTRA-AXIAL SPACES:  Normal for the patient's age  VISUALIZED ORBITS AND PARANASAL SINUSES:  Trace sinus mucosal disease based on the thickness of the right maxillary sinus wall this is likely chronic  CALVARIUM AND EXTRACRANIAL SOFT TISSUES:  Numerous periapical lucencies related to poor dentition  Impression: No acute intracranial abnormality  Discrete mass, edema or hemorrhage are not evident  Contrast would increase conspicuity of small lesions not otherwise evident without contrast  Workstation performed: [id number]     Ct Chest Wo Contrast    Result Date: 12/14/2019  Narrative: CT CHEST WITHOUT IV CONTRAST INDICATION:   Preoperative evaluation prior to cardiac surgery  COMPARISON:  None  TECHNIQUE: CT examination of the chest was performed without intravenous contrast   Axial, sagittal, and coronal 2D reformatted images were created from the source data and submitted for interpretation  Radiation dose length product (DLP) for this visit:  414 04 mGy-cm     This examination, like all CT scans performed in the [facility], was performed utilizing techniques to minimize radiation dose exposure, including the use of iterative  reconstruction and automated exposure control  FINDINGS: LUNGS:  Subtle groundglass densities in the perihilar lungs could represent atelectasis or, in the appropriate clinical setting, mild interstitial edema related to pulmonary vascular congestive change  No consolidative airspace opacity to suggest pneumonia  There is a noncalcified left upper lobe perihilar mass measuring 10 x 7 mm on image 36 of series 3  No other pulmonary nodule or mass  No tracheal or endobronchial lesion  PLEURA:  Unremarkable  HEART/GREAT VESSELS:  Coronary artery calcification is noted  Heart is normal in size  No thoracic aortic aneurysm  No pericardial effusion  MEDIASTINUM AND HATTIE:  Unremarkable  CHEST WALL AND LOWER NECK:   Unremarkable  VISUALIZED STRUCTURES IN THE UPPER ABDOMEN:  Unremarkable  OSSEOUS STRUCTURES:  No acute fracture or destructive osseous lesion  Impression: Noncalcified 10 x 7 mm left upper lobe pulmonary nodule  Based on current Fleischner Society 2017 Guidelines on incidental pulmonary nodule, either PET/CT scan evaluation or tissue sampling may be considered appropriate in this patient with a smoking history  Subtle groundglass parenchymal densities which could represent atelectasis or perhaps mild interstitial edema related to pulmonary vascular congestive change  No pleural effusions  The study was marked in EPIC for significant notification  Workstation performed: [id number]     Ct Spine Cervical Without Contrast    Result Date: 12/3/2019  Narrative: CT CERVICAL SPINE - WITHOUT CONTRAST INDICATION:   neck pain after fall  COMPARISON:  None  TECHNIQUE:  CT examination of the cervical spine was performed without intravenous contrast   Contiguous axial images were obtained  Sagittal and coronal reconstructions were performed  Radiation dose length product (DLP) for this visit:  486 mGy-cm     This examination, like all CT scans performed in the [facility], was performed utilizing techniques to minimize radiation dose exposure, including the use of iterative reconstruction and automated exposure control  IMAGE QUALITY:  Diagnostic  FINDINGS: ALIGNMENT:  Normal alignment of the cervical spine  No subluxation  VERTEBRAL BODIES:  No fracture  DEGENERATIVE CHANGES:  Mild multilevel cervical degenerative changes are noted without critical central canal stenosis  PREVERTEBRAL AND PARASPINAL SOFT TISSUES:  Unremarkable  THORACIC INLET:  Normal      Impression: No cervical spine fracture or traumatic malalignment  Workstation performed: [id number]     Vas Carotid Complete Study    Result Date: 12/15/2019  Narrative:  [facility] REPORT CLINICAL: Indications: Yearly surveillance of carotid artery disease  Patient is asymptomatic at this time  Risk Factors The patient has history of HTN and HLD  FINDINGS:  Segment      Right                        Left                      Impression  PSV  EDV (cm/s)  Impression  PSV  EDV (cm/s)  Dist  ICA                 44          12               62          19  Mid  ICA                  64          14               46          13  Prox  ICA    1 - 49%      74          11  1 - 49%      63          13  Dist CCA                  65          13               64          16  Mid CCA                   68          14               74          16  Prox CCA                  71          14               71          13  Ext Carotid               76           7              106          13  Prox Vert                 22           6               29           9  Subclavian                92                          151                 CONCLUSION: Impression RIGHT: There is <50% stenosis noted in the internal carotid artery  Plaque is heterogenous and smooth  Vertebral artery flow is antegrade  There is no significant subclavian artery disease  LEFT: There is <50% stenosis noted in the internal carotid artery  Plaque is heterogenous and smooth  Vertebral artery flow is antegrade  There is no significant subclavian artery disease  Internal carotid artery stenosis determination by consensus criteria from: liam Garcia al  Carotid Artery Stenosis: Gray-Scale and Doppler US Diagnosis - Society of Radiologists in [address] [place] [address] [facility], Radiology 2003; [phone]  SIGNATURE: Electronically Signed by: Kvng Reyes MD on 2019-12-15 02:34:09 PM    Vas Lower Limb Vein Mapping Bypass Graft    Result Date: 12/14/2019  Narrative:  [facility] REPORT CLINICAL: Indications: Pre-op Vein Mapping for Future Open Heart Surgery  FINDINGS:  Segment         Right        Left                            Diameter AP  Diameter AP  GSV Inguinal            6 7          7 1  GSV Prox Thigh          4 1          3 4  GSV Mid Thigh           4 3          4 2  GSV Dist Thigh          3 3          3 1  GSV Knee                3 3          3 2  GSV Prox Calf           2 1          2 5  GSV Mid Calf            1 8          2 7  GSV Dist Calf           1 7          2 1  GSV Ankle               2 5          2 4     CONCLUSION: Impression: RIGHT LOWER LIMB: The great saphenous vein is patent  from the groin to the ankle  The vein is of adequate caliber and quality for graft conduit with intraluminal diameters ranging from 1 7mm to 6 7mm throughout the leg  LEFT LOWER LIMB: The great saphenous vein is patent  from the groin to the ankle  The vein is of adequate caliber and quality for graft conduit with intraluminal diameters ranging from 2 1mm to 7 1mm throughout the leg  SIGNATURE: Electronically Signed by: Kvng Reyes MD on 2019-12-14 04:40:09 PM    Xr Chest Portable Icu    Result Date: 12/19/2019  Narrative: CHEST INDICATION:   S/P open heart  COMPARISON:  CT chest 12/14/2019 EXAM PERFORMED/VIEWS:  XR CHEST PORTABLE ICU FINDINGS:  Endotracheal tube in satisfactory position 3 cm above the stef  NG tube coursing below the diaphragm    [place]fil[place] catheter in the right main pulmonary artery  Mediastinal drains in place  Cardiomediastinal silhouette appears unremarkable  Moderate vascular congestion with bibasilar atelectasis  Osseous structures appear within normal limits for patient age  Impression: Moderate vascular congestion with bibasilar atelectasis  Workstation performed: [id number]       EKG personally reviewed by Criss Stein MD      Counseling / Coordination of Care  Total floor / unit time spent today 30 minutes  Greater than 50% of total time was spent with the patient and / or family counseling and / or coordination of care

## 2019-12-23 NOTE — PROGRESS NOTES
Progress Note - Critical Care   Pretty Carlson 63 y o  male MRN: [id number]  Unit/Bed#: [facility] 417-01 Encounter: [id number]      Attending Physician: Kana Arias, DO    24 Hour Events/HPI: POD # 4 s/p CABG x 3  Bronchoscopy yesterday with minimal secretions, extubated to nasal cannula  On HFNC most of the day, Bipap at night  On low dose karely overnight, this morning requiring cardene, now on hold  ROS: Review of Systems  Review of systems was reviewed and negative unless stated above in HPI/24-hour events   ---------------------------------------------------------------------------------------------------------------------------------------------------------------------  Impressions:  1  CAD s/p CABG x 3  2  Acute hypoxic respiratory failure  3  Pulmonary edema  4  HLD  5  Bradycardia  6  Acute pain following surgery    Plan:    Neuro:   · Pain controlled with: scheduled tylenol, oxycodone prn, dilaudid prn for breaktrhough  · Regulate sleep/wake cycle  · Restoril qhs prn  · Delirium precautions  · CAM-ICU daily  · Trend neuro exam    CV:   · Cardiac infusions: Cardene on hold  · Wean as able  · MAP goal > 65   · Discontinue a line  · Rhythm: NSR  · Follow rhythm on telemetry  · Start lopressor 12 5 mg PO BID  · Epicardial pacing wires out    Lung:   · Acute respiratory failure; Requiring Bipap  Secondary to pulmonary vascular congestion and poor inspiratory effort secondary to pain  Continue incentive spirometry/coughing/deep breathing exercises    Wean supplemental oxygen as tolerated for saturation > 90%  · Wean HFNC as tolerated  · Chlorhexidine ordered: yes  · Chest tubes have been discontinued    GI:   · Continue PPI for stress ulcer prophylaxis  · Continue bowel regimen  · Trend abdominal exam and bowel function    FEN:   · Diuretic plan: Lasix 40 mg IV BID  · K-dur 20 mEQ PO BID  · Nutrition/diet plan: Cardiac diet  · Replete electrolytes with goals: K >4 0, Mag >2 0, and Phos >3 0    : · Indwelling Banks present: yes   · Discontinue Banks  · Trend UOP and BUN/creat  · Strict I and O    ID:   · Trend temps and WBC count  · Maintain normothermia    Heme:   · Trend hgb and plts    Endo:   · Glycemic control plan: SSI    MSK/Skin:  · Mobility goal:   · PT consult: yes  · OT consult: yes  · Frequent turning and pressure off-loading  · Local wound care as needed    Disposition:  Transfer to SD level 1  ---------------------------------------------------------------------------------------------------------------------------------------------------------------------  Allergies: No Known Allergies  Medications:   Scheduled Meds:    Current Facility-Administered Medications:  acetaminophen 975 mg Oral Q8H Belle Eller PA-C    acetylcysteine 3 mL Nebulization TID Katheryn Benavidez, DO    albuterol 2 5 mg Nebulization Q4H PRN Katheryn Benavidez, DO    amiodarone 200 mg Oral [facility] Monique Castanon    aspirin 325 mg Oral Daily Sonya Cleveland PA-C    atorvastatin 80 mg Oral Daily With [employer] MEGA Godwin    bisacodyl 10 mg Rectal Daily Claudia Rascon PA-C    chlorhexidine 15 mL Swish & Spit Q12H [facility] Monique Castanon    fondaparinux 2 5 mg Subcutaneous Q24H Belle Eller PA-C    furosemide 40 mg Intravenous BID (diuretic) RALPH Choudhary    HYDROmorphone 1 mg Intravenous Q1H PRN Belle Eller PA-C    insulin lispro 1-5 Units Subcutaneous TID AC Belle Eller PA-C    insulin lispro 1-5 Units Subcutaneous HS Belle Eller PA-C    ipratropium 0 5 mg Nebulization TID Katheryn Benavidez DO    ketorolac 15 mg Intravenous Q8H [facility] RALPH Choudhary    levalbuterol 1 25 mg Nebulization TID Katheryn Benavidez,     lidocaine (cardiac) 100 mg Intravenous Q30 Min PRN Sonya Cleveland PA-C    methocarbamol 500 mg Oral Q6H [facility] Belle Eller PA-C    mupirocin 1 application Nasal G75R [facility] Sonya Godwin PA-C    niCARdipine 1-15 mg/hr Intravenous Titrated Marthe Mcburney, CRNP Last Rate: Stopped (12/23/19 5067)   ondansetron 4 mg Intravenous Q6H PRN Jacinto Orta PA-C    oxyCODONE 5 mg Oral Q2H PRN Lindsay Fuller PA-C    Or        oxyCODONE 10 mg Oral Q2H PRN Belle Eller PA-C    pantoprazole 40 mg Intravenous Q24H [facility] Belle Eller PA-C    phenylephine  mcg/min Intravenous Titrated RALPH Morgan Last Rate: Stopped (12/23/19 0432)   polyethylene glycol 17 g Oral Daily Alta Godwin PA-C    potassium chloride 20 mEq Oral BID Belle Eller PA-C    potassium chloride 40 mEq Intravenous Once Jennyfer Alonso PA-C Last Rate: 40 mEq (12/23/19 0651)   senna-docusate sodium 2 tablet Oral HS Jennyfer Alonso PA-C    temazepam 15 mg Oral HS PRN Lindsay Fuller PA-C      VTE Pharmacologic Prophylaxis: Fondaparinux (Arixtra)  VTE Mechanical Prophylaxis: sequential compression device    Continuous Infusions:    niCARdipine 1-15 mg/hr Last Rate: Stopped (12/23/19 0635)   phenylephine  mcg/min Last Rate: Stopped (12/23/19 0432)     PRN Meds:    albuterol 2 5 mg Q4H PRN   HYDROmorphone 1 mg Q1H PRN   lidocaine (cardiac) 100 mg Q30 Min PRN   ondansetron 4 mg Q6H PRN   oxyCODONE 5 mg Q2H PRN   Or     oxyCODONE 10 mg Q2H PRN   temazepam 15 mg HS PRN     Home Medications:   Prior to Admission medications    Medication Sig Start Date End Date Taking?  Authorizing Provider   aspirin (ECOTRIN LOW STRENGTH) 81 mg EC tablet Take 1 tablet by mouth daily 10/29/17   Virginia Sarmiento MD   atorvastatin (LIPITOR) 40 mg tablet Take 1 tablet (40 mg total) by mouth daily with dinner 6/21/19   Andreina Gupta MD   clopidogrel (PLAVIX) 75 mg tablet Take 1 tablet (75 mg total) by mouth daily 6/21/19   Andreina Gupta MD   cyclobenzaprine (FLEXERIL) 10 mg tablet Take 1 tablet (10 mg total) by mouth 3 (three) times a day as needed for muscle spasms 12/3/19   Adam Orozco PA-C   isosorbide mononitrate (IMDUR) 30 mg 24 hr tablet Take 1 tablet (30 mg total) by mouth daily 6/21/19   Andreina Gupta MD metoprolol succinate (TOPROL-XL) 25 mg 24 hr tablet Take 1 tablet (25 mg total) by mouth daily 19   June Salguero MD   naproxen (NAPROSYN) 500 mg tablet Take 1 tablet (500 mg total) by mouth every 12 (twelve) hours 19   Alan Cunha PA-C     ---------------------------------------------------------------------------------------------------------------------------------------------------------------------  Vitals:   Vitals:    19 0300 19 0400 19 0600 19 0700   BP: 116/66 123/65  112/65   BP Location:  Left arm     Pulse: 56 58  62   Resp: 14 15  15   Temp:  98 8 °F (37 1 °C)     TempSrc:  Bladder     SpO2: 100% 100%  98%   Weight:   88 kg (194 lb 0 1 oz)    Height:         Arterial Line:  Arterial Line BP: 112/50  Arterial Line MAP (mmHg): 70 mmHg    Tele Rhythm: NSR This was personally reviewed by myself  Respiratory:  SpO2: SpO2: 98 %  O2 Flow Rate (L/min): 50 L/min    Ventilator:  Respiratory    Lab Data (Last 4 hours)    None         O2/Vent Data (Last 4 hours)    None              Temperature: Temp (24hrs), Av 7 °F (37 6 °C), Min:98 8 °F (37 1 °C), Max:100 °F (37 8 °C)  Current: Temperature: 98 8 °F (37 1 °C)    Weights:   Weight (last 2 days)     Date/Time   Weight    19 0600   88 (194 01)    19 0600   90 1 (198 63)    19 0600   91 5 (201 72)            Body mass index is 30 39 kg/m²  Hemodynamic Monitoring:  PAP: PAP: 39/18, CVP: CVP (mean): 18 mmHg, CO: CO (L/min): 4 9 L/min, CI: CI (L/min/m2): 2 5 L/min/m2, SVR: SVR (dyne*sec)/cm5: 979 (dyne*sec)/cm5       Intake and Outputs:    Intake/Output Summary (Last 24 hours) at 2019 0708  Last data filed at 2019 0432  Gross per 24 hour   Intake 885 17 ml   Output 1885 ml   Net -999 83 ml     I/O last 24 hours: In: 885 2 [P O :120;  I V :657 7; NG/GT:60; IV Piggyback:47 5]  Out: 4688 [ODELM:7886; Emesis/NG output:300; Chest Tube:30]    UOP: 65cc/hour   BM: 0 in the last 24 hours    Labs:  Results from last 7 days   Lab Units 12/23/19 0407 12/22/19  0524 12/21/19  0456  12/18/19  0442   WBC Thousand/uL 12 56* 14 36* 19 63*   < > 7 38   HEMOGLOBIN g/dL 10 5* 10 2* 10 5*   < > 15 2   I STAT HEMOGLOBIN   --   --   --    < >  --    HEMATOCRIT % 31 8* 31 6* 32 1*   < > 46 0   HEMATOCRIT, ISTAT   --   --   --    < >  --    PLATELETS Thousands/uL 172 134* 120*   < > 244   NEUTROS PCT %  --  66  --   --  49   MONOS PCT %  --  12  --   --  11    < > = values in this interval not displayed  Results from last 7 days   Lab Units 12/23/19 0407 12/22/19  0524 12/21/19  0456 12/20/19  2357  12/19/19  1438 12/19/19  1401   SODIUM mmol/L 145 141 141  --    < >  --   --    POTASSIUM mmol/L 3 5 3 6 4 3  --    < >  --   --    CHLORIDE mmol/L 111* 110* 109*  --    < >  --   --    CO2 mmol/L 29 27 28  --    < >  --   --    CO2, I-STAT mmol/L  --   --   --  25  --  25 24   BUN mg/dL 31* 28* 29*  --    < >  --   --    CREATININE mg/dL 1 16 1 01 1 26  --    < >  --   --    CALCIUM mg/dL 8 2* 8 8 8 2*  --    < >  --   --    GLUCOSE, ISTAT mg/dl  --   --   --  134  --  133 127    < > = values in this interval not displayed       Results from last 7 days   Lab Units 12/23/19 0407 12/22/19  0524 12/21/19  0456   MAGNESIUM mg/dL 2 3 2 3 2 3         Results from last 7 days   Lab Units 12/22/19  0524 12/21/19  0458 12/20/19  0425   PH ART  7 512* 7 399 7 346*   PCO2 ART mm Hg 31 2* 43 3 42 5   PO2 ART mm Hg 81 7 136 5* 72 7*   HCO3 ART mmol/L 24 5 26 2 22 7   BASE EXC ART mmol/L 1 9 1 1 -2 9   ABG SOURCE  Line, Arterial Line, Arterial Line, Arterial     Results from last 7 days   Lab Units 12/22/19  0524   PROCALCITONIN ng/ml 0 11       Diagnositic Studies:  No new imaging    Nutrition:        Diet Orders   (From admission, onward)             Start     Ordered    12/23/19 0637  Diet Cardiovascular; Cardiac; Fluid Restriction 1800 ML  Diet effective 0500     Question Answer Comment   Diet Type Cardiovascular Cardiac Cardiac    Other Restriction(s): Fluid Restriction 1800 ML    RD to adjust diet per protocol? Yes        12/23/19 1641                Physical Exam   Constitutional: He is oriented to person, place, and time  Vital signs are normal   Non-toxic appearance  He does not have a sickly appearance  No distress  Middle aged male, Bipap in place   HENT:   Head: Normocephalic and atraumatic  Eyes: Pupils are equal, round, and reactive to light  Neck: Neck supple  Cardiovascular: Normal rate, regular rhythm and normal heart sounds  Exam reveals no gallop and no friction rub  No murmur heard  Sternum is stable   Pulmonary/Chest: Effort normal  No tachypnea  No respiratory distress  He has decreased breath sounds in the right lower field and the left lower field  He has no wheezes  He has no rhonchi  He has no rales  Abdominal: Soft  He exhibits no distension  There is no tenderness  Neurological: He is alert and oriented to person, place, and time  GCS eye subscore is 4  GCS verbal subscore is 5  GCS motor subscore is 6  Skin: Skin is warm and dry  He is not diaphoretic  Invasive lines and devices: Invasive Devices     Central Venous Catheter Line            CVC Central Lines 12/19/19 Triple 3 days          Peripheral Intravenous Line            Peripheral IV 12/19/19 Right Forearm 3 days          Arterial Line            Arterial Line 12/19/19 Right Radial 3 days          Drain            Urethral Catheter Non-latex; Temperature probe 16 Fr  3 days              ---------------------------------------------------------------------------------------------------------------------------------------------------------------------  Code Status: Level 1 - Full Code    Counseling / Coordination of Care  Total Critical Care time spent 0 minutes excluding procedures, teaching and family updates  Collaborative bedside rounds performed with cardiac surgery attending and bedside RN      SIGNATURE: Essie Niño PA-C  DATE: December 23, 2019  TIME: 7:08 AM

## 2019-12-24 PROBLEM — D62 ACUTE BLOOD LOSS ANEMIA: Status: ACTIVE | Noted: 2019-12-24

## 2019-12-24 PROBLEM — E87.8 HYPERCHLOREMIA: Status: ACTIVE | Noted: 2019-12-24

## 2019-12-24 PROBLEM — N17.9 AKI (ACUTE KIDNEY INJURY) (HCC): Status: ACTIVE | Noted: 2019-12-24

## 2019-12-24 LAB
ANION GAP SERPL CALCULATED.3IONS-SCNC: 1 MMOL/L (ref 4–13)
BASOPHILS # BLD AUTO: 0.06 THOUSANDS/ΜL (ref 0–0.1)
BASOPHILS NFR BLD AUTO: 1 % (ref 0–1)
BUN SERPL-MCNC: 45 MG/DL (ref 5–25)
CALCIUM SERPL-MCNC: 8.9 MG/DL (ref 8.3–10.1)
CHLORIDE SERPL-SCNC: 110 MMOL/L (ref 100–108)
CO2 SERPL-SCNC: 31 MMOL/L (ref 21–32)
CREAT SERPL-MCNC: 1.32 MG/DL (ref 0.6–1.3)
EOSINOPHIL # BLD AUTO: 0.33 THOUSAND/ΜL (ref 0–0.61)
EOSINOPHIL NFR BLD AUTO: 4 % (ref 0–6)
ERYTHROCYTE [DISTWIDTH] IN BLOOD BY AUTOMATED COUNT: 13.3 % (ref 11.6–15.1)
GFR SERPL CREATININE-BSD FRML MDRD: 59 ML/MIN/1.73SQ M
GLUCOSE SERPL-MCNC: 108 MG/DL (ref 65–140)
GLUCOSE SERPL-MCNC: 109 MG/DL (ref 65–140)
GLUCOSE SERPL-MCNC: 110 MG/DL (ref 65–140)
GLUCOSE SERPL-MCNC: 158 MG/DL (ref 65–140)
GLUCOSE SERPL-MCNC: 162 MG/DL (ref 65–140)
HCT VFR BLD AUTO: 31.9 % (ref 36.5–49.3)
HGB BLD-MCNC: 10 G/DL (ref 12–17)
IMM GRANULOCYTES # BLD AUTO: 0.09 THOUSAND/UL (ref 0–0.2)
IMM GRANULOCYTES NFR BLD AUTO: 1 % (ref 0–2)
LYMPHOCYTES # BLD AUTO: 1.78 THOUSANDS/ΜL (ref 0.6–4.47)
LYMPHOCYTES NFR BLD AUTO: 19 % (ref 14–44)
MAGNESIUM SERPL-MCNC: 2.6 MG/DL (ref 1.6–2.6)
MCH RBC QN AUTO: 31 PG (ref 26.8–34.3)
MCHC RBC AUTO-ENTMCNC: 31.3 G/DL (ref 31.4–37.4)
MCV RBC AUTO: 99 FL (ref 82–98)
MONOCYTES # BLD AUTO: 1.24 THOUSAND/ΜL (ref 0.17–1.22)
MONOCYTES NFR BLD AUTO: 13 % (ref 4–12)
NEUTROPHILS # BLD AUTO: 5.79 THOUSANDS/ΜL (ref 1.85–7.62)
NEUTS SEG NFR BLD AUTO: 62 % (ref 43–75)
NRBC BLD AUTO-RTO: 0 /100 WBCS
PLATELET # BLD AUTO: 203 THOUSANDS/UL (ref 149–390)
PMV BLD AUTO: 10.3 FL (ref 8.9–12.7)
POTASSIUM SERPL-SCNC: 3.8 MMOL/L (ref 3.5–5.3)
RBC # BLD AUTO: 3.23 MILLION/UL (ref 3.88–5.62)
SODIUM SERPL-SCNC: 142 MMOL/L (ref 136–145)
WBC # BLD AUTO: 9.29 THOUSAND/UL (ref 4.31–10.16)

## 2019-12-24 PROCEDURE — 94760 N-INVAS EAR/PLS OXIMETRY 1: CPT

## 2019-12-24 PROCEDURE — 85025 COMPLETE CBC W/AUTO DIFF WBC: CPT | Performed by: THORACIC SURGERY (CARDIOTHORACIC VASCULAR SURGERY)

## 2019-12-24 PROCEDURE — 94640 AIRWAY INHALATION TREATMENT: CPT

## 2019-12-24 PROCEDURE — 97530 THERAPEUTIC ACTIVITIES: CPT

## 2019-12-24 PROCEDURE — C9113 INJ PANTOPRAZOLE SODIUM, VIA: HCPCS | Performed by: PHYSICIAN ASSISTANT

## 2019-12-24 PROCEDURE — 97535 SELF CARE MNGMENT TRAINING: CPT

## 2019-12-24 PROCEDURE — 99024 POSTOP FOLLOW-UP VISIT: CPT | Performed by: THORACIC SURGERY (CARDIOTHORACIC VASCULAR SURGERY)

## 2019-12-24 PROCEDURE — 94660 CPAP INITIATION&MGMT: CPT

## 2019-12-24 PROCEDURE — 99232 SBSQ HOSP IP/OBS MODERATE 35: CPT | Performed by: INTERNAL MEDICINE

## 2019-12-24 PROCEDURE — 80048 BASIC METABOLIC PNL TOTAL CA: CPT | Performed by: THORACIC SURGERY (CARDIOTHORACIC VASCULAR SURGERY)

## 2019-12-24 PROCEDURE — 82948 REAGENT STRIP/BLOOD GLUCOSE: CPT

## 2019-12-24 PROCEDURE — 99233 SBSQ HOSP IP/OBS HIGH 50: CPT | Performed by: INTERNAL MEDICINE

## 2019-12-24 PROCEDURE — 83735 ASSAY OF MAGNESIUM: CPT | Performed by: THORACIC SURGERY (CARDIOTHORACIC VASCULAR SURGERY)

## 2019-12-24 PROCEDURE — 94668 MNPJ CHEST WALL SBSQ: CPT

## 2019-12-24 RX ORDER — SODIUM PHOSPHATE, DIBASIC AND SODIUM PHOSPHATE, MONOBASIC 7; 19 G/133ML; G/133ML
1 ENEMA RECTAL ONCE
Status: COMPLETED | OUTPATIENT
Start: 2019-12-24 | End: 2019-12-24

## 2019-12-24 RX ORDER — AMOXICILLIN 250 MG
2 CAPSULE ORAL 2 TIMES DAILY
Status: DISCONTINUED | OUTPATIENT
Start: 2019-12-24 | End: 2019-12-26 | Stop reason: HOSPADM

## 2019-12-24 RX ORDER — HEPARIN SODIUM 5000 [USP'U]/ML
5000 INJECTION, SOLUTION INTRAVENOUS; SUBCUTANEOUS EVERY 8 HOURS SCHEDULED
Status: DISCONTINUED | OUTPATIENT
Start: 2019-12-24 | End: 2019-12-26 | Stop reason: HOSPADM

## 2019-12-24 RX ADMIN — PANTOPRAZOLE SODIUM 40 MG: 40 INJECTION, POWDER, FOR SOLUTION INTRAVENOUS at 08:06

## 2019-12-24 RX ADMIN — IPRATROPIUM BROMIDE 0.5 MG: 0.5 SOLUTION RESPIRATORY (INHALATION) at 14:14

## 2019-12-24 RX ADMIN — ATORVASTATIN CALCIUM 80 MG: 80 TABLET, FILM COATED ORAL at 18:14

## 2019-12-24 RX ADMIN — METHOCARBAMOL 500 MG: 500 TABLET, FILM COATED ORAL at 23:20

## 2019-12-24 RX ADMIN — SODIUM PHOSPHATE 1 ENEMA: 7; 19 ENEMA RECTAL at 10:48

## 2019-12-24 RX ADMIN — SENNOSIDES AND DOCUSATE SODIUM 2 TABLET: 8.6; 5 TABLET ORAL at 18:14

## 2019-12-24 RX ADMIN — Medication 1 APPLICATION: at 08:06

## 2019-12-24 RX ADMIN — FUROSEMIDE 40 MG: 10 INJECTION, SOLUTION INTRAMUSCULAR; INTRAVENOUS at 08:06

## 2019-12-24 RX ADMIN — ACETAMINOPHEN 975 MG: 325 TABLET ORAL at 06:41

## 2019-12-24 RX ADMIN — METOPROLOL TARTRATE 25 MG: 25 TABLET ORAL at 08:04

## 2019-12-24 RX ADMIN — HEPARIN SODIUM 5000 UNITS: 5000 INJECTION INTRAVENOUS; SUBCUTANEOUS at 14:00

## 2019-12-24 RX ADMIN — INSULIN LISPRO 1 UNITS: 100 INJECTION, SOLUTION INTRAVENOUS; SUBCUTANEOUS at 11:49

## 2019-12-24 RX ADMIN — LEVALBUTEROL HYDROCHLORIDE 1.25 MG: 1.25 SOLUTION, CONCENTRATE RESPIRATORY (INHALATION) at 14:14

## 2019-12-24 RX ADMIN — ACETAMINOPHEN 975 MG: 325 TABLET ORAL at 21:46

## 2019-12-24 RX ADMIN — POTASSIUM CHLORIDE 20 MEQ: 1500 TABLET, EXTENDED RELEASE ORAL at 18:14

## 2019-12-24 RX ADMIN — LEVALBUTEROL HYDROCHLORIDE 1.25 MG: 1.25 SOLUTION, CONCENTRATE RESPIRATORY (INHALATION) at 19:21

## 2019-12-24 RX ADMIN — AMIODARONE HYDROCHLORIDE 200 MG: 200 TABLET ORAL at 13:58

## 2019-12-24 RX ADMIN — POLYETHYLENE GLYCOL 3350 17 G: 17 POWDER, FOR SOLUTION ORAL at 08:04

## 2019-12-24 RX ADMIN — FUROSEMIDE 40 MG: 10 INJECTION, SOLUTION INTRAMUSCULAR; INTRAVENOUS at 18:15

## 2019-12-24 RX ADMIN — OXYCODONE HYDROCHLORIDE 10 MG: 10 TABLET ORAL at 18:30

## 2019-12-24 RX ADMIN — AMIODARONE HYDROCHLORIDE 200 MG: 200 TABLET ORAL at 21:46

## 2019-12-24 RX ADMIN — HYDROMORPHONE HYDROCHLORIDE 1 MG: 1 INJECTION, SOLUTION INTRAMUSCULAR; INTRAVENOUS; SUBCUTANEOUS at 21:49

## 2019-12-24 RX ADMIN — LEVALBUTEROL HYDROCHLORIDE 1.25 MG: 1.25 SOLUTION, CONCENTRATE RESPIRATORY (INHALATION) at 07:35

## 2019-12-24 RX ADMIN — OXYCODONE HYDROCHLORIDE 10 MG: 10 TABLET ORAL at 20:56

## 2019-12-24 RX ADMIN — METOPROLOL TARTRATE 25 MG: 25 TABLET ORAL at 20:56

## 2019-12-24 RX ADMIN — POTASSIUM CHLORIDE 20 MEQ: 1500 TABLET, EXTENDED RELEASE ORAL at 08:06

## 2019-12-24 RX ADMIN — HEPARIN SODIUM 5000 UNITS: 5000 INJECTION INTRAVENOUS; SUBCUTANEOUS at 21:47

## 2019-12-24 RX ADMIN — METHOCARBAMOL 500 MG: 500 TABLET, FILM COATED ORAL at 06:42

## 2019-12-24 RX ADMIN — ASPIRIN 325 MG ORAL TABLET 325 MG: 325 PILL ORAL at 08:06

## 2019-12-24 RX ADMIN — OXYCODONE HYDROCHLORIDE 5 MG: 5 TABLET ORAL at 23:20

## 2019-12-24 RX ADMIN — BISACODYL 10 MG: 10 SUPPOSITORY RECTAL at 08:06

## 2019-12-24 RX ADMIN — INSULIN LISPRO 1 UNITS: 100 INJECTION, SOLUTION INTRAVENOUS; SUBCUTANEOUS at 06:41

## 2019-12-24 RX ADMIN — METHOCARBAMOL 500 MG: 500 TABLET, FILM COATED ORAL at 11:48

## 2019-12-24 RX ADMIN — METHOCARBAMOL 500 MG: 500 TABLET, FILM COATED ORAL at 18:14

## 2019-12-24 RX ADMIN — SENNOSIDES AND DOCUSATE SODIUM 2 TABLET: 8.6; 5 TABLET ORAL at 11:48

## 2019-12-24 RX ADMIN — IPRATROPIUM BROMIDE 0.5 MG: 0.5 SOLUTION RESPIRATORY (INHALATION) at 07:35

## 2019-12-24 RX ADMIN — FONDAPARINUX SODIUM 2.5 MG: 2.5 INJECTION, SOLUTION SUBCUTANEOUS at 08:18

## 2019-12-24 RX ADMIN — HEPARIN SODIUM 5000 UNITS: 5000 INJECTION INTRAVENOUS; SUBCUTANEOUS at 10:49

## 2019-12-24 RX ADMIN — AMIODARONE HYDROCHLORIDE 200 MG: 200 TABLET ORAL at 06:42

## 2019-12-24 RX ADMIN — ACETAMINOPHEN 975 MG: 325 TABLET ORAL at 14:00

## 2019-12-24 RX ADMIN — IPRATROPIUM BROMIDE 0.5 MG: 0.5 SOLUTION RESPIRATORY (INHALATION) at 19:22

## 2019-12-24 NOTE — PLAN OF CARE
Problem: PHYSICAL THERAPY ADULT  Goal: Performs mobility at highest level of function for planned discharge setting  See evaluation for individualized goals  Description  Treatment/Interventions: Functional transfer training, LE strengthening/ROM, Elevations, Therapeutic exercise, Endurance training, Equipment eval/education, Bed mobility, Gait training, Spoke to nursing, OT(PT spoke to CM)  Equipment Recommended: Walker(at this time)       See flowsheet documentation for full assessment, interventions and recommendations  Outcome: Progressing  Note:   Prognosis: Good  Problem List: Decreased strength, Decreased endurance, Impaired balance, Decreased mobility, Obesity  Assessment: Pt demonstrated overall improvement in functional mobility skills ambulating further distances w/ rw w/ less (A) required as well; pt still remains to be generally mod weak and deconditioned w/ decreased standing balance and associated mobility deficits requiring PT to address; based on above, cont to recommend rehab upon D/C at this time when medically cleared; will cont to monitor progress however and make appropriate changes to D/C recommendations as needed; will follow  Barriers to Discharge: Inaccessible home environment     Recommendation: Post acute IP rehab(at this time)          See flowsheet documentation for full assessment

## 2019-12-24 NOTE — PROGRESS NOTES
Progress Note - Cardiothoracic Surgery   [facility] 63 y o  male MRN: [id number]  Unit/Bed#: [facility] 417-01 Encounter: [id number]      POD # 5 s/p CABG    Pt seen/examined  Interval history and data reviewed with critical care team   Pt doing well  No specific complaints          Medications:   Scheduled Meds:    Current Facility-Administered Medications:  acetaminophen 975 mg Oral Q8H [facility] David Hay PA-C    albuterol 2 5 mg Nebulization Q4H PRN Doug Aviles DO    amiodarone 200 mg Oral [facility] Berto [place]    aspirin 325 mg Oral Daily Berto [place]    atorvastatin 80 mg Oral Daily With [employer] MEGA Godwin    bisacodyl 10 mg Rectal Daily David Hay PA-C    fondaparinux 2 5 mg Subcutaneous Q24H Belle Eller PA-C    furosemide 40 mg Intravenous BID (diuretic) RALPH Smith    HYDROmorphone 1 mg Intravenous Q1H PRN Belle Eller PA-C    insulin lispro 1-5 Units Subcutaneous TID AC Belle Eller PA-C    insulin lispro 1-5 Units Subcutaneous HS Belle Eller PA-C    ipratropium 0 5 mg Nebulization TID Doug Aviles DO    levalbuterol 1 25 mg Nebulization TID Doug Aviles DO    lidocaine (cardiac) 100 mg Intravenous Q30 Min PRN Berto Cleveland PA-C    methocarbamol 500 mg Oral Q6H [facility] Belle Eller PA-C    metoprolol tartrate 25 mg Oral Q12H [facility] David Hay PA-C    mupirocin 1 application Nasal I43K [facility] Berto Godwin PA-C    niCARdipine 1-15 mg/hr Intravenous Titrated RALPH Smith Last Rate: Stopped (12/23/19 2145)   ondansetron 4 mg Intravenous Q6H PRN Liya Jha PA-C    oxyCODONE 5 mg Oral Q2H PRN Arelis Barbour PA-C    Or        oxyCODONE 10 mg Oral Q2H PRN Belle Eller PA-C    pantoprazole 40 mg Intravenous Q24H [facility] Belle Eller PA-C    phenylephine  mcg/min Intravenous Titrated RALPH Smith Last Rate: Stopped (12/23/19 0432)   polyethylene glycol 17 g Oral Daily Berto Godwin PA-C    potassium chloride 20 mEq Oral BID Alethia Shone, PA-C    senna-docusate sodium 2 tablet Oral HS Amelia Cartwright PA-C    temazepam 15 mg Oral HS PRN Alethia Shone, PA-C      Continuous Infusions:    niCARdipine 1-15 mg/hr Last Rate: Stopped (12/23/19 2145)   phenylephine  mcg/min Last Rate: Stopped (12/23/19 0432)     PRN Meds: albuterol    HYDROmorphone    lidocaine (cardiac)    ondansetron    oxyCODONE **OR** oxyCODONE    temazepam    Vitals: Blood pressure 115/66, pulse 56, temperature 99 °F (37 2 °C), temperature source Oral, resp  rate 14, height 5' 7" (1 702 m), weight 89 1 kg (196 lb 6 9 oz), SpO2 100 %  ,Body mass index is 30 77 kg/m²  I/O last 24 hours: In: 587 5 [P O :240; I V :247 5; IV Piggyback:100]  Out: 1175 [Urine:1175]  Invasive Devices     Central Venous Catheter Line            CVC Central Lines 12/19/19 Triple 4 days          Peripheral Intravenous Line            Peripheral IV 12/19/19 Right Forearm 5 days                  Lab, Imaging and other studies:   Results from last 7 days   Lab Units 12/24/19 0433 12/23/19 0407 12/22/19  0524   WBC Thousand/uL 9 29 12 56* 14 36*   HEMOGLOBIN g/dL 10 0* 10 5* 10 2*   HEMATOCRIT % 31 9* 31 8* 31 6*   PLATELETS Thousands/uL 203 172 134*     Results from last 7 days   Lab Units 12/24/19 0433 12/23/19 0407 12/22/19  0524  12/20/19  2357   POTASSIUM mmol/L 3 8 3 5 3 6   < >  --    CHLORIDE mmol/L 110* 111* 110*   < >  --    CO2 mmol/L 31 29 27   < >  --    CO2, I-STAT mmol/L  --   --   --   --  25   BUN mg/dL 45* 31* 28*   < >  --    CREATININE mg/dL 1 32* 1 16 1 01   < >  --    GLUCOSE, ISTAT mg/dl  --   --   --   --  134   CALCIUM mg/dL 8 9 8 2* 8 8   < >  --     < > = values in this interval not displayed  Recent Labs     12/22/19  0524   PHART 7 512*   [id number] 24 5   PO2ART 81 7   [id number] 31 2*   BEART 1 9           Plan:    Wean Hi Flow O2  Pain control  Pulm Toilet  Transfer to floor  Ambulate  Incentive spirometry  Diuresis    PO ASA/Statin/B blocker          SIGNATURE: Wilfred Ramirez MD  DATE: December 24, 2019  TIME: 7:49 AM

## 2019-12-24 NOTE — PROGRESS NOTES
Cardiology Progress Note - Tan Devi 63 y o  male MRN: [id number]    Unit/Bed#: [facility] 417-01 Encounter: [id number]      Assessment:  Principal Problem:    Unstable angina ([facility] 75 )  Active Problems:    Tobacco abuse    Pure hypercholesterolemia    S/P primary angioplasty with coronary stent    Bradycardia    Pulmonary nodule    Atherosclerosis of native coronary artery with angina pectoris (HCC)    S/P CABG x 3    Pulmonary edema    Acute respiratory failure with hypoxia (HCC)    MARK (acute kidney injury) ([facility] )    Hyperchloremia    Acute blood loss anemia      Plan:  Patient is out of bed to a chair  He has no complaint  He is postop day five after coronary artery bypass graft surgery  He is in sinus rhythm on telemetry  He continues on high-flow supplemental oxygen  Transfer to the floor is anticipated today  He continues with pulmonary toilet  He is on standard medical therapy  BMP today with potassium of 3 8 and creatinine of 1 32  I have made no change in his medical regimen  Subjective:   Patient seen and examined  No significant events overnight   negative  Objective:     Vitals: Blood pressure 120/62, pulse 61, temperature 99 °F (37 2 °C), temperature source Oral, resp  rate 14, height 5' 7" (1 702 m), weight 89 1 kg (196 lb 6 9 oz), SpO2 100 %  , Body mass index is 30 77 kg/m² ,   Orthostatic Blood Pressures      Most Recent Value   Blood Pressure  120/62 filed at 12/24/2019 0804   Patient Position - Orthostatic VS  Lying filed at 12/24/2019 0400      ,      Intake/Output Summary (Last 24 hours) at 12/24/2019 0911  Last data filed at 12/24/2019 0733  Gross per 24 hour   Intake 587 5 ml   Output 925 ml   Net -337 5 ml       No significant arrhythmias seen on telemetry review         Physical Exam:    GEN: Tan Devi appears well, alert and oriented x 3, pleasant and cooperative   NECK: supple, no carotid bruits, no JVD or HJR  HEART: normal rate, regular rhythm, normal S1 and S2, no murmurs, clicks, gallops or rubs   LUNGS:  Diminished breath sounds  ABDOMEN: normal bowel sounds, soft, no tenderness, no distention  EXTREMITIES: peripheral pulses normal; no clubbing, cyanosis, or edema  SKIN: warm and well perfused, no suspicious lesions on exposed skin    Labs & Results:    No results displayed because visit has over 200 results  Ct Abdomen Pelvis Wo Contrast    Result Date: 12/16/2019  Narrative: CT ABDOMEN AND PELVIS WITHOUT IV CONTRAST INDICATION:   rule out metastatic lung CA  COMPARISON:  12/14/2018 TECHNIQUE:  CT examination of the abdomen and pelvis was performed without intravenous contrast   Axial, sagittal, and coronal 2D reformatted images were created from the source data and submitted for interpretation  Radiation dose length product (DLP) for this visit:  436 67 mGy-cm   This examination, like all CT scans performed in the [facility], was performed utilizing techniques to minimize radiation dose exposure, including the use of iterative  reconstruction and automated exposure control  Enteric contrast was administered  FINDINGS: ABDOMEN LOWER CHEST:  High density in the right coronary artery may represent stent and/or atherosclerotic calcifications, image 5, series 2  No focal parenchymal opacities at the lung bases  LIVER/BILIARY TREE:  Unremarkable  GALLBLADDER:  No calcified gallstones  No pericholecystic inflammatory change  SPLEEN:  Unremarkable  PANCREAS:  Unremarkable  ADRENAL GLANDS:  Unremarkable  KIDNEYS/URETERS:  Unremarkable  No hydronephrosis  STOMACH AND BOWEL:  Moderate amount of retained colonic stool diffusely but more so on the right hemicolon  APPENDIX:  No findings to suggest appendicitis  ABDOMINOPELVIC CAVITY:  No ascites or free intraperitoneal air  No lymphadenopathy  VESSELS:  Mild atherosclerotic vascular calcifications noted  PELVIS REPRODUCTIVE ORGANS:  Unremarkable for patient's age  URINARY BLADDER:  Unremarkable   ABDOMINAL WALL/INGUINAL REGIONS:  Unremarkable  OSSEOUS STRUCTURES:  Mild spondylotic changes noted  Impression: 1  No evidence of metastatic disease in the abdomen or pelvis  2  Moderate constipation  Workstation performed: [id number]     Xr Chest Portable    Result Date: 12/22/2019  Narrative: CHEST INDICATION:   hypoxia  COMPARISON:  12/21/2019 1:47 AM EXAM PERFORMED/VIEWS:  XR CHEST PORTABLE  5:51 AM FINDINGS:  Postoperative mediastinal change is present  Endotracheal tube in stable satisfactory position  Orogastric tube enters the stomach but the tip is not clearly imaged  Right jugular venous catheter tip at the cavoatrial junction  Mediastinal  pleural tubes present  No pneumothorax identified  Opacification in the left mid and lower and right lower lung zones likely reflecting atelectasis and/or edema  The heart is mildly enlarged  No pneumothorax or sizable effusion noted  Osseous structures appear within normal limits for patient age  Impression: 1  Postoperative mediastinal changes as described with continued opacity in the left mid and lower right lower lung zones likely reflecting atelectasis  2   Lines and tubes as described  Workstation performed: [id number]     Xr Chest Portable    Result Date: 12/21/2019  Narrative: CHEST INDICATION:   intubation  COMPARISON:  December 20, 2019 at 5:52 AM EXAM PERFORMED/VIEWS:  XR CHEST PORTABLE 1 FINDINGS:  The right internal jugular central line in the right internal jugular pulmonary artery catheter overlie each other  I cannot confidently identify the tip of the central line  The pulmonary artery catheter has its tip in the proximal right pulmonary artery, without change  Endotracheal tube is unchanged 3 cm from the stef The tip and side-port of the nasogastric tube overlie the expected location of the stomach  The stomach is not distended  Thoracostomy tube is visible at the left base  There is no pneumothorax  Tubes overlie the mediastinum  There is been no interval change in the contour of the mediastinum  Heart shadow is obscured by adjacent opacity  Abnormal, airspace type, densities are now visible in the periphery of the left midlung  The left apex is involved as well  This is new since previous  There are linear densities at the right base  The minor fissure is depressed  This probably represents atelectasis  Osseous structures appear within normal limits for patient age  Impression: New infiltrate on the left, consider pneumonia Workstation performed: [id number]     Xr Chest Portable    Result Date: 12/20/2019  Narrative: CHEST INDICATION:   Post Open Heart Surgey  COMPARISON:  Chest x-ray 12/19/2019 EXAM PERFORMED/VIEWS:  XR CHEST PORTABLE FINDINGS:  Stable support lines and tubes from prior exam   Postoperative changes status post CABG Stable mild cardiomegaly  Grossly unchanged vascular congestion and bilateral perihilar opacities No acute or aggressive appearing osseous abnormality  Impression: Grossly stable vascular congestion and bilateral perihilar opacities Workstation performed: [id number]     Xr Chest Portable    Result Date: 12/20/2019  Narrative: CHEST INDICATION:   SOB  COMPARISON:  12/19/2019 EXAM PERFORMED/VIEWS:  XR CHEST PORTABLE FINDINGS:  Lines and tubes are unchanged from prior exam  Heart shadow is enlarged but unchanged from prior exam  Mild improvement in vascular congestion with patchy airspace opacities  No pneumothorax  Osseous structures appear within normal limits for patient age  Impression: Mild improvement in vascular congestion with patchy airspace opacities  Workstation performed: [id number]     Xr Chest 2 Views    Result Date: 12/12/2019  Narrative: CHEST INDICATION:   cp  COMPARISON:  10/26/2017 EXAM PERFORMED/VIEWS:  XR CHEST PA & LATERAL  The frontal view was performed utilizing dual energy radiographic technique  FINDINGS: Cardiomediastinal silhouette appears unremarkable   Mild interstitial prominence remains without focal pulmonary opacification  No pneumothorax or pleural effusion  Osseous structures appear within normal limits for patient age  Impression: No acute cardiopulmonary disease  Workstation performed: [id number]     Xr Wrist 3+ Views Right    Result Date: 12/3/2019  Narrative: RIGHT WRIST INDICATION:   foosh  COMPARISON:  None VIEWS:  XR WRIST 3+ VW RIGHT Images: 4 FINDINGS: There is no acute fracture or dislocation  No significant degenerative changes  There is a small ossified density seen on the lateral view anterior to the carpal bones with no overlying soft tissue swelling  This may represent sequela of a previous injury  Soft tissues are unremarkable  Impression: No acute osseous abnormality  Small ossified density projects anterior to the carpal bones on the lateral view, possibly representing sequela of an old injury  This does not have the appearance of an acute fracture  Correlate for focal pain  Workstation performed: [id number]     Xr Hand 3+ Vw Right    Result Date: 12/3/2019  Narrative: RIGHT HAND INDICATION:   foosh  COMPARISON:  None VIEWS:  XR HAND 3+ VW RIGHT Images: 3 For the purposes of institution wide universal language the following terms will apply: (thumb=1st digit/finger, index finger=2nd digit/finger, long finger=3rd digit/finger, ring=4th digit/finger and small finger=5th digit/finger) FINDINGS: There is no acute fracture or dislocation  No acute osseous abnormality  Small ossified density projects anterior to the carpal bones on the lateral view, possibly representing sequela of an old injury  This does not have the appearance of an acute fracture  Correlate for focal pain  No lytic or blastic lesions are seen  Soft tissues are unremarkable  Impression: No acute osseous abnormality  Small ossified density projects anterior to the carpal bones on the lateral view, possibly representing sequela of an old injury    This does not have the appearance of an acute fracture  Correlate for focal pain  Workstation performed: [id number]     Ct Head Wo Contrast    Result Date: 12/16/2019  Narrative: CT BRAIN - WITHOUT CONTRAST INDICATION:   rule out metastatic lung CA  COMPARISON:  None  TECHNIQUE:  CT examination of the brain was performed  In addition to axial images, coronal 2D reformatted images were created and submitted for interpretation  Radiation dose length product (DLP) for this visit:  895 7 mGy-cm   This examination, like all CT scans performed in the [facility], was performed utilizing techniques to minimize radiation dose exposure, including the use of iterative reconstruction and automated exposure control  IMAGE QUALITY:  Diagnostic  FINDINGS: PARENCHYMA:  No intracranial mass, mass effect or midline shift  No CT signs of acute infarction  No acute parenchymal hemorrhage  Scattered vascular calcifications  VENTRICLES AND EXTRA-AXIAL SPACES:  Normal for the patient's age  VISUALIZED ORBITS AND PARANASAL SINUSES:  Trace sinus mucosal disease based on the thickness of the right maxillary sinus wall this is likely chronic  CALVARIUM AND EXTRACRANIAL SOFT TISSUES:  Numerous periapical lucencies related to poor dentition  Impression: No acute intracranial abnormality  Discrete mass, edema or hemorrhage are not evident  Contrast would increase conspicuity of small lesions not otherwise evident without contrast  Workstation performed: [id number]     Ct Chest Wo Contrast    Result Date: 12/14/2019  Narrative: CT CHEST WITHOUT IV CONTRAST INDICATION:   Preoperative evaluation prior to cardiac surgery  COMPARISON:  None  TECHNIQUE: CT examination of the chest was performed without intravenous contrast   Axial, sagittal, and coronal 2D reformatted images were created from the source data and submitted for interpretation  Radiation dose length product (DLP) for this visit:  414 04 mGy-cm     This examination, like all CT scans performed in the [facility], was performed utilizing techniques to minimize radiation dose exposure, including the use of iterative  reconstruction and automated exposure control  FINDINGS: LUNGS:  Subtle groundglass densities in the perihilar lungs could represent atelectasis or, in the appropriate clinical setting, mild interstitial edema related to pulmonary vascular congestive change  No consolidative airspace opacity to suggest pneumonia  There is a noncalcified left upper lobe perihilar mass measuring 10 x 7 mm on image 36 of series 3  No other pulmonary nodule or mass  No tracheal or endobronchial lesion  PLEURA:  Unremarkable  HEART/GREAT VESSELS:  Coronary artery calcification is noted  Heart is normal in size  No thoracic aortic aneurysm  No pericardial effusion  MEDIASTINUM AND HATTIE:  Unremarkable  CHEST WALL AND LOWER NECK:   Unremarkable  VISUALIZED STRUCTURES IN THE UPPER ABDOMEN:  Unremarkable  OSSEOUS STRUCTURES:  No acute fracture or destructive osseous lesion  Impression: Noncalcified 10 x 7 mm left upper lobe pulmonary nodule  Based on current Fleischner Society 2017 Guidelines on incidental pulmonary nodule, either PET/CT scan evaluation or tissue sampling may be considered appropriate in this patient with a smoking history  Subtle groundglass parenchymal densities which could represent atelectasis or perhaps mild interstitial edema related to pulmonary vascular congestive change  No pleural effusions  The study was marked in EPIC for significant notification  Workstation performed: [id number]     Ct Spine Cervical Without Contrast    Result Date: 12/3/2019  Narrative: CT CERVICAL SPINE - WITHOUT CONTRAST INDICATION:   neck pain after fall  COMPARISON:  None  TECHNIQUE:  CT examination of the cervical spine was performed without intravenous contrast   Contiguous axial images were obtained  Sagittal and coronal reconstructions were performed    Radiation dose length product (DLP) for this visit:  486 mGy-cm   This examination, like all CT scans performed in the [facility], was performed utilizing techniques to minimize radiation dose exposure, including the use of iterative reconstruction and automated exposure control  IMAGE QUALITY:  Diagnostic  FINDINGS: ALIGNMENT:  Normal alignment of the cervical spine  No subluxation  VERTEBRAL BODIES:  No fracture  DEGENERATIVE CHANGES:  Mild multilevel cervical degenerative changes are noted without critical central canal stenosis  PREVERTEBRAL AND PARASPINAL SOFT TISSUES:  Unremarkable  THORACIC INLET:  Normal      Impression: No cervical spine fracture or traumatic malalignment  Workstation performed: [id number]     Vas Carotid Complete Study    Result Date: 12/15/2019  Narrative:  [facility] REPORT CLINICAL: Indications: Yearly surveillance of carotid artery disease  Patient is asymptomatic at this time  Risk Factors The patient has history of HTN and HLD  FINDINGS:  Segment      Right                        Left                      Impression  PSV  EDV (cm/s)  Impression  PSV  EDV (cm/s)  Dist  ICA                 44          12               62          19  Mid  ICA                  64          14               46          13  Prox  ICA    1 - 49%      74          11  1 - 49%      63          13  Dist CCA                  65          13               64          16  Mid CCA                   68          14               74          16  Prox CCA                  71          14               71          13  Ext Carotid               76           7              106          13  Prox Vert                 22           6               29           9  Subclavian                92                          151                 CONCLUSION: Impression RIGHT: There is <50% stenosis noted in the internal carotid artery  Plaque is heterogenous and smooth  Vertebral artery flow is antegrade   There is no significant subclavian artery disease  LEFT: There is <50% stenosis noted in the internal carotid artery  Plaque is heterogenous and smooth  Vertebral artery flow is antegrade  There is no significant subclavian artery disease  Internal carotid artery stenosis determination by consensus criteria from: liam Garcia al  Carotid Artery Stenosis: Gray-Scale and Doppler US Diagnosis - Society of Radiologists in [facility] [address], Radiology 2003; [phone]  SIGNATURE: Electronically Signed by: Kvng Reyes MD on 2019-12-15 02:34:09 PM    Vas Lower Limb Vein Mapping Bypass Graft    Result Date: 12/14/2019  Narrative:  [facility] REPORT CLINICAL: Indications: Pre-op Vein Mapping for Future Open Heart Surgery  FINDINGS:  Segment         Right        Left                            Diameter AP  Diameter AP  GSV Inguinal            6 7          7 1  GSV Prox Thigh          4 1          3 4  GSV Mid Thigh           4 3          4 2  GSV Dist Thigh          3 3          3 1  GSV Knee                3 3          3 2  GSV Prox Calf           2 1          2 5  GSV Mid Calf            1 8          2 7  GSV Dist Calf           1 7          2 1  GSV Ankle               2 5          2 4     CONCLUSION: Impression: RIGHT LOWER LIMB: The great saphenous vein is patent  from the groin to the ankle  The vein is of adequate caliber and quality for graft conduit with intraluminal diameters ranging from 1 7mm to 6 7mm throughout the leg  LEFT LOWER LIMB: The great saphenous vein is patent  from the groin to the ankle  The vein is of adequate caliber and quality for graft conduit with intraluminal diameters ranging from 2 1mm to 7 1mm throughout the leg  SIGNATURE: Electronically Signed by: Kvng Reyes MD on 2019-12-14 04:40:09 PM    Xr Chest Portable Icu    Result Date: 12/19/2019  Narrative: CHEST INDICATION:   S/P open heart   COMPARISON:  CT chest 12/14/2019 EXAM PERFORMED/VIEWS:  XR CHEST PORTABLE ICU FINDINGS:  Endotracheal tube in satisfactory position 3 cm above the stef  NG tube coursing below the diaphragm  [place] catheter in the right main pulmonary artery  Mediastinal drains in place  Cardiomediastinal silhouette appears unremarkable  Moderate vascular congestion with bibasilar atelectasis  Osseous structures appear within normal limits for patient age  Impression: Moderate vascular congestion with bibasilar atelectasis  Workstation performed: [id number]       EKG personally reviewed by Lucio Zaragoza MD      Counseling / Coordination of Care  Total floor / unit time spent today 30 minutes  Greater than 50% of total time was spent with the patient and / or family counseling and / or coordination of care

## 2019-12-24 NOTE — OCCUPATIONAL THERAPY NOTE
[address] Progress Note     Patient Name: Tesfaye BLAKELY Date: 12/24/2019  Problem List  Principal Problem:    Unstable angina ([facility] 75 )  Active Problems:    Tobacco abuse    Pure hypercholesterolemia    S/P primary angioplasty with coronary stent    Bradycardia    Pulmonary nodule    Atherosclerosis of native coronary artery with angina pectoris ([facility] 75 )    S/P CABG x 3    Pulmonary edema    Acute respiratory failure with hypoxia (HCC)    MARK (acute kidney injury) ([facility] 75 )    Hyperchloremia    Acute blood loss anemia            12/24/19 1011   Restrictions/Precautions   Weight Bearing Precautions Per Order No   Other Precautions Cardiac/sternal;Fall Risk;Pain;Telemetry;Multiple lines   General   Response to Previous Treatment Patient with no complaints from previous session   Lifestyle   Autonomy Pt is (I) at baseline in ADLs, IADLs, mobility  Reciprocal Relationships Lives w/ supportive spouse   Service to Others Retired   [address] Enjoys staying active and exercising    Pain Assessment   Pain Assessment No/denies pain   Pain Score No Pain   Transfers   Sit to Stand 3  Moderate assistance   Additional items Assist x 1   Stand to Sit 3  Moderate assistance   Additional items Assist x 1   Additional Comments With RW and SBA of 2nd person for line management and chair follow    Functional Mobility   Functional Mobility 3  Moderate assistance   Additional Comments Pt demonstrated household mobility with 1 seated rest break and RW  Additional items Rolling walker   Cognition   Overall Cognitive Status WFL   Arousal/Participation Alert; Cooperative   Attention Within functional limits   Orientation Level Oriented X4   Memory Decreased recall of precautions   Following Commands Follows one step commands without difficulty   Comments Pt is very pleasant and cooperative to work with therapy      Activity Tolerance   Activity Tolerance Patient limited by fatigue   Medical Staff Made Aware RN confirmed okay to see pt   Assessment   Assessment Patient participated in Skilled OT session this date with interventions consisting of deep breathing technique, maintaining sternal precautions and  therapeutic activities to: increase activity tolerance   Patient agreeable to OT treatment session, upon arrival patient was found seated OOB to Chair  In comparison to previous session, patient with improvements in functional mobility  Patient requiring frequent rest periods  Patient continues to be functioning below baseline level, occupational performance remains limited secondary to factors listed above and increased risk for falls and injury  From OT standpoint, recommendation at time of d/c would be Short Term Rehab  Patient to benefit from continued Occupational Therapy treatment while in the hospital to address deficits as defined above and maximize level of functional independence with ADLs and functional mobility  Plan   Treatment Interventions ADL retraining;Functional transfer training; Endurance training;Patient/family training; Compensatory technique education;Continued evaluation; Energy conservation; Activityengagement;Cardiac education   Goal Expiration Date 01/02/20   OT Treatment Day 2   OT Frequency 3-5x/wk   Recommendation   OT Discharge Recommendation Short Term Rehab  (pending progress)   OT - OK to Discharge Yes  (When medically appropriate )   Barthel Index   Feeding 10   Bathing 0   Grooming Score 5   Dressing Score 5   Bladder Score 0   Bowels Score 10   Toilet Use Score 5   Transfers (Bed/Chair) Score 5   Mobility (Level Surface) Score 0   Stairs Score 0   Barthel Index Score 40   Modified [place] Scale   Modified [place] Scale 4     Nubia Tinsley, ROCIO, OTR/L

## 2019-12-24 NOTE — PROGRESS NOTES
Progress Note - Critical Care   Wing Emerson 63 y o  male MRN: [id number]  Unit/Bed#: [facility] 417-01 Encounter: [id number]      Attending Physician: Bala Pena DO    24 Hour Events/HPI: POD # 5 s/p CABG x 3  Maintained on HFNC throughout the day and FiO2 weaned down  No overnight events  Reports productive cough and feels much better than yesterday  ROS: Review of Systems  Review of systems was reviewed and negative unless stated above in HPI/24-hour events   ---------------------------------------------------------------------------------------------------------------------------------------------------------------------  Impressions:  1  CAD s/p CABG x 3  2  Acute hypoxic respiratory failure  3  Pulmonary edema  4  Sinus bradycardia  5  Acute pain post operatively   6  HLD    Plan:    Neuro:   · Pain controlled with: [address] tylenol, PRN oxycodone  · Regulate sleep/wake cycle  · Restoril QHS PRN   · Delirium precautions  · CAM-ICU daily  · Trend neuro exam      CV:   · Cardiac infusions: None  · MAP goal > 65  · Rhythm: Sinus Bradycardia  · Follow rhythm on telemetry  · Lopressor 12 5 mg PO BID  · Epicardial pacing wires out      Lung:   · Acute post-op pulmonary insufficiency; Requiring high flow via nasal cannula, secondary to pulmonary vascular congestion and poor inspiratory effort secondary to pain  Continue incentive spirometry/coughing/deep breathing exercises    Wean supplemental oxygen as tolerated for saturation > 90%  · Wean HFNC as tolerated  · Chest tubes have been discontinued      GI:   · Continue PPI for stress ulcer prophylaxis  · Continue bowel regimen  · Trend abdominal exam and bowel function    FEN:   · Diuretic plan: Lasix 40 mg IV q BID  · K-dur 20 mEQ PO q BID  · Nutrition/diet plan: Cardiac diet   · Replete electrolytes with goals: K >4 0, Mag >2 0, and Phos >3 0    :   · Indwelling Banks present: no   · Trend UOP and BUN/creat  · Strict I and O    ID:   · Trend temps and WBC count  · Maintain normothermia    Heme:   · Trend hgb and plts    Endo:   · Glycemic control plan: SSI    MSK/Skin:  · Mobility goal:   · PT consult: yes  · OT consult: yes  · Frequent turning and pressure off-loading  · Local wound care as needed    Disposition:  Transfer to SD level 1  ---------------------------------------------------------------------------------------------------------------------------------------------------------------------  Allergies: No Known Allergies  Medications:   Scheduled Meds:  Current Facility-Administered Medications:  [facility] 975 mg [facility] Q8H [facility]    albuterol 2 5 mg Nebulization Q4H PRN Adan Guzman DO    amiodarone 200 mg Oral [facility] Roman Collet Akureyri, Benedict Beverage    aspirin 325 mg Oral Daily Roman Collet Akureyri, PA-C    atorvastatin 80 mg Oral Daily With [email or web address] reina    bisacodyl 10 mg Rectal Daily [facility]    fondaparinux 2 5 mg Subcutaneous Q24H Belle Eller PA-C    furosemide 40 mg Intravenous BID (diuretic) RALPH Choudhary    HYDROmorphone 1 mg Intravenous Q1H PRN Belle Eller PA-C    insulin lispro 1-5 Units Subcutaneous TID AC Belle Eller PA-C    insulin lispro 1-5 Units Subcutaneous HS Belle Eller PA-C    ipratropium 0 5 mg Nebulization TID Adan Guzman DO    levalbuterol 1 25 mg Nebulization TID Adan Guzman DO    lidocaine (cardiac) 100 mg Intravenous Q30 Min PRN Roman Collet Akureyri, PA-C    methocarbamol 500 mg Oral Q6H [facility] Belle Eller PA-C    metoprolol tartrate 25 mg Oral Q12H [facility] Bransuzyghada Pinto PA-C    mupirocin 1 application Nasal E47Y [facility] Blu Collet Lugiano, PA-C    niCARdipine 1-15 mg/hr Intravenous Titrated RALPH Pierson Last Rate: Stopped (12/23/19 2145)   ondansetron 4 mg Intravenous Q6H PRN Shira Sheth PA-C    oxyCODONE 5 mg Oral Q2H PRN Coco Kinney PA-C    Or        oxyCODONE 10 mg Oral Q2H PRN Belle Eller PA-C    pantoprazole 40 mg Intravenous Q24H [facility] Belle MEGA Eller    phenylephine  mcg/min Intravenous Titrated RALPH Garza Last Rate: Stopped (12/23/19 0432)   polyethylene glycol 17 g Oral Daily Juventinocornelio Godwin PA-C    potassium chloride 20 mEq Oral BID Belle Eller PA-C    senna-docusate sodium 2 tablet Oral HS Meghna Villegas PA-C    temazepam 15 mg Oral HS PRN Sylvester Mckenna PA-C      VTE Pharmacologic Prophylaxis: Fondaparinux (Arixtra)  VTE Mechanical Prophylaxis: sequential compression device    Continuous Infusions:  niCARdipine 1-15 mg/hr Last Rate: Stopped (12/23/19 2145)   phenylephine  mcg/min Last Rate: Stopped (12/23/19 0432)     PRN Meds:    albuterol 2 5 mg Q4H PRN   HYDROmorphone 1 mg Q1H PRN   lidocaine (cardiac) 100 mg Q30 Min PRN   ondansetron 4 mg Q6H PRN   oxyCODONE 5 mg Q2H PRN   Or     oxyCODONE 10 mg Q2H PRN   temazepam 15 mg HS PRN     Home Medications:   Prior to Admission medications    Medication Sig Start Date End Date Taking?  Authorizing Provider   aspirin (ECOTRIN LOW STRENGTH) 81 mg EC tablet Take 1 tablet by mouth daily 10/29/17   Shellie Preston MD   atorvastatin (LIPITOR) 40 mg tablet Take 1 tablet (40 mg total) by mouth daily with dinner 6/21/19   Lauren Henderson MD   clopidogrel (PLAVIX) 75 mg tablet Take 1 tablet (75 mg total) by mouth daily 6/21/19   Lauren Henderson MD   cyclobenzaprine (FLEXERIL) 10 mg tablet Take 1 tablet (10 mg total) by mouth 3 (three) times a day as needed for muscle spasms 12/3/19   Charlie Anna PA-C   isosorbide mononitrate (IMDUR) 30 mg 24 hr tablet Take 1 tablet (30 mg total) by mouth daily 6/21/19   Lauren Henderson MD   metoprolol succinate (TOPROL-XL) 25 mg 24 hr tablet Take 1 tablet (25 mg total) by mouth daily 6/21/19   Lauren Henderson MD   naproxen (NAPROSYN) 500 mg tablet Take 1 tablet (500 mg total) by mouth every 12 (twelve) hours 6/21/19   Tiki Orr PA-C ---------------------------------------------------------------------------------------------------------------------------------------------------------------------  Vitals:   Vitals:    19 0400 19 0500 19   BP: 107/53  140/68 115/66   BP Location: Left arm      Pulse: 56  62 58   Resp: 14  18 14   Temp: 99 °F (37 2 °C)      TempSrc: Oral      SpO2: 100% 99% 99% 99%   Weight:    89 1 kg (196 lb 6 9 oz)   Height:         Arterial Line:  Arterial Line BP: 100/94  Arterial Line MAP (mmHg): 98 mmHg    Tele Rhythm: Sinus Bradycardia This was personally reviewed by myself  Respiratory:  HFNC 45% 50LPM  O2 Flow Rate (L/min): 50 L/min    Ventilator:  Respiratory    Lab Data (Last 4 hours)    None         O2/Vent Data (Last 4 hours)              Non-Invasive Ventilation Mode BiPAP HFNC                Temperature: Temp (24hrs), Av 6 °F (37 °C), Min:98 3 °F (36 8 °C), Max:99 °F (37 2 °C)  Current: Temperature: 99 °F (37 2 °C)    Weights:   Weight (last 2 days)     Date/Time   Weight    19   89 1 (196 43)    19 06   88 (194 01)    19 06   90 1 (198 63)            Body mass index is 30 77 kg/m²  Hemodynamic Monitoring:  PAP: PAP: 39/18, CVP: CVP (mean): 18 mmHg, CO: CO (L/min): 4 9 L/min, CI: CI (L/min/m2): 2 5 L/min/m2, SVR: SVR (dyne*sec)/cm5: 979 (dyne*sec)/cm5       Intake and Outputs:    Intake/Output Summary (Last 24 hours) at 2019 0651  Last data filed at 2019 1800  Gross per 24 hour   Intake 587 5 ml   Output 900 ml   Net -312 5 ml     I/O last 24 hours:   In: 1039 9 [P O :360; I V :579 9; IV Piggyback:100]  Out: 1116 [[id number]]    UOP: 0 5cc/kg/hour   BM: 0 in the last 24 hours      Labs:  Results from last 7 days   Lab Units 19  0433 19  0407 19  0524  19  0442   WBC Thousand/uL 9 29 12 56* 14 36*   < > 7 38   HEMOGLOBIN g/dL 10 0* 10 5* 10 2*   < > 15 2   I STAT HEMOGLOBIN   --   -- --    < >  --    HEMATOCRIT % 31 9* 31 8* 31 6*   < > 46 0   HEMATOCRIT, ISTAT   --   --   --    < >  --    PLATELETS Thousands/uL 203 172 134*   < > 244   NEUTROS PCT % 62  --  66  --  49   MONOS PCT % 13*  --  12  --  11    < > = values in this interval not displayed  Results from last 7 days   Lab Units 12/24/19  0433 12/23/19  0407 12/22/19  0524  12/20/19  2357  12/19/19  1438 12/19/19  1401   SODIUM mmol/L 142 145 141   < >  --    < >  --   --    POTASSIUM mmol/L 3 8 3 5 3 6   < >  --    < >  --   --    CHLORIDE mmol/L 110* 111* 110*   < >  --    < >  --   --    CO2 mmol/L 31 29 27   < >  --    < >  --   --    CO2, I-STAT mmol/L  --   --   --   --  25  --  25 24   BUN mg/dL 45* 31* 28*   < >  --    < >  --   --    CREATININE mg/dL 1 32* 1 16 1 01   < >  --    < >  --   --    CALCIUM mg/dL 8 9 8 2* 8 8   < >  --    < >  --   --    GLUCOSE, ISTAT mg/dl  --   --   --   --  134  --  133 127    < > = values in this interval not displayed  Baseline Creat: 1 0    Results from last 7 days   Lab Units 12/24/19  0433 12/23/19  0407 12/22/19  0524   MAGNESIUM mg/dL 2 6 2 3 2 3                  Micro:   Blood Culture: No results found for: BLOODCX  Urine Culture: No results found for: URINECX  Sputum Culture: No components found for: SPUTUMCX  Wound Culure: No results found for: WOUNDCULT        Nutrition:        Diet Orders   (From admission, onward)             Start     Ordered    12/23/19 0637  Diet Cardiovascular; Cardiac; Fluid Restriction 1800 ML  Diet effective 0500     Question Answer Comment   Diet Type Cardiovascular    Cardiac Cardiac    Other Restriction(s): Fluid Restriction 1800 ML    RD to adjust diet per protocol? Yes        12/23/19 3266                Physical Exam   Constitutional: He is oriented to person, place, and time  He appears well-developed and well-nourished  No distress  HENT:   Head: Normocephalic  Eyes: Pupils are equal, round, and reactive to light  Neck: No JVD present  +CVC   Cardiovascular: Regular rhythm and intact distal pulses  Exam reveals no gallop and no friction rub  No murmur heard  Bradycardic    Pulmonary/Chest: No respiratory distress  He has no wheezes  He has no rales  Poor inspiratory effort   Diminished throughout    Abdominal: Soft  He exhibits no distension  There is no tenderness  Musculoskeletal: He exhibits no edema  Neurological: He is alert and oriented to person, place, and time  Nonfocal    Skin: Skin is warm and dry  Invasive lines and devices: Invasive Devices     Central Venous Catheter Line            CVC Central Lines 12/19/19 Triple 4 days          Peripheral Intravenous Line            Peripheral IV 12/19/19 Right Forearm 4 days              ---------------------------------------------------------------------------------------------------------------------------------------------------------------------  Code Status: Level 1 - Full Code    Counseling / Coordination of Care  Total Critical Care time spent 0 minutes excluding procedures, teaching and family updates  Collaborative bedside rounds performed with cardiac surgery attending and bedside RN      SIGNATURE: Charleen Walden PA-C  DATE: December 24, 2019  TIME: 6:51 AM

## 2019-12-24 NOTE — PHYSICAL THERAPY NOTE
PHYSICAL THERAPY NOTE          Patient Name: James Aguilar  TJEZS'T Date: 12/24/2019 12/24/19 1010   Pain Assessment   Pain Assessment No/denies pain   Restrictions/Precautions   Other Precautions Cardiac/sternal;Multiple lines;Telemetry; Fall Risk;O2  (nsg placed pt on 6 L of O2 prior to mobilization)   General   Chart Reviewed Yes   Additional Pertinent History cleared for Tx session (spoke to nsg)   Response to Previous Treatment Patient with no complaints from previous session  Cognition   Overall Cognitive Status WFL   Arousal/Participation Alert; Cooperative   Attention Attends with cues to redirect   Orientation Level Oriented to person;Oriented to place;Oriented to situation   Memory Decreased recall of recent events;Decreased recall of precautions   Following Commands Follows one step commands without difficulty   Subjective   Subjective Pt is in the chair; somewhat flat affect; agreeable to mobilize;   Transfers   Sit to Stand 3  Moderate assistance  (stand by (A) of 2nd)   Additional items Assist x 1; Increased time required;Verbal cues  (2 trials)   Stand to Sit 3  Moderate assistance  (stand by (A) of 2nd)   Additional items Assist x 1; Increased time required;Verbal cues  (2 trials)   Ambulation/Elevation   Gait pattern Excessively slow; Short stride; Foward flexed; Inconsistent brianna; Forward Flexion   Gait Assistance 3  Moderate assist   Additional items Assist x 1;Verbal cues; Tactile cues  (stand by (A) of 2 more staff for lines and chair follow)   Assistive Device Rolling walker   Distance 50 ft and 150 ft w/ seated rest period in between   Balance   Static Sitting Fair -   Static Standing Poor +   Ambulatory Poor   Activity Tolerance   Activity Tolerance Patient limited by fatigue   Nurse Made Aware [facility]   Equipment Use   Comments Pillow placed for (R) UE support; call bell w/in reach at the end of session;   Assessment   Prognosis Good   Problem List Decreased strength;Decreased endurance; Impaired balance;Decreased mobility;Obesity   Assessment Pt demonstrated overall improvement in functional mobility skills ambulating further distances w/ rw w/ less (A) required as well; pt still remains to be generally mod weak and deconditioned w/ decreased standing balance and associated mobility deficits requiring PT to address; based on above, cont to recommend rehab upon D/C at this time when medically cleared; will cont to monitor progress however and make appropriate changes to D/C recommendations as needed; will follow  Barriers to Discharge Inaccessible home environment   Goals   Patient Goals to cont getting better   STG Expiration Date 01/02/19   PT Treatment Day 2   Plan   Treatment/Interventions Functional transfer training;LE strengthening/ROM; Elevations; Therapeutic exercise; Endurance training;Equipment eval/education; Bed mobility;Gait training;Spoke to nursing;OT   Progress Progressing toward goals   PT Frequency Other (Comment)  (4-6x/wk)   Recommendation   Recommendation Post acute IP rehab  (at this time)   Equipment Recommended [place]  (w/ (A))       Shaista Andrew, PT

## 2019-12-24 NOTE — PLAN OF CARE
Problem: OCCUPATIONAL THERAPY ADULT  Goal: Performs self-care activities at highest level of function for planned discharge setting  See evaluation for individualized goals  Description  Treatment Interventions: ADL retraining, Functional transfer training, Endurance training, Patient/family training, Equipment evaluation/education, Compensatory technique education, Cardiac education, Energy conservation, Activityengagement          See flowsheet documentation for full assessment, interventions and recommendations  Outcome: Progressing  Note:   Limitation: Decreased ADL status, Decreased endurance, Decreased self-care trans, Decreased high-level ADLs  Prognosis: Good  Assessment: Patient participated in Skilled OT session this date with interventions consisting of deep breathing technique, maintaining sternal precautions and  therapeutic activities to: increase activity tolerance   Patient agreeable to OT treatment session, upon arrival patient was found seated OOB to Chair  In comparison to previous session, patient with improvements in functional mobility  Patient requiring frequent rest periods  Patient continues to be functioning below baseline level, occupational performance remains limited secondary to factors listed above and increased risk for falls and injury  From OT standpoint, recommendation at time of d/c would be Short Term Rehab  Patient to benefit from continued Occupational Therapy treatment while in the hospital to address deficits as defined above and maximize level of functional independence with ADLs and functional mobility        OT Discharge Recommendation: Short Term Rehab(pending progress)  OT - OK to Discharge: Yes(When medically appropriate )

## 2019-12-25 LAB
ANION GAP SERPL CALCULATED.3IONS-SCNC: 5 MMOL/L (ref 4–13)
BASOPHILS # BLD AUTO: 0.09 THOUSANDS/ΜL (ref 0–0.1)
BASOPHILS NFR BLD AUTO: 1 % (ref 0–1)
BUN SERPL-MCNC: 42 MG/DL (ref 5–25)
CALCIUM SERPL-MCNC: 9.3 MG/DL (ref 8.3–10.1)
CHLORIDE SERPL-SCNC: 108 MMOL/L (ref 100–108)
CO2 SERPL-SCNC: 29 MMOL/L (ref 21–32)
CREAT SERPL-MCNC: 1.15 MG/DL (ref 0.6–1.3)
EOSINOPHIL # BLD AUTO: 0.34 THOUSAND/ΜL (ref 0–0.61)
EOSINOPHIL NFR BLD AUTO: 3 % (ref 0–6)
ERYTHROCYTE [DISTWIDTH] IN BLOOD BY AUTOMATED COUNT: 13.1 % (ref 11.6–15.1)
GFR SERPL CREATININE-BSD FRML MDRD: 70 ML/MIN/1.73SQ M
GLUCOSE SERPL-MCNC: 119 MG/DL (ref 65–140)
GLUCOSE SERPL-MCNC: 123 MG/DL (ref 65–140)
GLUCOSE SERPL-MCNC: 128 MG/DL (ref 65–140)
GLUCOSE SERPL-MCNC: 132 MG/DL (ref 65–140)
GLUCOSE SERPL-MCNC: 136 MG/DL (ref 65–140)
HCT VFR BLD AUTO: 35 % (ref 36.5–49.3)
HGB BLD-MCNC: 11 G/DL (ref 12–17)
IMM GRANULOCYTES # BLD AUTO: 0.16 THOUSAND/UL (ref 0–0.2)
IMM GRANULOCYTES NFR BLD AUTO: 2 % (ref 0–2)
LYMPHOCYTES # BLD AUTO: 1.82 THOUSANDS/ΜL (ref 0.6–4.47)
LYMPHOCYTES NFR BLD AUTO: 17 % (ref 14–44)
MCH RBC QN AUTO: 31.1 PG (ref 26.8–34.3)
MCHC RBC AUTO-ENTMCNC: 31.4 G/DL (ref 31.4–37.4)
MCV RBC AUTO: 99 FL (ref 82–98)
MONOCYTES # BLD AUTO: 1.18 THOUSAND/ΜL (ref 0.17–1.22)
MONOCYTES NFR BLD AUTO: 11 % (ref 4–12)
NEUTROPHILS # BLD AUTO: 6.85 THOUSANDS/ΜL (ref 1.85–7.62)
NEUTS SEG NFR BLD AUTO: 66 % (ref 43–75)
NRBC BLD AUTO-RTO: 0 /100 WBCS
PLATELET # BLD AUTO: 281 THOUSANDS/UL (ref 149–390)
PMV BLD AUTO: 10.9 FL (ref 8.9–12.7)
POTASSIUM SERPL-SCNC: 3.5 MMOL/L (ref 3.5–5.3)
RBC # BLD AUTO: 3.54 MILLION/UL (ref 3.88–5.62)
SODIUM SERPL-SCNC: 142 MMOL/L (ref 136–145)
WBC # BLD AUTO: 10.44 THOUSAND/UL (ref 4.31–10.16)

## 2019-12-25 PROCEDURE — 97530 THERAPEUTIC ACTIVITIES: CPT

## 2019-12-25 PROCEDURE — 99232 SBSQ HOSP IP/OBS MODERATE 35: CPT | Performed by: INTERNAL MEDICINE

## 2019-12-25 PROCEDURE — 94760 N-INVAS EAR/PLS OXIMETRY 1: CPT

## 2019-12-25 PROCEDURE — C9113 INJ PANTOPRAZOLE SODIUM, VIA: HCPCS | Performed by: PHYSICIAN ASSISTANT

## 2019-12-25 PROCEDURE — 85025 COMPLETE CBC W/AUTO DIFF WBC: CPT | Performed by: PHYSICIAN ASSISTANT

## 2019-12-25 PROCEDURE — 82948 REAGENT STRIP/BLOOD GLUCOSE: CPT

## 2019-12-25 PROCEDURE — 94668 MNPJ CHEST WALL SBSQ: CPT

## 2019-12-25 PROCEDURE — 97116 GAIT TRAINING THERAPY: CPT

## 2019-12-25 PROCEDURE — 94640 AIRWAY INHALATION TREATMENT: CPT

## 2019-12-25 PROCEDURE — 99024 POSTOP FOLLOW-UP VISIT: CPT | Performed by: THORACIC SURGERY (CARDIOTHORACIC VASCULAR SURGERY)

## 2019-12-25 PROCEDURE — 80048 BASIC METABOLIC PNL TOTAL CA: CPT | Performed by: PHYSICIAN ASSISTANT

## 2019-12-25 RX ORDER — OXYCODONE HYDROCHLORIDE 10 MG/1
10 TABLET ORAL EVERY 8 HOURS PRN
Status: DISCONTINUED | OUTPATIENT
Start: 2019-12-25 | End: 2019-12-26 | Stop reason: HOSPADM

## 2019-12-25 RX ORDER — POTASSIUM CHLORIDE 20 MEQ/1
40 TABLET, EXTENDED RELEASE ORAL ONCE
Status: COMPLETED | OUTPATIENT
Start: 2019-12-25 | End: 2019-12-25

## 2019-12-25 RX ORDER — OXYCODONE HYDROCHLORIDE 5 MG/1
5 TABLET ORAL EVERY 6 HOURS PRN
Status: DISCONTINUED | OUTPATIENT
Start: 2019-12-25 | End: 2019-12-26 | Stop reason: HOSPADM

## 2019-12-25 RX ORDER — METHOCARBAMOL 500 MG/1
500 TABLET, FILM COATED ORAL EVERY 6 HOURS PRN
Status: DISCONTINUED | OUTPATIENT
Start: 2019-12-25 | End: 2019-12-26 | Stop reason: HOSPADM

## 2019-12-25 RX ADMIN — OXYCODONE HYDROCHLORIDE 10 MG: 10 TABLET ORAL at 05:25

## 2019-12-25 RX ADMIN — POTASSIUM CHLORIDE 20 MEQ: 1500 TABLET, EXTENDED RELEASE ORAL at 17:04

## 2019-12-25 RX ADMIN — METOPROLOL TARTRATE 12.5 MG: 25 TABLET ORAL at 21:34

## 2019-12-25 RX ADMIN — LEVALBUTEROL HYDROCHLORIDE 1.25 MG: 1.25 SOLUTION, CONCENTRATE RESPIRATORY (INHALATION) at 13:48

## 2019-12-25 RX ADMIN — SENNOSIDES AND DOCUSATE SODIUM 2 TABLET: 8.6; 5 TABLET ORAL at 17:04

## 2019-12-25 RX ADMIN — SENNOSIDES AND DOCUSATE SODIUM 2 TABLET: 8.6; 5 TABLET ORAL at 08:29

## 2019-12-25 RX ADMIN — IPRATROPIUM BROMIDE 0.5 MG: 0.5 SOLUTION RESPIRATORY (INHALATION) at 07:10

## 2019-12-25 RX ADMIN — METOPROLOL TARTRATE 12.5 MG: 25 TABLET ORAL at 08:28

## 2019-12-25 RX ADMIN — METHOCARBAMOL 500 MG: 500 TABLET, FILM COATED ORAL at 05:24

## 2019-12-25 RX ADMIN — POTASSIUM CHLORIDE 40 MEQ: 1500 TABLET, EXTENDED RELEASE ORAL at 08:32

## 2019-12-25 RX ADMIN — FUROSEMIDE 40 MG: 10 INJECTION, SOLUTION INTRAMUSCULAR; INTRAVENOUS at 17:00

## 2019-12-25 RX ADMIN — IPRATROPIUM BROMIDE 0.5 MG: 0.5 SOLUTION RESPIRATORY (INHALATION) at 13:48

## 2019-12-25 RX ADMIN — ONDANSETRON 4 MG: 2 INJECTION INTRAMUSCULAR; INTRAVENOUS at 10:15

## 2019-12-25 RX ADMIN — AMIODARONE HYDROCHLORIDE 200 MG: 200 TABLET ORAL at 13:55

## 2019-12-25 RX ADMIN — AMIODARONE HYDROCHLORIDE 200 MG: 200 TABLET ORAL at 21:35

## 2019-12-25 RX ADMIN — HEPARIN SODIUM 5000 UNITS: 5000 INJECTION INTRAVENOUS; SUBCUTANEOUS at 05:25

## 2019-12-25 RX ADMIN — HEPARIN SODIUM 5000 UNITS: 5000 INJECTION INTRAVENOUS; SUBCUTANEOUS at 13:55

## 2019-12-25 RX ADMIN — LEVALBUTEROL HYDROCHLORIDE 1.25 MG: 1.25 SOLUTION, CONCENTRATE RESPIRATORY (INHALATION) at 07:10

## 2019-12-25 RX ADMIN — ACETAMINOPHEN 975 MG: 325 TABLET ORAL at 13:54

## 2019-12-25 RX ADMIN — OXYCODONE HYDROCHLORIDE 10 MG: 10 TABLET ORAL at 21:35

## 2019-12-25 RX ADMIN — PANTOPRAZOLE SODIUM 40 MG: 40 INJECTION, POWDER, FOR SOLUTION INTRAVENOUS at 08:28

## 2019-12-25 RX ADMIN — HEPARIN SODIUM 5000 UNITS: 5000 INJECTION INTRAVENOUS; SUBCUTANEOUS at 21:33

## 2019-12-25 RX ADMIN — IPRATROPIUM BROMIDE 0.5 MG: 0.5 SOLUTION RESPIRATORY (INHALATION) at 19:21

## 2019-12-25 RX ADMIN — ACETAMINOPHEN 975 MG: 325 TABLET ORAL at 05:25

## 2019-12-25 RX ADMIN — POTASSIUM CHLORIDE 20 MEQ: 1500 TABLET, EXTENDED RELEASE ORAL at 08:29

## 2019-12-25 RX ADMIN — FUROSEMIDE 40 MG: 10 INJECTION, SOLUTION INTRAMUSCULAR; INTRAVENOUS at 08:28

## 2019-12-25 RX ADMIN — ATORVASTATIN CALCIUM 80 MG: 80 TABLET, FILM COATED ORAL at 17:04

## 2019-12-25 RX ADMIN — TEMAZEPAM 15 MG: 15 CAPSULE ORAL at 01:50

## 2019-12-25 RX ADMIN — ASPIRIN 325 MG ORAL TABLET 325 MG: 325 PILL ORAL at 08:28

## 2019-12-25 RX ADMIN — ACETAMINOPHEN 975 MG: 325 TABLET ORAL at 21:34

## 2019-12-25 RX ADMIN — AMIODARONE HYDROCHLORIDE 200 MG: 200 TABLET ORAL at 05:25

## 2019-12-25 RX ADMIN — LEVALBUTEROL HYDROCHLORIDE 1.25 MG: 1.25 SOLUTION, CONCENTRATE RESPIRATORY (INHALATION) at 19:21

## 2019-12-25 NOTE — PHYSICAL THERAPY NOTE
Physical Therapy Progress Note     12/25/19 1428   Pain Assessment   Pain Assessment 0-10   Pain Score 5   Pain Location Leg  (incision site)   Pain Orientation Left   Restrictions/Precautions   Weight Bearing Precautions Per Order No   Other Precautions Cardiac/sternal;Fall Risk;Pain   General   Family/Caregiver Present Yes  (wife)   Cognition   Overall Cognitive Status WFL   Arousal/Participation Alert; Cooperative   Transfers   Sit to Stand 4  Minimal assistance  (contact guard)   Additional items Assist x 1;Verbal cues   Stand to Sit 4  Minimal assistance   Additional items Assist x 1;Verbal cues   Ambulation/Elevation   Gait pattern Antalgic; Step to  (slow)   Gait Assistance   (close supervision)   Additional items Assist x 1   Assistive Device Rolling walker   Distance 150 feet x 2   Balance   Static Sitting Fair   Static Standing Fair   Dynamic Standing Fair -   Ambulatory Fair -   Endurance Deficit   Endurance Deficit Yes   Endurance Deficit Description pain and general weakness   Activity Tolerance   Activity Tolerance Patient limited by pain; Patient limited by fatigue   Nurse [address] to see per RN   Exercises   Hip Flexion Sitting;10 reps;AROM; Bilateral   Knee AROM Long Arc Quad Sitting;10 reps;AROM; Bilateral   Ankle Pumps Sitting;10 reps;AROM; Bilateral   Assessment   Prognosis Fair   Problem List Decreased strength;Decreased endurance; Impaired balance;Decreased mobility; Decreased coordination;Pain   Assessment Pt is making slow but steady progress with the use of a rolling walker  Wife present in room today  Pt requires min assist to contact guard for sit to stand transfers  Additional time required as pt reported dizziness with initial sit to stand  Dizziness subsided with progression of session  Gait is slow with mild unsteadiness however without loss of balance    Pt states he is "fearful of falling and that is why I'm hesitant with walking "  Cues required for safety with transfers for hand placement  Encouraged pt to complete seated BLE exercises as pt states "I've been doing it "  Pt reports "my wife is able to help me at home if needed "  Declined stair trial as pt states "I'm not ready for it today "  Pt would benefit from continued physical therapy to maximize functional independence  Goals   Patient Goals To go home   STG Expiration Date 01/02/19   Short Term Goal #1 7-10 days  Pt will ambulate 250 ft with least restrictive device mod (I) to facilitate safe return to home environment and community ambulatory distances  Pt will navigate 4 steps w/ railing and SPC, mod (I) to allow pt to enter/exit home environment safely plus an additional 4 steps w/ railing and SPC, mod (I) to access upstairs bed/bath in home environment  Pt will be (I) for transfers to allow pt increased (I), ability to navigate home set up, and safely transfer to and from desired locations in home environment  Pt will be (I) for bed mobility to allow pt to transition into bed and OOB safely and reduce possibility for caregiver burden  Pt will progress to balance and therapeutic exercises in order to continue building upon pt's strength, mobility, safety and (I)  PT Treatment Day 2   Plan   Treatment/Interventions Functional transfer training;LE strengthening/ROM; Elevations; Therapeutic exercise; Endurance training;Patient/family training;Bed mobility;Gait training;Spoke to nursing   Progress Progressing toward goals   PT Frequency   (4-6x/week)   Recommendation   Recommendation   (Rehab pending progress)   Equipment Recommended [place] Pena  (RW)     Raimundo Miller, PTA

## 2019-12-25 NOTE — PROGRESS NOTES
Progress Note - Cardiothoracic Surgery   [facility] 63 y o  male MRN: [id number]  Unit/Bed#: [facility] 422-01 Encounter: [id number]  Coronary artery disease  S/P coronary artery bypass grafting; POD # 6    24 Hour Events: Weaned to RA this morning, patient moving around more comfortably this morning  Pain controlled  Appetite is good  Ambulating with assistance        Medications:   Scheduled Meds:  Current Facility-Administered Medications:  acetaminophen 975 mg Oral Q8H [address] Donnalee Lapping, PA-C   albuterol 2 5 mg Nebulization Q4H PRN Donnalee Lapping, PA-C   amiodarone 200 mg Oral Q8H [address] Donnalee Lapping, PA-C   aspirin 325 mg Oral Daily Donnalee Lapping, PA-C   atorvastatin 80 mg Oral Daily With Dinner Donnalee Lapping, PA-C   bisacodyl 10 mg Rectal Daily Donnalee Lapping, PA-C   furosemide 40 mg Intravenous BID (diuretic) Donnalee Lapping, PA-C   heparin (porcine) 5,000 Units Subcutaneous Q8H [address] Donnalee Lapping, PA-C   HYDROmorphone 1 mg Intravenous Q1H PRN Donnalee Lapping, PA-C   insulin lispro 1-5 Units Subcutaneous TID AC Lisa Bond, PA-C   insulin lispro 1-5 Units Subcutaneous HS Donnalee Lapping, PA-C   ipratropium 0 5 mg Nebulization TID Donnalee Lapping, PA-C   levalbuterol 1 25 mg Nebulization TID Donnalee Lapping, PA-C   methocarbamol 500 mg Oral Q6H [address] Donnalee Lapping, PA-C   metoprolol tartrate 25 mg Oral Q12H [address] Lisa Bond PA-C   ondansetron 4 mg Intravenous Q6H PRN Donnalee Lapping, PA-C   oxyCODONE 5 mg Oral Q2H PRN Donnalee Lapping, PA-C   Or       oxyCODONE 10 mg Oral Q2H PRN Donnalee Lapping, PA-C   pantoprazole 40 mg Intravenous Q24H [address] Lisa Yackanicz, PA-C   polyethylene glycol 17 g Oral Daily Donnalee Lapping, PA-C   potassium chloride 20 mEq Oral BID Donnalee Lapping, PA-C   senna-docusate sodium 2 tablet Oral BID Donnalee Lapping, PA-C   temazepam 15 mg Oral HS PRN Donnalee Lapping, PA-C     Continuous Infusions:   PRN Meds: albuterol    HYDROmorphone    ondansetron    oxyCODONE **OR** oxyCODONE    temazepam    Vitals:   Vitals:    12/24/19 2320 12/24/19 2340 12/25/19 0100 12/25/19 0635   BP: 137/69  145/74    BP Location: Left arm  Left arm    Pulse: (!) 47  (!) 46    Resp: 18  18    Temp: 97 8 °F (36 6 °C)  98 °F (36 7 °C)    TempSrc: Oral  Oral    SpO2: 100% 98% 99% 94%   Weight:       Height:           Telemetry: Sinus Bradycardia; Heart Rate: 54    Respiratory:   SpO2: SpO2: 94 %; Room Air    Intake/Output:   I/O       12/23 0701 - 12/24 0700 12/24 0701 - 12/25 0700    P  O  240     I V  (mL/kg) 247 5 (2 8)     IV Piggyback 100     Total Intake(mL/kg) 587 5 (6 6)     Urine (mL/kg/hr) 900 (0 4) 1985 (0 9)    Stool  0    Total Output 900 1985    Net -312 5 -1985          Unmeasured Urine Occurrence  1 x    Unmeasured Stool Occurrence  1 x        Weights:   Weight (last 2 days)     Date/Time   Weight    12/24/19 0600   89 1 (196 43)    12/23/19 0600   88 (194 01)            Results:   Results from last 7 days   Lab Units 12/24/19 0433 12/23/19  0407 12/22/19  0524   WBC Thousand/uL 9 29 12 56* 14 36*   HEMOGLOBIN g/dL 10 0* 10 5* 10 2*   HEMATOCRIT % 31 9* 31 8* 31 6*   PLATELETS Thousands/uL 203 172 134*     Results from last 7 days   Lab Units 12/24/19  0433 12/23/19  0407 12/22/19  0524  12/20/19  2357   SODIUM mmol/L 142 145 141   < >  --    POTASSIUM mmol/L 3 8 3 5 3 6   < >  --    CHLORIDE mmol/L 110* 111* 110*   < >  --    CO2 mmol/L 31 29 27   < >  --    CO2, I-STAT mmol/L  --   --   --   --  25   BUN mg/dL 45* 31* 28*   < >  --    CREATININE mg/dL 1 32* 1 16 1 01   < >  --    GLUCOSE, ISTAT mg/dl  --   --   --   --  134   CALCIUM mg/dL 8 9 8 2* 8 8   < >  --     < > = values in this interval not displayed  Point of care glucose: 108-158    Invasive Lines/Tubes:  Invasive Devices     Central Venous Catheter Line            CVC Central Lines 12/19/19 Triple 5 days                Physical Exam:    HEENT/NECK:  PERRLA  No jugular venous distention      Cardiac: Regular rate and rhythm  Pulmonary:  Breath sounds clear bilaterally  Abdomen:  Non-tender, Non-distended and Normal bowel sounds  Incisions: Sternum is stable  Incision is clean, dry, and intact  and Saphenectomy incison is clean, dry, and intact  Extremities: Extremities warm/dry and Trace edema B/L  Neuro: Alert and oriented X 3  Skin: Warm/Dry, without rashes or lesions  Assessment:  Principal Problem:    Unstable angina ([facility])  Active Problems:    Tobacco abuse    Pure hypercholesterolemia    S/P primary angioplasty with coronary stent    Bradycardia    Pulmonary nodule    Atherosclerosis of native coronary artery with angina pectoris ([facility])    S/P CABG x 3    Pulmonary edema    Acute respiratory failure with hypoxia ([facility])    MARK (acute kidney injury) ([facility])    Hyperchloremia    Acute blood loss anemia       Coronary artery disease  S/P coronary artery bypass grafting; POD # 6    Plan:    1  Cardiac:   Sinus Bradycardia; HR/BP well-controlled  Lopressor, 12 5mg PO BID  Continue ASA and Statin therapy  Epicardial pacing wires out  Central IV access no longer required; Remove central venous catheter today  Continue DVT prophylaxis    2  Pulmonary:   Good Room air oxygen saturation; Continue incentive spirometry/Coughing/Deep breathing exercises  Chest tubes have been discontinued    3  Renal:   Intake/Output net: -1985 mL/24 hours  Continue diuresis   Lasix 40 mg IV BID  Potassium Chloride 20 mEq PO BID  Post op Creatinine stable; Follow up labs prn    4  Neuro:  Neurologically intact; No active issues  Incisional pain well-controlled; Continue prn Percocet    5  GI:  Tolerating TLC 2 3 gm sodium diet  Maintain 1800 mL daily fluid restriction   Continue stool softeners and prn suppository  Continue GI prophylaxis    6  Endo:   No history of diabetes; Glucose well-controlled with sliding scale coverage    7    Hematology:    Post-operative acute blood loss anemia; Hemoglobin 10; trend prn    8     Disposition: Anticipate discharge to home tomorrow     VTE Pharmacologic Prophylaxis: Fondaparinux (Arixtra)  VTE Mechanical Prophylaxis: sequential compression device    Collaborative rounds completed with LISA Myers    Plan of care discussed with bedside nurse    SIGNATURE: Nancy Alvarez PA-C  DATE: December 25, 2019  TIME: 6:47 AM

## 2019-12-25 NOTE — PLAN OF CARE
Problem: PHYSICAL THERAPY ADULT  Goal: Performs mobility at highest level of function for planned discharge setting  See evaluation for individualized goals  Description  Treatment/Interventions: Functional transfer training, LE strengthening/ROM, Elevations, Therapeutic exercise, Endurance training, Equipment eval/education, Bed mobility, Gait training, Spoke to nursing, OT(PT spoke to CM)  Equipment Recommended: Walker(at this time)       See flowsheet documentation for full assessment, interventions and recommendations  Outcome: Progressing  Note:   Prognosis: Fair  Problem List: Decreased strength, Decreased endurance, Impaired balance, Decreased mobility, Decreased coordination, Pain  Assessment: Pt is making slow but steady progress with the use of a rolling walker  Wife present in room today  Pt requires min assist to contact guard for sit to stand transfers  Additional time required as pt reported dizziness with initial sit to stand  Dizziness subsided with progression of session  Gait is slow with mild unsteadiness however without loss of balance  Pt states he is "fearful of falling and that is why I'm hesitant with walking "  Cues required for safety with transfers for hand placement  Encouraged pt to complete seated BLE exercises as pt states "I've been doing it "  Pt reports "my wife is able to help me at home if needed "  Declined stair trial as pt states "I'm not ready for it today "  Pt would benefit from continued physical therapy to maximize functional independence  Barriers to Discharge: Inaccessible home environment     Recommendation: (Rehab pending progress)          See flowsheet documentation for full assessment

## 2019-12-25 NOTE — PROGRESS NOTES
Cardiology Progress Note - Gabrielle Coyne 63 y o  male MRN: [id number]    Unit/Bed#: [facility] 422-01 Encounter: [id number]      Assessment:  Principal Problem:    Unstable angina ([facility] 75 )  Active Problems:    Tobacco abuse    Pure hypercholesterolemia    S/P primary angioplasty with coronary stent    Bradycardia    Pulmonary nodule    Atherosclerosis of native coronary artery with angina pectoris (HCC)    S/P CABG x 3    Pulmonary edema    Acute respiratory failure with hypoxia (HCC)    MARK (acute kidney injury) ([facility] 75 )    Hyperchloremia    Acute blood loss anemia      Plan:  Patient with no complaints  He is off oxygen  He has no chest pain or significant dyspnea  He is postop day six after CABG  He is tentatively planned for discharge planning from the morning  [facility] today with potassium of 3 5 and creatinine of 1 15  Hemoglobin is 11 0  Potassium was supplemented today by Surgical Service  Subjective:   Patient seen and examined  No significant events overnight   negative  Objective:     Vitals: Blood pressure 141/73, pulse (!) 49, temperature 97 8 °F (36 6 °C), temperature source Oral, resp  rate 18, height 5' 7" (1 702 m), weight 87 kg (191 lb 12 8 oz), SpO2 100 %  , Body mass index is 30 04 kg/m² ,   Orthostatic Blood Pressures      Most Recent Value   Blood Pressure  141/73 [Map 101] filed at 12/25/2019 6783   Patient Position - Orthostatic VS  Sitting filed at 12/25/2019 0714      ,      Intake/Output Summary (Last 24 hours) at 12/25/2019 0851  Last data filed at 12/24/2019 2301  Gross per 24 hour   Intake    Output 1710 ml   Net -1710 ml       No significant arrhythmias seen on telemetry review         Physical Exam:    GEN: Gabrielle Coyne appears well, alert and oriented x 3, pleasant and cooperative   NECK: supple, no carotid bruits, no JVD or HJR  HEART: normal rate, regular rhythm, normal S1 and S2, no murmurs, clicks, gallops or rubs   LUNGS: clear to auscultation bilaterally; no wheezes, rales, or rhonchi   ABDOMEN: normal bowel sounds, soft, no tenderness, no distention  EXTREMITIES: peripheral pulses normal; no clubbing, cyanosis, or edema  SKIN: warm and well perfused, no suspicious lesions on exposed skin    Labs & Results:    No results displayed because visit has over 200 results  Ct Abdomen Pelvis Wo Contrast    Result Date: 12/16/2019  Narrative: CT ABDOMEN AND PELVIS WITHOUT IV CONTRAST INDICATION:   rule out metastatic lung CA  COMPARISON:  12/14/2018 TECHNIQUE:  CT examination of the abdomen and pelvis was performed without intravenous contrast   Axial, sagittal, and coronal 2D reformatted images were created from the source data and submitted for interpretation  Radiation dose length product (DLP) for this visit:  436 67 mGy-cm   This examination, like all CT scans performed in the [facility], was performed utilizing techniques to minimize radiation dose exposure, including the use of iterative  reconstruction and automated exposure control  Enteric contrast was administered  FINDINGS: ABDOMEN LOWER CHEST:  High density in the right coronary artery may represent stent and/or atherosclerotic calcifications, image 5, series 2  No focal parenchymal opacities at the lung bases  LIVER/BILIARY TREE:  Unremarkable  GALLBLADDER:  No calcified gallstones  No pericholecystic inflammatory change  SPLEEN:  Unremarkable  PANCREAS:  Unremarkable  ADRENAL GLANDS:  Unremarkable  KIDNEYS/URETERS:  Unremarkable  No hydronephrosis  STOMACH AND BOWEL:  Moderate amount of retained colonic stool diffusely but more so on the right hemicolon  APPENDIX:  No findings to suggest appendicitis  ABDOMINOPELVIC CAVITY:  No ascites or free intraperitoneal air  No lymphadenopathy  VESSELS:  Mild atherosclerotic vascular calcifications noted  PELVIS REPRODUCTIVE ORGANS:  Unremarkable for patient's age  URINARY BLADDER:  Unremarkable  ABDOMINAL WALL/INGUINAL REGIONS:  Unremarkable   OSSEOUS STRUCTURES:  Mild spondylotic changes noted  Impression: 1  No evidence of metastatic disease in the abdomen or pelvis  2  Moderate constipation  Workstation performed: [id number]     Xr Chest Portable    Result Date: 12/22/2019  Narrative: CHEST INDICATION:   hypoxia  COMPARISON:  12/21/2019 1:47 AM EXAM PERFORMED/VIEWS:  XR CHEST PORTABLE  5:51 AM FINDINGS:  Postoperative mediastinal change is present  Endotracheal tube in stable satisfactory position  Orogastric tube enters the stomach but the tip is not clearly imaged  Right jugular venous catheter tip at the cavoatrial junction  Mediastinal  pleural tubes present  No pneumothorax identified  Opacification in the left mid and lower and right lower lung zones likely reflecting atelectasis and/or edema  The heart is mildly enlarged  No pneumothorax or sizable effusion noted  Osseous structures appear within normal limits for patient age  Impression: 1  Postoperative mediastinal changes as described with continued opacity in the left mid and lower right lower lung zones likely reflecting atelectasis  2   Lines and tubes as described  Workstation performed: [id number]     Xr Chest Portable    Result Date: 12/21/2019  Narrative: CHEST INDICATION:   intubation  COMPARISON:  December 20, 2019 at 5:52 AM EXAM PERFORMED/VIEWS:  XR CHEST PORTABLE 1 FINDINGS:  The right internal jugular central line in the right internal jugular pulmonary artery catheter overlie each other  I cannot confidently identify the tip of the central line  The pulmonary artery catheter has its tip in the proximal right pulmonary artery, without change  Endotracheal tube is unchanged 3 cm from the stef The tip and side-port of the nasogastric tube overlie the expected location of the stomach  The stomach is not distended  Thoracostomy tube is visible at the left base  There is no pneumothorax  Tubes overlie the mediastinum    There is been no interval change in the contour of the mediastinum  Heart shadow is obscured by adjacent opacity  Abnormal, airspace type, densities are now visible in the periphery of the left midlung  The left apex is involved as well  This is new since previous  There are linear densities at the right base  The minor fissure is depressed  This probably represents atelectasis  Osseous structures appear within normal limits for patient age  Impression: New infiltrate on the left, consider pneumonia Workstation performed: [id number]     Xr Chest Portable    Result Date: 12/20/2019  Narrative: CHEST INDICATION:   Post Open Heart Surgey  COMPARISON:  Chest x-ray 12/19/2019 EXAM PERFORMED/VIEWS:  XR CHEST PORTABLE FINDINGS:  Stable support lines and tubes from prior exam   Postoperative changes status post CABG Stable mild cardiomegaly  Grossly unchanged vascular congestion and bilateral perihilar opacities No acute or aggressive appearing osseous abnormality  Impression: Grossly stable vascular congestion and bilateral perihilar opacities Workstation performed: [id number]     Xr Chest Portable    Result Date: 12/20/2019  Narrative: CHEST INDICATION:   SOB  COMPARISON:  12/19/2019 EXAM PERFORMED/VIEWS:  XR CHEST PORTABLE FINDINGS:  Lines and tubes are unchanged from prior exam  Heart shadow is enlarged but unchanged from prior exam  Mild improvement in vascular congestion with patchy airspace opacities  No pneumothorax  Osseous structures appear within normal limits for patient age  Impression: Mild improvement in vascular congestion with patchy airspace opacities  Workstation performed: [id number]     Xr Chest 2 Views    Result Date: 12/12/2019  Narrative: CHEST INDICATION:   cp  COMPARISON:  10/26/2017 EXAM PERFORMED/VIEWS:  XR CHEST PA & LATERAL  The frontal view was performed utilizing dual energy radiographic technique  FINDINGS: Cardiomediastinal silhouette appears unremarkable   Mild interstitial prominence remains without focal pulmonary opacification  No pneumothorax or pleural effusion  Osseous structures appear within normal limits for patient age  Impression: No acute cardiopulmonary disease  Workstation performed: [id number]     Xr Wrist 3+ Views Right    Result Date: 12/3/2019  Narrative: RIGHT WRIST INDICATION:   foosh  COMPARISON:  None VIEWS:  XR WRIST 3+ VW RIGHT Images: 4 FINDINGS: There is no acute fracture or dislocation  No significant degenerative changes  There is a small ossified density seen on the lateral view anterior to the carpal bones with no overlying soft tissue swelling  This may represent sequela of a previous injury  Soft tissues are unremarkable  Impression: No acute osseous abnormality  Small ossified density projects anterior to the carpal bones on the lateral view, possibly representing sequela of an old injury  This does not have the appearance of an acute fracture  Correlate for focal pain  Workstation performed: [id number]     Xr Hand 3+ Vw Right    Result Date: 12/3/2019  Narrative: RIGHT HAND INDICATION:   foosh  COMPARISON:  None VIEWS:  XR HAND 3+ VW RIGHT Images: 3 For the purposes of institution wide universal language the following terms will apply: (thumb=1st digit/finger, index finger=2nd digit/finger, long finger=3rd digit/finger, ring=4th digit/finger and small finger=5th digit/finger) FINDINGS: There is no acute fracture or dislocation  No acute osseous abnormality  Small ossified density projects anterior to the carpal bones on the lateral view, possibly representing sequela of an old injury  This does not have the appearance of an acute fracture  Correlate for focal pain  No lytic or blastic lesions are seen  Soft tissues are unremarkable  Impression: No acute osseous abnormality  Small ossified density projects anterior to the carpal bones on the lateral view, possibly representing sequela of an old injury  This does not have the appearance of an acute fracture    Correlate for focal pain  Workstation performed: [id number]     Ct Head Wo Contrast    Result Date: 12/16/2019  Narrative: CT BRAIN - WITHOUT CONTRAST INDICATION:   rule out metastatic lung CA  COMPARISON:  None  TECHNIQUE:  CT examination of the brain was performed  In addition to axial images, coronal 2D reformatted images were created and submitted for interpretation  Radiation dose length product (DLP) for this visit:  895 7 mGy-cm   This examination, like all CT scans performed in the [facility], was performed utilizing techniques to minimize radiation dose exposure, including the use of iterative reconstruction and automated exposure control  IMAGE QUALITY:  Diagnostic  FINDINGS: PARENCHYMA:  No intracranial mass, mass effect or midline shift  No CT signs of acute infarction  No acute parenchymal hemorrhage  Scattered vascular calcifications  VENTRICLES AND EXTRA-AXIAL SPACES:  Normal for the patient's age  VISUALIZED ORBITS AND PARANASAL SINUSES:  Trace sinus mucosal disease based on the thickness of the right maxillary sinus wall this is likely chronic  CALVARIUM AND EXTRACRANIAL SOFT TISSUES:  Numerous periapical lucencies related to poor dentition  Impression: No acute intracranial abnormality  Discrete mass, edema or hemorrhage are not evident  Contrast would increase conspicuity of small lesions not otherwise evident without contrast  Workstation performed: [id number]     Ct Chest Wo Contrast    Result Date: 12/14/2019  Narrative: CT CHEST WITHOUT IV CONTRAST INDICATION:   Preoperative evaluation prior to cardiac surgery  COMPARISON:  None  TECHNIQUE: CT examination of the chest was performed without intravenous contrast   Axial, sagittal, and coronal 2D reformatted images were created from the source data and submitted for interpretation  Radiation dose length product (DLP) for this visit:  414 04 mGy-cm     This examination, like all CT scans performed in the [facility], was performed utilizing techniques to minimize radiation dose exposure, including the use of iterative  reconstruction and automated exposure control  FINDINGS: LUNGS:  Subtle groundglass densities in the perihilar lungs could represent atelectasis or, in the appropriate clinical setting, mild interstitial edema related to pulmonary vascular congestive change  No consolidative airspace opacity to suggest pneumonia  There is a noncalcified left upper lobe perihilar mass measuring 10 x 7 mm on image 36 of series 3  No other pulmonary nodule or mass  No tracheal or endobronchial lesion  PLEURA:  Unremarkable  HEART/GREAT VESSELS:  Coronary artery calcification is noted  Heart is normal in size  No thoracic aortic aneurysm  No pericardial effusion  MEDIASTINUM AND HATTIE:  Unremarkable  CHEST WALL AND LOWER NECK:   Unremarkable  VISUALIZED STRUCTURES IN THE UPPER ABDOMEN:  Unremarkable  OSSEOUS STRUCTURES:  No acute fracture or destructive osseous lesion  Impression: Noncalcified 10 x 7 mm left upper lobe pulmonary nodule  Based on current Fleischner Society 2017 Guidelines on incidental pulmonary nodule, either PET/CT scan evaluation or tissue sampling may be considered appropriate in this patient with a smoking history  Subtle groundglass parenchymal densities which could represent atelectasis or perhaps mild interstitial edema related to pulmonary vascular congestive change  No pleural effusions  The study was marked in EPIC for significant notification  Workstation performed: [id number]     Ct Spine Cervical Without Contrast    Result Date: 12/3/2019  Narrative: CT CERVICAL SPINE - WITHOUT CONTRAST INDICATION:   neck pain after fall  COMPARISON:  None  TECHNIQUE:  CT examination of the cervical spine was performed without intravenous contrast   Contiguous axial images were obtained  Sagittal and coronal reconstructions were performed  Radiation dose length product (DLP) for this visit:  486 mGy-cm   This examination, like all CT scans performed in the [facility], was performed utilizing techniques to minimize radiation dose exposure, including the use of iterative reconstruction and automated exposure control  IMAGE QUALITY:  Diagnostic  FINDINGS: ALIGNMENT:  Normal alignment of the cervical spine  No subluxation  VERTEBRAL BODIES:  No fracture  DEGENERATIVE CHANGES:  Mild multilevel cervical degenerative changes are noted without critical central canal stenosis  PREVERTEBRAL AND PARASPINAL SOFT TISSUES:  Unremarkable  THORACIC INLET:  Normal      Impression: No cervical spine fracture or traumatic malalignment  Workstation performed: [id number]     Vas Carotid Complete Study    Result Date: 12/15/2019  Narrative:  [facility] REPORT CLINICAL: Indications: Yearly surveillance of carotid artery disease  Patient is asymptomatic at this time  Risk Factors The patient has history of HTN and HLD  FINDINGS:  Segment      Right                        Left                      Impression  PSV  EDV (cm/s)  Impression  PSV  EDV (cm/s)  Dist  ICA                 44          12               62          19  Mid  ICA                  64          14               46          13  Prox  ICA    1 - 49%      74          11  1 - 49%      63          13  Dist CCA                  65          13               64          16  Mid CCA                   68          14               74          16  Prox CCA                  71          14               71          13  Ext Carotid               76           7              106          13  Prox Vert                 22           6               29           9  Subclavian                92                          151                 CONCLUSION: Impression RIGHT: There is <50% stenosis noted in the internal carotid artery  Plaque is heterogenous and smooth  Vertebral artery flow is antegrade  There is no significant subclavian artery disease   LEFT: There is <50% stenosis noted in the internal carotid artery  Plaque is heterogenous and smooth  Vertebral artery flow is antegrade  There is no significant subclavian artery disease  Internal carotid artery stenosis determination by consensus criteria from: Manuel Garrett et al  Carotid Artery Stenosis: Gray-Scale and Doppler US Diagnosis - [employer] in [address] [place] [address] Drive, Radiology 2003; [phone]  SIGNATURE: Electronically Signed by: Maciel Grady MD on 2019-12-15 02:34:09 PM    Vas Lower Limb Vein Mapping Bypass Graft    Result Date: 12/14/2019  Narrative:  [facility] REPORT CLINICAL: Indications: Pre-op Vein Mapping for Future Open Heart Surgery  FINDINGS:  Segment         Right        Left                            Diameter AP  Diameter AP  GSV Inguinal            6 7          7 1  GSV Prox Thigh          4 1          3 4  GSV Mid Thigh           4 3          4 2  GSV Dist Thigh          3 3          3 1  GSV Knee                3 3          3 2  GSV Prox Calf           2 1          2 5  GSV Mid Calf            1 8          2 7  GSV Dist Calf           1 7          2 1  GSV Ankle               2 5          2 4     CONCLUSION: Impression: RIGHT LOWER LIMB: The great saphenous vein is patent  from the groin to the ankle  The vein is of adequate caliber and quality for graft conduit with intraluminal diameters ranging from 1 7mm to 6 7mm throughout the leg  LEFT LOWER LIMB: The great saphenous vein is patent  from the groin to the ankle  The vein is of adequate caliber and quality for graft conduit with intraluminal diameters ranging from 2 1mm to 7 1mm throughout the leg  SIGNATURE: Electronically Signed by: Maciel Grady MD on 2019-12-14 04:40:09 PM    Xr Chest Portable Icu    Result Date: 12/19/2019  Narrative: CHEST INDICATION:   S/P open heart   COMPARISON:  CT chest 12/14/2019 EXAM PERFORMED/VIEWS:  XR CHEST PORTABLE ICU FINDINGS:  Endotracheal tube in satisfactory position 3 cm above the stef  NG tube coursing below the diaphragm  Tunica-Panfilo catheter in the right main pulmonary artery  Mediastinal drains in place  Cardiomediastinal silhouette appears unremarkable  Moderate vascular congestion with bibasilar atelectasis  Osseous structures appear within normal limits for patient age  Impression: Moderate vascular congestion with bibasilar atelectasis  Workstation performed: [id number]       EKG personally reviewed by Marla Quiñones MD      Counseling / Coordination of Care  Total floor / unit time spent today 30 minutes  Greater than 50% of total time was spent with the patient and / or family counseling and / or coordination of care

## 2019-12-26 ENCOUNTER — TELEPHONE (OUTPATIENT)
Dept: CARDIOLOGY CLINIC | Facility: CLINIC | Age: 59
End: 2019-12-26

## 2019-12-26 VITALS
BODY MASS INDEX: 27.68 KG/M2 | OXYGEN SATURATION: 99 % | DIASTOLIC BLOOD PRESSURE: 67 MMHG | WEIGHT: 176.37 LBS | HEIGHT: 67 IN | TEMPERATURE: 98.2 F | RESPIRATION RATE: 18 BRPM | SYSTOLIC BLOOD PRESSURE: 139 MMHG | HEART RATE: 58 BPM

## 2019-12-26 LAB
ANION GAP SERPL CALCULATED.3IONS-SCNC: 3 MMOL/L (ref 4–13)
BUN SERPL-MCNC: 38 MG/DL (ref 5–25)
CALCIUM SERPL-MCNC: 9.1 MG/DL (ref 8.3–10.1)
CHLORIDE SERPL-SCNC: 107 MMOL/L (ref 100–108)
CO2 SERPL-SCNC: 32 MMOL/L (ref 21–32)
CREAT SERPL-MCNC: 1.22 MG/DL (ref 0.6–1.3)
GFR SERPL CREATININE-BSD FRML MDRD: 65 ML/MIN/1.73SQ M
GLUCOSE SERPL-MCNC: 118 MG/DL (ref 65–140)
GLUCOSE SERPL-MCNC: 124 MG/DL (ref 65–140)
MAGNESIUM SERPL-MCNC: 2.4 MG/DL (ref 1.6–2.6)
POTASSIUM SERPL-SCNC: 3.5 MMOL/L (ref 3.5–5.3)
SODIUM SERPL-SCNC: 142 MMOL/L (ref 136–145)

## 2019-12-26 PROCEDURE — 80048 BASIC METABOLIC PNL TOTAL CA: CPT | Performed by: PHYSICIAN ASSISTANT

## 2019-12-26 PROCEDURE — C9113 INJ PANTOPRAZOLE SODIUM, VIA: HCPCS | Performed by: PHYSICIAN ASSISTANT

## 2019-12-26 PROCEDURE — 83735 ASSAY OF MAGNESIUM: CPT | Performed by: PHYSICIAN ASSISTANT

## 2019-12-26 PROCEDURE — 94668 MNPJ CHEST WALL SBSQ: CPT

## 2019-12-26 PROCEDURE — 99024 POSTOP FOLLOW-UP VISIT: CPT | Performed by: THORACIC SURGERY (CARDIOTHORACIC VASCULAR SURGERY)

## 2019-12-26 PROCEDURE — 99024 POSTOP FOLLOW-UP VISIT: CPT | Performed by: PHYSICIAN ASSISTANT

## 2019-12-26 PROCEDURE — 82948 REAGENT STRIP/BLOOD GLUCOSE: CPT

## 2019-12-26 PROCEDURE — 99232 SBSQ HOSP IP/OBS MODERATE 35: CPT | Performed by: INTERNAL MEDICINE

## 2019-12-26 PROCEDURE — 94760 N-INVAS EAR/PLS OXIMETRY 1: CPT

## 2019-12-26 PROCEDURE — 97116 GAIT TRAINING THERAPY: CPT

## 2019-12-26 PROCEDURE — 94640 AIRWAY INHALATION TREATMENT: CPT

## 2019-12-26 PROCEDURE — 97530 THERAPEUTIC ACTIVITIES: CPT

## 2019-12-26 RX ORDER — OMEPRAZOLE 20 MG/1
20 CAPSULE, DELAYED RELEASE ORAL DAILY
Qty: 30 CAPSULE | Refills: 0 | Status: SHIPPED | OUTPATIENT
Start: 2019-12-26 | End: 2020-01-29 | Stop reason: SDUPTHER

## 2019-12-26 RX ORDER — OXYCODONE HYDROCHLORIDE AND ACETAMINOPHEN 5; 325 MG/1; MG/1
TABLET ORAL
Qty: 60 TABLET | Refills: 0 | Status: SHIPPED | OUTPATIENT
Start: 2019-12-26 | End: 2019-12-26 | Stop reason: SDUPTHER

## 2019-12-26 RX ORDER — POTASSIUM CHLORIDE 20 MEQ/1
20 TABLET, EXTENDED RELEASE ORAL DAILY
Qty: 5 TABLET | Refills: 1 | Status: SHIPPED | OUTPATIENT
Start: 2019-12-26 | End: 2020-01-20

## 2019-12-26 RX ORDER — ASPIRIN 325 MG
325 TABLET ORAL DAILY
Qty: 30 TABLET | Refills: 6 | Status: SHIPPED | OUTPATIENT
Start: 2019-12-27 | End: 2020-04-23

## 2019-12-26 RX ORDER — POLYETHYLENE GLYCOL 3350 17 G/17G
17 POWDER, FOR SOLUTION ORAL DAILY
Qty: 30 EACH | Refills: 0 | Status: SHIPPED | OUTPATIENT
Start: 2019-12-27 | End: 2020-01-20

## 2019-12-26 RX ORDER — TORSEMIDE 20 MG/1
20 TABLET ORAL DAILY
Qty: 5 TABLET | Refills: 1 | Status: SHIPPED | OUTPATIENT
Start: 2019-12-26 | End: 2020-01-20

## 2019-12-26 RX ORDER — OXYCODONE HYDROCHLORIDE AND ACETAMINOPHEN 5; 325 MG/1; MG/1
TABLET ORAL
Qty: 30 TABLET | Refills: 0 | Status: SHIPPED | OUTPATIENT
Start: 2019-12-26 | End: 2020-01-20

## 2019-12-26 RX ORDER — ATORVASTATIN CALCIUM 80 MG/1
80 TABLET, FILM COATED ORAL
Qty: 30 TABLET | Refills: 6 | Status: SHIPPED | OUTPATIENT
Start: 2019-12-26 | End: 2020-01-29 | Stop reason: SDUPTHER

## 2019-12-26 RX ADMIN — POTASSIUM CHLORIDE 20 MEQ: 1500 TABLET, EXTENDED RELEASE ORAL at 08:52

## 2019-12-26 RX ADMIN — ACETAMINOPHEN 975 MG: 325 TABLET ORAL at 05:52

## 2019-12-26 RX ADMIN — PANTOPRAZOLE SODIUM 40 MG: 40 INJECTION, POWDER, FOR SOLUTION INTRAVENOUS at 08:51

## 2019-12-26 RX ADMIN — IPRATROPIUM BROMIDE 0.5 MG: 0.5 SOLUTION RESPIRATORY (INHALATION) at 07:41

## 2019-12-26 RX ADMIN — LEVALBUTEROL HYDROCHLORIDE 1.25 MG: 1.25 SOLUTION, CONCENTRATE RESPIRATORY (INHALATION) at 07:41

## 2019-12-26 RX ADMIN — METOPROLOL TARTRATE 12.5 MG: 25 TABLET ORAL at 08:52

## 2019-12-26 RX ADMIN — HEPARIN SODIUM 5000 UNITS: 5000 INJECTION INTRAVENOUS; SUBCUTANEOUS at 05:52

## 2019-12-26 RX ADMIN — SENNOSIDES AND DOCUSATE SODIUM 2 TABLET: 8.6; 5 TABLET ORAL at 08:51

## 2019-12-26 RX ADMIN — AMIODARONE HYDROCHLORIDE 200 MG: 200 TABLET ORAL at 05:52

## 2019-12-26 RX ADMIN — POLYETHYLENE GLYCOL 3350 17 G: 17 POWDER, FOR SOLUTION ORAL at 08:51

## 2019-12-26 RX ADMIN — ASPIRIN 325 MG ORAL TABLET 325 MG: 325 PILL ORAL at 08:52

## 2019-12-26 RX ADMIN — FUROSEMIDE 40 MG: 10 INJECTION, SOLUTION INTRAMUSCULAR; INTRAVENOUS at 08:51

## 2019-12-26 NOTE — PROGRESS NOTES
Progress Note - Cardiothoracic Surgery   [facility] 63 y o  male MRN: [id number]  Unit/Bed#: [facility] 422-01 Encounter: [id number]  Coronary artery disease  S/P coronary artery bypass grafting; POD # 7    24 Hour Events: No events overnight  No complaints this morning  Ambulating independently in the [place] this morning  Pain controlled, more relaxed today  Tolerating diet   + BM since surgery       Medications:   Scheduled Meds:  Current Facility-Administered Medications:  acetaminophen 975 mg Oral Q8H [facility] Fanta Pedersen PA-C   albuterol 2 5 mg Nebulization Q4H PRN Fanta Pedersen PA-C   amiodarone 200 mg Oral Q8H [facility] Fanta Pedersen PA-C   aspirin 325 mg Oral Daily Fanta Pedersen PA-C   atorvastatin 80 mg Oral Daily With Dinner Fanta Pedersen PA-C   bisacodyl 10 mg Rectal Daily Fanta Pedersen PA-C   furosemide 40 mg Intravenous BID (diuretic) Fanta Pedersen PA-C   heparin (porcine) 5,000 Units Subcutaneous Q8H [facility] Lisa Morgan PA-C   insulin lispro 1-5 Units Subcutaneous TID AC Lisa Sheridan PA-C   insulin lispro 1-5 Units Subcutaneous HS Fanta Pedersen PA-C   methocarbamol 500 mg Oral Q6H PRN [facility] MEGA Godwin   metoprolol tartrate 12 5 mg Oral Q12H [facility] EMGA Epps   ondansetron 4 mg Intravenous Q6H PRN Fanta Pedersen PA-C   oxyCODONE 5 mg Oral Q6H PRN Karlene Ness PA-C   Or       oxyCODONE 10 mg Oral Q8H PRN [facility] MEGA Epps   pantoprazole 40 mg Intravenous Q24H [facility] Fanta Pedersen PA-C   polyethylene glycol 17 g Oral Daily Fanta Pedersen PA-C   potassium chloride 20 mEq Oral BID Fanta Pedersen PA-C   senna-docusate sodium 2 tablet Oral BID Fanta Pedersen PA-C   temazepam 15 mg Oral HS PRN Fanta Pedersen PA-C     Continuous Infusions:   PRN Meds: albuterol    methocarbamol    ondansetron    oxyCODONE **OR** oxyCODONE    temazepam    Vitals:   Vitals:    12/26/19 0311 12/26/19 0600 12/26/19 0659 12/26/19 0743   BP: 121/58  139/67    BP Location: Right arm  Right arm    Pulse: 60  56 58   Resp: 18  18    Temp: 97 7 °F (36 5 °C)  98 2 °F (36 8 °C)    TempSrc: Oral  Oral    SpO2: 95%  94% 99%   Weight:  80 kg (176 lb 5 9 oz)     Height:           Telemetry: Sinus Bradycardia; Heart Rate: 54    Respiratory:   SpO2: SpO2: 99 %; Room Air    Intake/Output:   I/O       12/24 0701 - 12/25 0700 12/25 0701 - 12/26 0700 12/26 0701 - 12/27 0700    P  O   225     I V  (mL/kg)       IV Piggyback       Total Intake(mL/kg)  225 (2 8)     Urine (mL/kg/hr) 1985 (1) 625 (0 3)     Stool 0      Total Output 1985 625     Net -1985 -400            Unmeasured Urine Occurrence 1 x 3 x     Unmeasured Stool Occurrence 1 x          Weights:   Weight (last 2 days)     Date/Time   Weight    12/26/19 0600   80 (176 37)    12/25/19 0600   87 (191 8)    12/24/19 0600   89 1 (196 43)            Results:   Results from last 7 days   Lab Units 12/25/19  0526 12/24/19  0433 12/23/19  0407   WBC Thousand/uL 10 44* 9 29 12 56*   HEMOGLOBIN g/dL 11 0* 10 0* 10 5*   HEMATOCRIT % 35 0* 31 9* 31 8*   PLATELETS Thousands/uL 281 203 172     Results from last 7 days   Lab Units 12/26/19  0622 12/25/19  0526 12/24/19  0433  12/20/19  2357   SODIUM mmol/L 142 142 142   < >  --    POTASSIUM mmol/L 3 5 3 5 3 8   < >  --    CHLORIDE mmol/L 107 108 110*   < >  --    CO2 mmol/L 32 29 31   < >  --    CO2, I-STAT mmol/L  --   --   --   --  25   BUN mg/dL 38* 42* 45*   < >  --    CREATININE mg/dL 1 22 1 15 1 32*   < >  --    GLUCOSE, ISTAT mg/dl  --   --   --   --  134   CALCIUM mg/dL 9 1 9 3 8 9   < >  --     < > = values in this interval not displayed  Point of care glucose: 119-132    nvasive Lines/Tubes:  Invasive Devices     Peripheral Intravenous Line            Peripheral IV 12/25/19 Right Forearm less than 1 day                Physical Exam:    HEENT/NECK:  PERRLA  No jugular venous distention      Cardiac: Regular rate and rhythm  Pulmonary:  Breath sounds clear bilaterally  Abdomen: Non-tender, Non-distended and Normal bowel sounds  Incisions: Sternum is stable  Incision is clean, dry, and intact  and Saphenectomy incison is clean, dry, and intact  Extremities: Extremities warm/dry and Trace edema B/L  Neuro: Alert and oriented X 3  Skin: Warm/Dry, without rashes or lesions  Assessment:  Principal Problem:    Unstable angina ([facility])  Active Problems:    Tobacco abuse    Pure hypercholesterolemia    S/P primary angioplasty with coronary stent    Bradycardia    Pulmonary nodule    Atherosclerosis of native coronary artery with angina pectoris (HCC)    S/P CABG x 3    Pulmonary edema    Acute respiratory failure with hypoxia (HCC)    MARK (acute kidney injury) ([facility])    Hyperchloremia    Acute blood loss anemia       Coronary artery disease  S/P coronary artery bypass grafting; POD # 7    Plan:    1  Cardiac:   Sinus Bradycardia; HR/BP well-controlled  Lopressor, 12 5mg PO BID  Continue ASA and Statin therapy  Epicardial pacing wires out  Patient no longer has central IV access   Continue DVT prophylaxis    2  Pulmonary:   Good Room air oxygen saturation; Continue incentive spirometry/Coughing/Deep breathing exercises  Chest tubes have been discontinued    3  Renal:   Continue diuresis   Lasix 40 mg IV BID  Potassium Chloride 20 mEq PO BID  Post op Creatinine stable; Follow up labs prn    4  Neuro:  Neurologically intact; No active issues  Incisional pain well-controlled; Continue prn Percocet    5  GI:  Tolerating TLC 2 3 gm sodium diet  Maintain 1800 mL daily fluid restriction   Continue stool softeners and prn suppository  Continue GI prophylaxis    6  Endo:   No history of diabetes; Glucose well-controlled with sliding scale coverage    7    Hematology:    Post-operative blood count acceptable; Trend prn    8     Disposition:      Anticipate discharge to home today     VTE Pharmacologic Prophylaxis: Fondaparinux (Arixtra)  VTE Mechanical Prophylaxis: sequential compression device    Collaborative rounds completed with LISA Florence    Plan of care discussed with bedside nurse    SIGNATURE: Hellen Barraza PA-C  DATE: December 26, 2019  TIME: 8:50 AM

## 2019-12-26 NOTE — PLAN OF CARE
Problem: Potential for Falls  Goal: Patient will remain free of falls  Description  INTERVENTIONS:  - Assess patient frequently for physical needs  -  Identify cognitive and physical deficits and behaviors that affect risk of falls    -  Holden fall precautions as indicated by assessment   - Educate patient/family on patient safety including physical limitations  - Instruct patient to call for assistance with activity based on assessment  - Modify environment to reduce risk of injury  - Consider OT/PT consult to assist with strengthening/mobility  Outcome: Completed

## 2019-12-26 NOTE — PLAN OF CARE
Problem: PHYSICAL THERAPY ADULT  Goal: Performs mobility at highest level of function for planned discharge setting  See evaluation for individualized goals  Description  Treatment/Interventions: Functional transfer training, LE strengthening/ROM, Elevations, Therapeutic exercise, Endurance training, Equipment eval/education, Bed mobility, Gait training, Spoke to nursing, OT(PT spoke to CM)  Equipment Recommended: Walker(at this time)       See flowsheet documentation for full assessment, interventions and recommendations  Outcome: Adequate for Discharge  Note:   Prognosis: Good  Problem List: Decreased endurance, Impaired balance, Decreased mobility  Assessment: The pt  has improving mobility as he was able to complete a stair trial today  He had no loss of balance throughout the session  Reviewed the importance of continued mobility at home, home safety, and his strenal precautions with him  He has demonstrated the necessary mobility in order to safely return home at discharge  Addressed several questions that the pt  had in regards to going home, and he and his family had no further questions in regards to therapy  Barriers to Discharge: None     Recommendation: Home PT, Home with family support     PT - OK to Discharge: Yes    See flowsheet documentation for full assessment

## 2019-12-26 NOTE — PHYSICAL THERAPY NOTE
Physical Therapy Progress Note     12/26/19 1030   Pain Assessment   Pain Assessment No/denies pain   Pain Score No Pain   Restrictions/Precautions   Other Precautions Cardiac/sternal   Subjective   Subjective The pt  states that he is doing better so that he can go home  Transfers   Sit to Stand 5  Supervision   Additional items Increased time required   Stand to Sit 5  Supervision   Ambulation/Elevation   Gait pattern Excessively slow; Short stride; Inconsistent brianna   Gait Assistance 5  Supervision   Assistive Device Rolling walker   Distance 80 feet  Stair Management Assistance 5  Supervision   Additional items Verbal cues; Increased time required   Stair Management Technique Two rails; Foreward;Nonreciprocal   Number of Stairs 7   Balance   Static Sitting Good   Dynamic Sitting Good   Static Standing Fair +   Ambulatory Fair   Activity Tolerance   Activity Tolerance Patient tolerated treatment well;Patient limited by fatigue   Nurse [id number], RN  Exercises   Knee AROM Long Arc Quad Sitting;Bilateral;AROM;5 reps   Assessment   Prognosis Good   Problem List Decreased endurance; Impaired balance;Decreased mobility   Assessment The pt  has improving mobility as he was able to complete a stair trial today  He had no loss of balance throughout the session  Reviewed the importance of continued mobility at home, home safety, and his strenal precautions with him  He has demonstrated the necessary mobility in order to safely return home at discharge  Addressed several questions that the pt  had in regards to going home, and he and his family had no further questions in regards to therapy  Barriers to Discharge None   Goals   Patient Goals To go home  STG Expiration Date 01/02/20   PT Treatment Day 3   Plan   Treatment/Interventions Functional transfer training;LE strengthening/ROM; Elevations; Therapeutic exercise; Endurance training;Patient/family training;Bed mobility;Gait training   PT Frequency   (4-6x a week )   Recommendation   Recommendation Home PT; Home with family support   Equipment Recommended Magdalena Sanders   PT - OK to Discharge Yes     Mar Bardales, PTA

## 2019-12-26 NOTE — PROGRESS NOTES
Cardiology Progress Note - Megan Henrandez 63 y o  male MRN: [id number]    Unit/Bed#: [facility] 422-01 Encounter: [id number]      Assessment:  Principal Problem:    Unstable angina ([facility] 75 )  Active Problems:    Tobacco abuse    Pure hypercholesterolemia    S/P primary angioplasty with coronary stent    Bradycardia    Pulmonary nodule    Atherosclerosis of native coronary artery with angina pectoris (HCC)    S/P CABG x 3    Pulmonary edema    Acute respiratory failure with hypoxia (HCC)    MARK (acute kidney injury) ([facility] 75 )    Hyperchloremia    Acute blood loss anemia      Plan:  Patient is comfortable today  He is out of bed to a chair  He is postop day seven after coronary artery bypass graft surgery  His telemetry is stable  Discharge planning is anticipated for later today  He lives in the [facility] and will likely will follow-up at the [facility] in reference to ongoing cardiac management  Subjective:   Patient seen and examined  No significant events overnight   negative  Objective:     Vitals: Blood pressure 139/67, pulse 58, temperature 98 2 °F (36 8 °C), temperature source Oral, resp  rate 18, height 5' 7" (1 702 m), weight 80 kg (176 lb 5 9 oz), SpO2 99 %  , Body mass index is 27 62 kg/m² ,   Orthostatic Blood Pressures      Most Recent Value   Blood Pressure  139/67 [Map 96] filed at 12/26/2019 0659   Patient Position - Orthostatic VS  Sitting filed at 12/26/2019 0659      ,      Intake/Output Summary (Last 24 hours) at 12/26/2019 0952  Last data filed at 12/26/2019 0601  Gross per 24 hour   Intake 225 ml   Output 625 ml   Net -400 ml       No significant arrhythmias seen on telemetry review    Sinus bradycardia      Physical Exam:    GEN: Megan Hernandez appears well, alert and oriented x 3, pleasant and cooperative   NECK: supple, no carotid bruits, no JVD or HJR  HEART: normal rate, regular rhythm, normal S1 and S2, no murmurs, clicks, gallops or rubs   LUNGS: clear to auscultation bilaterally; no wheezes, rales, or rhonchi   ABDOMEN: normal bowel sounds, soft, no tenderness, no distention  EXTREMITIES: peripheral pulses normal; no clubbing, cyanosis, or edema  SKIN: warm and well perfused, no suspicious lesions on exposed skin    Labs & Results:    No results displayed because visit has over 200 results  Ct Abdomen Pelvis Wo Contrast    Result Date: 12/16/2019  Narrative: CT ABDOMEN AND PELVIS WITHOUT IV CONTRAST INDICATION:   rule out metastatic lung CA  COMPARISON:  12/14/2018 TECHNIQUE:  CT examination of the abdomen and pelvis was performed without intravenous contrast   Axial, sagittal, and coronal 2D reformatted images were created from the source data and submitted for interpretation  Radiation dose length product (DLP) for this visit:  436 67 mGy-cm   This examination, like all CT scans performed in the [facility], was performed utilizing techniques to minimize radiation dose exposure, including the use of iterative  reconstruction and automated exposure control  Enteric contrast was administered  FINDINGS: ABDOMEN LOWER CHEST:  High density in the right coronary artery may represent stent and/or atherosclerotic calcifications, image 5, series 2  No focal parenchymal opacities at the lung bases  LIVER/BILIARY TREE:  Unremarkable  GALLBLADDER:  No calcified gallstones  No pericholecystic inflammatory change  SPLEEN:  Unremarkable  PANCREAS:  Unremarkable  ADRENAL GLANDS:  Unremarkable  KIDNEYS/URETERS:  Unremarkable  No hydronephrosis  STOMACH AND BOWEL:  Moderate amount of retained colonic stool diffusely but more so on the right hemicolon  APPENDIX:  No findings to suggest appendicitis  ABDOMINOPELVIC CAVITY:  No ascites or free intraperitoneal air  No lymphadenopathy  VESSELS:  Mild atherosclerotic vascular calcifications noted  PELVIS REPRODUCTIVE ORGANS:  Unremarkable for patient's age  URINARY BLADDER:  Unremarkable  ABDOMINAL WALL/INGUINAL REGIONS:  Unremarkable  OSSEOUS STRUCTURES:  Mild spondylotic changes noted  Impression: 1  No evidence of metastatic disease in the abdomen or pelvis  2  Moderate constipation  Workstation performed: [id number]     Xr Chest Portable    Result Date: 12/22/2019  Narrative: CHEST INDICATION:   hypoxia  COMPARISON:  12/21/2019 1:47 AM EXAM PERFORMED/VIEWS:  XR CHEST PORTABLE  5:51 AM FINDINGS:  Postoperative mediastinal change is present  Endotracheal tube in stable satisfactory position  Orogastric tube enters the stomach but the tip is not clearly imaged  Right jugular venous catheter tip at the cavoatrial junction  Mediastinal  pleural tubes present  No pneumothorax identified  Opacification in the left mid and lower and right lower lung zones likely reflecting atelectasis and/or edema  The heart is mildly enlarged  No pneumothorax or sizable effusion noted  Osseous structures appear within normal limits for patient age  Impression: 1  Postoperative mediastinal changes as described with continued opacity in the left mid and lower right lower lung zones likely reflecting atelectasis  2   Lines and tubes as described  Workstation performed: [id number]     Xr Chest Portable    Result Date: 12/21/2019  Narrative: CHEST INDICATION:   intubation  COMPARISON:  December 20, 2019 at 5:52 AM EXAM PERFORMED/VIEWS:  XR CHEST PORTABLE 1 FINDINGS:  The right internal jugular central line in the right internal jugular pulmonary artery catheter overlie each other  I cannot confidently identify the tip of the central line  The pulmonary artery catheter has its tip in the proximal right pulmonary artery, without change  Endotracheal tube is unchanged 3 cm from the stef The tip and side-port of the nasogastric tube overlie the expected location of the stomach  The stomach is not distended  Thoracostomy tube is visible at the left base  There is no pneumothorax  Tubes overlie the mediastinum    There is been no interval change in the contour of the mediastinum  Heart shadow is obscured by adjacent opacity  Abnormal, airspace type, densities are now visible in the periphery of the left midlung  The left apex is involved as well  This is new since previous  There are linear densities at the right base  The minor fissure is depressed  This probably represents atelectasis  Osseous structures appear within normal limits for patient age  Impression: New infiltrate on the left, consider pneumonia Workstation performed: [id number]     Xr Chest Portable    Result Date: 12/20/2019  Narrative: CHEST INDICATION:   Post Open Heart Surgey  COMPARISON:  Chest x-ray 12/19/2019 EXAM PERFORMED/VIEWS:  XR CHEST PORTABLE FINDINGS:  Stable support lines and tubes from prior exam   Postoperative changes status post CABG Stable mild cardiomegaly  Grossly unchanged vascular congestion and bilateral perihilar opacities No acute or aggressive appearing osseous abnormality  Impression: Grossly stable vascular congestion and bilateral perihilar opacities Workstation performed: [id number]     Xr Chest Portable    Result Date: 12/20/2019  Narrative: CHEST INDICATION:   SOB  COMPARISON:  12/19/2019 EXAM PERFORMED/VIEWS:  XR CHEST PORTABLE FINDINGS:  Lines and tubes are unchanged from prior exam  Heart shadow is enlarged but unchanged from prior exam  Mild improvement in vascular congestion with patchy airspace opacities  No pneumothorax  Osseous structures appear within normal limits for patient age  Impression: Mild improvement in vascular congestion with patchy airspace opacities  Workstation performed: [id number]     Xr Chest 2 Views    Result Date: 12/12/2019  Narrative: CHEST INDICATION:   cp  COMPARISON:  10/26/2017 EXAM PERFORMED/VIEWS:  XR CHEST PA & LATERAL  The frontal view was performed utilizing dual energy radiographic technique  FINDINGS: Cardiomediastinal silhouette appears unremarkable   Mild interstitial prominence remains without focal pulmonary opacification  No pneumothorax or pleural effusion  Osseous structures appear within normal limits for patient age  Impression: No acute cardiopulmonary disease  Workstation performed: [id number]     Xr Wrist 3+ Views Right    Result Date: 12/3/2019  Narrative: RIGHT WRIST INDICATION:   foosh  COMPARISON:  None VIEWS:  XR WRIST 3+ VW RIGHT Images: 4 FINDINGS: There is no acute fracture or dislocation  No significant degenerative changes  There is a small ossified density seen on the lateral view anterior to the carpal bones with no overlying soft tissue swelling  This may represent sequela of a previous injury  Soft tissues are unremarkable  Impression: No acute osseous abnormality  Small ossified density projects anterior to the carpal bones on the lateral view, possibly representing sequela of an old injury  This does not have the appearance of an acute fracture  Correlate for focal pain  Workstation performed: [id number]     Xr Hand 3+ Vw Right    Result Date: 12/3/2019  Narrative: RIGHT HAND INDICATION:   foosh  COMPARISON:  None VIEWS:  XR HAND 3+ VW RIGHT Images: 3 For the purposes of institution wide universal language the following terms will apply: (thumb=1st digit/finger, index finger=2nd digit/finger, long finger=3rd digit/finger, ring=4th digit/finger and small finger=5th digit/finger) FINDINGS: There is no acute fracture or dislocation  No acute osseous abnormality  Small ossified density projects anterior to the carpal bones on the lateral view, possibly representing sequela of an old injury  This does not have the appearance of an acute fracture  Correlate for focal pain  No lytic or blastic lesions are seen  Soft tissues are unremarkable  Impression: No acute osseous abnormality  Small ossified density projects anterior to the carpal bones on the lateral view, possibly representing sequela of an old injury  This does not have the appearance of an acute fracture  Correlate for focal pain  Workstation performed: [id number]     Ct Head Wo Contrast    Result Date: 12/16/2019  Narrative: CT BRAIN - WITHOUT CONTRAST INDICATION:   rule out metastatic lung CA  COMPARISON:  None  TECHNIQUE:  CT examination of the brain was performed  In addition to axial images, coronal 2D reformatted images were created and submitted for interpretation  Radiation dose length product (DLP) for this visit:  895 7 mGy-cm   This examination, like all CT scans performed in the [facility], was performed utilizing techniques to minimize radiation dose exposure, including the use of iterative reconstruction and automated exposure control  IMAGE QUALITY:  Diagnostic  FINDINGS: PARENCHYMA:  No intracranial mass, mass effect or midline shift  No CT signs of acute infarction  No acute parenchymal hemorrhage  Scattered vascular calcifications  VENTRICLES AND EXTRA-AXIAL SPACES:  Normal for the patient's age  VISUALIZED ORBITS AND PARANASAL SINUSES:  Trace sinus mucosal disease based on the thickness of the right maxillary sinus wall this is likely chronic  CALVARIUM AND EXTRACRANIAL SOFT TISSUES:  Numerous periapical lucencies related to poor dentition  Impression: No acute intracranial abnormality  Discrete mass, edema or hemorrhage are not evident  Contrast would increase conspicuity of small lesions not otherwise evident without contrast  Workstation performed: [id number]     Ct Chest Wo Contrast    Result Date: 12/14/2019  Narrative: CT CHEST WITHOUT IV CONTRAST INDICATION:   Preoperative evaluation prior to cardiac surgery  COMPARISON:  None  TECHNIQUE: CT examination of the chest was performed without intravenous contrast   Axial, sagittal, and coronal 2D reformatted images were created from the source data and submitted for interpretation  Radiation dose length product (DLP) for this visit:  414 04 mGy-cm     This examination, like all CT scans performed in the [facility], was performed utilizing techniques to minimize radiation dose exposure, including the use of iterative  reconstruction and automated exposure control  FINDINGS: LUNGS:  Subtle groundglass densities in the perihilar lungs could represent atelectasis or, in the appropriate clinical setting, mild interstitial edema related to pulmonary vascular congestive change  No consolidative airspace opacity to suggest pneumonia  There is a noncalcified left upper lobe perihilar mass measuring 10 x 7 mm on image 36 of series 3  No other pulmonary nodule or mass  No tracheal or endobronchial lesion  PLEURA:  Unremarkable  HEART/GREAT VESSELS:  Coronary artery calcification is noted  Heart is normal in size  No thoracic aortic aneurysm  No pericardial effusion  MEDIASTINUM AND HATTIE:  Unremarkable  CHEST WALL AND LOWER NECK:   Unremarkable  VISUALIZED STRUCTURES IN THE UPPER ABDOMEN:  Unremarkable  OSSEOUS STRUCTURES:  No acute fracture or destructive osseous lesion  Impression: Noncalcified 10 x 7 mm left upper lobe pulmonary nodule  Based on current Fleischner Society 2017 Guidelines on incidental pulmonary nodule, either PET/CT scan evaluation or tissue sampling may be considered appropriate in this patient with a smoking history  Subtle groundglass parenchymal densities which could represent atelectasis or perhaps mild interstitial edema related to pulmonary vascular congestive change  No pleural effusions  The study was marked in EPIC for significant notification  Workstation performed: [id number]     Ct Spine Cervical Without Contrast    Result Date: 12/3/2019  Narrative: CT CERVICAL SPINE - WITHOUT CONTRAST INDICATION:   neck pain after fall  COMPARISON:  None  TECHNIQUE:  CT examination of the cervical spine was performed without intravenous contrast   Contiguous axial images were obtained  Sagittal and coronal reconstructions were performed    Radiation dose length product (DLP) for this visit:  486 mGy-cm   This examination, like all CT scans performed in the [facility], was performed utilizing techniques to minimize radiation dose exposure, including the use of iterative reconstruction and automated exposure control  IMAGE QUALITY:  Diagnostic  FINDINGS: ALIGNMENT:  Normal alignment of the cervical spine  No subluxation  VERTEBRAL BODIES:  No fracture  DEGENERATIVE CHANGES:  Mild multilevel cervical degenerative changes are noted without critical central canal stenosis  PREVERTEBRAL AND PARASPINAL SOFT TISSUES:  Unremarkable  THORACIC INLET:  Normal      Impression: No cervical spine fracture or traumatic malalignment  Workstation performed: [id number]     Vas Carotid Complete Study    Result Date: 12/15/2019  Narrative:  [facility] REPORT CLINICAL: Indications: Yearly surveillance of carotid artery disease  Patient is asymptomatic at this time  Risk Factors The patient has history of HTN and HLD  FINDINGS:  Segment      Right                        Left                      Impression  PSV  EDV (cm/s)  Impression  PSV  EDV (cm/s)  Dist  ICA                 44          12               62          19  Mid  ICA                  64          14               46          13  Prox  ICA    1 - 49%      74          11  1 - 49%      63          13  Dist CCA                  65          13               64          16  Mid CCA                   68          14               74          16  Prox CCA                  71          14               71          13  Ext Carotid               76           7              106          13  Prox Vert                 22           6               29           9  Subclavian                92                          151                 CONCLUSION: Impression RIGHT: There is <50% stenosis noted in the internal carotid artery  Plaque is heterogenous and smooth  Vertebral artery flow is antegrade   There is no significant subclavian artery disease  LEFT: There is <50% stenosis noted in the internal carotid artery  Plaque is heterogenous and smooth  Vertebral artery flow is antegrade  There is no significant subclavian artery disease  Internal carotid artery stenosis determination by consensus criteria from: Raven Drummond , et al  Carotid Artery Stenosis: Gray-Scale and Doppler US Diagnosis - Society of Radiologists in [address] [place] [address], Radiology 2003; [phone]  SIGNATURE: Electronically Signed by: Selvin Salazar MD on 2019-12-15 02:34:09 PM    Vas Lower Limb Vein Mapping Bypass Graft    Result Date: 12/14/2019  Narrative:  [facility] REPORT CLINICAL: Indications: Pre-op Vein Mapping for Future Open Heart Surgery  FINDINGS:  Segment         Right        Left                            Diameter AP  Diameter AP  GSV Inguinal            6 7          7 1  GSV Prox Thigh          4 1          3 4  GSV Mid Thigh           4 3          4 2  GSV Dist Thigh          3 3          3 1  GSV Knee                3 3          3 2  GSV Prox Calf           2 1          2 5  GSV Mid Calf            1 8          2 7  GSV Dist Calf           1 7          2 1  GSV Ankle               2 5          2 4     CONCLUSION: Impression: RIGHT LOWER LIMB: The great saphenous vein is patent  from the groin to the ankle  The vein is of adequate caliber and quality for graft conduit with intraluminal diameters ranging from 1 7mm to 6 7mm throughout the leg  LEFT LOWER LIMB: The great saphenous vein is patent  from the groin to the ankle  The vein is of adequate caliber and quality for graft conduit with intraluminal diameters ranging from 2 1mm to 7 1mm throughout the leg  SIGNATURE: Electronically Signed by: Selvin Salazar MD on 2019-12-14 04:40:09 PM    Xr Chest Portable Icu    Result Date: 12/19/2019  Narrative: CHEST INDICATION:   S/P open heart   COMPARISON:  CT chest 12/14/2019 EXAM PERFORMED/VIEWS:  XR CHEST PORTABLE ICU FINDINGS:  Endotracheal tube in satisfactory position 3 cm above the stef  NG tube coursing below the diaphragm  Laurel-Panfilo catheter in the right main pulmonary artery  Mediastinal drains in place  Cardiomediastinal silhouette appears unremarkable  Moderate vascular congestion with bibasilar atelectasis  Osseous structures appear within normal limits for patient age  Impression: Moderate vascular congestion with bibasilar atelectasis  Workstation performed: [id number]       EKG personally reviewed by Michelle Santillan MD      Counseling / Coordination of Care  Total floor / unit time spent today 30 minutes  Greater than 50% of total time was spent with the patient and / or family counseling and / or coordination of care

## 2020-01-15 ENCOUNTER — OFFICE VISIT (OUTPATIENT)
Dept: CARDIOLOGY CLINIC | Facility: CLINIC | Age: 60
End: 2020-01-15

## 2020-01-15 VITALS
OXYGEN SATURATION: 98 % | SYSTOLIC BLOOD PRESSURE: 134 MMHG | HEART RATE: 63 BPM | WEIGHT: 180 LBS | HEIGHT: 67 IN | BODY MASS INDEX: 28.25 KG/M2 | DIASTOLIC BLOOD PRESSURE: 80 MMHG

## 2020-01-15 DIAGNOSIS — Z95.1 S/P CABG X 3: ICD-10-CM

## 2020-01-15 DIAGNOSIS — Z95.5 S/P PRIMARY ANGIOPLASTY WITH CORONARY STENT: ICD-10-CM

## 2020-01-15 DIAGNOSIS — I25.10 CORONARY ARTERY DISEASE INVOLVING NATIVE CORONARY ARTERY OF NATIVE HEART WITHOUT ANGINA PECTORIS: ICD-10-CM

## 2020-01-15 DIAGNOSIS — G47.00 INSOMNIA, UNSPECIFIED TYPE: ICD-10-CM

## 2020-01-15 DIAGNOSIS — I25.119: Primary | ICD-10-CM

## 2020-01-15 PROCEDURE — 99213 OFFICE O/P EST LOW 20 MIN: CPT | Performed by: INTERNAL MEDICINE

## 2020-01-15 RX ORDER — ZOLPIDEM TARTRATE 5 MG/1
5 TABLET ORAL
Qty: 30 TABLET | Refills: 0 | Status: SHIPPED | OUTPATIENT
Start: 2020-01-15 | End: 2020-01-15 | Stop reason: SDUPTHER

## 2020-01-15 RX ORDER — ZOLPIDEM TARTRATE 5 MG/1
5 TABLET ORAL
Qty: 30 TABLET | Refills: 0 | Status: SHIPPED | OUTPATIENT
Start: 2020-01-15

## 2020-01-15 NOTE — PROGRESS NOTES
Cardiology Follow Up    Leisa Vazquez  1960  [id number]  [address] [facility]    Dear Dr Coleen Ratliff is a 50-year-old gentleman who has been referred to the [address] [place] [address] Place of Cardiology in [facility] with the following issues:    1  Coronary artery disease with unstable angina, status post recent coronary artery bypass grafting (lima to LAD, SVG to RPDA, SVG to OM)  2  Prior PCI  3  Family history of coronary artery disease  4  Hyperlipidemia  5  Tobacco abuse, quit      Interval History:  Mr Shoaib Coyne was seen in the hospital in mid December  He was having symptoms of unstable angina, including chest pressure at rest   He underwent cardiac catheterization given his known history of coronary artery disease with PCI  This revealed progression of his de Jeb disease without evidence of InStent restenosis  He did have 3 vessel disease  Given the fact that he was having episodes of resting chest pain, we decided to expedite his revascularization  He was transferred to [facility] and underwent 3 vessel coronary artery bypass grafting  His hospital course was complicated by hypoxic respiratory failure requiring bronchoscopy and high-flow oxygen  He was diuresed and recovered well  He was discharged and reports feeling generally well today  He has no more chest pain related to cardiac ischemia and now feels that he is able to take a deep breath which she was not able to do before  Concerning Becky, he does make note of some chest discomfort related to movement particularly a sudden movement such as a bump in the road while on the car  He describes some sternal clicking  His left lower extremity is also sore from the saphenous vein harvest site, but this is improving  He does report some insomnia      Patient Active Problem List   Diagnosis    Unstable angina ([address] )    Tobacco abuse    Coronary artery disease involving native coronary artery    Pure hypercholesterolemia    Presence of drug coated stent in left circumflex coronary artery    S/P right coronary artery (RCA) stent placement    Chest pain    S/P primary angioplasty with coronary stent    Bradycardia    Pulmonary nodule    Atherosclerosis of native coronary artery with angina pectoris (HCC)    S/P CABG x 3    Pulmonary edema    Acute respiratory failure with hypoxia (HCC)    MARK (acute kidney injury) (HCC)    Hyperchloremia    Acute blood loss anemia     Past Medical History:   Diagnosis Date    CAD (coronary artery disease)     Family history of MI (myocardial infarction)     Former smoker     Hyperlipidemia     Irregular heart beat      Social History     Socioeconomic History    Marital status: /Civil Union     Spouse name: Not on file    Number of children: Not on file    Years of education: Not on file    Highest education level: Not on file   Occupational History    Not on file   Social Needs    Financial resource strain: Not on file    Food insecurity:     Worry: Not on file     Inability: Not on file    Transportation needs:     Medical: Not on file     Non-medical: Not on file   Tobacco Use    Smoking status: Current Every Day Smoker     Packs/day: 0 50     Types: Cigarettes    Smokeless tobacco: Never Used   Substance and Sexual Activity    Alcohol use: Never     Frequency: Never     Drinks per session: Patient refused     Binge frequency: Never    Drug use: No    Sexual activity: Never   Lifestyle    Physical activity:     Days per week: Not on file     Minutes per session: Not on file    Stress: Not on file   Relationships    Social connections:     Talks on phone: Not on file     Gets together: Not on file     Attends Adventism service: Not on file     Active member of club or organization: Not on file     Attends meetings of clubs or organizations: Not on file Relationship status: Not on file    Intimate partner violence:     Fear of current or ex partner: Not on file     Emotionally abused: Not on file     Physically abused: Not on file     Forced sexual activity: Not on file   Other Topics Concern    Not on file   Social History Narrative    Not on file      Family History   Family history unknown: Yes     Past Surgical History:   Procedure Laterality Date    CORONARY ANGIOPLASTY WITH STENT PLACEMENT  10/27/2017    TAMARA to prox  Cfx    UT CABG, ARTERY-VEIN, THREE N/A 12/19/2019    Procedure: CORONARY ARTERY BYPASS GRAFT (CABG) 3 VESSELS: LIMA to LAD, EVH/SVG to PDA and OM;  Surgeon: Adan Guzman DO;  [facility];  Service: Cardiac Surgery    UT ECHO TRANSESOPHAG R-T 2D W/PRB IMG ACQUISJ I&R N/A 12/19/2019    Procedure: TRANSESOPHAGEAL ECHOCARDIOGRAM (PHILIP); Surgeon: Adan Guzman DO;  [facility];  Service: Cardiac Surgery    UT ENDOSCOPY W/VIDEO-ASST VEIN HARVEST,CABG Left 12/19/2019    Procedure: HARVEST VEIN ENDOSCOPIC ([address]);   Surgeon: Adan Guzman DO;  [facility];  Service: Cardiac Surgery       Current Outpatient Medications:     aspirin 325 mg tablet, Take 1 tablet (325 mg total) by mouth daily, Disp: 30 tablet, Rfl: 6    atorvastatin (LIPITOR) 80 mg tablet, Take 1 tablet (80 mg total) by mouth daily with dinner, Disp: 30 tablet, Rfl: 6    metoprolol tartrate (LOPRESSOR) 25 mg tablet, Take 0 5 tablets (12 5 mg total) by mouth every 12 (twelve) hours, Disp: 60 tablet, Rfl: 2    omeprazole (PriLOSEC) 20 mg delayed release capsule, Take 1 capsule (20 mg total) by mouth daily Take 30 minutes prior to breakfast, Disp: 30 capsule, Rfl: 0    polyethylene glycol (MIRALAX) 17 g packet, Take 17 g by mouth daily Mix in 4 to 8 ounces of water, juice, tea or soda daily (stop with loose stools), Disp: 30 each, Rfl: 0    potassium chloride (K-DUR,KLOR-CON) 20 mEq tablet, Take 1 tablet (20 mEq total) by mouth daily for 5 days, Disp: 5 tablet, Rfl: 1    torsemide (DEMADEX) 20 mg tablet, Take 1 tablet (20 mg total) by mouth daily for 5 days, Disp: 5 tablet, Rfl: 1    oxyCODONE-acetaminophen (PERCOCET) 5-325 mg per tablet, Take 1 tablet every 6 hours, as needed for moderate pain OR Take 2 tablets every 8 hours, as needed for severe pain  (Patient not taking: Reported on 1/15/2020), Disp: 30 tablet, Rfl: 0       No Known Allergies    Labs:  Results for Anupama Krueger (MRN [id number]) as of 1/15/2020 13:54   12/26/2019 06:22   Sodium 142   Potassium 3 5   Chloride 107   CO2 32   Anion Gap 3 (L)   BUN 38 (H)   Creatinine 1 22   Glucose, Random 118   Calcium 9 1   eGFR 65   Magnesium 2 4         Imaging:  Transthoracic echocardiogram, 12/20/2019  SUMMARY  LEFT VENTRICLE:  The cavity was small  Systolic function was hyperdynamic  Ejection fraction was estimated to be 70 %  Although no diagnostic regional wall motion abnormality was identified, this possibility cannot be completely excluded on the basis of this study      PERICARDIUM:  Limited imaging reveals no obvious pericardial effusion  Review of Systems:  Review of Systems   Constitutional: Negative  Respiratory: Negative  Cardiovascular: Positive for chest pain (Chest wall pain, clicking sensation)  Gastrointestinal: Negative  Endocrine: Negative  Musculoskeletal: Negative  Skin: Negative  Neurological: Negative  Hematological: Negative  /80   Pulse 63   Ht 5' 7" (1 702 m)   Wt 81 6 kg (180 lb)   SpO2 98%   BMI 28 19 kg/m²     Physical Exam:  Physical Exam   Constitutional: He is oriented to person, place, and time  He appears well-developed and well-nourished  HENT:   Head: Normocephalic and atraumatic  Eyes: Conjunctivae and EOM are normal    Neck: No hepatojugular reflux and no JVD present  Carotid bruit is not present  No tracheal deviation present  No thyromegaly present     Cardiovascular: Normal rate, regular rhythm, S1 normal and S2 normal  PMI is not displaced  Exam reveals no gallop  No murmur heard  Pulses:       Carotid pulses are 2+ on the right side, and 2+ on the left side  Radial pulses are 2+ on the right side, and 2+ on the left side  Femoral pulses are 2+ on the right side, and 2+ on the left side  Dorsalis pedis pulses are 2+ on the right side, and 2+ on the left side  Posterior tibial pulses are 2+ on the right side, and 2+ on the left side  Pulmonary/Chest: Breath sounds normal  No accessory muscle usage  No tachypnea  No respiratory distress  Abdominal: Soft  Bowel sounds are normal  He exhibits no distension  There is no tenderness  Musculoskeletal: He exhibits no edema  Lymphadenopathy:        Head (right side): No submental, no submandibular, no tonsillar, no preauricular, no posterior auricular and no occipital adenopathy present  Head (left side): No submental, no submandibular, no tonsillar, no preauricular, no posterior auricular and no occipital adenopathy present  He has no cervical adenopathy  Neurological: He is alert and oriented to person, place, and time  Skin: Skin is warm and dry  Psychiatric: He has a normal mood and affect  His behavior is normal  Judgment and thought content normal        Discussion/Summary:  Coronary artery disease status post coronary artery bypass grafting (LIMA to LAD, SVG to OM, SVG to RPDA)  Continued chest discomfort with clicking sensation, concerning for sternal malunion  Hyperlipidemia  Family history of coronary artery disease  Insomnia  Bradycardia, prohibiting use of beta-blockers      I am pleased with his overall cardiovascular outcome  He is on appropriate medical therapy including aspirin, atorvastatin, metoprolol and torsemide  No medication changes in this regard  Blood pressure and heart rate are controlled      In regards to his chest discomfort, I am deferring that to CT surgery for concern of sternal malunion with his clicking sensation  He is due to see cardiothoracic surgery in 4 days  In regards to his insomnia, I am prescribing him low-dose zolpidem  I would like him to establish care with a primary care physician  A referral was placed to internal medicine  I would like him to follow up in our office in 3 months, sooner if any acute issues arise  I am holding off on referral to cardiac rehab until he has been more fully evaluated by cardiothoracic surgery  Thank you for the opportunity to consult on this patient  If you have any questions, please feel free to call my office

## 2020-01-15 NOTE — LETTER
January 15, 2020     Ed Sender, DO  [address] [facility] [address]    Patient: Gema Rodriguez   YOB: 1960   Date of Visit: 1/15/2020       Dear Dr Madison Simmons:    Thank you for referring Gema Rodriguez to me for evaluation  Below are my notes for this consultation  If you have questions, please do not hesitate to call me  I look forward to following your patient along with you  Sincerely,        Wesly Baez DO        CC: Apolonia Harris, DO Wesly Baez DO  1/15/2020  2:00 PM  Sign at close encounter                                             Cardiology Follow Up    Gema Rodriguez  1960  [id number]  [address] [place] [id number]    Dear Dr Margot Hernandez is a 28-year-old gentleman who has been referred to the [address] [place] [address] Place of Cardiology in [facility] with the following issues:    1  Coronary artery disease with unstable angina, status post recent coronary artery bypass grafting (lima to LAD, SVG to RPDA, SVG to OM)  2  Prior PCI  3  Family history of coronary artery disease  4  Hyperlipidemia  5  Tobacco abuse, quit      Interval History:  Mr Peggy Dumont was seen in the hospital in mid December  He was having symptoms of unstable angina, including chest pressure at rest   He underwent cardiac catheterization given his known history of coronary artery disease with PCI  This revealed progression of his de Jeb disease without evidence of InStent restenosis  He did have 3 vessel disease  Given the fact that he was having episodes of resting chest pain, we decided to expedite his revascularization  He was transferred to [facility] and underwent 3 vessel coronary artery bypass grafting  His hospital course was complicated by hypoxic respiratory failure requiring bronchoscopy and high-flow oxygen  He was diuresed and recovered well    He was discharged and reports feeling generally well today  He has no more chest pain related to cardiac ischemia and now feels that he is able to take a deep breath which she was not able to do before  Concerning Becky, he does make note of some chest discomfort related to movement particularly a sudden movement such as a bump in the road while on the car  He describes some sternal clicking  His left lower extremity is also sore from the saphenous vein harvest site, but this is improving  He does report some insomnia      Patient Active Problem List   Diagnosis    Unstable angina (HCC)    Tobacco abuse    Coronary artery disease involving native coronary artery    Pure hypercholesterolemia    Presence of drug coated stent in left circumflex coronary artery    S/P right coronary artery (RCA) stent placement    Chest pain    S/P primary angioplasty with coronary stent    Bradycardia    Pulmonary nodule    Atherosclerosis of native coronary artery with angina pectoris (HCC)    S/P CABG x 3    Pulmonary edema    Acute respiratory failure with [address] [address]    MARK (acute kidney injury) ([facility] [address]    Hyperchloremia    Acute blood loss anemia     Past Medical History:   Diagnosis Date    CAD (coronary artery disease)     Family history of MI (myocardial infarction)     Former smoker     Hyperlipidemia     Irregular heart beat      Social History     Socioeconomic History    Marital status: /Civil Union     Spouse name: Not on file    Number of children: Not on file    Years of education: Not on file    Highest education level: Not on file   Occupational History    Not on file   Social Needs    Financial resource strain: Not on file    Food insecurity:     Worry: Not on file     Inability: Not on file    Transportation needs:     Medical: Not on file     Non-medical: Not on file   Tobacco Use    Smoking status: Current Every Day Smoker     Packs/day: 0 50     Types: Cigarettes    Smokeless tobacco: Never Used   Substance and Sexual Activity    Alcohol use: Never     Frequency: Never     Drinks per session: Patient refused     Binge frequency: Never    Drug use: No    Sexual activity: Never   Lifestyle    Physical activity:     Days per week: Not on file     Minutes per session: Not on file    Stress: Not on file   Relationships    Social connections:     Talks on phone: Not on file     Gets together: Not on file     Attends Holiness service: Not on file     Active member of club or organization: Not on file     Attends meetings of clubs or organizations: Not on file     Relationship status: Not on file    Intimate partner violence:     Fear of current or ex partner: Not on file     Emotionally abused: Not on file     Physically abused: Not on file     Forced sexual activity: Not on file   Other Topics Concern    Not on file   Social History Narrative    Not on file      Family History   Family history unknown: Yes     Past Surgical History:   Procedure Laterality Date    CORONARY ANGIOPLASTY WITH STENT PLACEMENT  10/27/2017    TAMARA to prox  Cfx    MA CABG, ARTERY-VEIN, THREE N/A 12/19/2019    Procedure: CORONARY ARTERY BYPASS GRAFT (CABG) 3 VESSELS: LIMA to LAD, EVH/SVG to PDA and OM;  Surgeon: Lyndsey Flores DO;  [facility];  Service: Cardiac Surgery    MA ECHO TRANSESOPHAG R-T 2D W/PRB IMG ACQUISJ I&R N/A 12/19/2019    Procedure: TRANSESOPHAGEAL ECHOCARDIOGRAM (PHILIP); Surgeon: Lyndsey Flores DO;  [facility];  Service: Cardiac Surgery    MA ENDOSCOPY W/VIDEO-ASST VEIN HARVEST,CABG Left 12/19/2019    Procedure: HARVEST VEIN ENDOSCOPIC ([address]);   Surgeon: Lyndsey Flores DO;  [facility];  Service: Cardiac Surgery       Current Outpatient Medications:     aspirin 325 mg tablet, Take 1 tablet (325 mg total) by mouth daily, Disp: 30 tablet, Rfl: 6    atorvastatin (LIPITOR) 80 mg tablet, Take 1 tablet (80 mg total) by mouth daily with dinner, Disp: 30 tablet, Rfl: 6    metoprolol tartrate (LOPRESSOR) 25 mg tablet, Take 0 5 tablets (12 5 mg total) by mouth every 12 (twelve) hours, Disp: 60 tablet, Rfl: 2    omeprazole (PriLOSEC) 20 mg delayed release capsule, Take 1 capsule (20 mg total) by mouth daily Take 30 minutes prior to breakfast, Disp: 30 capsule, Rfl: 0    polyethylene glycol (MIRALAX) 17 g packet, Take 17 g by mouth daily Mix in 4 to 8 ounces of water, juice, tea or soda daily (stop with loose stools), Disp: 30 each, Rfl: 0    potassium chloride (K-DUR,KLOR-CON) 20 mEq tablet, Take 1 tablet (20 mEq total) by mouth daily for 5 days, Disp: 5 tablet, Rfl: 1    torsemide (DEMADEX) 20 mg tablet, Take 1 tablet (20 mg total) by mouth daily for 5 days, Disp: 5 tablet, Rfl: 1    oxyCODONE-acetaminophen (PERCOCET) 5-325 mg per tablet, Take 1 tablet every 6 hours, as needed for moderate pain OR Take 2 tablets every 8 hours, as needed for severe pain  (Patient not taking: Reported on 1/15/2020), Disp: 30 tablet, Rfl: 0       No Known Allergies    Labs:  Results for Aliyah Pineda (MRN [id number]) as of 1/15/2020 13:54   12/26/2019 06:22   Sodium 142   Potassium 3 5   Chloride 107   CO2 32   Anion Gap 3 (L)   BUN 38 (H)   Creatinine 1 22   Glucose, Random 118   Calcium 9 1   eGFR 65   Magnesium 2 4         Imaging:  Transthoracic echocardiogram, 12/20/2019  SUMMARY  LEFT VENTRICLE:  The cavity was small  Systolic function was hyperdynamic  Ejection fraction was estimated to be 70 %  Although no diagnostic regional wall motion abnormality was identified, this possibility cannot be completely excluded on the basis of this study      PERICARDIUM:  Limited imaging reveals no obvious pericardial effusion  Review of Systems:  Review of Systems   Constitutional: Negative  Respiratory: Negative  Cardiovascular: Positive for chest pain (Chest wall pain, clicking sensation)  Gastrointestinal: Negative  Endocrine: Negative  Musculoskeletal: Negative  Skin: Negative  Neurological: Negative  Hematological: Negative  /80   Pulse 63   Ht 5' 7" (1 702 m)   Wt 81 6 kg (180 lb)   SpO2 98%   BMI 28 19 kg/m²      Physical Exam:  Physical Exam   Constitutional: He is oriented to person, place, and time  He appears well-developed and well-nourished  HENT:   Head: Normocephalic and atraumatic  Eyes: Conjunctivae and EOM are normal    Neck: No hepatojugular reflux and no JVD present  Carotid bruit is not present  No tracheal deviation present  No thyromegaly present  Cardiovascular: Normal rate, regular rhythm, S1 normal and S2 normal  PMI is not displaced  Exam reveals no gallop  No murmur heard  Pulses:       Carotid pulses are 2+ on the right side, and 2+ on the left side  Radial pulses are 2+ on the right side, and 2+ on the left side  Femoral pulses are 2+ on the right side, and 2+ on the left side  Dorsalis pedis pulses are 2+ on the right side, and 2+ on the left side  Posterior tibial pulses are 2+ on the right side, and 2+ on the left side  Pulmonary/Chest: Breath sounds normal  No accessory muscle usage  No tachypnea  No respiratory distress  Abdominal: Soft  Bowel sounds are normal  He exhibits no distension  There is no tenderness  Musculoskeletal: He exhibits no edema  Lymphadenopathy:        Head (right side): No submental, no submandibular, no tonsillar, no preauricular, no posterior auricular and no occipital adenopathy present  Head (left side): No submental, no submandibular, no tonsillar, no preauricular, no posterior auricular and no occipital adenopathy present  He has no cervical adenopathy  Neurological: He is alert and oriented to person, place, and time  Skin: Skin is warm and dry  Psychiatric: He has a normal mood and affect   His behavior is normal  Judgment and thought content normal        Discussion/Summary:  Coronary artery disease status post coronary artery bypass grafting (LIMA to LAD, SVG to OM, SVG to RPDA)  Continued chest discomfort with clicking sensation, concerning for sternal malunion  Hyperlipidemia  Family history of coronary artery disease  Insomnia  Bradycardia, prohibiting use of beta-blockers      I am pleased with his overall cardiovascular outcome  He is on appropriate medical therapy including aspirin, atorvastatin, metoprolol and torsemide  No medication changes in this regard  Blood pressure and heart rate are controlled  In regards to his chest discomfort, I am deferring that to CT surgery for concern of sternal malunion with his clicking sensation  He is due to see cardiothoracic surgery in 4 days  In regards to his insomnia, I am prescribing him low-dose zolpidem  I would like him to establish care with a primary care physician  A referral was placed to internal medicine  I would like him to follow up in our office in 3 months, sooner if any acute issues arise  I am holding off on referral to cardiac rehab until he has been more fully evaluated by cardiothoracic surgery  Thank you for the opportunity to consult on this patient  If you have any questions, please feel free to call my office

## 2020-01-15 NOTE — PATIENT INSTRUCTIONS
Please follow up with cardiothoracic surgery at her next scheduled appointment  No medication changes were made today  Please follow up in our office in 3 months, sooner if any acute issues arise  He will be referred to cardiac rehab once cardiothoracic surgery is satisfied with your surgical outcome

## 2020-01-20 ENCOUNTER — OFFICE VISIT (OUTPATIENT)
Dept: CARDIAC SURGERY | Facility: CLINIC | Age: 60
End: 2020-01-20

## 2020-01-20 VITALS
WEIGHT: 181 LBS | HEART RATE: 58 BPM | SYSTOLIC BLOOD PRESSURE: 116 MMHG | OXYGEN SATURATION: 97 % | BODY MASS INDEX: 28.41 KG/M2 | HEIGHT: 67 IN | RESPIRATION RATE: 16 BRPM | TEMPERATURE: 97.7 F | DIASTOLIC BLOOD PRESSURE: 74 MMHG

## 2020-01-20 DIAGNOSIS — I25.118 CORONARY ARTERY DISEASE OF NATIVE ARTERY OF NATIVE HEART WITH STABLE ANGINA PECTORIS (HCC): ICD-10-CM

## 2020-01-20 DIAGNOSIS — Z48.89 ENCOUNTER FOR POSTOPERATIVE CARE: ICD-10-CM

## 2020-01-20 DIAGNOSIS — Z95.1 S/P CABG X 3: Primary | ICD-10-CM

## 2020-01-20 PROCEDURE — 99024 POSTOP FOLLOW-UP VISIT: CPT | Performed by: NURSE PRACTITIONER

## 2020-01-20 NOTE — PROGRESS NOTES
POST OP FOLLOW UP VISIT    Procedure: S/P coronary artery bypass grafting x 3 (LIMA to LAD, SVG to PDA, SVG to OM), performed on 12/19/20  History: Lor Leo is a 63y o  year old male who presents to our office today for routine follow up care from coronary artery bypass grafting  Patient was extubated in a timely manner post op but required reintubation due to persistent hypoxia  Bronchoscopy was performed and mucus plug removed  After extubation he require hiflow oxygen and BiPap  He was discharged home on 12/26/19  Patient has had follow up with cardiology  He does not have a PCP  Today he reports his breathing feels better and he has no angina but continues with chest wall soreness  He reports occasional sternal movement  He denies fever, chills weight gain, edema, incisional issues  Vital Signs:   Vitals:    01/20/20 1600 01/20/20 1617   BP: 122/78 116/74   BP Location: Left arm Right arm   Cuff Size: Adult Adult   Pulse: 58    Resp: 16    Temp: 97 7 °F (36 5 °C)    TempSrc: Oral    SpO2: 97%    Weight: 82 1 kg (181 lb)    Height: 5' 7" (1 702 m)        Home Medications:   Prior to Admission medications    Medication Sig Start Date End Date Taking?  Authorizing Provider   aspirin 325 mg tablet Take 1 tablet (325 mg total) by mouth daily 12/27/19   Shira Sheth PA-C   atorvastatin (LIPITOR) 80 mg tablet Take 1 tablet (80 mg total) by mouth daily with dinner 12/26/19   Shira Sheth PA-C   metoprolol tartrate (LOPRESSOR) 25 mg tablet Take 0 5 tablets (12 5 mg total) by mouth every 12 (twelve) hours 12/26/19   Shira Sheth PA-C   omeprazole (PriLOSEC) 20 mg delayed release capsule Take 1 capsule (20 mg total) by mouth daily Take 30 minutes prior to breakfast 12/26/19 1/25/20  Shira Sheth PA-C   oxyCODONE-acetaminophen (PERCOCET) 5-325 mg per tablet Take 1 tablet every 6 hours, as needed for moderate pain OR Take 2 tablets every 8 hours, as needed for severe pain   Patient not taking: Reported on 1/15/2020 12/26/19   Lyndon Harding PA-C   polyethylene glycol (MIRALAX) 17 g packet Take 17 g by mouth daily Mix in 4 to 8 ounces of water, juice, tea or soda daily (stop with loose stools) 12/27/19 1/26/20  Lyndon Harding PA-C   potassium chloride (K-DUR,KLOR-CON) 20 mEq tablet Take 1 tablet (20 mEq total) by mouth daily for 5 days 12/26/19 1/15/20  Lyndon Harding PA-C   torsemide [facility]) 20 mg tablet Take 1 tablet (20 mg total) by mouth daily for 5 days 12/26/19 1/15/20  Lyndon Harding PA-C   zolpidem (AMBIEN) 5 mg tablet Take 1 tablet (5 mg total) by mouth daily at bedtime as needed for sleep 1/15/20   Shellie Hand,        Physical Exam:  General: Alert, oriented, well developed, no acute idstress  HEENT/NECK:  PERRLA  No jugular venous distention  Cardiac:Regular rate and rhythm, No murmurs rubs or gallops  Pulmonary:Breath sounds clear bilaterally  Abdomen:  Non-tender, Non-distended  Positive bowel sounds  Upper extremities: 2+ radial pulses; brisk capillary refill  Lower extremities: Extremities warm/dry  PT/DP pules 2+ bilaterally  No edema B/L  Incisions: Sternum is stable  Incision is clean, dry, and intact  Saphenectomy incision is clean, dry, and intact  Musculoskeletal: MAEE, stable gait  Neuro: Alert and oriented X 3  Sensation is grossly intact  No focal deficits  Skin: Warm/Dry, without rashes or lesions  Lab Results:     Lab Results   Component Value Date    HGBA1C 5 6 12/14/2019     Lab Results   Component Value Date    TROPONINI 0 04 12/16/2019     Assessment: Coronary artery disease  S/P coronary artery bypass grafting;    Plan:     Pretty Carlson continues to make good progress in his recovery following coronary artery bypass grafting  He is now 4 weeks post op  Incisions are well-healed and his sternum is stable  I do not palpate any sternal instability  Weight and VS are stable   I reviewed his medications with him and made no changes  I discussed the benefits of participating in cardiac rehab and have cleared him to begin the outpatient  program  He may resume driving and I reviewed his lifting restriction of no more than 25 lbs for an additional 2 months, until he reaches 12 weeks post-op  Gin Harrison has already been evaluated by his cardiologist for ongoing medical care  At this point I have not scheduled him for routine followup care with our office  I have advised him to establish himself with a PCP for routine medical care  Gin Harrison was comfortable with our recommendations and his questions were answered to his satisfaction        RALPH Christopher  1/20/20

## 2020-01-29 DIAGNOSIS — Z95.1 S/P CABG X 3: ICD-10-CM

## 2020-01-29 RX ORDER — OMEPRAZOLE 20 MG/1
20 CAPSULE, DELAYED RELEASE ORAL DAILY
Qty: 90 CAPSULE | Refills: 3 | Status: SHIPPED | OUTPATIENT
Start: 2020-01-29 | End: 2020-04-23

## 2020-01-29 RX ORDER — ATORVASTATIN CALCIUM 80 MG/1
80 TABLET, FILM COATED ORAL
Qty: 90 TABLET | Refills: 3 | Status: SHIPPED | OUTPATIENT
Start: 2020-01-29 | End: 2020-12-03

## 2020-01-29 NOTE — TELEPHONE ENCOUNTER
pts wife called and said that they are not sure if pt is to continue these meds that he was put on in the hospital or if he is done with them?  If he to continue them please send refills to [facility]

## 2020-04-21 ENCOUNTER — HOSPITAL ENCOUNTER (EMERGENCY)
Facility: HOSPITAL | Age: 60
Discharge: HOME | End: 2020-04-21
Attending: EMERGENCY MEDICINE | Admitting: EMERGENCY MEDICINE

## 2020-04-21 VITALS
BODY MASS INDEX: 28.98 KG/M2 | HEART RATE: 62 BPM | DIASTOLIC BLOOD PRESSURE: 80 MMHG | TEMPERATURE: 97.9 F | OXYGEN SATURATION: 98 % | RESPIRATION RATE: 18 BRPM | WEIGHT: 185 LBS | SYSTOLIC BLOOD PRESSURE: 149 MMHG

## 2020-04-21 DIAGNOSIS — M10.9 GOUT: Primary | ICD-10-CM

## 2020-04-21 PROCEDURE — 99283 EMERGENCY DEPT VISIT LOW MDM: CPT

## 2020-04-21 PROCEDURE — 99284 EMERGENCY DEPT VISIT MOD MDM: CPT | Performed by: NURSE PRACTITIONER

## 2020-04-21 RX ORDER — PREDNISONE 20 MG/1
60 TABLET ORAL DAILY
Qty: 9 TABLET | Refills: 0 | Status: SHIPPED | OUTPATIENT
Start: 2020-04-21 | End: 2020-04-24

## 2020-04-21 RX ORDER — HYDROCODONE BITARTRATE AND ACETAMINOPHEN 5; 325 MG/1; MG/1
1 TABLET ORAL EVERY 6 HOURS PRN
Qty: 12 TABLET | Refills: 0 | Status: SHIPPED | OUTPATIENT
Start: 2020-04-21

## 2020-04-22 ENCOUNTER — TELEPHONE (OUTPATIENT)
Dept: OTHER | Facility: OTHER | Age: 60
End: 2020-04-22

## 2020-04-23 ENCOUNTER — TELEMEDICINE (OUTPATIENT)
Dept: CARDIOLOGY CLINIC | Facility: CLINIC | Age: 60
End: 2020-04-23

## 2020-04-23 VITALS
BODY MASS INDEX: 29.35 KG/M2 | SYSTOLIC BLOOD PRESSURE: 149 MMHG | HEIGHT: 67 IN | HEART RATE: 62 BPM | DIASTOLIC BLOOD PRESSURE: 80 MMHG | WEIGHT: 187 LBS

## 2020-04-23 DIAGNOSIS — Z95.1 S/P CABG X 3: ICD-10-CM

## 2020-04-23 DIAGNOSIS — Z72.0 TOBACCO ABUSE: Chronic | ICD-10-CM

## 2020-04-23 DIAGNOSIS — Z95.5 S/P PRIMARY ANGIOPLASTY WITH CORONARY STENT: ICD-10-CM

## 2020-04-23 DIAGNOSIS — I25.118 CORONARY ARTERY DISEASE OF NATIVE ARTERY OF NATIVE HEART WITH STABLE ANGINA PECTORIS (HCC): Primary | ICD-10-CM

## 2020-04-23 PROCEDURE — G2012 BRIEF CHECK IN BY MD/QHP: HCPCS | Performed by: INTERNAL MEDICINE

## 2020-04-23 RX ORDER — POTASSIUM CHLORIDE 20 MEQ/1
20 TABLET, EXTENDED RELEASE ORAL DAILY
COMMUNITY
Start: 2020-01-31 | End: 2020-04-23

## 2020-04-23 RX ORDER — ASPIRIN 81 MG/1
81 TABLET, CHEWABLE ORAL DAILY
Qty: 90 TABLET | Refills: 3 | Status: SHIPPED | OUTPATIENT
Start: 2020-04-23 | End: 2022-02-14 | Stop reason: SDUPTHER

## 2020-12-01 DIAGNOSIS — Z95.1 S/P CABG X 3: ICD-10-CM

## 2020-12-03 RX ORDER — ATORVASTATIN CALCIUM 80 MG/1
80 TABLET, FILM COATED ORAL
Qty: 90 TABLET | Refills: 5 | Status: SHIPPED | OUTPATIENT
Start: 2020-12-03 | End: 2022-01-20

## 2022-01-20 DIAGNOSIS — Z95.1 S/P CABG X 3: ICD-10-CM

## 2022-01-20 RX ORDER — ATORVASTATIN CALCIUM 80 MG/1
80 TABLET, FILM COATED ORAL
Qty: 30 TABLET | Refills: 0 | Status: SHIPPED | OUTPATIENT
Start: 2022-01-20 | End: 2022-01-20 | Stop reason: SDUPTHER

## 2022-01-20 NOTE — TELEPHONE ENCOUNTER
Name of Caller: Patient     Call back Number: [phone]    Medication(s): Metoprolol and the atorvastatin    Are we prescribing provider?: Yes    30 or 90 day supply: 30 Day    Pharmacy name/number: [address] in [facility]  Last or Next appt?: 2/14/2022  Patient out of medication, can we refill until his upcoming appt

## 2022-01-20 NOTE — TELEPHONE ENCOUNTER
Placed Rx for 30 days  He will need an appointment with a new cardiologist for more refills, he was last seen by Dr Namrata Villegas 1 year ago

## 2022-01-21 RX ORDER — ATORVASTATIN CALCIUM 80 MG/1
80 TABLET, FILM COATED ORAL
Qty: 30 TABLET | Refills: 0 | Status: SHIPPED | OUTPATIENT
Start: 2022-01-21 | End: 2022-02-14 | Stop reason: SDUPTHER

## 2022-02-14 ENCOUNTER — OFFICE VISIT (OUTPATIENT)
Dept: CARDIOLOGY CLINIC | Facility: CLINIC | Age: 62
End: 2022-02-14

## 2022-02-14 VITALS
SYSTOLIC BLOOD PRESSURE: 122 MMHG | HEIGHT: 66 IN | RESPIRATION RATE: 16 BRPM | HEART RATE: 62 BPM | BODY MASS INDEX: 30.53 KG/M2 | DIASTOLIC BLOOD PRESSURE: 80 MMHG | WEIGHT: 190 LBS

## 2022-02-14 DIAGNOSIS — Z95.1 S/P CABG X 3: ICD-10-CM

## 2022-02-14 DIAGNOSIS — I25.10 CORONARY ARTERY DISEASE INVOLVING NATIVE HEART, UNSPECIFIED VESSEL OR LESION TYPE, UNSPECIFIED WHETHER ANGINA PRESENT: Primary | ICD-10-CM

## 2022-02-14 DIAGNOSIS — G47.00 INSOMNIA, UNSPECIFIED TYPE: ICD-10-CM

## 2022-02-14 DIAGNOSIS — I25.118 CORONARY ARTERY DISEASE OF NATIVE ARTERY OF NATIVE HEART WITH STABLE ANGINA PECTORIS (HCC): ICD-10-CM

## 2022-02-14 PROCEDURE — 99214 OFFICE O/P EST MOD 30 MIN: CPT | Performed by: INTERNAL MEDICINE

## 2022-02-14 PROCEDURE — 93000 ELECTROCARDIOGRAM COMPLETE: CPT | Performed by: INTERNAL MEDICINE

## 2022-02-14 RX ORDER — ATORVASTATIN CALCIUM 80 MG/1
80 TABLET, FILM COATED ORAL
Qty: 30 TABLET | Refills: 0 | Status: SHIPPED | OUTPATIENT
Start: 2022-02-14

## 2022-02-14 RX ORDER — ASPIRIN 81 MG/1
81 TABLET, CHEWABLE ORAL DAILY
Qty: 90 TABLET | Refills: 3 | Status: SHIPPED | OUTPATIENT
Start: 2022-02-14

## 2022-02-14 NOTE — PROGRESS NOTES
Cardiology Follow Up    Adarsh Rizvi  1960  [id number]  [facility] [place], [place] [facility] [place]  [id number] [phone]    1  Coronary artery disease involving native heart, unspecified vessel or lesion type, unspecified whether angina present  -     POCT ECG    2  S/P CABG x 3  -     metoprolol tartrate (LOPRESSOR) 25 mg tablet; Take 0 5 tablets (12 5 mg total) by mouth every 12 (twelve) hours  -     atorvastatin (LIPITOR) 80 mg tablet; Take 1 tablet (80 mg total) by mouth daily with dinner    3  Insomnia, unspecified type    4  Coronary artery disease of native artery of native heart with stable angina pectoris (HCC)  -     aspirin 81 mg chewable tablet; Chew 1 tablet (81 mg total) daily          Interval History:  60-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  Prior to that he had had stents in 2 vessels  His surgery consisted of a LIMA to the LAD and a vein graft to the PDA and OM circ  He does not get exertional discomfort  He does walk some  Unfortunately, his wife passed away and he has been rather depressed  He ran out of his medicines and wishes to have them renewed  He does not have a family doctor  Previous HDL before surgery was low with a markedly elevated LDL cholesterol      Patient Active Problem List   Diagnosis    Unstable angina (HCC)    Tobacco abuse    Coronary artery disease involving native coronary artery    Pure hypercholesterolemia    Presence of drug coated stent in left circumflex coronary artery    S/P right coronary artery (RCA) stent placement    Chest pain    S/P primary angioplasty with coronary stent    Bradycardia    Pulmonary nodule    Atherosclerosis of native coronary artery with angina pectoris (HCC)    S/P CABG x 3    Pulmonary edema    Acute respiratory failure with hypoxia (HCC)    MARK [facility]acute kidney injury) ([facility] [address] )    Hyperchloremia    Acute blood loss anemia    Encounter for postoperative care     Past Medical History:   Diagnosis Date    CAD (coronary artery disease)     Family history of MI (myocardial infarction)     Former smoker     Hyperlipidemia     Irregular heart beat      Social History     Socioeconomic History    Marital status: /Civil Union     Spouse name: Not on file    Number of children: Not on file    Years of education: Not on file    Highest education level: Not on file   Occupational History    Not on file   Tobacco Use    Smoking status: Current Every Day Smoker     Packs/day: 0 50     Types: Cigarettes    Smokeless tobacco: Never Used   Substance and Sexual Activity    Alcohol use: Never    Drug use: No    Sexual activity: Never   Other Topics Concern    Not on file   Social History Narrative    Not on file     Social Determinants of Health     Financial Resource Strain: Not on file   Food Insecurity: Not on file   Transportation Needs: Not on file   Physical Activity: Not on file   Stress: Not on file   Social Connections: Not on file   Intimate Partner Violence: Not on file   Housing Stability: Not on file      Family History   Family history unknown: Yes     Past Surgical History:   Procedure Laterality Date    CORONARY ANGIOPLASTY WITH STENT PLACEMENT  10/27/2017    TAMARA to prox  Cfx    NM CABG, ARTERY-VEIN, THREE N/A 12/19/2019    Procedure: CORONARY ARTERY BYPASS GRAFT (CABG) 3 VESSELS: LIMA to LAD, EVH/SVG to PDA and OM;  Surgeon: Ernie Genao DO;  [facility]: [facility];  Service: Cardiac Surgery    NM ECHO TRANSESOPHAG R-T 2D W/PRB IMG ACQUISJ I&R N/A 12/19/2019    Procedure: TRANSESOPHAGEAL ECHOCARDIOGRAM (PHILIP); Surgeon: Ernie Genao DO;  [facility];  Service: Cardiac Surgery    NM ENDOSCOPY W/VIDEO-ASST VEIN HARVEST,CABG Left 12/19/2019    Procedure: HARVEST VEIN ENDOSCOPIC ([address]);   Surgeon: Ernie Genao DO;  Location: [facility]; Service: Cardiac Surgery       Current Outpatient Medications:     aspirin 81 mg chewable tablet, Chew 1 tablet (81 mg total) daily, Disp: 90 tablet, Rfl: 3    atorvastatin (LIPITOR) 80 mg tablet, Take 1 tablet (80 mg total) by mouth daily with dinner, Disp: 30 tablet, Rfl: 0    metoprolol tartrate (LOPRESSOR) 25 mg tablet, Take 0 5 tablets (12 5 mg total) by mouth every 12 (twelve) hours, Disp: 30 tablet, Rfl: 0    zolpidem (AMBIEN) 5 mg tablet, Take 1 tablet (5 mg total) by mouth daily at bedtime as needed for sleep, Disp: 30 tablet, Rfl: 0    HYDROcodone-acetaminophen (NORCO) 5-325 mg per tablet, Take 1 tablet by mouth every 6 (six) hours as needed for pain for up to 12 dosesMax Daily Amount: 4 tablets (Patient not taking: Reported on 2/14/2022 ), Disp: 12 tablet, Rfl: 0  No Known Allergies    Labs:  No visits with results within 2 Month(s) from this visit  Latest known visit with results is:   No results displayed because visit has over 200 results  Imaging: No results found  Review of Systems:  Review of Systems    Physical Exam:  122/80  Looks depressed  Overweight  Skin warm dry  Pupils equal   Carotids 2+ without bruits  Lungs reveal scattered wheezes on forced expiration  Rhythm regular  No murmurs or gallops  Bowel sounds present  Femoral pulses okay  No edema  Good strength in all extremities  EKG is normal        Discussion/Summary:    1  Previous bypass surgery and previous stenting now without angina pectoris  2  Nicotine dependence  3  Obesity  4  Depression    Recommendations:    1  Medications renewed  2  Should have cholesterol checked after he begins taking his Lipitor again  3  Go see a primary doctor  4  Patient advised to discontinue smoking          Sidra Short MD

## (undated) DEVICE — STERNAL WIRE

## (undated) DEVICE — TRAY FOLEY 16FR SURESTEP TEMP SENS URIMETER STAT LOK

## (undated) DEVICE — SUT SILK 3-0 SH CR/8 18 IN C013D

## (undated) DEVICE — LIGHT HANDLE COVER SLEEVE DISP BLUE STELLAR

## (undated) DEVICE — SILVER-COATED ANTIBACTERIAL BARRIER DRESSING: Brand: ACTICOAT SURGIC 10X25CM 5PK US

## (undated) DEVICE — 40601 PROLONGED POSITIONING SYSTEM: Brand: 40601 PROLONGED POSITIONING SYSTEM

## (undated) DEVICE — SILVER-COATED ANTIBACTERIAL BARRIER DRESSING: Brand: ACTICOAT SURGIC 10X12CM 5PK US

## (undated) DEVICE — ADHESIVE SKIN HIGH VISCOSITY EXOFIN 1ML

## (undated) DEVICE — SYRINGE 50ML LL

## (undated) DEVICE — PLEDGET CARDIO PTFE 9.5 X 4.8 SOFT LF (6EA/PK)

## (undated) DEVICE — INTENDED FOR TISSUE SEPARATION, AND OTHER PROCEDURES THAT REQUIRE A SHARP SURGICAL BLADE TO PUNCTURE OR CUT.: Brand: BARD-PARKER ® CARBON RIB-BACK BLADES

## (undated) DEVICE — DRESSING ALLEVYN LIFE HEEL 25 X 25.2CM

## (undated) DEVICE — AORTIC PUNCH 5.2 MM DISP

## (undated) DEVICE — TUBING INSUFFLATION SET ISO CONNECTOR

## (undated) DEVICE — SUT PROLENE 5-0 C-1/C-1 36 IN 8321H

## (undated) DEVICE — THERMOFLECT BLANKET, L, 25EA                               TS THERMOFLECT BLANKET, 48" X 84", SILVER, 5/BG, 5 BG/CS NW: Brand: THERMOFLECT

## (undated) DEVICE — LIGACLIP MCA MULTIPLE CLIP APPLIERS, 20 SMALL CLIPS: Brand: LIGACLIP

## (undated) DEVICE — EVERGRIP INSERT SET 86MM: Brand: FOGARTY EVERGRIP

## (undated) DEVICE — GAUZE SPONGES,16 PLY: Brand: CURITY

## (undated) DEVICE — BLADE BEAVER MINI SZ 69

## (undated) DEVICE — 3000CC GUARDIAN II: Brand: GUARDIAN

## (undated) DEVICE — BONE WAX WHITE: Brand: BONE WAX WHITE

## (undated) DEVICE — ALCON OPHTHALMIC KNIFE 15 °: Brand: ALCON

## (undated) DEVICE — LIGACLIP MCA MULTIPLE CLIP APPLIERS, 20 MEDIUM CLIPS: Brand: LIGACLIP

## (undated) DEVICE — ACE WRAP 6 IN UNSTERILE

## (undated) DEVICE — SUT PROLENE 6-0 C-1/C-1 30 IN 8307H

## (undated) DEVICE — VASOVIEW HEMOPRO 2: Brand: VASOVIEW HEMOPRO 2

## (undated) DEVICE — PENCIL ELECTROSURG E-Z CLEAN -0035H

## (undated) DEVICE — ANTIBACTERIAL UNDYED BRAIDED (POLYGLACTIN 910), SYNTHETIC ABSORBABLE SUTURE: Brand: COATED VICRYL

## (undated) DEVICE — 32 FR RIGHT ANGLE – SOFT PVC CATHETER: Brand: PVC THORACIC CATHETERS

## (undated) DEVICE — SUT PROLENE 4-0 BB 36 IN 8581H

## (undated) DEVICE — SUT PDS PLUS 1 CTB 36 IN PDPB359T

## (undated) DEVICE — PLUMEPEN PRO 10FT

## (undated) DEVICE — SURGICEL 4 X 8

## (undated) DEVICE — GLOVE INDICATOR PI UNDERGLOVE SZ 8 BLUE

## (undated) DEVICE — GLOVE SRG BIOGEL ECLIPSE 8

## (undated) DEVICE — CATH STRAIGHT RED RUBBER 20 FR

## (undated) DEVICE — ELECTRODE BLADE E-Z CLEAN 4IN -0014A

## (undated) DEVICE — DRESSING ALLEVYN LIFE SACRAL 6.75 X 6.5 IN

## (undated) DEVICE — SUT ETHIBOND 2-0 SH-1/SH-1 30 IN X763H

## (undated) DEVICE — SUCTION CATH 18 FR

## (undated) DEVICE — PACK CABG PBDS

## (undated) DEVICE — SUT VICRYL 2 TP-1 27 IN J849G

## (undated) DEVICE — INTENDED FOR TISSUE SEPARATION, AND OTHER PROCEDURES THAT REQUIRE A SHARP SURGICAL BLADE TO PUNCTURE OR CUT.: Brand: BARD-PARKER SAFETY BLADES SIZE 15, STERILE

## (undated) DEVICE — SUT SILK 0 CT-1 30 IN 424H

## (undated) DEVICE — SUT PROLENE 7-0 BV175-8/BV175-8 24 IN EPM8747

## (undated) DEVICE — BLANKET HYPOTHERMIA ADULT GAYMAR

## (undated) DEVICE — RECIP.STERNUM SAW BLADE 34/7.5/0.7MM: Brand: AESCULAP

## (undated) DEVICE — SUT SILK 2 60 IN SA8H

## (undated) DEVICE — FILTER SMOKE EVAC VIROSAFE

## (undated) DEVICE — 32 FR STRAIGHT – SOFT PVC CATHETER: Brand: PVC THORACIC CATHETERS

## (undated) DEVICE — OASIS DRAIN, DUAL, IN-LINE, ATS COMPATIBLE: Brand: OASIS